# Patient Record
Sex: MALE | Race: WHITE | NOT HISPANIC OR LATINO | Employment: FULL TIME | URBAN - METROPOLITAN AREA
[De-identification: names, ages, dates, MRNs, and addresses within clinical notes are randomized per-mention and may not be internally consistent; named-entity substitution may affect disease eponyms.]

---

## 2017-01-30 ENCOUNTER — PATIENT MESSAGE (OUTPATIENT)
Dept: INTERNAL MEDICINE | Facility: CLINIC | Age: 57
End: 2017-01-30

## 2017-01-31 DIAGNOSIS — Z12.11 SPECIAL SCREENING FOR MALIGNANT NEOPLASMS, COLON: Primary | ICD-10-CM

## 2017-01-31 RX ORDER — SODIUM, POTASSIUM,MAG SULFATES 17.5-3.13G
SOLUTION, RECONSTITUTED, ORAL ORAL
Qty: 354 ML | Refills: 0 | Status: ON HOLD | OUTPATIENT
Start: 2017-01-31 | End: 2017-04-08 | Stop reason: HOSPADM

## 2017-02-10 ENCOUNTER — OFFICE VISIT (OUTPATIENT)
Dept: DERMATOLOGY | Facility: CLINIC | Age: 57
End: 2017-02-10
Payer: COMMERCIAL

## 2017-02-10 DIAGNOSIS — L25.9 CONTACT DERMATITIS, UNSPECIFIED CONTACT DERMATITIS TYPE, UNSPECIFIED TRIGGER: Primary | ICD-10-CM

## 2017-02-10 DIAGNOSIS — B07.9 VIRAL WARTS, UNSPECIFIED TYPE: ICD-10-CM

## 2017-02-10 PROCEDURE — 99999 PR PBB SHADOW E&M-EST. PATIENT-LVL II: CPT | Mod: PBBFAC,,, | Performed by: DERMATOLOGY

## 2017-02-10 PROCEDURE — 99202 OFFICE O/P NEW SF 15 MIN: CPT | Mod: 25,S$GLB,, | Performed by: DERMATOLOGY

## 2017-02-10 PROCEDURE — 17110 DESTRUCTION B9 LES UP TO 14: CPT | Mod: S$GLB,,, | Performed by: DERMATOLOGY

## 2017-02-10 RX ORDER — FLUTICASONE PROPIONATE 0.5 MG/G
CREAM TOPICAL 2 TIMES DAILY
Qty: 60 G | Refills: 0 | Status: SHIPPED | OUTPATIENT
Start: 2017-02-10 | End: 2018-05-21

## 2017-02-10 RX ORDER — KETOCONAZOLE 20 MG/G
CREAM TOPICAL 2 TIMES DAILY
Qty: 60 G | Refills: 0 | Status: SHIPPED | OUTPATIENT
Start: 2017-02-10 | End: 2018-05-21

## 2017-02-10 NOTE — PROGRESS NOTES
Subjective:       Patient ID:  Dylan Sawant is a 56 y.o. male who presents for   Chief Complaint   Patient presents with    Lesion     HPI Comments: Pt c/o itchy red rash on right buttock x 1-2 weeks. Tx with unknown otc cream. No improvement.    Lesion  - Initial  Affected locations: left elbow  Duration: 1 year  Signs / symptoms: asymptomatic  Severity: mild to moderate  Timing: constant  Aggravated by: nothing  Relieving factors/Treatments tried: nothing  Improvement on treatment: no relief    He was unknowingly applying Prep H pads to his buttock, thinking they were moist hygiene wipes  Past Medical History   Diagnosis Date    Allergy     Anxiety     Elevated BP     Hyperlipidemia     KELLY (obstructive sleep apnea)     Restless leg syndrome      Review of Systems   Constitutional: Negative for fever, chills, fatigue and malaise.   Skin: Positive for activity-related sunscreen use. Negative for daily sunscreen use and recent sunburn.   Hematologic/Lymphatic: Does not bruise/bleed easily.        Objective:    Physical Exam   Constitutional: He appears well-developed and well-nourished. No distress.   Neurological: He is alert and oriented to person, place, and time. He is not disoriented.   Psychiatric: He has a normal mood and affect.   Skin:   Areas Examined (abnormalities noted in diagram):   Head / Face Inspection Performed  Neck Inspection Performed  Chest / Axilla Inspection Performed  Back Inspection Performed  RUE Inspected  LUE Inspection Performed              Diagram Legend     Erythematous scaling macule/papule c/w actinic keratosis       Vascular papule c/w angioma      Pigmented verrucoid papule/plaque c/w seborrheic keratosis      Yellow umbilicated papule c/w sebaceous hyperplasia      Irregularly shaped tan macule c/w lentigo     1-2 mm smooth white papules consistent with Milia      Movable subcutaneous cyst with punctum c/w epidermal inclusion cyst      Subcutaneous movable cyst c/w  pilar cyst      Firm pink to brown papule c/w dermatofibroma      Pedunculated fleshy papule(s) c/w skin tag(s)      Evenly pigmented macule c/w junctional nevus     Mildly variegated pigmented, slightly irregular-bordered macule c/w mildly atypical nevus      Flesh colored to evenly pigmented papule c/w intradermal nevus       Pink pearly papule/plaque c/w basal cell carcinoma      Erythematous hyperkeratotic cursted plaque c/w SCC      Surgical scar with no sign of skin cancer recurrence      Open and closed comedones      Inflammatory papules and pustules      Verrucoid papule consistent consistent with wart     Erythematous eczematous patches and plaques     Dystrophic onycholytic nail with subungual debris c/w onychomycosis     Umbilicated papule    Erythematous-base heme-crusted tan verrucoid plaque consistent with inflamed seborrheic keratosis     Erythematous Silvery Scaling Plaque c/w Psoriasis     See annotation      Assessment / Plan:        Contact dermatitis, unspecified contact dermatitis type, unspecified trigger with likely component of intertrigo  -     fluticasone (CUTIVATE) 0.05 % cream; Apply topically 2 (two) times daily.  Dispense: 60 g; Refill: 0  -     ketoconazole (NIZORAL) 2 % cream; Apply topically 2 (two) times daily.  Dispense: 60 g; Refill: 0  I discussed the side effects of topical steroids including atrophy, telangiectasias, striae.  Avoid use on the face and contact with the eyes  Keep area as clean and dry as possible    Verruca Vulgaris  Cryosurgery procedure note:    Verbal consent from the patient is obtained. Liquid nitrogen cryosurgery is applied to 2 verruca, as detailed in the physical exam, to produce a freeze injury. 2 consecutive freeze thaw cycles are applied to each verruca. The patient is aware that blisters (possibly blood blisters) may form.           Return for Pt will call for follow up in 3 weeks if rash persists.

## 2017-03-06 ENCOUNTER — PATIENT MESSAGE (OUTPATIENT)
Dept: INTERNAL MEDICINE | Facility: CLINIC | Age: 57
End: 2017-03-06

## 2017-03-06 ENCOUNTER — PATIENT MESSAGE (OUTPATIENT)
Dept: DERMATOLOGY | Facility: CLINIC | Age: 57
End: 2017-03-06

## 2017-03-06 DIAGNOSIS — G47.33 OSA (OBSTRUCTIVE SLEEP APNEA): ICD-10-CM

## 2017-03-07 PROBLEM — G47.33 OSA (OBSTRUCTIVE SLEEP APNEA): Status: ACTIVE | Noted: 2017-03-07

## 2017-03-08 ENCOUNTER — PATIENT MESSAGE (OUTPATIENT)
Dept: INTERNAL MEDICINE | Facility: CLINIC | Age: 57
End: 2017-03-08

## 2017-03-30 DIAGNOSIS — G25.81 RESTLESS LEGS: ICD-10-CM

## 2017-03-30 RX ORDER — ROPINIROLE 2 MG/1
TABLET, FILM COATED ORAL
Qty: 180 TABLET | Refills: 1 | Status: SHIPPED | OUTPATIENT
Start: 2017-03-30 | End: 2017-09-12 | Stop reason: SDUPTHER

## 2017-04-08 ENCOUNTER — ANESTHESIA EVENT (OUTPATIENT)
Dept: ENDOSCOPY | Facility: HOSPITAL | Age: 57
End: 2017-04-08
Payer: COMMERCIAL

## 2017-04-08 ENCOUNTER — SURGERY (OUTPATIENT)
Age: 57
End: 2017-04-08

## 2017-04-08 ENCOUNTER — ANESTHESIA (OUTPATIENT)
Dept: ENDOSCOPY | Facility: HOSPITAL | Age: 57
End: 2017-04-08
Payer: COMMERCIAL

## 2017-04-08 VITALS — RESPIRATION RATE: 40 BRPM

## 2017-04-08 PROBLEM — Z12.11 SCREENING FOR COLON CANCER: Status: ACTIVE | Noted: 2017-04-08

## 2017-04-08 PROCEDURE — D9220A PRA ANESTHESIA: Mod: 33,ANES,, | Performed by: ANESTHESIOLOGY

## 2017-04-08 PROCEDURE — D9220A PRA ANESTHESIA: Mod: 33,CRNA,, | Performed by: NURSE ANESTHETIST, CERTIFIED REGISTERED

## 2017-04-08 PROCEDURE — 63600175 PHARM REV CODE 636 W HCPCS: Performed by: NURSE ANESTHETIST, CERTIFIED REGISTERED

## 2017-04-08 PROCEDURE — 25000003 PHARM REV CODE 250: Performed by: NURSE ANESTHETIST, CERTIFIED REGISTERED

## 2017-04-08 RX ORDER — PROPOFOL 10 MG/ML
VIAL (ML) INTRAVENOUS CONTINUOUS PRN
Status: DISCONTINUED | OUTPATIENT
Start: 2017-04-08 | End: 2017-04-08

## 2017-04-08 RX ORDER — PROPOFOL 10 MG/ML
VIAL (ML) INTRAVENOUS
Status: DISCONTINUED | OUTPATIENT
Start: 2017-04-08 | End: 2017-04-08

## 2017-04-08 RX ORDER — LIDOCAINE HCL/PF 100 MG/5ML
SYRINGE (ML) INTRAVENOUS
Status: DISCONTINUED | OUTPATIENT
Start: 2017-04-08 | End: 2017-04-08

## 2017-04-08 RX ADMIN — LIDOCAINE HYDROCHLORIDE 50 MG: 20 INJECTION, SOLUTION INTRAVENOUS at 09:04

## 2017-04-08 RX ADMIN — PROPOFOL 100 MG: 10 INJECTION, EMULSION INTRAVENOUS at 09:04

## 2017-04-08 RX ADMIN — PROPOFOL 150 MCG/KG/MIN: 10 INJECTION, EMULSION INTRAVENOUS at 09:04

## 2017-04-08 NOTE — ANESTHESIA PREPROCEDURE EVALUATION
04/08/2017  Dylan Sawant is a 56 y.o., male.    [facility] Anesthesia Evaluation    I have reviewed the Patient Summary Reports.    I have reviewed the Nursing Notes.   I have reviewed the Medications.     Review of Systems  Anesthesia Hx:  No problems with previous Anesthesia Denies Hx of Anesthetic complications    Social:  Non-Smoker    Cardiovascular:   Denies Hypertension.  Denies MI.  Denies CAD.    Denies CABG/stent.   Denies Angina. hyperlipidemia    Pulmonary:   Denies COPD.  Denies Asthma.  Denies Recent URI.    Renal/:   Denies Chronic Renal Disease.     Hepatic/GI:   GERD, well controlled Denies Liver Disease.    Neurological:   Denies TIA. Denies CVA. Denies Seizures.    Endocrine:   Denies Diabetes. Denies Hypothyroidism.    Psych:   Denies Psychiatric History.          Physical Exam  General:  Well nourished    Airway/Jaw/Neck:  Airway Findings: Mouth Opening: Normal Tongue: Normal  General Airway Assessment: Adult, Good  Mallampati: II  TM Distance: 4 - 6 cm       Chest/Lungs:  Chest/Lungs Findings: Clear to auscultation, Normal Respiratory Rate     Heart/Vascular:  Heart Findings: Rate: Normal  Rhythm: Regular Rhythm  Sounds: Normal        Mental Status:  Mental Status Findings:  Cooperative, Alert and Oriented         Anesthesia Plan  Type of Anesthesia, risks & benefits discussed:  Anesthesia Type:  general  Patient's Preference:   Intra-op Monitoring Plan:   Intra-op Monitoring Plan Comments:   Post Op Pain Control Plan:   Post Op Pain Control Plan Comments:   Induction:   IV  Beta Blocker:         Informed Consent: Patient understands risks and agrees with Anesthesia plan.  Questions answered. Anesthesia consent signed with patient.  ASA Score: 2     Day of Surgery Review of History & Physical: I have interviewed and examined the patient. I have reviewed the patient's H&P dated:  There  are no significant changes.          Ready For Surgery From Anesthesia Perspective.

## 2017-04-08 NOTE — ANESTHESIA POSTPROCEDURE EVALUATION
"Anesthesia Post Evaluation    Patient: Dylan Sawant    Procedure(s) Performed: Procedure(s) (LRB):  COLONOSCOPY (N/A)    Final Anesthesia Type: general  Patient location during evaluation: PACU  Patient participation: Yes- Able to Participate  Level of consciousness: awake and alert and oriented  Post-procedure vital signs: reviewed and stable  Pain management: adequate  Airway patency: patent  PONV status at discharge: No PONV  Anesthetic complications: no      Cardiovascular status: blood pressure returned to baseline  Respiratory status: unassisted, spontaneous ventilation and room air  Hydration status: euvolemic  Follow-up not needed.        Visit Vitals    /71 (BP Location: Left arm, Patient Position: Lying, BP Method: Automatic)    Pulse 78    Temp 36.7 °C (98 °F) (Axillary)    Resp 16    Ht 5' 8" (1.727 m)    Wt 89.4 kg (197 lb)    SpO2 96%    BMI 29.95 kg/m2       Pain/Gee Score: Pain Assessment Performed: Yes (4/8/2017 10:10 AM)  Presence of Pain: non-verbal indicators absent (4/8/2017 10:10 AM)  Gee Score: 9 (4/8/2017 10:10 AM)      "

## 2017-04-08 NOTE — TRANSFER OF CARE
"Anesthesia Transfer of Care Note    Patient: Dylan Sawant    Procedure(s) Performed: Procedure(s) (LRB):  COLONOSCOPY (N/A)    Patient location: PACU    Anesthesia Type: general    Transport from OR: Transported from OR on room air with adequate spontaneous ventilation    Post pain: adequate analgesia    Post assessment: no apparent anesthetic complications    Post vital signs: stable    Level of consciousness: awake    Nausea/Vomiting: no nausea/vomiting    Complications: none          Last vitals:   Visit Vitals    /71 (BP Location: Left arm, Patient Position: Lying, BP Method: Automatic)    Pulse 78    Temp 36.7 °C (98 °F) (Axillary)    Resp 16    Ht 5' 8" (1.727 m)    Wt 89.4 kg (197 lb)    SpO2 96%    BMI 29.95 kg/m2     "

## 2017-09-12 DIAGNOSIS — G25.81 RESTLESS LEGS: ICD-10-CM

## 2017-09-12 RX ORDER — ROPINIROLE 2 MG/1
TABLET, FILM COATED ORAL
Qty: 180 TABLET | Refills: 0 | Status: SHIPPED | OUTPATIENT
Start: 2017-09-12 | End: 2017-09-25 | Stop reason: SDUPTHER

## 2017-09-25 ENCOUNTER — LAB VISIT (OUTPATIENT)
Dept: LAB | Facility: HOSPITAL | Age: 57
End: 2017-09-25
Attending: INTERNAL MEDICINE
Payer: COMMERCIAL

## 2017-09-25 ENCOUNTER — OFFICE VISIT (OUTPATIENT)
Dept: INTERNAL MEDICINE | Facility: CLINIC | Age: 57
End: 2017-09-25
Payer: COMMERCIAL

## 2017-09-25 VITALS
DIASTOLIC BLOOD PRESSURE: 89 MMHG | BODY MASS INDEX: 29.61 KG/M2 | OXYGEN SATURATION: 98 % | WEIGHT: 199.94 LBS | HEART RATE: 77 BPM | SYSTOLIC BLOOD PRESSURE: 130 MMHG | HEIGHT: 69 IN

## 2017-09-25 DIAGNOSIS — S39.012D BACK STRAIN, SUBSEQUENT ENCOUNTER: ICD-10-CM

## 2017-09-25 DIAGNOSIS — Z12.5 SPECIAL SCREENING FOR MALIGNANT NEOPLASM OF PROSTATE: ICD-10-CM

## 2017-09-25 DIAGNOSIS — Z00.00 HEALTH CARE MAINTENANCE: Primary | ICD-10-CM

## 2017-09-25 DIAGNOSIS — G47.00 INSOMNIA, UNSPECIFIED TYPE: ICD-10-CM

## 2017-09-25 DIAGNOSIS — Z23 NEED FOR TDAP VACCINATION: ICD-10-CM

## 2017-09-25 DIAGNOSIS — E78.2 MIXED HYPERLIPIDEMIA: ICD-10-CM

## 2017-09-25 DIAGNOSIS — G47.33 OSA (OBSTRUCTIVE SLEEP APNEA): ICD-10-CM

## 2017-09-25 DIAGNOSIS — B35.6 TINEA CRURIS: ICD-10-CM

## 2017-09-25 DIAGNOSIS — K21.9 GASTROESOPHAGEAL REFLUX DISEASE, ESOPHAGITIS PRESENCE NOT SPECIFIED: ICD-10-CM

## 2017-09-25 DIAGNOSIS — Z00.00 HEALTH CARE MAINTENANCE: ICD-10-CM

## 2017-09-25 DIAGNOSIS — G25.81 RESTLESS LEGS: ICD-10-CM

## 2017-09-25 LAB
ALBUMIN SERPL BCP-MCNC: 4.4 G/DL
ALP SERPL-CCNC: 47 U/L
ALT SERPL W/O P-5'-P-CCNC: 35 U/L
ANION GAP SERPL CALC-SCNC: 13 MMOL/L
AST SERPL-CCNC: 25 U/L
BASOPHILS # BLD AUTO: 0.03 K/UL
BASOPHILS NFR BLD: 0.3 %
BILIRUB SERPL-MCNC: 0.7 MG/DL
BUN SERPL-MCNC: 13 MG/DL
CALCIUM SERPL-MCNC: 9.9 MG/DL
CHLORIDE SERPL-SCNC: 104 MMOL/L
CHOLEST SERPL-MCNC: 202 MG/DL
CHOLEST/HDLC SERPL: 3.8 {RATIO}
CO2 SERPL-SCNC: 25 MMOL/L
COMPLEXED PSA SERPL-MCNC: 0.47 NG/ML
CREAT SERPL-MCNC: 1 MG/DL
DIFFERENTIAL METHOD: NORMAL
EOSINOPHIL # BLD AUTO: 0.1 K/UL
EOSINOPHIL NFR BLD: 0.6 %
ERYTHROCYTE [DISTWIDTH] IN BLOOD BY AUTOMATED COUNT: 13.9 %
EST. GFR  (AFRICAN AMERICAN): >60 ML/MIN/1.73 M^2
EST. GFR  (NON AFRICAN AMERICAN): >60 ML/MIN/1.73 M^2
ESTIMATED AVG GLUCOSE: 117 MG/DL
GLUCOSE SERPL-MCNC: 95 MG/DL
HBA1C MFR BLD HPLC: 5.7 %
HCT VFR BLD AUTO: 47.1 %
HDLC SERPL-MCNC: 53 MG/DL
HDLC SERPL: 26.2 %
HGB BLD-MCNC: 15.4 G/DL
LDLC SERPL CALC-MCNC: 123 MG/DL
LYMPHOCYTES # BLD AUTO: 2.4 K/UL
LYMPHOCYTES NFR BLD: 21.7 %
MCH RBC QN AUTO: 30.7 PG
MCHC RBC AUTO-ENTMCNC: 32.7 G/DL
MCV RBC AUTO: 94 FL
MONOCYTES # BLD AUTO: 0.8 K/UL
MONOCYTES NFR BLD: 7 %
NEUTROPHILS # BLD AUTO: 7.7 K/UL
NEUTROPHILS NFR BLD: 70.2 %
NONHDLC SERPL-MCNC: 149 MG/DL
PLATELET # BLD AUTO: 281 K/UL
PMV BLD AUTO: 11.1 FL
POTASSIUM SERPL-SCNC: 4.3 MMOL/L
PROT SERPL-MCNC: 7.6 G/DL
RBC # BLD AUTO: 5.01 M/UL
SODIUM SERPL-SCNC: 142 MMOL/L
TRIGL SERPL-MCNC: 130 MG/DL
TSH SERPL DL<=0.005 MIU/L-ACNC: 0.45 UIU/ML
WBC # BLD AUTO: 10.99 K/UL

## 2017-09-25 PROCEDURE — 83036 HEMOGLOBIN GLYCOSYLATED A1C: CPT

## 2017-09-25 PROCEDURE — 90715 TDAP VACCINE 7 YRS/> IM: CPT | Mod: S$GLB,,, | Performed by: INTERNAL MEDICINE

## 2017-09-25 PROCEDURE — 90471 IMMUNIZATION ADMIN: CPT | Mod: S$GLB,,, | Performed by: INTERNAL MEDICINE

## 2017-09-25 PROCEDURE — 36415 COLL VENOUS BLD VENIPUNCTURE: CPT

## 2017-09-25 PROCEDURE — 80061 LIPID PANEL: CPT

## 2017-09-25 PROCEDURE — 84443 ASSAY THYROID STIM HORMONE: CPT

## 2017-09-25 PROCEDURE — 85025 COMPLETE CBC W/AUTO DIFF WBC: CPT

## 2017-09-25 PROCEDURE — 99396 PREV VISIT EST AGE 40-64: CPT | Mod: 25,S$GLB,, | Performed by: INTERNAL MEDICINE

## 2017-09-25 PROCEDURE — 80053 COMPREHEN METABOLIC PANEL: CPT

## 2017-09-25 PROCEDURE — 84153 ASSAY OF PSA TOTAL: CPT

## 2017-09-25 PROCEDURE — 99999 PR PBB SHADOW E&M-EST. PATIENT-LVL III: CPT | Mod: PBBFAC,,, | Performed by: INTERNAL MEDICINE

## 2017-09-25 RX ORDER — CYCLOBENZAPRINE HCL 10 MG
10 TABLET ORAL 3 TIMES DAILY PRN
Qty: 30 TABLET | Refills: 1 | Status: SHIPPED | OUTPATIENT
Start: 2017-09-25 | End: 2017-10-25

## 2017-09-25 RX ORDER — ZOLPIDEM TARTRATE 5 MG/1
5 TABLET ORAL NIGHTLY PRN
Qty: 30 TABLET | Refills: 3 | Status: SHIPPED | OUTPATIENT
Start: 2017-09-25 | End: 2018-04-25 | Stop reason: SDUPTHER

## 2017-09-25 RX ORDER — ROPINIROLE 2 MG/1
2 TABLET, FILM COATED ORAL 2 TIMES DAILY PRN
Qty: 180 TABLET | Refills: 3 | Status: SHIPPED | OUTPATIENT
Start: 2017-09-25 | End: 2018-07-24 | Stop reason: SDUPTHER

## 2017-09-25 NOTE — PROGRESS NOTES
"Subjective:       Patient ID: Dylan Sawant is a 56 y.o. male.    Chief Complaint: Follow-up (pt states he needs bloodwork/labs done.)    HPI   57 yo M here for follow up of restless leg syndrome, hyperlipidemia, CASTILLO, GERD.   ropinorole 4mg qhs, ok 2mg bid working well. Sleep medicine referred but not seen. Not interested in CPAP. Didn't go. Also with deviated septum.  Deviated septum already operated on twice.    lexapro 20mg  Fenofibrate and prava  gerd- taking omeprazole 20mg daily. Recurrent symptoms if stops medicine.    Going to gym in morning - elliptical. Now retired.   Pulled back few times over course of year. Went to chiropractor.     He had bad ear infection and was treated at [facility] about 6 weeks ago.    Still with insomnia. Tylenol pm not helping.   Review of Systems   Constitutional: Negative for fever.   HENT: Negative.    Eyes: Negative.    Respiratory: Negative for shortness of breath.    Cardiovascular: Negative for chest pain and leg swelling.   Gastrointestinal: Negative for abdominal pain, diarrhea, nausea and vomiting.   Genitourinary: Negative.    Musculoskeletal: Negative for arthralgias.   Skin: Negative for rash.   Psychiatric/Behavioral: Positive for sleep disturbance.       Objective:   /89 (BP Location: Right arm, Patient Position: Sitting, BP Method: Medium (Manual))   Pulse 77   Ht 5' 9" (1.753 m)   Wt 90.7 kg (199 lb 15.3 oz)   SpO2 98%   BMI 29.53 kg/m²      Physical Exam   Constitutional: He is oriented to person, place, and time. He appears well-developed and well-nourished.   HENT:   Head: Normocephalic and atraumatic.   Bilateral ear canals without purulence and bilateral TM with good light reflex     Eyes: Conjunctivae and EOM are normal. Pupils are equal, round, and reactive to light.   Neck: Neck supple. No thyromegaly present.   Cardiovascular: Normal rate, regular rhythm and normal heart sounds.    No murmur heard.  Pulmonary/Chest: Effort normal and " breath sounds normal. No respiratory distress. He has no wheezes.   Abdominal: Soft. Bowel sounds are normal. He exhibits no distension. There is no tenderness.   Musculoskeletal: Normal range of motion.   Neurological: He is alert and oriented to person, place, and time.   Skin: Skin is warm and dry. No rash noted.   Psychiatric: He has a normal mood and affect. Judgment and thought content normal.   Vitals reviewed.      Assessment:       1. Health care maintenance    2. KELLY (obstructive sleep apnea)    3. Mixed hyperlipidemia    4. Gastroesophageal reflux disease, esophagitis presence not specified    5. Restless legs    6. Special screening for malignant neoplasm of prostate    7. Insomnia, unspecified type    8. Tinea cruris    9. Back strain, subsequent encounter    10. Need for Tdap vaccination        Plan:       Dylan Mann was seen today for follow-up.    Diagnoses and all orders for this visit:    Health care maintenance  -     CBC auto differential; Future  -     Comprehensive metabolic panel; Future  -     Hemoglobin A1c; Future  -     Lipid panel; Future  -     TSH; Future    KELLY (obstructive sleep apnea)  Declines further eval or cpap    Mixed hyperlipidemia  Cont statin, fenofibrate  Check lipids    Gastroesophageal reflux disease, esophagitis presence not specified  Cont ppi, recurrent symptoms immediately if stops    Restless legs  -     ropinirole (REQUIP) 2 MG tablet; Take 1 tablet (2 mg total) by mouth 2 (two) times daily as needed.    Special screening for malignant neoplasm of prostate  -     PSA, Screening; Future    Insomnia, unspecified type  -     zolpidem (AMBIEN) 5 MG Tab; Take 1 tablet (5 mg total) by mouth nightly as needed.    Tinea cruris    Back strain, subsequent encounter  -     cyclobenzaprine (FLEXERIL) 10 MG tablet; Take 1 tablet (10 mg total) by mouth 3 (three) times daily as needed for Muscle spasms. Do not take within an hour of ambien or ropinorole   To take  sparingly    Need for Tdap vaccination  -     (In Office Administered) Tdap Vaccine

## 2017-11-27 DIAGNOSIS — E78.2 MIXED HYPERLIPIDEMIA: ICD-10-CM

## 2017-11-27 RX ORDER — PRAVASTATIN SODIUM 20 MG/1
TABLET ORAL
Qty: 90 TABLET | Refills: 2 | Status: SHIPPED | OUTPATIENT
Start: 2017-11-27 | End: 2018-09-04 | Stop reason: SDUPTHER

## 2017-12-22 DIAGNOSIS — K21.9 GASTROESOPHAGEAL REFLUX DISEASE, ESOPHAGITIS PRESENCE NOT SPECIFIED: ICD-10-CM

## 2017-12-22 DIAGNOSIS — F41.1 GAD (GENERALIZED ANXIETY DISORDER): ICD-10-CM

## 2017-12-22 RX ORDER — ESCITALOPRAM OXALATE 20 MG/1
TABLET ORAL
Qty: 90 TABLET | Refills: 3 | Status: SHIPPED | OUTPATIENT
Start: 2017-12-22 | End: 2018-12-02 | Stop reason: SDUPTHER

## 2017-12-22 RX ORDER — OMEPRAZOLE 20 MG/1
CAPSULE, DELAYED RELEASE ORAL
Qty: 90 CAPSULE | Refills: 2 | Status: SHIPPED | OUTPATIENT
Start: 2017-12-22 | End: 2018-12-02 | Stop reason: SDUPTHER

## 2018-02-01 DIAGNOSIS — E78.2 MIXED HYPERLIPIDEMIA: ICD-10-CM

## 2018-02-01 RX ORDER — FENOFIBRATE 134 MG/1
CAPSULE ORAL
Qty: 90 CAPSULE | Refills: 2 | Status: SHIPPED | OUTPATIENT
Start: 2018-02-01 | End: 2018-09-03 | Stop reason: SDUPTHER

## 2018-02-04 ENCOUNTER — OFFICE VISIT (OUTPATIENT)
Dept: URGENT CARE | Facility: CLINIC | Age: 58
End: 2018-02-04
Payer: COMMERCIAL

## 2018-02-04 VITALS
DIASTOLIC BLOOD PRESSURE: 80 MMHG | OXYGEN SATURATION: 99 % | HEART RATE: 98 BPM | TEMPERATURE: 98 F | WEIGHT: 198 LBS | HEIGHT: 69 IN | SYSTOLIC BLOOD PRESSURE: 120 MMHG | RESPIRATION RATE: 18 BRPM | BODY MASS INDEX: 29.33 KG/M2

## 2018-02-04 DIAGNOSIS — S91.012A: Primary | ICD-10-CM

## 2018-02-04 PROCEDURE — 99214 OFFICE O/P EST MOD 30 MIN: CPT | Mod: 25,S$GLB,, | Performed by: INTERNAL MEDICINE

## 2018-02-04 PROCEDURE — 3008F BODY MASS INDEX DOCD: CPT | Mod: S$GLB,,, | Performed by: INTERNAL MEDICINE

## 2018-02-04 PROCEDURE — 12002 RPR S/N/AX/GEN/TRNK2.6-7.5CM: CPT | Mod: S$GLB,,, | Performed by: INTERNAL MEDICINE

## 2018-02-04 NOTE — PROGRESS NOTES
Subjective:       Patient ID: Dylan Sawant is a 57 y.o. male.    Vitals:  vitals were not taken for this visit.    Chief Complaint: Laceration    Laceration    The incident occurred 1 to 3 hours ago. The laceration is located on the left foot. The laceration is 5 cm in size. The laceration mechanism was a broken glass. The patient is experiencing no pain. He reports no foreign bodies present. His tetanus status is UTD.   laceration above left medial malleolus.  Glass broke while he was cleaning bathroom.  Had tetanus injection within last year.   Review of Systems   Constitution: Negative for weakness and malaise/fatigue.   HENT: Negative for nosebleeds.    Cardiovascular: Negative for chest pain and syncope.   Respiratory: Negative for shortness of breath.    Musculoskeletal: Negative for back pain, joint pain and neck pain.   Gastrointestinal: Negative for abdominal pain.   Genitourinary: Negative for hematuria.   Neurological: Negative for dizziness and numbness.       Objective:      Physical Exam   Skin:   Clean linear 5-6 cm laceration proximal to medial malleolus left.  No tendon visible.       Assessment:       1.  laceration left ankle   Plan:         1.  Laceration repaired.  2.  Sterile dressing.

## 2018-02-04 NOTE — PATIENT INSTRUCTIONS
Extremity Laceration: Sutures, Staples, or Tape  A laceration is a cut through the skin. If it is deep, it may require stitches (sutures) or staples to close so it can heal. Minor cuts may be treated with surgical tape closures.   X-rays may be done if something may have entered the skin through the cut. You may also need a tetanus shot if you are not up to date on this vaccination.  Home care  · Follow the health care providers instructions on how to care for the cut.  · Wash your hands with soap and warm water before and after caring for your wound. This is to help prevent infection.  · Keep the wound clean and dry. If a bandage was applied and it becomes wet or dirty, replace it. Otherwise, leave it in place for the first 24 hours, then change it once a day or as directed.  · If sutures or staples were used, clean the wound daily:  · After removing the bandage, wash the area with soap and water. Use a wet cotton swab to loosen and remove any blood or crust that forms.  · After cleaning, keep the wound clean and dry. Talk with your doctor before applying any antibiotic ointment to the wound. Reapply the bandage.  · You may remove the bandage to shower as usual after the first 24 hours, but do not soak the area in water (no swimming) until the stitches or staples are removed.  · If surgical tape closures were used, keep the area clean and dry. If it becomes wet, blot it dry with a towel.  · The doctor may prescribe an antibiotic cream or ointment to prevent infection. Do not stop taking this medication until you have finished the prescribed course or the doctor tells you to stop. The doctor may also prescribe medications for pain. Follow the doctors instructions for taking these medications.  · Avoid activities that may reopen your wound.  Follow-up care  Follow up with your health care provider. Most skin wounds heal within ten days. However, an infection may sometimes occur despite proper treatment.  Therefore, check the wound daily for the signs of infection listed below. Stitches and staples should be removed within 7-14 days. If surgical tape closures were used, you may remove them after 10 days if they have not fallen off by then.   When to seek medical advice  Call your health care provider right away if any of these occur:  · Wound bleeding not controlled by direct pressure  · Signs of infection, including increasing pain in the wound, increasing wound redness or swelling, or pus or bad odor coming from the wound  · Fever of 100.4°F (38ºC) or higher or as directed by your healthcare provider  · Stitches or staples come apart or fall out or surgical tape falls off before 7 days  · Wound edges re-open  · Wound changes colors  · Numbness around the wound   · Decreased movement around the injured area  Date Last Reviewed: 6/14/2015  © 8944-8569 [employer], [employer] [facility]. [address] [place] [address], [place] [address]. All rights reserved. This information is not intended as a substitute for professional medical care. Always follow your healthcare professional's instructions.

## 2018-02-04 NOTE — PROCEDURES
"Laceration Repair  Date/Time: 2/4/2018 3:23 PM  Performed by: BARRY OCONNELL  Authorized by: BARRY OCONNELL   Consent Done: Yes  Consent: Verbal consent obtained. Written consent not obtained.  Risks and benefits: risks, benefits and alternatives were discussed  Consent given by: patient  Patient understanding: patient states understanding of the procedure being performed  Patient consent: the patient's understanding of the procedure matches consent given  Procedure consent: procedure consent matches procedure scheduled  Relevant documents: relevant documents present and verified  Test results: test results available and properly labeled  Site marked: the operative site was not marked  Imaging studies: imaging studies not available  Time out: Immediately prior to procedure a "time out" was called to verify the correct patient, procedure, equipment, support staff and site/side marked as required.  Body area: lower extremity  Location details: left ankle  Laceration length: 6 cm  Foreign bodies: no foreign bodies  Tendon involvement: none  Nerve involvement: none  Vascular damage: no  Anesthesia: local infiltration    Anesthesia:  Local Anesthetic: lidocaine 2% with epinephrine  Patient sedated: no  Preparation: Patient was prepped and draped in the usual sterile fashion.  Irrigation solution: saline  Irrigation method: syringe  Amount of cleaning: standard  Debridement: none  Degree of undermining: none  Skin closure: 5-0 nylon  Number of sutures: 4  Technique: simple  Approximation: loose  Approximation difficulty: simple  Dressing: antibiotic ointment  Patient tolerance: Patient tolerated the procedure well with no immediate complications        "

## 2018-02-14 ENCOUNTER — OFFICE VISIT (OUTPATIENT)
Dept: URGENT CARE | Facility: CLINIC | Age: 58
End: 2018-02-14

## 2018-02-14 VITALS
BODY MASS INDEX: 29.33 KG/M2 | OXYGEN SATURATION: 96 % | DIASTOLIC BLOOD PRESSURE: 99 MMHG | WEIGHT: 198 LBS | HEART RATE: 98 BPM | RESPIRATION RATE: 18 BRPM | SYSTOLIC BLOOD PRESSURE: 145 MMHG | TEMPERATURE: 99 F | HEIGHT: 69 IN

## 2018-02-14 DIAGNOSIS — Z48.02 VISIT FOR SUTURE REMOVAL: Primary | ICD-10-CM

## 2018-02-14 PROCEDURE — 99499 UNLISTED E&M SERVICE: CPT | Mod: S$GLB,,, | Performed by: NURSE PRACTITIONER

## 2018-02-14 PROCEDURE — 99024 POSTOP FOLLOW-UP VISIT: CPT | Mod: S$GLB,,, | Performed by: NURSE PRACTITIONER

## 2018-02-14 NOTE — PATIENT INSTRUCTIONS
"  Suture or Staple Removal  You were seen today for a suture or staple removal. Your wound is healing as expected. The wound has healed well enough that the sutures or staples can be removed. The wound will continue to heal for the next few months.  At this time there is no sign of infection.   Home care  · If you have pain, take pain medicine as advised by your healthcare provider.   · Keep your wound clean and protected by covering it with a bandage for the next week or so.   · Wash your hands with soap and warm water before and after caring for your wound. This helps prevent infection.  · Clean the wound gently with soap and warm water daily or as directed by your childs health care provider. Do not use iodine, alcohol, or other cleansers on the wound.  Gently pat it dry. Put on a new bandage, if needed. Do not reuse bandages.  · If the area gets wet, gently pat it dry with a clean cloth. Replace the wet bandage with a dry one.  · Check the wound daily for signs of infection. (These are listed under "When to seek medical advice" below.)  · You may shower and bathe as usual. Swimming is now permitted.  Follow-up care  Follow up with your healthcare provider as advised.  When to seek medical advice   Call your healthcare provider if any of the following occur:  · Wound reopens or bleeds  · Signs of an infection, such as:  ¨ Increasing redness or swelling around the wound  ¨ Increased warmth from the wound  ¨ Worsening pain  ¨ Red streaking lines away from the wound  ¨ Fluid draining from the wound  · Fever of 100.4°F (38°C) or higher, or as directed by your child's healthcare provider  Date Last Reviewed: 9/27/2015  © 8199-7356 [employer]. [address] [place] [address], [place] [address]. All rights reserved. This information is not intended as a substitute for professional medical care. Always follow your healthcare professional's instructions.        "

## 2018-02-14 NOTE — PROGRESS NOTES
"Subjective:       Patient ID: Dylan Sawant is a 57 y.o. male.    Vitals:  height is 5' 9" (1.753 m) and weight is 89.8 kg (198 lb). His tympanic temperature is 98.5 °F (36.9 °C). His blood pressure is 145/99 (abnormal) and his pulse is 98. His respiration is 18 and oxygen saturation is 96%.     Chief Complaint: Suture / Staple Removal (left inner ankle )    This is a 57 y.o. male with Past Medical History:  No date: Allergy  No date: Anxiety  No date: Elevated BP  No date: Hyperlipidemia  No date: KELLY (obstructive sleep apnea)  No date: Restless leg syndrome   who presents today with a chief complaint of suture removal from left  inner ankle. Sutures placed in clinic 10 days ago      Suture / Staple Removal   The sutures were placed 7 to 10 days ago. He tried regular soap and water washings since the wound repair. The treatment provided significant relief. There has been no drainage from the wound. There is no redness present. The swelling has improved. There is no pain present. He has no difficulty moving the affected extremity or digit.     Review of Systems   Constitution: Negative for weakness and malaise/fatigue.   HENT: Negative for nosebleeds.    Cardiovascular: Negative for chest pain and syncope.   Respiratory: Negative for shortness of breath.    Skin: Negative for poor wound healing.   Musculoskeletal: Negative for back pain, joint pain and neck pain.   Gastrointestinal: Negative for abdominal pain.   Genitourinary: Negative for hematuria.   Neurological: Negative for dizziness and numbness.   All other systems reviewed and are negative.      Objective:      Physical Exam   Constitutional: He is oriented to person, place, and time. He appears well-developed and well-nourished.   HENT:   Head: Normocephalic and atraumatic.   Eyes: EOM are normal.   Neck: Normal range of motion. Neck supple.   Cardiovascular: Normal rate, regular rhythm, S1 normal, S2 normal, normal heart sounds and intact distal " pulses.    Pulmonary/Chest: Effort normal and breath sounds normal.   Neurological: He is alert and oriented to person, place, and time.   Skin: Skin is warm and dry. No erythema.   4 sutures intact to L lower leg. Healing as expected. No SOI   Nursing note and vitals reviewed.      Assessment:       1. Visit for suture removal        Plan:         Visit for suture removal

## 2018-02-14 NOTE — PROCEDURES
Suture Removal  Date/Time: 2/14/2018 1:23 PM  Performed by: JD CARTER  Authorized by: JD CARTER   Body area: lower extremity  Location details: left ankle  Wound Appearance: clean, well healed, no drainage and nontender  Sutures Removed: 4  Post-removal: dressing applied  Facility: sutures placed in this facility  Complications: No  Patient tolerance: Patient tolerated the procedure well with no immediate complications

## 2018-03-05 NOTE — TELEPHONE ENCOUNTER
Called pt to let him know that he would need a follow-up to see provider before can approve refill on medication because has been over a year since last seen. Pt states when he gets some time he will make an appointment.

## 2018-04-25 DIAGNOSIS — G47.00 INSOMNIA, UNSPECIFIED TYPE: ICD-10-CM

## 2018-04-25 RX ORDER — ZOLPIDEM TARTRATE 5 MG/1
5 TABLET ORAL NIGHTLY PRN
Qty: 30 TABLET | Refills: 5 | Status: SHIPPED | OUTPATIENT
Start: 2018-04-25 | End: 2018-05-21

## 2018-05-07 ENCOUNTER — OFFICE VISIT (OUTPATIENT)
Dept: OTOLARYNGOLOGY | Facility: CLINIC | Age: 58
End: 2018-05-07
Payer: COMMERCIAL

## 2018-05-07 VITALS — HEART RATE: 75 BPM | SYSTOLIC BLOOD PRESSURE: 133 MMHG | DIASTOLIC BLOOD PRESSURE: 82 MMHG

## 2018-05-07 DIAGNOSIS — M95.0 NASAL DEFORMITY, ACQUIRED: Primary | ICD-10-CM

## 2018-05-07 DIAGNOSIS — T48.5X5A RHINITIS MEDICAMENTOSA: ICD-10-CM

## 2018-05-07 DIAGNOSIS — J31.0 RHINITIS MEDICAMENTOSA: ICD-10-CM

## 2018-05-07 DIAGNOSIS — J34.89 NASAL OBSTRUCTION: ICD-10-CM

## 2018-05-07 PROCEDURE — 99244 OFF/OP CNSLTJ NEW/EST MOD 40: CPT | Mod: S$GLB,,, | Performed by: OTOLARYNGOLOGY

## 2018-05-07 PROCEDURE — 99999 PR PBB SHADOW E&M-EST. PATIENT-LVL II: CPT | Mod: PBBFAC,,, | Performed by: OTOLARYNGOLOGY

## 2018-05-07 RX ORDER — PREDNISONE 10 MG/1
TABLET ORAL
Qty: 42 TABLET | Refills: 0 | Status: SHIPPED | OUTPATIENT
Start: 2018-05-07 | End: 2018-05-21 | Stop reason: ALTCHOICE

## 2018-05-09 NOTE — CONSULTS
"Mr. Sawant presents referred by Dr. Santiago for consultation.    VITAL SIGNS:  Per nurses' notes.    CHIEF COMPLAINT:  Nasal obstruction.    HISTORY OF PRESENT ILLNESS:  This is a 57-year-old white male who has had a   longstanding history of nasal obstruction.  He states that he had a   "septoplasty" x2 when he was a teenager.  He states this was at  though he   does not remember the name of the surgeon.  He now presents with nasal   obstruction, snoring, diagnosed obstructive sleep apnea and he is not able to   tolerate his CPAP mask.  He does have difficulty sleeping and is tired during   the day.    REVIEW OF SYSTEMS:  CONSTITUTIONAL: Weight loss or weight gain: Negative.  ALLERGY/IMMUNOLOGIC: Negative.  ENT/Mouth:  Hearing Loss/Dizziness/Tinnitus: Negative.  Ear Infections/Otalgia: Negative.  Rhinitis/Sinusitis/Epistaxis: Negative.  Headache/Facial Pain: Negative.  Nasal Obstruction/Snoring/KELLY: Negative.  Throat: Infections/Pain: Negative.  Hoarseness/Speech Disturbance: Negative.  Salivary Glands Disorder: Negative.  Trauma: Hx: Negative.    Cardiovascular:  MI/Angina: Negative.  Hypertension: Negative.  Endo: DM/Steroids: Negative.  Eyes: Negative.  GI: Dysphagia/Reflux: Negative.  : GYN Pregnancy: Negative.      Renal: Dialysis: Negative.  Lymph: Neck Mass/Lymphadenopathy: Negative.  Musculoskeletal: Negative.  Hem: Bleeding Disorders/Anemia: Negative.  Neuro: Cranial/Neuralgia: Negative.  Pulm: Asthma/SOB/Cough: Negative.  Skin/Breast: Negative.    PAST MEDICAL/FAMILY/SOCIAL HISTORY:    Additional Past Medical History   ENT Surgery: Positive for septorhinoplasty.   Occupational Exposure: Negative.    Problems: Negative.   Cancer: Negative.   Positive for anxiety, hypertension.  Past surgeries include tonsillectomy,   adenoidectomy as well as a colonoscopy.    Past Family History   Family history hearing loss: Negative.   Family history cancer: Positive for pancreatic cancer.   Positive for " diabetes, diverticulitis.    Past Social History   Tobacco: Nonsmoker.   Alcohol: Nondrinker.    MEDICATIONS:  Per MedCard.    ALLERGIES:  Per MedCard.    EXAMINATION:  General Appearance:  Well-developed, well-nourished 57-year-old white male in no   apparent distress.  Communication Ability:  Good.  EARS, NOSE, THROAT, MOUTH;  EARS: Clear.   External auditory canals: Clear.   Hearing: Grossly Intact.   Tympanic membranes: Clear.  NOSE:   External: Very narrow nasal vestibules with obvious past alar base reduction   scars.  He does have a positive Waller maneuver.  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   Intranasal: His septum is relatively straight.  His turbinates are 1-2+.  MOUTH:   Intraorally: Lips, teeth and gums: Normal.   Oropharynx: Normal.   Mucosa: Normal.  THROAT:   Tongue: Normal.   Palate: Somewhat low lying.   Tonsils: Absent.   Posterior pharynx: Normal.  HEAD/FACE INSPECTION: Normal and atraumatic.   Palpation/Percussion:  Non tender.   Facial Strength: Normal and symmetric.   Salivary glands: Normal.    NECK: Supple.  THYROID: No masses.  LYMPHATICS: No nodes.  RESPIRATORY:   Effort: Normal.  EYES:   Ocular Mobility: Normal.   Vision: Grossly intact.  NEURO/PSYCH:   Cranial nerves: 2-12 grossly intact.   Orientation: x3.   Mood/Affect: Normal.    Upon further discussion with Mr. Swaant, he does admit that he has been on   oxymetazoline type over-the-counter nasal sprays for many years.    RECOMMENDATION:  I have discussed my findings with him in detail as well as my   recommendations for treatment.  My first recommendation would be for him to   discontinue his oxymetazoline nasal sprays.  I will order a steroid taper for   him to help him do this.  I have discussed rebound nasal obstruction secondary   to the overuse of these sprays.  I have also asked him to do sinus rinses   utilizing distilled water and I have given him literature on this.  He will    begin Flonase as well.  We will have him return to clinic in two weeks for a   followup visit.  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  We have discussed possible   options including nasal reconstruction with auricular cartilage Batten grafts   versus the Latera implants.  We will discuss this again in two weeks when he   returns.      HG/HN  dd: 05/07/2018 09:40:56 (CDT)  td: 05/08/2018 00:51:26 (CDT)  Doc ID   #[id number]  Job ID #[id number]    CC:

## 2018-05-21 ENCOUNTER — OFFICE VISIT (OUTPATIENT)
Dept: OTOLARYNGOLOGY | Facility: CLINIC | Age: 58
End: 2018-05-21
Payer: COMMERCIAL

## 2018-05-21 VITALS — HEART RATE: 82 BPM | SYSTOLIC BLOOD PRESSURE: 153 MMHG | DIASTOLIC BLOOD PRESSURE: 92 MMHG

## 2018-05-21 DIAGNOSIS — J34.89 NASAL OBSTRUCTION: ICD-10-CM

## 2018-05-21 DIAGNOSIS — M95.0 NASAL DEFORMITY, ACQUIRED: Primary | ICD-10-CM

## 2018-05-21 PROCEDURE — 99999 PR PBB SHADOW E&M-EST. PATIENT-LVL II: CPT | Mod: PBBFAC,,, | Performed by: OTOLARYNGOLOGY

## 2018-05-21 PROCEDURE — 99214 OFFICE O/P EST MOD 30 MIN: CPT | Mod: S$GLB,,, | Performed by: OTOLARYNGOLOGY

## 2018-05-21 NOTE — PROGRESS NOTES
SUBJECTIVE:  Mr. Sawant presents back in followup approximately two weeks since   his last visit.  At that time, he was having nasal airway obstruction and was on   oxymetazoline sprays regularly.  He has gotten off these sprays and with the   help of steroid taper as well as Flonase and sinus rinses.  Today, he is   breathing better.  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  He recalled   the previous surgeon that had performed two previous rhinoplasties on him was   Dr. Linton.  We again discussed the options including auricular cartilage,   batten grafts versus Lutera implant.  He wishes to try the Lutera implants to   improve his valve collapse and airway obstruction.  He understands that I cannot   guarantee that this will cure his snoring issues and he understands this.  We   will set this up for him at his convenience.      HG/HN  dd: 05/21/2018 09:09:56 (CDT)  td: 05/21/2018 18:43:56 (CDT)  Doc ID   #[id number]  Job ID #[id number]    CC:

## 2018-06-07 ENCOUNTER — TELEPHONE (OUTPATIENT)
Dept: OTOLARYNGOLOGY | Facility: CLINIC | Age: 58
End: 2018-06-07

## 2018-06-07 DIAGNOSIS — Z01.818 PRE-OP TESTING: Primary | ICD-10-CM

## 2018-06-07 DIAGNOSIS — J34.89 NASAL OBSTRUCTION: ICD-10-CM

## 2018-06-07 DIAGNOSIS — M95.0 NASAL DEFORMITY, ACQUIRED: ICD-10-CM

## 2018-06-18 ENCOUNTER — ANESTHESIA EVENT (OUTPATIENT)
Dept: SURGERY | Facility: HOSPITAL | Age: 58
End: 2018-06-18
Payer: COMMERCIAL

## 2018-06-18 ENCOUNTER — HOSPITAL ENCOUNTER (OUTPATIENT)
Dept: CARDIOLOGY | Facility: CLINIC | Age: 58
Discharge: HOME | End: 2018-06-18
Payer: COMMERCIAL

## 2018-06-18 ENCOUNTER — HOSPITAL ENCOUNTER (OUTPATIENT)
Dept: PREADMISSION TESTING | Facility: HOSPITAL | Age: 58
Discharge: HOME | End: 2018-06-18
Attending: ANESTHESIOLOGY
Payer: COMMERCIAL

## 2018-06-18 VITALS
BODY MASS INDEX: 30.33 KG/M2 | HEART RATE: 78 BPM | WEIGHT: 204.81 LBS | HEIGHT: 69 IN | DIASTOLIC BLOOD PRESSURE: 95 MMHG | TEMPERATURE: 99 F | RESPIRATION RATE: 18 BRPM | SYSTOLIC BLOOD PRESSURE: 152 MMHG | OXYGEN SATURATION: 98 %

## 2018-06-18 DIAGNOSIS — Z01.818 PRE-OP TESTING: ICD-10-CM

## 2018-06-18 PROCEDURE — 93000 ELECTROCARDIOGRAM COMPLETE: CPT | Mod: S$GLB,,, | Performed by: INTERNAL MEDICINE

## 2018-06-18 NOTE — ANESTHESIA PREPROCEDURE EVALUATION
"                                                                                                             06/18/2018  Dylan Sawant is a 57 y.o., male.    Anesthesia Evaluation      I have reviewed the Medications.   Steroids Taken In Past Year: Prednisone    Review of Systems  Anesthesia Hx:  History of prior surgery of interest to airway management or planning: Previous anesthesia: General 4/2017: Colonoscopy with general anesthesia. Procedure performed at an [facility]. Denies Family Hx of Anesthesia complications.   Denies Personal Hx of Anesthesia complications.   Social:  Patient's occupation is [employer]. Denies Tobacco Use. Denies Alcohol Use.   Hematology/Oncology:         -- Denies Anemia:   EENT/Dental:   Denies Throat Symptoms Denies Jaw Problems   Cardiovascular:  Functional Capacity every morning: ellipitical; stretching: denies CP/SOB  Denies Coronary Artery Disease.  Denies Deep Venous Thrombosis (DVT)   Denies Hypertension.    Pulmonary:   Sleep Apnea  Denies Asthma.  Denies Chronic Obstructive Pulmonary Disease (COPD).  Obstructive Sleep Apnea (KELLY). No CPAP use- "felt like I was dying"   Renal/:  Denies Kidney Function/Disease    Hepatic/GI:  Esophageal / Stomach Disorders Gerd Controlled by chronic antireflux medication.  Denies Liver Disease    Musculoskeletal:  Denies Joint Disease     Neurological:  Denies Seizure Disorder  Denies CVA - Cerebrovasular Accident  Denies TIA - Transient Ischemic Attack  Movement Disorder Dx, Restless Leg Syndrome   Endocrine:  Denies Diabetes  Denies Thyroid Disease  Metabolic Disorders, Hyperlipoproteinemia, mixed hyperlipidemia  Psych:  Anxiety Disorder.          Physical Exam  General:  Well nourished    Airway/Jaw/Neck:  Airway Findings: Mouth Opening: Small, but > 3cm Tongue: Large  General Airway Assessment: Possible difficult intubation  Mallampati: III  TM Distance: Normal, at least 6 cm  Jaw/Neck Findings:  Neck ROM: Normal ROM    "   Dental:  Dental Findings: In tact   Chest/Lungs:  Chest/Lungs Findings: Clear to auscultation, Normal Respiratory Rate     Heart/Vascular:  Heart Findings: Rate: Normal  Rhythm: Regular Rhythm  Vascular Findings: Normal       Mental Status:  Mental Status Findings:  Cooperative, Alert and Oriented         Anesthesia Plan  Type of Anesthesia, risks & benefits discussed:  Anesthesia Type:  general  Patient's Preference: General  Intra-op Monitoring Plan: standard ASA monitors  Intra-op Monitoring Plan Comments:   Post Op Pain Control Plan: per primary service following discharge from PACU  Post Op Pain Control Plan Comments: Per primary service  Induction:   IV  Beta Blocker:  Patient is not currently on a Beta-Blocker (No further documentation required).       Informed Consent: Patient understands risks and agrees with Anesthesia plan.  Questions answered. Anesthesia consent signed with patient.  ASA Score: 3     Day of Surgery Review of History & Physical:    H&P update referred to the surgeon.         Ready For Surgery From Anesthesia Perspective.     Tony Whiting RN

## 2018-06-18 NOTE — DISCHARGE INSTRUCTIONS
Your surgery has been scheduled for:__________________________________________    You should report to:  ____[facility], located on the [place] side of the first floor of the           [facility] ([phone])  ____[facility], located on the [facility] side of the            Second floor of the [facility] ([phone])  ____3rd Floor [facility] located on the [facility] side of the [facility] [phone]  Please Note   - Tell your doctor if you take Aspirin, products containing Aspirin, herbal medications  or blood thinners, such as Coumadin, Ticlid, or Plavix.  (Consult your provider regarding holding or stopping before surgery).  - Arrange for someone to drive you home following surgery.  You will not be allowed to leave the surgical facility alone or drive yourself home following sedation and anesthesia.  Before Surgery  - Stop taking all herbal medications 14days prior to surgery  - No Motrin/Advil (Ibuprofen) 7 days before surgery  - No Aleve (Naproxen) 7 days before surgery  - Stop Taking Asprin, products containing Asprin _____days before surgery  - Stop taking blood thinners_______days before surgery  - Refrain from drinking alcoholic beverages for 24hours before and after surgery  - Stop or limit smoking _________days before surgery  Night before Surgery  - DO NOT EAT OR DRINK ANYTHING AFTER MIDNIGHT, INCLUDING GUM, HARD CANDY, MINTS, OR CHEWING TOBACCO.  - Take a shower or bath (shower is recommended).  Bathe with Hibiclens soap or an antibacterial soap from the neck down.  If not supplied by your surgeon, hibiclens soap will need to be purchased over the counter in pharmacy.  Rinse soap off thoroughly.  - Shampoo your hair with your regular shampoo  The Day of Surgery  - Take another bath or shower with hibiclens or any antibacterial soap, to reduce the chance of infection.  - Take heart and blood  pressure medications with a small sip of water, as advised by the perioperative team.  - Do not take fluid pills  - You may brush your teeth and rinse your mouth, but do not swall any additional water.   - Do not apply perfumes, powder, body lotions or deodorant on the day of surgery.  - Nail polish should be removed.  - Do not wear makeup or moisturizer  - Wear comfortable clothes, such as a button front shirt and loose fitting pants.  - Leave all jewelry, including body piercings, and valuables at home.    - Bring any devices you will neeed after surgery such as crutches or canes.  - If you have sleep apnea, please bring your CPAP machine  In the event that your physical condition changes including the onset of a cold or respiratory illness, or if you have to delay or cancel your surgery, please notify your surgeon.  Anesthesia: General Anesthesia     You are watched continuously during your procedure by your anesthesia provider.     Youre due to have surgery. During surgery, youll be given medicine called anesthesia or anesthetic. This will keep you comfortable and pain-free. Your anesthesia provider will use general anesthesia.  What is general anesthesia?  General anesthesia puts you into a state like deep sleep. It goes into the bloodstream (IV anesthetics), into the lungs (gas anesthetics), or both. You feel nothing during the procedure. You will not remember it. During the procedure, the anesthesia provider monitors you continuously. He or she checks your heart rate and rhythm, blood pressure, breathing, and blood oxygen.  · IV anesthetics. IV anesthetics are given through an IV line in your arm. Theyre often given first. This is so you are asleep before a gas anesthetic is started. Some kinds of IV anesthetics relieve pain. Others relax you. Your doctor will decide which kind is best in your case.  · Gas anesthetics. Gas anesthetics are breathed into the lungs. They are often used to keep you asleep.  They can be given through a facemask or a tube placed in your larynx or trachea (breathing tube).  ? If you have a facemask, your anesthesia provider will most likely place it over your nose and mouth while youre still awake. Youll breathe oxygen through the mask as your IV anesthetic is started. Gas anesthetic may be added through the mask.  ? If you have a tube in the larynx or trachea, it will be inserted into your throat after youre asleep.  Anesthesia tools and medicines  You will likely have:  · IV anesthetics. These are put into an IV line into your bloodstream.  · Gas anesthetics. You breathe these anesthetics into your lungs, where they pass into your bloodstream.  · Pulse oximeter. This is a small clip that is attached to the end of your finger. This measures your blood oxygen level.  · Electrocardiography leads (electrodes). These are small sticky pads that are placed on your chest. They record your heart rate and rhythm.  · Blood pressure cuff. This reads your blood pressure.  Risks and possible complications  General anesthesia has some risks. These include:  · Breathing problems  · Nausea and vomiting  · Sore throat or hoarseness (usually temporary)  · Allergic reaction to the anesthetic  · Irregular heartbeat (rare)  · Cardiac arrest (rare)   Anesthesia safety  · Follow all instructions you are given for how long not to eat or drink before your procedure.  · Be sure your doctor knows what medicines and drugs you take. This includes over-the-counter medicines, herbs, supplements, alcohol or other drugs. You will be asked when those were last taken.  · Have an adult family member or friend drive you home after the procedure.  · For the first 24 hours after your surgery:  ? Do not drive or use heavy equipment.  ? Do not make important decisions or sign legal documents. If important decisions or signing legal documents is necessary during the first 24 hours after surgery, have a trusted family member  or spouse act on your behalf.  ? Avoid alcohol.  ? Have a responsible adult stay with you. He or she can watch for problems and help keep you safe.  Date Last Reviewed: 12/1/2016  © 1341-6483 [employer]. [address] [place] [address], [place] [address]. All rights reserved. This information is not intended as a substitute for professional medical care. Always follow your healthcare professional's instructions

## 2018-06-21 ENCOUNTER — TELEPHONE (OUTPATIENT)
Dept: OTOLARYNGOLOGY | Facility: CLINIC | Age: 58
End: 2018-06-21

## 2018-06-22 ENCOUNTER — ANESTHESIA (OUTPATIENT)
Dept: SURGERY | Facility: HOSPITAL | Age: 58
End: 2018-06-22
Payer: COMMERCIAL

## 2018-06-22 ENCOUNTER — HOSPITAL ENCOUNTER (OUTPATIENT)
Facility: HOSPITAL | Age: 58
Discharge: HOME | End: 2018-06-22
Attending: OTOLARYNGOLOGY | Admitting: OTOLARYNGOLOGY
Payer: COMMERCIAL

## 2018-06-22 VITALS
DIASTOLIC BLOOD PRESSURE: 96 MMHG | HEIGHT: 69 IN | HEART RATE: 87 BPM | WEIGHT: 200 LBS | BODY MASS INDEX: 29.62 KG/M2 | SYSTOLIC BLOOD PRESSURE: 136 MMHG | OXYGEN SATURATION: 95 % | TEMPERATURE: 99 F | RESPIRATION RATE: 20 BRPM

## 2018-06-22 DIAGNOSIS — J34.89 NASAL OBSTRUCTION: ICD-10-CM

## 2018-06-22 PROCEDURE — 30999 UNLISTED PROCEDURE NOSE: CPT | Mod: ,,, | Performed by: OTOLARYNGOLOGY

## 2018-06-22 PROCEDURE — 63600175 PHARM REV CODE 636 W HCPCS: Performed by: ANESTHESIOLOGY

## 2018-06-22 PROCEDURE — 25000003 PHARM REV CODE 250

## 2018-06-22 PROCEDURE — D9220A PRA ANESTHESIA: Mod: CRNA,,, | Performed by: NURSE ANESTHETIST, CERTIFIED REGISTERED

## 2018-06-22 PROCEDURE — 27000221 HC OXYGEN, UP TO 24 HOURS

## 2018-06-22 PROCEDURE — 25000003 PHARM REV CODE 250: Performed by: NURSE ANESTHETIST, CERTIFIED REGISTERED

## 2018-06-22 PROCEDURE — 36000706: Performed by: OTOLARYNGOLOGY

## 2018-06-22 PROCEDURE — 71000039 HC RECOVERY, EACH ADD'L HOUR: Performed by: OTOLARYNGOLOGY

## 2018-06-22 PROCEDURE — 71000015 HC POSTOP RECOV 1ST HR: Performed by: OTOLARYNGOLOGY

## 2018-06-22 PROCEDURE — 27800903 OPTIME MED/SURG SUP & DEVICES OTHER IMPLANTS: Performed by: OTOLARYNGOLOGY

## 2018-06-22 PROCEDURE — 36000707: Performed by: OTOLARYNGOLOGY

## 2018-06-22 PROCEDURE — D9220A PRA ANESTHESIA: Mod: ANES,,, | Performed by: ANESTHESIOLOGY

## 2018-06-22 PROCEDURE — 71000033 HC RECOVERY, INTIAL HOUR: Performed by: OTOLARYNGOLOGY

## 2018-06-22 PROCEDURE — 63600175 PHARM REV CODE 636 W HCPCS: Performed by: NURSE ANESTHETIST, CERTIFIED REGISTERED

## 2018-06-22 PROCEDURE — 25000003 PHARM REV CODE 250: Performed by: ANESTHESIOLOGY

## 2018-06-22 PROCEDURE — 25000003 PHARM REV CODE 250: Performed by: STUDENT IN AN ORGANIZED HEALTH CARE EDUCATION/TRAINING PROGRAM

## 2018-06-22 PROCEDURE — 37000009 HC ANESTHESIA EA ADD 15 MINS: Performed by: OTOLARYNGOLOGY

## 2018-06-22 PROCEDURE — 25000003 PHARM REV CODE 250: Performed by: OTOLARYNGOLOGY

## 2018-06-22 PROCEDURE — 37000008 HC ANESTHESIA 1ST 15 MINUTES: Performed by: OTOLARYNGOLOGY

## 2018-06-22 PROCEDURE — 63600175 PHARM REV CODE 636 W HCPCS

## 2018-06-22 RX ORDER — CEFAZOLIN SODIUM 1 G/3ML
INJECTION, POWDER, FOR SOLUTION INTRAMUSCULAR; INTRAVENOUS
Status: DISCONTINUED | OUTPATIENT
Start: 2018-06-22 | End: 2018-06-22

## 2018-06-22 RX ORDER — EPHEDRINE SULFATE 50 MG/ML
INJECTION, SOLUTION INTRAVENOUS
Status: DISCONTINUED | OUTPATIENT
Start: 2018-06-22 | End: 2018-06-22

## 2018-06-22 RX ORDER — PROPOFOL 10 MG/ML
VIAL (ML) INTRAVENOUS
Status: DISCONTINUED | OUTPATIENT
Start: 2018-06-22 | End: 2018-06-22

## 2018-06-22 RX ORDER — LIDOCAINE HYDROCHLORIDE AND EPINEPHRINE 10; 10 MG/ML; UG/ML
INJECTION, SOLUTION INFILTRATION; PERINEURAL
Status: DISCONTINUED | OUTPATIENT
Start: 2018-06-22 | End: 2018-06-22 | Stop reason: HOSPADM

## 2018-06-22 RX ORDER — OXYCODONE AND ACETAMINOPHEN 5; 325 MG/1; MG/1
1 TABLET ORAL ONCE
Status: COMPLETED | OUTPATIENT
Start: 2018-06-22 | End: 2018-06-22

## 2018-06-22 RX ORDER — HYDROMORPHONE HYDROCHLORIDE 1 MG/ML
0.2 INJECTION, SOLUTION INTRAMUSCULAR; INTRAVENOUS; SUBCUTANEOUS EVERY 5 MIN PRN
Status: DISCONTINUED | OUTPATIENT
Start: 2018-06-22 | End: 2018-06-22 | Stop reason: HOSPADM

## 2018-06-22 RX ORDER — ROCURONIUM BROMIDE 10 MG/ML
INJECTION, SOLUTION INTRAVENOUS
Status: DISCONTINUED | OUTPATIENT
Start: 2018-06-22 | End: 2018-06-22

## 2018-06-22 RX ORDER — SODIUM CHLORIDE 9 MG/ML
INJECTION, SOLUTION INTRAVENOUS CONTINUOUS PRN
Status: DISCONTINUED | OUTPATIENT
Start: 2018-06-22 | End: 2018-06-22

## 2018-06-22 RX ORDER — ONDANSETRON 2 MG/ML
INJECTION INTRAMUSCULAR; INTRAVENOUS
Status: DISCONTINUED | OUTPATIENT
Start: 2018-06-22 | End: 2018-06-22

## 2018-06-22 RX ORDER — LABETALOL HYDROCHLORIDE 5 MG/ML
15 INJECTION, SOLUTION INTRAVENOUS ONCE
Status: COMPLETED | OUTPATIENT
Start: 2018-06-22 | End: 2018-06-22

## 2018-06-22 RX ORDER — MEPERIDINE HYDROCHLORIDE 50 MG/ML
12.5 INJECTION INTRAMUSCULAR; INTRAVENOUS; SUBCUTANEOUS ONCE AS NEEDED
Status: DISCONTINUED | OUTPATIENT
Start: 2018-06-22 | End: 2018-06-22 | Stop reason: HOSPADM

## 2018-06-22 RX ORDER — OXYMETAZOLINE HCL 0.05 %
SPRAY, NON-AEROSOL (ML) NASAL
Status: DISCONTINUED | OUTPATIENT
Start: 2018-06-22 | End: 2018-06-22 | Stop reason: HOSPADM

## 2018-06-22 RX ORDER — HYDRALAZINE HYDROCHLORIDE 20 MG/ML
10 INJECTION INTRAMUSCULAR; INTRAVENOUS ONCE
Status: COMPLETED | OUTPATIENT
Start: 2018-06-22 | End: 2018-06-22

## 2018-06-22 RX ORDER — GLYCOPYRROLATE 0.2 MG/ML
INJECTION INTRAMUSCULAR; INTRAVENOUS
Status: DISCONTINUED | OUTPATIENT
Start: 2018-06-22 | End: 2018-06-22

## 2018-06-22 RX ORDER — ONDANSETRON 4 MG/1
8 TABLET, ORALLY DISINTEGRATING ORAL EVERY 8 HOURS PRN
Qty: 15 TABLET | Refills: 0 | Status: SHIPPED | OUTPATIENT
Start: 2018-06-22 | End: 2018-07-23

## 2018-06-22 RX ORDER — BACITRACIN ZINC 500 UNIT/G
OINTMENT (GRAM) TOPICAL
Status: DISCONTINUED | OUTPATIENT
Start: 2018-06-22 | End: 2018-06-22 | Stop reason: HOSPADM

## 2018-06-22 RX ORDER — MIDAZOLAM HYDROCHLORIDE 1 MG/ML
INJECTION, SOLUTION INTRAMUSCULAR; INTRAVENOUS
Status: DISCONTINUED | OUTPATIENT
Start: 2018-06-22 | End: 2018-06-22

## 2018-06-22 RX ORDER — LABETALOL HYDROCHLORIDE 5 MG/ML
INJECTION, SOLUTION INTRAVENOUS
Status: COMPLETED
Start: 2018-06-22 | End: 2018-06-22

## 2018-06-22 RX ORDER — FENTANYL CITRATE 50 UG/ML
INJECTION, SOLUTION INTRAMUSCULAR; INTRAVENOUS
Status: DISCONTINUED | OUTPATIENT
Start: 2018-06-22 | End: 2018-06-22

## 2018-06-22 RX ORDER — SUCCINYLCHOLINE CHLORIDE 20 MG/ML
INJECTION INTRAMUSCULAR; INTRAVENOUS
Status: DISCONTINUED | OUTPATIENT
Start: 2018-06-22 | End: 2018-06-22

## 2018-06-22 RX ORDER — NEOSTIGMINE METHYLSULFATE 1 MG/ML
INJECTION, SOLUTION INTRAVENOUS
Status: DISCONTINUED | OUTPATIENT
Start: 2018-06-22 | End: 2018-06-22

## 2018-06-22 RX ORDER — BUPIVACAINE HYDROCHLORIDE AND EPINEPHRINE 5; 5 MG/ML; UG/ML
INJECTION, SOLUTION EPIDURAL; INTRACAUDAL; PERINEURAL
Status: DISCONTINUED | OUTPATIENT
Start: 2018-06-22 | End: 2018-06-22 | Stop reason: HOSPADM

## 2018-06-22 RX ORDER — OXYCODONE AND ACETAMINOPHEN 5; 325 MG/1; MG/1
1 TABLET ORAL EVERY 4 HOURS PRN
Qty: 24 TABLET | Refills: 0 | Status: SHIPPED | OUTPATIENT
Start: 2018-06-22 | End: 2018-07-23

## 2018-06-22 RX ORDER — LABETALOL HYDROCHLORIDE 5 MG/ML
15 INJECTION, SOLUTION INTRAVENOUS ONCE
Status: DISCONTINUED | OUTPATIENT
Start: 2018-06-22 | End: 2018-06-22

## 2018-06-22 RX ORDER — DEXAMETHASONE SODIUM PHOSPHATE 4 MG/ML
INJECTION, SOLUTION INTRA-ARTICULAR; INTRALESIONAL; INTRAMUSCULAR; INTRAVENOUS; SOFT TISSUE
Status: DISCONTINUED | OUTPATIENT
Start: 2018-06-22 | End: 2018-06-22

## 2018-06-22 RX ORDER — SODIUM CHLORIDE 0.9 % (FLUSH) 0.9 %
3 SYRINGE (ML) INJECTION
Status: DISCONTINUED | OUTPATIENT
Start: 2018-06-22 | End: 2018-06-22 | Stop reason: HOSPADM

## 2018-06-22 RX ORDER — LIDOCAINE HYDROCHLORIDE 10 MG/ML
1 INJECTION, SOLUTION EPIDURAL; INFILTRATION; INTRACAUDAL; PERINEURAL ONCE
Status: COMPLETED | OUTPATIENT
Start: 2018-06-22 | End: 2018-06-22

## 2018-06-22 RX ORDER — OXYCODONE AND ACETAMINOPHEN 5; 325 MG/1; MG/1
TABLET ORAL
Status: COMPLETED
Start: 2018-06-22 | End: 2018-06-22

## 2018-06-22 RX ORDER — CEPHALEXIN 500 MG/1
500 CAPSULE ORAL EVERY 8 HOURS
Qty: 30 CAPSULE | Refills: 0 | Status: SHIPPED | OUTPATIENT
Start: 2018-06-22 | End: 2018-07-02

## 2018-06-22 RX ORDER — LIDOCAINE HCL/PF 100 MG/5ML
SYRINGE (ML) INTRAVENOUS
Status: DISCONTINUED | OUTPATIENT
Start: 2018-06-22 | End: 2018-06-22

## 2018-06-22 RX ORDER — HYDRALAZINE HYDROCHLORIDE 20 MG/ML
INJECTION INTRAMUSCULAR; INTRAVENOUS
Status: COMPLETED
Start: 2018-06-22 | End: 2018-06-22

## 2018-06-22 RX ADMIN — ROCURONIUM BROMIDE 10 MG: 10 INJECTION, SOLUTION INTRAVENOUS at 07:06

## 2018-06-22 RX ADMIN — SODIUM CHLORIDE: 0.9 INJECTION, SOLUTION INTRAVENOUS at 07:06

## 2018-06-22 RX ADMIN — Medication 0.2 MG: at 09:06

## 2018-06-22 RX ADMIN — Medication 0.2 MG: at 11:06

## 2018-06-22 RX ADMIN — LABETALOL HYDROCHLORIDE 15 MG: 5 INJECTION, SOLUTION INTRAVENOUS at 09:06

## 2018-06-22 RX ADMIN — PROPOFOL 160 MG: 10 INJECTION, EMULSION INTRAVENOUS at 07:06

## 2018-06-22 RX ADMIN — SUCCINYLCHOLINE CHLORIDE 160 MG: 20 INJECTION, SOLUTION INTRAMUSCULAR; INTRAVENOUS at 07:06

## 2018-06-22 RX ADMIN — FENTANYL CITRATE 25 MCG: 50 INJECTION, SOLUTION INTRAMUSCULAR; INTRAVENOUS at 09:06

## 2018-06-22 RX ADMIN — Medication 0.2 MG: at 10:06

## 2018-06-22 RX ADMIN — EPHEDRINE SULFATE 10 MG: 50 INJECTION, SOLUTION INTRAMUSCULAR; INTRAVENOUS; SUBCUTANEOUS at 08:06

## 2018-06-22 RX ADMIN — LIDOCAINE HYDROCHLORIDE 80 MG: 20 INJECTION, SOLUTION INTRAVENOUS at 07:06

## 2018-06-22 RX ADMIN — ONDANSETRON 4 MG: 2 INJECTION INTRAMUSCULAR; INTRAVENOUS at 08:06

## 2018-06-22 RX ADMIN — ROCURONIUM BROMIDE 40 MG: 10 INJECTION, SOLUTION INTRAVENOUS at 08:06

## 2018-06-22 RX ADMIN — SODIUM CHLORIDE, SODIUM GLUCONATE, SODIUM ACETATE, POTASSIUM CHLORIDE, MAGNESIUM CHLORIDE, SODIUM PHOSPHATE, DIBASIC, AND POTASSIUM PHOSPHATE: .53; .5; .37; .037; .03; .012; .00082 INJECTION, SOLUTION INTRAVENOUS at 08:06

## 2018-06-22 RX ADMIN — HYDRALAZINE HYDROCHLORIDE 10 MG: 20 INJECTION INTRAMUSCULAR; INTRAVENOUS at 10:06

## 2018-06-22 RX ADMIN — DEXAMETHASONE SODIUM PHOSPHATE 12 MG: 4 INJECTION, SOLUTION INTRAMUSCULAR; INTRAVENOUS at 07:06

## 2018-06-22 RX ADMIN — GLYCOPYRROLATE 0.6 MG: 0.2 INJECTION, SOLUTION INTRAMUSCULAR; INTRAVENOUS at 08:06

## 2018-06-22 RX ADMIN — OXYCODONE HYDROCHLORIDE AND ACETAMINOPHEN 1 TABLET: 5; 325 TABLET ORAL at 10:06

## 2018-06-22 RX ADMIN — NEOSTIGMINE METHYLSULFATE 5 MG: 1 INJECTION INTRAVENOUS at 08:06

## 2018-06-22 RX ADMIN — FENTANYL CITRATE 100 MCG: 50 INJECTION, SOLUTION INTRAMUSCULAR; INTRAVENOUS at 07:06

## 2018-06-22 RX ADMIN — LABETALOL HYDROCHLORIDE 8 MG: 5 INJECTION, SOLUTION INTRAVENOUS at 11:06

## 2018-06-22 RX ADMIN — OXYCODONE AND ACETAMINOPHEN 1 TABLET: 5; 325 TABLET ORAL at 10:06

## 2018-06-22 RX ADMIN — LIDOCAINE HYDROCHLORIDE: 10 INJECTION, SOLUTION EPIDURAL; INFILTRATION; INTRACAUDAL; PERINEURAL at 07:06

## 2018-06-22 RX ADMIN — MIDAZOLAM HYDROCHLORIDE 2 MG: 1 INJECTION, SOLUTION INTRAMUSCULAR; INTRAVENOUS at 07:06

## 2018-06-22 RX ADMIN — CEFAZOLIN 2 G: 330 INJECTION, POWDER, FOR SOLUTION INTRAMUSCULAR; INTRAVENOUS at 08:06

## 2018-06-22 NOTE — ANESTHESIA RELEASE NOTE
"Anesthesia Release from PACU Note    Patient: Dylan Sawant    Procedure(s) Performed: Procedure(s) (LRB):  RECONSTRUCTION, NOSE (N/A)    Anesthesia type: general    Post pain: Adequate analgesia    Post assessment: no apparent anesthetic complications    Last Vitals:   Visit Vitals  BP (!) 136/96 (BP Location: Right arm, Patient Position: Lying)   Pulse 79   Temp 37.2 °C (99 °F) (Temporal)   Resp 20   Ht 5' 9" (1.753 m)   Wt 90.7 kg (200 lb)   SpO2 95%   BMI 29.53 kg/m²       Post vital signs: stable    Level of consciousness: awake    Nausea/Vomiting: no nausea/no vomiting    Complications: none    Airway Patency: patent    Respiratory: unassisted    Cardiovascular: stable    Hydration: euvolemic  "

## 2018-06-22 NOTE — BRIEF OP NOTE
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 Did well post op. Discharged home post op.      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 for 10 days     ondansetron 4 MG Tbdl  Commonly known as:  ZOFRAN-ODT  Take 2 tablets (8 mg total) by mouth every 8 (eight) hours as needed.     oxyCODONE-acetaminophen 5-325 mg per tablet  Commonly known as:  PERCOCET  Take 1 tablet by mouth every 4 (four) hours as needed for Pain.        CONTINUE taking these medications    escitalopram oxalate 20 MG tablet  Commonly known as:  LEXAPRO  TAKE ONE TABLET BY MOUTH ONCE DAILY.     fenofibrate micronized 134 MG Cap  Commonly known as:   LOFIBRA  TAKE ONE CAPSULE BY MOUTH EVERY EVENING     omeprazole 20 MG capsule  Commonly known as:  PRILOSEC  TAKE 1 CAP BY MOUTH EVERY MORNING. TRY TO STOP IF POSSIBLE. IF REFLUX RETURNS CAN TAKE A 2 WEEK COURSE.     pravastatin 20 MG tablet  Commonly known as:  PRAVACHOL  TAKE ONE TABLET BY MOUTH ONCE DAILY.     rOPINIRole 2 MG tablet  Commonly known as:  REQUIP  Take 1 tablet (2 mg total) by mouth 2 (two) times daily as needed.            Discharge Procedure Orders  Diet Adult Regular     Activity as tolerated   Order Comments: Light activity for next 2 weeks. No lifting greater than 10 pounds for 10 days. Limit nasal trauma. Please take antibiotics for next 7 days. Pain and nausea medication as needed. Ok to shower. Regular diet.  Strips on nose will fall off by self; or removed in clinic in 1 week.     Notify your health care provider if you experience any of the following:  temperature >100.4     Notify your health care provider if you experience any of the following:  persistent nausea and vomiting or diarrhea     Notify your health care provider if you experience any of the following:  severe uncontrolled pain     Notify your health care provider if you experience any of the following:  redness, tenderness, or signs of infection (pain, swelling, redness, odor or green/yellow discharge around incision site)     Notify your health care provider if you experience any of the following:  difficulty breathing or increased cough     Notify your health care provider if you experience any of the following:  severe persistent headache     Leave dressing on - Keep it clean, dry, and intact until clinic visit   Order Comments: Keep nasal dressing on. This will be removed in clinic. Ok to shower, warm running water. Do not submerge wound in sitting water.       Follow-up Information     H Bernardo Echols MD In 1 week.    Specialty:  Otolaryngology  Why:  Post op, pack removal and wound check.   Contact information:  [id number]  [facility] [address]  [phone]

## 2018-06-22 NOTE — ANESTHESIA POSTPROCEDURE EVALUATION
"Anesthesia Post Evaluation    Patient: Dylan Sawant    Procedure(s) Performed: Procedure(s) (LRB):  RECONSTRUCTION, NOSE (N/A)    Final Anesthesia Type: general  Patient location during evaluation: PACU  Patient participation: Yes- Able to Participate  Level of consciousness: awake and alert  Post-procedure vital signs: reviewed and stable  Pain management: adequate  Airway patency: patent  PONV status at discharge: No PONV  Anesthetic complications: no      Cardiovascular status: blood pressure returned to baseline and hemodynamically stable  Respiratory status: unassisted, spontaneous ventilation and room air  Hydration status: euvolemic  Follow-up not needed.        Visit Vitals  BP (!) 136/96 (BP Location: Right arm, Patient Position: Lying)   Pulse 79   Temp 37.2 °C (99 °F) (Temporal)   Resp 20   Ht 5' 9" (1.753 m)   Wt 90.7 kg (200 lb)   SpO2 95%   BMI 29.53 kg/m²       Pain/Gee Score: Pain Assessment Performed: Yes (6/22/2018 12:04 PM)  Presence of Pain: complains of pain/discomfort (6/22/2018 12:04 PM)  Pain Rating Prior to Med Admin: 6 (6/22/2018 11:26 AM)  Pain Rating Post Med Admin: 5 (6/22/2018 11:30 AM)  Gee Score: 9 (6/22/2018 10:19 AM)      "

## 2018-06-22 NOTE — OP NOTE
DATE OF PROCEDURE: 06/22/2018    PREOPERATIVE DIAGNOSIS:    1. Nasal Obstruction  2. Internal nasal valve stenosis    POSTOPERATIVE DIAGNOSIS:    1. Nasal Obstruction  2. Internal nasal valve stenosis    PROCEDURE:    1. Nasal Reconstruction with bilateral Lutera implants and mesial crural suture CPT 36947    SURGEON: WINSTON Echols III, M.D.    ASSISTANT SURGEON: Marc Dsouza MD (RES)    ANESTHESIA: General anesthesia with endotracheal intubation  .  ESTIMATED BLOOD LOSS: 5 cc     PROCEDURE IN DETAIL:     The patient was brought to the operating room and transferred to the operating room table. General anesthesia was  induced. The table was then turned 90 degrees and the nose was decongested with Afrin, and 1% Lidocaine with  epinephrine was injected to obtain infraorbital blocks. Patients nasal area is prepped and draped in the usual  fashion. A time-out was taken.    Marking in preparation for the valve repair was done as follows: A surgical pen was used to pratik the junction of the  upper lateral cartilage and nasal bone/maxilla. On the left side of the patient, a line was drawn  starting slightly medial to the medial canthus to the upper 1/3 of the alar rim. A line was marked at 6 mm cranial to  the bone/cartilage junction along the line as the most distal placement of the implant. Creation of lateral cartilage  support was performed as follows: One (1%) percent lidocaine with epinephrine was infused along the outlined track  of implantation. The polymer implant was loaded into a sterile delivery device which includes a 16-gauge cannula.  The nasal mucosa was incised immediately cranial to the alar rim and the cannula was advanced through the incision  along the line drawn. Using the cannula, a dissection plane was created over the upper lateral cartilage. At the  junction with the nasal aspect of the maxillary bone, the cannula was brought superficial to the bone. Using the 16-  gauge cannula, a dissection  plane was created above the periosteum on the superficial aspect of the maxillary bone.  The cannula tip was advanced to the target marked 6 mm cranial to the alysa/cartilaginous junction using palpation  on the surface of the skin. Once the destination was obtained and the dissection was completed, the skin on the  nose was normalized, and the cannula was inspected to determine if its tip was at a sufficient depth. Once it was  determined that the cannula tip was at a sufficient depth, the polymer implant was delivered and the cannula  withdrawn. Thus the lateral cartilage was supported. The entry wound was inspected to determine if suture closure  was necessary. There was no bleeding at the insertion site and closure of the wound was deemed unnecessary.  The same procedure was performed on the right side of the patient. Once we inspected the right implant, it was decided to remove it and reposition the implant. This was done by making a small stab marginal incision. The incision was probed with a [place] hemostat and the implant removed. The incision was closed using a chromic suture. The right implant was replaced and appreciated to be in great position.  A small mesial crural incision was made using fine Iris scissors. A chromic suture was passed around this incision to close the wound and approximate the mesial crura.  The caudal nasal airway was appreciated to be open. At this point the procedure was deemed correct. Bilateral bacitracin soaked telfa packs were placed. Steristrips were placed on the external nasal structure.  The patient was awaken from surgery without issue.       DISPOSITION: Transferred to Postanesthesia Care Unit.    COMPLICATIONS: None.    Dr. Echols was present and participated in the entirety of the case.

## 2018-06-22 NOTE — DISCHARGE INSTRUCTIONS
After Endoscopic Sinus Surgery    After surgery youll be moved to a recovery room. You may feel groggy from the anesthesia and will likely have some discomfort. There will be a dressing under your nose to absorb drainage. You may also have packing (absorbent bandage) inside your nose. You can usually go home as soon as youre no longer feeling groggy. This is usually the same day. In certain cases, you may need to stay overnight.  The first week  Your doctor will schedule an office visit a few days after surgery to check on your progress. At this visit, your doctor will remove dried blood and mucus to help you heal. He or she will also remove any nasal packing. Its normal to feel stuffiness and have pinkish or dark red drainage. Change your nasal dressing as needed, and take any prescribed medicines. Also be sure to drink plenty of water. Other guidelines from your doctor may include:  · Rinsing your nose and sinuses with saltwater  · Sneeze with your mouth open  · Not blowing your nose  · Not doing strenuous exercise, straining, or lifting  · Using a humidifier to keep nasal passages moist  · Not taking aspirin or ibuprofen  · Sleeping with your upper body raised  · Not eating hot or spicy foods  The next few weeks  As youre healing, its normal to feel some stuffiness and have nasal crusting. Keeping your nasal passages clean and moist will help speed the healing process and prevent scarring. Also be sure to:  · Take medicine as directed  · Stay away from irritating substances such as dust, chalk, and harsh chemicals  · Use saltwater rinses or a humidifier as directed.  · Drink plenty of water  · Stay away from people who have a cold  · Stay away from allergic triggers  · Talk with your doctor before swimming or air travel  When to seek medical care  Call your healthcare provider right away if you notice any of the following:  · Large amount of bright red bleeding  · Fever of 100.4°F (38°C) or higher, or as  directed by your healthcare provider  · Changes in vision, or swelling around the eye  · Signs of infection, such as yellow or greenish drainage  · Constant headache or increasing pain  · Drainage of a large amount of clear fluid  · Extreme tiredness, or a stiff neck   Date Last Reviewed: 10/1/2016  © 6671-7203 [employer]. [address] [place] [address], [place] [address]. All rights reserved. This information is not intended as a substitute for professional medical care. Always follow your healthcare professional's instructions.

## 2018-06-22 NOTE — H&P
"Mr. Sawant presents referred by Dr. Santiago for consultation.     VITAL SIGNS:  Per nurses' notes.     CHIEF COMPLAINT:  Nasal obstruction.     HISTORY OF PRESENT ILLNESS:  This is a 57-year-old white male who has had a   longstanding history of nasal obstruction.  He states that he had a   "septoplasty" x2 when he was a teenager.  He states this was at  though he   does not remember the name of the surgeon.  He now presents with nasal   obstruction, snoring, diagnosed obstructive sleep apnea and he is not able to   tolerate his CPAP mask.  He does have difficulty sleeping and is tired during   the day.     REVIEW OF SYSTEMS:  CONSTITUTIONAL: Weight loss or weight gain: Negative.  ALLERGY/IMMUNOLOGIC: Negative.  ENT/Mouth:  Hearing Loss/Dizziness/Tinnitus: Negative.  Ear Infections/Otalgia: Negative.  Rhinitis/Sinusitis/Epistaxis: Negative.  Headache/Facial Pain: Negative.  Nasal Obstruction/Snoring/KELLY: Negative.  Throat: Infections/Pain: Negative.  Hoarseness/Speech Disturbance: Negative.  Salivary Glands Disorder: Negative.  Trauma: Hx: Negative.     Cardiovascular:  MI/Angina: Negative.  Hypertension: Negative.  Endo: DM/Steroids: Negative.  Eyes: Negative.  GI: Dysphagia/Reflux: Negative.  : GYN Pregnancy: Negative.                Renal: Dialysis: Negative.  Lymph: Neck Mass/Lymphadenopathy: Negative.  Musculoskeletal: Negative.  Hem: Bleeding Disorders/Anemia: Negative.  Neuro: Cranial/Neuralgia: Negative.  Pulm: Asthma/SOB/Cough: Negative.  Skin/Breast: Negative.     PAST MEDICAL/FAMILY/SOCIAL HISTORY:     Additional Past Medical History             ENT Surgery: Positive for septorhinoplasty.             Occupational Exposure: Negative.              Problems: Negative.             Cancer: Negative.             Positive for anxiety, hypertension.  Past surgeries include tonsillectomy,   adenoidectomy as well as a colonoscopy.     Past Family History             Family history hearing loss: Negative.   "           Family history cancer: Positive for pancreatic cancer.             Positive for diabetes, diverticulitis.     Past Social History             Tobacco: Nonsmoker.             Alcohol: Nondrinker.     MEDICATIONS:  Per MedCard.     ALLERGIES:  Per MedCard.     EXAMINATION:  General Appearance:  Well-developed, well-nourished 57-year-old white male in no   apparent distress.  Communication Ability:  Good.  EARS, NOSE, THROAT, MOUTH;  EARS: Clear.             External auditory canals: Clear.             Hearing: Grossly Intact.             Tympanic membranes: Clear.  NOSE:             External: Very narrow nasal vestibules with obvious past alar base reduction   scars.  He does have a positive Lake maneuver.  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             Intranasal: His septum is relatively straight.  His turbinates are 1-2+.  MOUTH:             Intraorally: Lips, teeth and gums: Normal.             Oropharynx: Normal.             Mucosa: Normal.  THROAT:             Tongue: Normal.             Palate: Somewhat low lying.             Tonsils: Absent.             Posterior pharynx: Normal.  HEAD/FACE INSPECTION: Normal and atraumatic.             Palpation/Percussion:  Non tender.             Facial Strength: Normal and symmetric.             Salivary glands: Normal.     NECK: Supple.  THYROID: No masses.  LYMPHATICS: No nodes.  RESPIRATORY:             Effort: Normal.  EYES:             Ocular Mobility: Normal.             Vision: Grossly intact.  NEURO/PSYCH:             Cranial nerves: 2-12 grossly intact.             Orientation: x3.             Mood/Affect: Normal.     56yo with nasal obstruction     RECOMMENDATION:     To OR   Nasal Reconstruction/Lutera Implant

## 2018-06-22 NOTE — TRANSFER OF CARE
"Anesthesia Transfer of Care Note    Patient: Dylan Sawant    Procedure(s) Performed: Procedure(s) (LRB):  RECONSTRUCTION, NOSE (N/A)    Patient location: PACU    Anesthesia Type: general    Transport from OR: Transported from OR on 6-10 L/min O2 by face mask with adequate spontaneous ventilation    Post pain: adequate analgesia    Post assessment: no apparent anesthetic complications and tolerated procedure well    Post vital signs: stable    Level of consciousness: sedated and responds to stimulation    Nausea/Vomiting: no nausea/vomiting    Complications: none    Transfer of care protocol was followed      Last vitals:   Visit Vitals  BP (!) 174/92   Pulse 72   Temp 36.5 °C (97.7 °F) (Temporal)   Resp (!) 22   Ht 5' 9" (1.753 m)   Wt 90.7 kg (200 lb)   SpO2 97%   BMI 29.53 kg/m²     "

## 2018-06-27 ENCOUNTER — OFFICE VISIT (OUTPATIENT)
Dept: OTOLARYNGOLOGY | Facility: CLINIC | Age: 58
End: 2018-06-27
Payer: COMMERCIAL

## 2018-06-27 DIAGNOSIS — J34.89 NASAL OBSTRUCTION: ICD-10-CM

## 2018-06-27 DIAGNOSIS — Z98.890 POST-OPERATIVE STATE: ICD-10-CM

## 2018-06-27 DIAGNOSIS — M95.0 NASAL DEFORMITY, ACQUIRED: Primary | ICD-10-CM

## 2018-06-27 PROCEDURE — 99999 PR PBB SHADOW E&M-EST. PATIENT-LVL II: CPT | Mod: PBBFAC,,, | Performed by: OTOLARYNGOLOGY

## 2018-06-27 PROCEDURE — 99024 POSTOP FOLLOW-UP VISIT: CPT | Mod: S$GLB,,, | Performed by: OTOLARYNGOLOGY

## 2018-06-27 NOTE — PROGRESS NOTES
One week S/P placement of Latera implants and mesial crural footplate suture.  All packs removed.  Lateral nasal walls appear well supported,airway clear.  Post-op instructions reviewed.  Plan: RTC 3 weeks.

## 2018-07-23 ENCOUNTER — OFFICE VISIT (OUTPATIENT)
Dept: OTOLARYNGOLOGY | Facility: CLINIC | Age: 58
End: 2018-07-23
Payer: COMMERCIAL

## 2018-07-23 VITALS — DIASTOLIC BLOOD PRESSURE: 83 MMHG | HEART RATE: 84 BPM | SYSTOLIC BLOOD PRESSURE: 121 MMHG

## 2018-07-23 DIAGNOSIS — J34.89 NASAL OBSTRUCTION: ICD-10-CM

## 2018-07-23 DIAGNOSIS — Z98.890 POST-OPERATIVE STATE: ICD-10-CM

## 2018-07-23 DIAGNOSIS — M95.0 NASAL DEFORMITY, ACQUIRED: Primary | ICD-10-CM

## 2018-07-23 PROCEDURE — 99999 PR PBB SHADOW E&M-EST. PATIENT-LVL II: CPT | Mod: PBBFAC,,, | Performed by: OTOLARYNGOLOGY

## 2018-07-23 PROCEDURE — 99024 POSTOP FOLLOW-UP VISIT: CPT | Mod: S$GLB,,, | Performed by: OTOLARYNGOLOGY

## 2018-07-23 NOTE — PROGRESS NOTES
One month S/P placement of Latera implants and mesial crural footplate suture.  Nasal airway somewhat better subjectively.  He has significant columellar edema in area of mesial crural foot plates but no signs of infection.  Cont. nasal saline.  Plan: RTC 2 months

## 2018-07-24 DIAGNOSIS — G25.81 RESTLESS LEGS: ICD-10-CM

## 2018-07-24 RX ORDER — ROPINIROLE 2 MG/1
2 TABLET, FILM COATED ORAL 2 TIMES DAILY PRN
Qty: 180 TABLET | Refills: 3 | Status: SHIPPED | OUTPATIENT
Start: 2018-07-24 | End: 2018-10-03 | Stop reason: SDUPTHER

## 2018-07-24 NOTE — TELEPHONE ENCOUNTER
----- Message from Danny Bowman sent at 7/24/2018  1:40 PM CDT -----  Contact: self/[phone]  Type: Rx    Name of medication(s): ropinirole (REQUIP) 2 MG tablet    Is this a refill? New rx? Refill     Who prescribed medication?      Pharmacy Name, Phone, & Location:[id number] - [place], [place] - [address] [facility]    Comments: Please advise.      Thanks

## 2018-08-21 ENCOUNTER — PATIENT MESSAGE (OUTPATIENT)
Dept: INTERNAL MEDICINE | Facility: CLINIC | Age: 58
End: 2018-08-21

## 2018-08-23 ENCOUNTER — PATIENT MESSAGE (OUTPATIENT)
Dept: INTERNAL MEDICINE | Facility: CLINIC | Age: 58
End: 2018-08-23

## 2018-08-23 DIAGNOSIS — G25.81 RESTLESS LEGS: Primary | ICD-10-CM

## 2018-08-31 ENCOUNTER — TELEPHONE (OUTPATIENT)
Dept: OTOLARYNGOLOGY | Facility: CLINIC | Age: 58
End: 2018-08-31

## 2018-09-03 DIAGNOSIS — E78.2 MIXED HYPERLIPIDEMIA: ICD-10-CM

## 2018-09-04 RX ORDER — PRAVASTATIN SODIUM 20 MG/1
TABLET ORAL
Qty: 90 TABLET | Refills: 3 | Status: SHIPPED | OUTPATIENT
Start: 2018-09-04 | End: 2018-11-26 | Stop reason: SDUPTHER

## 2018-09-04 RX ORDER — FENOFIBRATE 134 MG/1
CAPSULE ORAL
Qty: 90 CAPSULE | Refills: 3 | Status: SHIPPED | OUTPATIENT
Start: 2018-09-04 | End: 2018-12-07 | Stop reason: SDUPTHER

## 2018-09-12 ENCOUNTER — OFFICE VISIT (OUTPATIENT)
Dept: NEUROLOGY | Facility: CLINIC | Age: 58
End: 2018-09-12
Payer: COMMERCIAL

## 2018-09-12 VITALS
DIASTOLIC BLOOD PRESSURE: 88 MMHG | BODY MASS INDEX: 31.71 KG/M2 | SYSTOLIC BLOOD PRESSURE: 141 MMHG | WEIGHT: 214.06 LBS | HEART RATE: 72 BPM | HEIGHT: 69 IN

## 2018-09-12 DIAGNOSIS — M79.604 RIGHT LEG PAIN: ICD-10-CM

## 2018-09-12 DIAGNOSIS — M79.604 PAIN OF RIGHT LOWER EXTREMITY: Primary | ICD-10-CM

## 2018-09-12 PROCEDURE — 99999 PR PBB SHADOW E&M-EST. PATIENT-LVL III: CPT | Mod: PBBFAC,,, | Performed by: NEUROMUSCULOSKELETAL MEDICINE & OMM

## 2018-09-12 PROCEDURE — 99204 OFFICE O/P NEW MOD 45 MIN: CPT | Mod: S$GLB,,, | Performed by: NEUROMUSCULOSKELETAL MEDICINE & OMM

## 2018-09-12 PROCEDURE — 3008F BODY MASS INDEX DOCD: CPT | Mod: CPTII,S$GLB,, | Performed by: NEUROMUSCULOSKELETAL MEDICINE & OMM

## 2018-09-12 RX ORDER — ZOLPIDEM TARTRATE 5 MG/1
TABLET ORAL
COMMUNITY
End: 2018-11-26

## 2018-09-12 NOTE — PROGRESS NOTES
Dylan Mann Jese  1960  Review of patient's allergies indicates:  No Known Allergies  [unfilled]    Past Medical History:   Diagnosis Date    Allergy     Anxiety     Elevated BP     Hyperlipidemia     KELLY (obstructive sleep apnea)     Restless leg syndrome      Social History     Socioeconomic History    Marital status:      Spouse name: Not on file    Number of children: Not on file    Years of education: Not on file    Highest education level: Not on file   Social Needs    Financial resource strain: Not on file    Food insecurity - worry: Not on file    Food insecurity - inability: Not on file    Transportation needs - medical: Not on file    Transportation needs - non-medical: Not on file   Occupational History     Employer: [employer]   Tobacco Use    Smoking status: Never Smoker    Smokeless tobacco: Never Used   Substance and Sexual Activity    Alcohol use: No    Drug use: No    Sexual activity: Not on file   Other Topics Concern    Not on file   Social History Narrative    Not on file     Family History   Problem Relation Age of Onset    Heart disease Father     Cancer Father         pancreatic    Diabetes Father     Diverticulitis Father     Restless legs syndrome Father     Diabetes Mother        Review of systems:  Constitutional-negative  Eyes-negative  ENT, mouth-negative  Cardiovascular-negative  Respiratory-negative  GI-negative  - negative  Musculoskeletal-negative  Skin-negative  Neurologic-negative  Psychiatric-negative  Endocrine-negative  Hematology/lymph nodes-negative  Allergies/immunology-negative  Dylan Mann Jese  1960  Review of patient's allergies indicates:  No Known Allergies  [unfilled]    Past Medical History:   Diagnosis Date    Allergy     Anxiety     Elevated BP     Hyperlipidemia     KELLY (obstructive sleep apnea)     Restless leg syndrome      Social History     Socioeconomic History    Marital status:       Spouse name: Not on file    Number of children: Not on file    Years of education: Not on file    Highest education level: Not on file   Social Needs    Financial resource strain: Not on file    Food insecurity - worry: Not on file    Food insecurity - inability: Not on file    Transportation needs - medical: Not on file    Transportation needs - non-medical: Not on file   Occupational History     Employer: [employer]   Tobacco Use    Smoking status: Never Smoker    Smokeless tobacco: Never Used   Substance and Sexual Activity    Alcohol use: No    Drug use: No    Sexual activity: Not on file   Other Topics Concern    Not on file   Social History Narrative    Not on file     Family History   Problem Relation Age of Onset    Heart disease Father     Cancer Father         pancreatic    Diabetes Father     Diverticulitis Father     Restless legs syndrome Father     Diabetes Mother        Review of systems:  Constitutional-negative  Eyes-negative  ENT, mouth-negative  Cardiovascular-negative  Respiratory-negative  GI-negative  - negative  Musculoskeletal-negative  Skin-negative  Neurologic-negative  Psychiatric-negative  Endocrine-negative  Hematology/lymph nodes-negative  Allergies/immunology-negative  Gen. Appearance: Well-developed with no obvious deformities  Carotid arteries symmetrical pulses  Peripheral vascular shows symmetrical pulses with no obvious edema or tenderness  Social History :  Patient is a [place]   Present history:   This is a 57-year-old white male presents with a history of restless leg syndrome.  It is predominantly his right leg that jerks and twitches at nighttime more than during the day.  He has some pain in the leg from the knee down.  He denies any weakness.  The pain can be as severe as 10/10 if he forgets to take his medication he has been taking ropinirole 2 mg 3 times a day.  He denies any recent back pain or neck pain.  He denies diabetes.  The  leg pain and twitching is been there for several years.  It does not seem to be getting better or worse but is noticeably a problem without the medication.    Neurological Exam:  Mental status-alert and oriented to person, place, and time; attention span and concentration is good. Fund of knowledge-patient is aware of current events and able to give detailed history of the current problem.recent and remote memory seems intact. Language function is normal with no evidence of aphasia  Cranial nerves:Visual acuity to hand chart -normal; visual fields to confrontation normal;pupils were equal and reactive to light ;no evidence of ptosis ;  funduscopic examination was normal with sharp disc margins. external ocular movements were full with no nystagmus. Facial sensation to pinprick : normal ; corneal reflexes intact; Facial muscles were symmetrical. Hearing is unimpaired symmetrical finger rub; Tongue movements - normal ; palate movements - normal ;Swallowing unimpaired. Shoulder shrug was intact with good strength Speech was normal  Motor examination: Upper : normal                                      Lower extremities - Normal;muscle tone was normal ;                  Right-handed  Sensory examination:   Upper; normal pinprick and soft touch ;   Lower extremities - normal and symmetrical.   Vibration sense: 15-20 seconds @ toes  Deep tendon reflexes: upper extremities :1-2+ symmetrical ;     lower extremities KJ- 1-2 +; AJ - 1-2+ Both plantar responses were flexor  Cerebellar examination upper: Normal finger to nose and rapid alternating movements  Gait: Steady with no ataxia;      heel and toe walk normal  Romberg test: negative       Tandem gait: Normal    Involuntary movements: Negative  TMJ - no tenderness  Cervical examination: Full range of motion with no pain Cervical tenderness :negative  Lumbar examination: Low back tenderness-negative                  Sciatic notchtenderness-negative            Straight leg  raising : negative    Impression:  Restless legs syndrome; right leg pain; rule out lumbar radiculopathy; rule out peripheral neuropathy    Recommendations/Plan :  Long discussion with the patient in terms of differential diagnosis of his right leg pain/twitching.  The asymmetry in the significant pain would make one wonder about radiculopathy or neuropathy as opposed to restless leg syndrome.  We will schedule EMG nerve conduction study to rule out causes other than restless leg.

## 2018-09-12 NOTE — LETTER
September 12, 2018      Abby Chaudhary MD  [address]  [facility] [address]           [facility]  [address] [place] [address]  Phone: [phone]          Patient: Dylan Sawant   MR Number: [id number]   YOB: 1960   Date of Visit: 9/12/2018       Dear Dr. Abby Chaudhary:    Thank you for referring Dylan Sawant to me for evaluation. Attached you will find relevant portions of my assessment and plan of care.    If you have questions, please do not hesitate to call me. I look forward to following Dylan Sawant along with you.    Sincerely,    Alberto Armendariz MD    Enclosure  CC:  No Recipients    If you would like to receive this communication electronically, please contact [email or web address] or [phone] to request more information on [employer] Link access.    For providers and/or their staff who would like to refer a patient to [facility], please contact us through our one-stop-shop provider referral line, [facility], at [phone].    If you feel you have received this communication in error or would no longer like to receive these types of communications, please e-mail [email or web address]

## 2018-09-24 ENCOUNTER — TELEPHONE (OUTPATIENT)
Dept: NEUROLOGY | Facility: CLINIC | Age: 58
End: 2018-09-24

## 2018-09-24 ENCOUNTER — OFFICE VISIT (OUTPATIENT)
Dept: OTOLARYNGOLOGY | Facility: CLINIC | Age: 58
End: 2018-09-24
Payer: COMMERCIAL

## 2018-09-24 VITALS — DIASTOLIC BLOOD PRESSURE: 84 MMHG | SYSTOLIC BLOOD PRESSURE: 132 MMHG | HEART RATE: 80 BPM

## 2018-09-24 DIAGNOSIS — M95.0 NASAL DEFORMITY, ACQUIRED: Primary | ICD-10-CM

## 2018-09-24 PROCEDURE — 99999 PR PBB SHADOW E&M-EST. PATIENT-LVL II: CPT | Mod: PBBFAC,,, | Performed by: OTOLARYNGOLOGY

## 2018-09-24 PROCEDURE — 99024 POSTOP FOLLOW-UP VISIT: CPT | Mod: S$GLB,,, | Performed by: OTOLARYNGOLOGY

## 2018-09-24 NOTE — TELEPHONE ENCOUNTER
----- Message from Hemalatha Mckoy sent at 8/24/2018  3:12 PM CDT -----  Contact: Self [phone]  Patient is requesting a call back to schedule from a referral for restless leg syndrome.  I attempted to schedule but was not given any times and patient was not able to make the. 9.7.18 appt at the [place] location.    Patient may be reached at [phone].    Thank you.  LC

## 2018-09-24 NOTE — PROGRESS NOTES
Three months S/P placement of Latera implants and mesial crural footplate suture.  Columellar edema in area of mesial crural foot plates resolved.  He has a URI today so is congested but he does feel that his nasal airway is somewhat improved.  Cont. nasal saline.  Mucinex D prn  Plan: RTC 2 months to examine when not sick and photos.

## 2018-09-24 NOTE — LETTER
September 24, 2018      Abby Chaudhary MD  [address]  [facility] [address]           [facility] - Otorhinolaryngology  [address]  [facility] [address]  Phone: [phone]  Fax: [phone]          Patient: Dylan Sawant   MR Number: [id number]   YOB: 1960   Date of Visit: 9/24/2018       Dear Dr. Abby Chaudhary:    Thank you for referring Dylan Sawant to me for evaluation. Attached you will find relevant portions of my assessment and plan of care.    If you have questions, please do not hesitate to call me. I look forward to following Dylan Sawant along with you.    Sincerely,    Kyle YASMANI Echols III, MD    Enclosure  CC:  No Recipients    If you would like to receive this communication electronically, please contact [email or web address] or [phone] to request more information on [employer] Link access.    For providers and/or their staff who would like to refer a patient to [facility], please contact us through our one-stop-shop provider referral line, [facility], at [phone].    If you feel you have received this communication in error or would no longer like to receive these types of communications, please e-mail [email or web address]

## 2018-10-01 ENCOUNTER — TELEPHONE (OUTPATIENT)
Dept: OTOLARYNGOLOGY | Facility: CLINIC | Age: 58
End: 2018-10-01

## 2018-10-01 NOTE — TELEPHONE ENCOUNTER
----- Message from Georgia Jackson sent at 10/1/2018 12:11 PM CDT -----  Contact: pt at [phone]  [facility] pt-Pt is requesting a rx for a c-pack.  Please call it in to [facility]/Domitila at [phone].  They still have all of his info in the computer.  Just needs ok from [employer] to charge the insurance company.  Please call pt and let know when it is taken care of.

## 2018-10-02 ENCOUNTER — TELEPHONE (OUTPATIENT)
Dept: INTERNAL MEDICINE | Facility: CLINIC | Age: 58
End: 2018-10-02

## 2018-10-02 DIAGNOSIS — G47.33 OSA (OBSTRUCTIVE SLEEP APNEA): Primary | ICD-10-CM

## 2018-10-02 NOTE — TELEPHONE ENCOUNTER
----- Message from Elinor Champion sent at 10/2/2018  4:35 PM CDT -----  Contact: patient  Says he needs a prescription for a C pap machine.    Would like a call back at [phone]      Thanks  KB

## 2018-10-03 ENCOUNTER — PROCEDURE VISIT (OUTPATIENT)
Dept: NEUROLOGY | Facility: CLINIC | Age: 58
End: 2018-10-03
Payer: COMMERCIAL

## 2018-10-03 DIAGNOSIS — M79.604 PAIN OF RIGHT LOWER EXTREMITY: ICD-10-CM

## 2018-10-03 DIAGNOSIS — G25.81 RESTLESS LEGS: ICD-10-CM

## 2018-10-03 PROCEDURE — 95886 MUSC TEST DONE W/N TEST COMP: CPT | Mod: S$GLB,,, | Performed by: NEUROMUSCULOSKELETAL MEDICINE & OMM

## 2018-10-03 PROCEDURE — 95908 NRV CNDJ TST 3-4 STUDIES: CPT | Mod: S$GLB,,, | Performed by: NEUROMUSCULOSKELETAL MEDICINE & OMM

## 2018-10-03 RX ORDER — ROPINIROLE 2 MG/1
2 TABLET, FILM COATED ORAL 3 TIMES DAILY
Qty: 180 TABLET | Refills: 3 | Status: SHIPPED | OUTPATIENT
Start: 2018-10-03 | End: 2019-04-16 | Stop reason: SDUPTHER

## 2018-10-04 NOTE — TELEPHONE ENCOUNTER
I need a copy of the sleep study with recommended cpap settings. Doesn't appear to have been done at [facility]. Thanks.

## 2018-10-04 NOTE — TELEPHONE ENCOUNTER
Pt called stating that he spoke with [facility], the department that provides the machine and they said a new study is not needed. All that's needed is an RX from PCP.   Provided with number. Domitila at access  [phone]

## 2018-11-02 ENCOUNTER — PATIENT MESSAGE (OUTPATIENT)
Dept: INTERNAL MEDICINE | Facility: CLINIC | Age: 58
End: 2018-11-02

## 2018-11-13 ENCOUNTER — PATIENT MESSAGE (OUTPATIENT)
Dept: INTERNAL MEDICINE | Facility: CLINIC | Age: 58
End: 2018-11-13

## 2018-11-13 DIAGNOSIS — G47.33 OSA (OBSTRUCTIVE SLEEP APNEA): Primary | ICD-10-CM

## 2018-11-20 ENCOUNTER — PATIENT MESSAGE (OUTPATIENT)
Dept: INTERNAL MEDICINE | Facility: CLINIC | Age: 58
End: 2018-11-20

## 2018-11-20 DIAGNOSIS — Z00.00 HEALTH CARE MAINTENANCE: Primary | ICD-10-CM

## 2018-11-20 DIAGNOSIS — Z12.5 SCREENING FOR MALIGNANT NEOPLASM OF PROSTATE: ICD-10-CM

## 2018-11-23 ENCOUNTER — LAB VISIT (OUTPATIENT)
Dept: LAB | Facility: HOSPITAL | Age: 58
End: 2018-11-23
Attending: INTERNAL MEDICINE
Payer: COMMERCIAL

## 2018-11-23 DIAGNOSIS — Z12.5 SCREENING FOR MALIGNANT NEOPLASM OF PROSTATE: ICD-10-CM

## 2018-11-23 DIAGNOSIS — Z00.00 HEALTH CARE MAINTENANCE: ICD-10-CM

## 2018-11-23 LAB
25(OH)D3+25(OH)D2 SERPL-MCNC: 16 NG/ML
ALBUMIN SERPL BCP-MCNC: 4.1 G/DL
ALP SERPL-CCNC: 59 U/L
ALT SERPL W/O P-5'-P-CCNC: 30 U/L
ANION GAP SERPL CALC-SCNC: 10 MMOL/L
AST SERPL-CCNC: 27 U/L
BASOPHILS # BLD AUTO: 0.07 K/UL
BASOPHILS NFR BLD: 1 %
BILIRUB SERPL-MCNC: 0.6 MG/DL
BUN SERPL-MCNC: 16 MG/DL
CALCIUM SERPL-MCNC: 9.5 MG/DL
CHLORIDE SERPL-SCNC: 106 MMOL/L
CHOLEST SERPL-MCNC: 197 MG/DL
CHOLEST/HDLC SERPL: 4.5 {RATIO}
CO2 SERPL-SCNC: 23 MMOL/L
COMPLEXED PSA SERPL-MCNC: 0.5 NG/ML
CREAT SERPL-MCNC: 0.9 MG/DL
DIFFERENTIAL METHOD: NORMAL
EOSINOPHIL # BLD AUTO: 0.1 K/UL
EOSINOPHIL NFR BLD: 1.6 %
ERYTHROCYTE [DISTWIDTH] IN BLOOD BY AUTOMATED COUNT: 14.2 %
EST. GFR  (AFRICAN AMERICAN): >60 ML/MIN/1.73 M^2
EST. GFR  (NON AFRICAN AMERICAN): >60 ML/MIN/1.73 M^2
ESTIMATED AVG GLUCOSE: 123 MG/DL
GLUCOSE SERPL-MCNC: 101 MG/DL
HBA1C MFR BLD HPLC: 5.9 %
HCT VFR BLD AUTO: 47.6 %
HDLC SERPL-MCNC: 44 MG/DL
HDLC SERPL: 22.3 %
HGB BLD-MCNC: 15.3 G/DL
IMM GRANULOCYTES # BLD AUTO: 0.03 K/UL
IMM GRANULOCYTES NFR BLD AUTO: 0.4 %
LDLC SERPL CALC-MCNC: 119.4 MG/DL
LYMPHOCYTES # BLD AUTO: 2.3 K/UL
LYMPHOCYTES NFR BLD: 31.6 %
MCH RBC QN AUTO: 28.9 PG
MCHC RBC AUTO-ENTMCNC: 32.1 G/DL
MCV RBC AUTO: 90 FL
MONOCYTES # BLD AUTO: 0.6 K/UL
MONOCYTES NFR BLD: 7.8 %
NEUTROPHILS # BLD AUTO: 4.2 K/UL
NEUTROPHILS NFR BLD: 57.6 %
NONHDLC SERPL-MCNC: 153 MG/DL
NRBC BLD-RTO: 0 /100 WBC
PLATELET # BLD AUTO: 254 K/UL
PMV BLD AUTO: 10.5 FL
POTASSIUM SERPL-SCNC: 3.8 MMOL/L
PROT SERPL-MCNC: 7.1 G/DL
RBC # BLD AUTO: 5.3 M/UL
SODIUM SERPL-SCNC: 139 MMOL/L
TRIGL SERPL-MCNC: 168 MG/DL
TSH SERPL DL<=0.005 MIU/L-ACNC: 0.77 UIU/ML
WBC # BLD AUTO: 7.31 K/UL

## 2018-11-23 PROCEDURE — 84443 ASSAY THYROID STIM HORMONE: CPT

## 2018-11-23 PROCEDURE — 84153 ASSAY OF PSA TOTAL: CPT

## 2018-11-23 PROCEDURE — 36415 COLL VENOUS BLD VENIPUNCTURE: CPT

## 2018-11-23 PROCEDURE — 83036 HEMOGLOBIN GLYCOSYLATED A1C: CPT

## 2018-11-23 PROCEDURE — 80061 LIPID PANEL: CPT

## 2018-11-23 PROCEDURE — 85025 COMPLETE CBC W/AUTO DIFF WBC: CPT

## 2018-11-23 PROCEDURE — 82306 VITAMIN D 25 HYDROXY: CPT

## 2018-11-23 PROCEDURE — 80053 COMPREHEN METABOLIC PANEL: CPT

## 2018-11-26 ENCOUNTER — TELEPHONE (OUTPATIENT)
Dept: INTERNAL MEDICINE | Facility: CLINIC | Age: 58
End: 2018-11-26

## 2018-11-26 ENCOUNTER — OFFICE VISIT (OUTPATIENT)
Dept: INTERNAL MEDICINE | Facility: CLINIC | Age: 58
End: 2018-11-26
Payer: COMMERCIAL

## 2018-11-26 ENCOUNTER — OFFICE VISIT (OUTPATIENT)
Dept: OTOLARYNGOLOGY | Facility: CLINIC | Age: 58
End: 2018-11-26
Payer: COMMERCIAL

## 2018-11-26 VITALS
HEART RATE: 94 BPM | DIASTOLIC BLOOD PRESSURE: 80 MMHG | BODY MASS INDEX: 30.92 KG/M2 | HEIGHT: 70 IN | OXYGEN SATURATION: 94 % | SYSTOLIC BLOOD PRESSURE: 136 MMHG | WEIGHT: 216 LBS

## 2018-11-26 VITALS — DIASTOLIC BLOOD PRESSURE: 94 MMHG | HEART RATE: 78 BPM | SYSTOLIC BLOOD PRESSURE: 141 MMHG

## 2018-11-26 DIAGNOSIS — Z98.890 POST-OPERATIVE STATE: ICD-10-CM

## 2018-11-26 DIAGNOSIS — G47.33 OSA (OBSTRUCTIVE SLEEP APNEA): ICD-10-CM

## 2018-11-26 DIAGNOSIS — J34.89 NASAL OBSTRUCTION: ICD-10-CM

## 2018-11-26 DIAGNOSIS — Z00.00 HEALTH CARE MAINTENANCE: Primary | ICD-10-CM

## 2018-11-26 DIAGNOSIS — F51.01 PRIMARY INSOMNIA: ICD-10-CM

## 2018-11-26 DIAGNOSIS — G25.81 RESTLESS LEGS: ICD-10-CM

## 2018-11-26 DIAGNOSIS — E78.2 MIXED HYPERLIPIDEMIA: ICD-10-CM

## 2018-11-26 DIAGNOSIS — M95.0 NASAL DEFORMITY, ACQUIRED: Primary | ICD-10-CM

## 2018-11-26 DIAGNOSIS — F41.9 ANXIETY: ICD-10-CM

## 2018-11-26 DIAGNOSIS — G47.33 OSA (OBSTRUCTIVE SLEEP APNEA): Primary | ICD-10-CM

## 2018-11-26 PROCEDURE — 99024 POSTOP FOLLOW-UP VISIT: CPT | Mod: S$GLB,,, | Performed by: OTOLARYNGOLOGY

## 2018-11-26 PROCEDURE — 99396 PREV VISIT EST AGE 40-64: CPT | Mod: S$GLB,,, | Performed by: INTERNAL MEDICINE

## 2018-11-26 PROCEDURE — 99999 PR PBB SHADOW E&M-EST. PATIENT-LVL II: CPT | Mod: PBBFAC,,, | Performed by: OTOLARYNGOLOGY

## 2018-11-26 PROCEDURE — 99999 PR PBB SHADOW E&M-EST. PATIENT-LVL IV: CPT | Mod: PBBFAC,,, | Performed by: INTERNAL MEDICINE

## 2018-11-26 RX ORDER — TRAZODONE HYDROCHLORIDE 50 MG/1
50 TABLET ORAL NIGHTLY PRN
Qty: 30 TABLET | Refills: 11 | Status: SHIPPED | OUTPATIENT
Start: 2018-11-26 | End: 2019-11-20 | Stop reason: SDUPTHER

## 2018-11-26 RX ORDER — PRAVASTATIN SODIUM 40 MG/1
40 TABLET ORAL DAILY
Qty: 90 TABLET | Refills: 3 | Status: SHIPPED | OUTPATIENT
Start: 2018-11-26 | End: 2019-12-23 | Stop reason: SDUPTHER

## 2018-11-26 NOTE — PROGRESS NOTES
Five months S/P placement of Latera implants and mesial crural footplate suture.  Notes improvement in nasal breathing since surgery  Does note continued nasal congestion, using nasal saline and flonase intermittently  Recommend daily use of flonase and saline  Plan: RTC

## 2018-11-26 NOTE — PROGRESS NOTES
"Subjective:       Patient ID: Dylan Sawant is a 57 y.o. male.    Chief Complaint: Annual Exam (yearly check up. patient is requesting to go back on Cpap machine. ); Procedure (patient did have nasal deformity surgery with Dr Echols.); and Results (patient is requesting lab results that he had done on last  week.)    HPI   Dylan Sawant is a 57 y.o. male here for a yearly preventative healthcare visit.     Vit d 16  a1c 5.9%  Lipid ok on prava  reaminder labs wnl    Sleep study done outside of [facility].  Previously had trouble tolerating mask. Has been working with [facility] to find a mask that fits. Medical necessity evidence by snoring, excessive fatigue, previous dx of KELLY.   Domitila  [facility]  Ph [phone]  Fax [phone]    Saw Dr. Armendariz for RLE pain. EMG    Saw Dr. Echols for deviated septum.    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    RLS   2mg tid    Review of Systems   Constitutional: Positive for fatigue. Negative for activity change and unexpected weight change.   HENT: Negative for hearing loss, rhinorrhea and trouble swallowing.    Eyes: Negative for discharge and visual disturbance.   Respiratory: Negative for chest tightness and wheezing.    Cardiovascular: Negative for chest pain and palpitations.   Gastrointestinal: Negative for blood in stool, constipation, diarrhea and vomiting.   Endocrine: Negative for polydipsia and polyuria.   Genitourinary: Negative for difficulty urinating, hematuria and urgency.   Musculoskeletal: Negative for arthralgias, joint swelling and neck pain.   Neurological: Negative for weakness and headaches.   Psychiatric/Behavioral: Positive for sleep disturbance. Negative for confusion and dysphoric mood.       Objective:   /80 (BP Location: Left arm, Patient Position: Sitting, BP Method: Large (Manual))   Pulse 94   Ht 5' 10" (1.778 m)   Wt 98 kg (216 lb)   SpO2 (!) 94%   BMI 30.99 kg/m²      Physical Exam "   Constitutional: He is oriented to person, place, and time. He appears well-developed and well-nourished. No distress.   HENT:   Head: Normocephalic and atraumatic.   Cardiovascular: Normal rate and regular rhythm.   No murmur heard.  Pulmonary/Chest: Effort normal. No respiratory distress. He has no wheezes. He has no rales.   Neurological: He is alert and oriented to person, place, and time.   Skin: Skin is warm and dry. He is not diaphoretic.   Psychiatric: He has a normal mood and affect. His behavior is normal.       Assessment:       1. Health care maintenance    2. KELLY (obstructive sleep apnea)    3. Primary insomnia    4. Mixed hyperlipidemia    5. Restless legs    6. Anxiety        Plan:       Dylan Mann was seen today for annual exam, procedure and results.    Diagnoses and all orders for this visit:    Health care maintenance  Labs reviewed    KELLY (obstructive sleep apnea)  Recommend CPAP  To [facility]    Primary insomnia  -     traZODone (DESYREL) 50 MG tablet; Take 1 tablet (50 mg total) by mouth nightly as needed for Insomnia.    Mixed hyperlipidemia  -    INCREASE pravastatin (PRAVACHOL) 40 MG tablet; Take 1 tablet (40 mg total) by mouth once daily.    Restless legs  ropinirole per neuro    Anxiety  lexapro helping, continue          prava increased to 40mg

## 2018-11-26 NOTE — TELEPHONE ENCOUNTER
Please call:  [facility]  Ph [phone]  Fax [phone]    We never received sleep study from outside hospital or other records from them. Please see if they can fax what they have. Does he need a new sleep study? I can order home study if so.

## 2018-11-26 NOTE — TELEPHONE ENCOUNTER
Last sleep study from 2010 will be scanned. Supply company states new one may not be required. Will send with recent note and order for cpap.

## 2018-11-29 ENCOUNTER — TELEPHONE (OUTPATIENT)
Dept: INTERNAL MEDICINE | Facility: CLINIC | Age: 58
End: 2018-11-29

## 2018-11-29 NOTE — TELEPHONE ENCOUNTER
Spoke with Domitila at [facility] she states they received order and was able to use study. She says pt has an appt edwar with them. KJ

## 2018-11-29 NOTE — TELEPHONE ENCOUNTER
Insurance did not approve in lab sleep study but should approve home study. Were you able to find out if the old sleep study was still able to be used from [facility]? If not will order home study. Thanks!

## 2018-12-02 DIAGNOSIS — F41.1 GAD (GENERALIZED ANXIETY DISORDER): ICD-10-CM

## 2018-12-02 DIAGNOSIS — K21.9 GASTROESOPHAGEAL REFLUX DISEASE, ESOPHAGITIS PRESENCE NOT SPECIFIED: ICD-10-CM

## 2018-12-03 RX ORDER — ESCITALOPRAM OXALATE 20 MG/1
TABLET ORAL
Qty: 90 TABLET | Refills: 1 | Status: SHIPPED | OUTPATIENT
Start: 2018-12-03 | End: 2019-03-02 | Stop reason: SDUPTHER

## 2018-12-03 RX ORDER — OMEPRAZOLE 20 MG/1
CAPSULE, DELAYED RELEASE ORAL
Qty: 90 CAPSULE | Refills: 1 | Status: SHIPPED | OUTPATIENT
Start: 2018-12-03 | End: 2019-07-20 | Stop reason: SDUPTHER

## 2018-12-07 DIAGNOSIS — E78.2 MIXED HYPERLIPIDEMIA: ICD-10-CM

## 2018-12-07 RX ORDER — FENOFIBRATE 134 MG/1
134 CAPSULE ORAL NIGHTLY
Qty: 90 CAPSULE | Refills: 3 | Status: SHIPPED | OUTPATIENT
Start: 2018-12-07 | End: 2019-12-23 | Stop reason: SDUPTHER

## 2018-12-09 ENCOUNTER — OFFICE VISIT (OUTPATIENT)
Dept: URGENT CARE | Facility: CLINIC | Age: 58
End: 2018-12-09
Payer: COMMERCIAL

## 2018-12-09 VITALS
HEART RATE: 78 BPM | WEIGHT: 216 LBS | RESPIRATION RATE: 18 BRPM | BODY MASS INDEX: 30.92 KG/M2 | OXYGEN SATURATION: 99 % | TEMPERATURE: 99 F | SYSTOLIC BLOOD PRESSURE: 140 MMHG | HEIGHT: 70 IN | DIASTOLIC BLOOD PRESSURE: 90 MMHG

## 2018-12-09 DIAGNOSIS — J06.9 UPPER RESPIRATORY TRACT INFECTION, UNSPECIFIED TYPE: ICD-10-CM

## 2018-12-09 DIAGNOSIS — H10.023 OTHER MUCOPURULENT CONJUNCTIVITIS OF BOTH EYES: Primary | ICD-10-CM

## 2018-12-09 PROCEDURE — 3008F BODY MASS INDEX DOCD: CPT | Mod: CPTII,S$GLB,, | Performed by: FAMILY MEDICINE

## 2018-12-09 PROCEDURE — 99214 OFFICE O/P EST MOD 30 MIN: CPT | Mod: S$GLB,,, | Performed by: FAMILY MEDICINE

## 2018-12-09 RX ORDER — GENTAMICIN SULFATE 3 MG/ML
1 SOLUTION/ DROPS OPHTHALMIC EVERY 4 HOURS
Qty: 5 ML | Refills: 0 | Status: SHIPPED | OUTPATIENT
Start: 2018-12-09 | End: 2020-12-01

## 2018-12-09 NOTE — PROGRESS NOTES
"Subjective:       Patient ID: Dylan Sawant is a 57 y.o. male.    Vitals:  height is 5' 10" (1.778 m) and weight is 98 kg (216 lb). His oral temperature is 98.5 °F (36.9 °C). His blood pressure is 140/90 (abnormal) and his pulse is 78. His respiration is 18 and oxygen saturation is 99%.     Chief Complaint: Eye Problem    This is a 57 y.o. male who presents today with a chief complaint of eye discharge and congestion. Patient reports awakening with mucus in both his eyes since this morning. Patient reports he has had a cold for the past three days with chest congestion. sinus congestion and pressure, cough for 3 days taking daquil and nyquil      Sinus Problem   This is a new problem. The current episode started in the past 7 days. The problem has been gradually worsening since onset. There has been no fever. His pain is at a severity of 0/10. He is experiencing no pain. Associated symptoms include congestion, coughing and sinus pressure. Pertinent negatives include no chills, diaphoresis, ear pain, shortness of breath or sore throat. Treatments tried: nyquil, daquil,  The treatment provided mild relief.       Constitution: Negative for chills, sweating, fatigue and fever.   HENT: Positive for congestion and sinus pressure. Negative for ear pain, sinus pain, sore throat and voice change.    Neck: Negative for painful lymph nodes.   Eyes: Positive for eye discharge, eye itching and eye pain. Negative for eye trauma, foreign body in eye and eye redness.   Respiratory: Positive for cough. Negative for chest tightness, sputum production, bloody sputum, COPD, shortness of breath, stridor, wheezing and asthma.    Gastrointestinal: Negative for nausea and vomiting.   Musculoskeletal: Negative for muscle ache.   Skin: Negative for rash.   Allergic/Immunologic: Negative for seasonal allergies and asthma.   Hematologic/Lymphatic: Negative for swollen lymph nodes.       Objective:      Physical Exam   Constitutional: He " appears well-developed and well-nourished.   HENT:   Head: Normocephalic and atraumatic.   Eyes: Right eye exhibits discharge. Left eye exhibits discharge. Right conjunctiva is injected. Left conjunctiva is injected.   Neck: Normal range of motion. Neck supple.   Cardiovascular: Normal rate, regular rhythm and normal heart sounds.   Pulmonary/Chest: Effort normal and breath sounds normal.   Lymphadenopathy:     He has no cervical adenopathy.   Nursing note and vitals reviewed.      Assessment:       1. Other mucopurulent conjunctivitis of both eyes    2. Upper respiratory tract infection, unspecified type        Plan:         Other mucopurulent conjunctivitis of both eyes  -     gentamicin (GARAMYCIN) 0.3 % ophthalmic solution; Place 1 drop into both eyes every 4 (four) hours.  Dispense: 5 mL; Refill: 0    Upper respiratory tract infection, unspecified type    recommend Muccinex OTC

## 2018-12-09 NOTE — PATIENT INSTRUCTIONS
What Is Conjunctivitis?    Conjunctivitis is an irritation or infection. It affects the membrane that covers the white of your eye and the inside of your eyelid (conjunctiva). It can happen to one or both eyes. The membrane swells and the blood vessels enlarge (dilate). This makes your eye red. That's why conjunctivitis is sometimes called red eye or pink eye.  What are the symptoms?  If you have one or more of these symptoms, see an eye doctor:  · Redness in and around your eye  · Eyes that are puffy and sore  · Itching, burning, or stinging eyes  · Watery eyes or discharge from your eye  · Eyelids that are crusty or stuck together when you wake up in the morning  · Pink color in the whites of one or both eyes  Getting treatment quickly can help prevent damage to your eyes.  How is it diagnosed?  Conjunctivitis is usually a minor eye infection. But it can sometimes become a more serious problem. Some more serious eye diseases have symptoms that look like conjunctivitis. So it's important for an eye doctor to diagnose you. Your eye doctor will ask about your symptoms and any medicines you take. He or she will ask about any illnesses or medical conditions you may have. The doctor will also check your eyes with a hand-held light and a special microscope called a slit lamp.  Date Last Reviewed: 6/11/2015 © 2000-2017 [employer]. [address] [place] [address], [place] [address]. All rights reserved. This information is not intended as a substitute for professional medical care. Always follow your healthcare professional's instructions.        Viral Upper Respiratory Illness (Adult)  You have a viral upper respiratory illness (URI), which is another term for the common cold. This illness is contagious during the first few days. It is spread through the air by coughing and sneezing. It may also be spread by direct contact (touching the sick person and then touching your own eyes, nose, or mouth). Frequent  handwashing will decrease risk of spread. Most viral illnesses go away within 7 to 10 days with rest and simple home remedies. Sometimes the illness may last for several weeks. Antibiotics will not kill a virus, and they are generally not prescribed for this condition.    Home care  · If symptoms are severe, rest at home for the first 2 to 3 days. When you resume activity, don't let yourself get too tired.  · Avoid being exposed to cigarette smoke (yours or others).  · You may use acetaminophen or ibuprofen to control pain and fever, unless another medicine was prescribed. (Note: If you have chronic liver or kidney disease, have ever had a stomach ulcer or gastrointestinal bleeding, or are taking blood-thinning medicines, talk with your healthcare provider before using these medicines.) Aspirin should never be given to anyone under 18 years of age who is ill with a viral infection or fever. It may cause severe liver or brain damage.  · Your appetite may be poor, so a light diet is fine. Avoid dehydration by drinking 6 to 8 glasses of fluids per day (water, soft drinks, juices, tea, or soup). Extra fluids will help loosen secretions in the nose and lungs.  · Over-the-counter cold medicines will not shorten the length of time youre sick, but they may be helpful for the following symptoms: cough, sore throat, and nasal and sinus congestion. (Note: Do not use decongestants if you have high blood pressure.)  Follow-up care  Follow up with your healthcare provider, or as advised.  When to seek medical advice  Call your healthcare provider right away if any of these occur:  · Cough with lots of colored sputum (mucus)  · Severe headache; face, neck, or ear pain  · Difficulty swallowing due to throat pain  · Fever of 100.4°F (38°C)  Call 911, or get immediate medical care  Call emergency services right away if any of these occur:  · Chest pain, shortness of breath, wheezing, or difficulty breathing  · Coughing up  blood  · Inability to swallow due to throat pain  Date Last Reviewed: 9/13/2015  © 0301-6775 [employer]. [address] [place] [address], [place] [address]. All rights reserved. This information is not intended as a substitute for professional medical care. Always follow your healthcare professional's instructions.

## 2019-01-23 ENCOUNTER — OFFICE VISIT (OUTPATIENT)
Dept: URGENT CARE | Facility: CLINIC | Age: 59
End: 2019-01-23
Payer: COMMERCIAL

## 2019-01-23 ENCOUNTER — PATIENT MESSAGE (OUTPATIENT)
Dept: OTOLARYNGOLOGY | Facility: CLINIC | Age: 59
End: 2019-01-23

## 2019-01-23 VITALS
DIASTOLIC BLOOD PRESSURE: 88 MMHG | SYSTOLIC BLOOD PRESSURE: 137 MMHG | HEIGHT: 70 IN | HEART RATE: 87 BPM | OXYGEN SATURATION: 99 % | RESPIRATION RATE: 20 BRPM | WEIGHT: 216 LBS | TEMPERATURE: 98 F | BODY MASS INDEX: 30.92 KG/M2

## 2019-01-23 DIAGNOSIS — A08.4 VIRAL GASTROENTERITIS: Primary | ICD-10-CM

## 2019-01-23 PROCEDURE — 99214 PR OFFICE/OUTPT VISIT, EST, LEVL IV, 30-39 MIN: ICD-10-PCS | Mod: S$GLB,,, | Performed by: FAMILY MEDICINE

## 2019-01-23 PROCEDURE — 3008F BODY MASS INDEX DOCD: CPT | Mod: CPTII,S$GLB,, | Performed by: FAMILY MEDICINE

## 2019-01-23 PROCEDURE — 99214 OFFICE O/P EST MOD 30 MIN: CPT | Mod: S$GLB,,, | Performed by: FAMILY MEDICINE

## 2019-01-23 PROCEDURE — 3008F PR BODY MASS INDEX (BMI) DOCUMENTED: ICD-10-PCS | Mod: CPTII,S$GLB,, | Performed by: FAMILY MEDICINE

## 2019-01-23 NOTE — PATIENT INSTRUCTIONS

## 2019-01-23 NOTE — PROGRESS NOTES
"Subjective:       Patient ID: Dylan Sawant is a 58 y.o. male.    Vitals:  height is 5' 10" (1.778 m) and weight is 98 kg (216 lb). His oral temperature is 98.2 °F (36.8 °C). His blood pressure is 137/88 and his pulse is 87. His respiration is 20 and oxygen saturation is 99%.     Chief Complaint: Diarrhea    This is a 58 y.o. male who presents today with a chief complaint of diarrhea since Monday.  Pt is also having some ABD cramping.  He has been taking Imodium without relief. No unusual ingestions or exposures. Social contacts are well.       Diarrhea    This is a new problem. The current episode started in the past 7 days (Monday). The problem occurs 5 to 10 times per day. The problem has been unchanged. The patient states that diarrhea awakens him from sleep. Associated symptoms include abdominal pain. Pertinent negatives include no chills, fever or vomiting. The treatment provided no relief.       Constitution: Negative for appetite change, chills, sweating and fever.   Cardiovascular: Negative for chest pain.   Respiratory: Negative for shortness of breath.    Gastrointestinal: Positive for abdominal pain and diarrhea. Negative for abdominal trauma, abdominal bloating, history of abdominal surgery, nausea, vomiting, constipation, dark colored stools and heartburn.   Genitourinary: Negative for dysuria and pelvic pain.   Musculoskeletal: Negative for back pain.       Objective:      Physical Exam   Constitutional: He appears well-developed and well-nourished.   HENT:   Head: Normocephalic and atraumatic.   Eyes: Pupils are equal, round, and reactive to light.   Neck: Normal range of motion. Neck supple.   Cardiovascular: Normal rate, regular rhythm and normal heart sounds.   Pulmonary/Chest: Effort normal and breath sounds normal.   Abdominal: Soft. He exhibits no distension. There is no tenderness. There is no guarding.   Nursing note and vitals reviewed.      Assessment:       1. Viral gastroenteritis     "    Plan:         Viral gastroenteritis    BRAT diet, fluids. RTC as needed

## 2019-01-31 ENCOUNTER — TELEPHONE (OUTPATIENT)
Dept: OTOLARYNGOLOGY | Facility: CLINIC | Age: 59
End: 2019-01-31

## 2019-02-11 ENCOUNTER — PATIENT MESSAGE (OUTPATIENT)
Dept: OTOLARYNGOLOGY | Facility: CLINIC | Age: 59
End: 2019-02-11

## 2019-02-28 ENCOUNTER — TELEPHONE (OUTPATIENT)
Dept: INTERNAL MEDICINE | Facility: CLINIC | Age: 59
End: 2019-02-28

## 2019-02-28 NOTE — TELEPHONE ENCOUNTER
----- Message from Sandra Hagen sent at 2/27/2019  4:36 PM CST -----  Contact: self [phone]  Type: Returning a call    Who left a message? Esther    When did the practice call? today    Comments:Pt states that he is calling to speak with someone in the office. He states that he received a letter in the mail stating that he has to meet with  in regards to a consult for his C-Pap machine for insurance. He states that he was advised that this consult has to be done by 02/28/19. Please call  Back and advise.

## 2019-03-02 DIAGNOSIS — F41.1 GAD (GENERALIZED ANXIETY DISORDER): ICD-10-CM

## 2019-03-04 ENCOUNTER — TELEPHONE (OUTPATIENT)
Dept: INTERNAL MEDICINE | Facility: CLINIC | Age: 59
End: 2019-03-04

## 2019-03-04 ENCOUNTER — OFFICE VISIT (OUTPATIENT)
Dept: INTERNAL MEDICINE | Facility: CLINIC | Age: 59
End: 2019-03-04
Payer: COMMERCIAL

## 2019-03-04 VITALS
WEIGHT: 218.94 LBS | BODY MASS INDEX: 32.43 KG/M2 | HEART RATE: 69 BPM | HEIGHT: 69 IN | DIASTOLIC BLOOD PRESSURE: 80 MMHG | SYSTOLIC BLOOD PRESSURE: 132 MMHG

## 2019-03-04 DIAGNOSIS — H10.10 ALLERGIC CONJUNCTIVITIS, UNSPECIFIED LATERALITY: Primary | ICD-10-CM

## 2019-03-04 DIAGNOSIS — G47.33 OSA (OBSTRUCTIVE SLEEP APNEA): ICD-10-CM

## 2019-03-04 PROCEDURE — 99213 OFFICE O/P EST LOW 20 MIN: CPT | Mod: S$GLB,,, | Performed by: PHYSICIAN ASSISTANT

## 2019-03-04 PROCEDURE — 3008F PR BODY MASS INDEX (BMI) DOCUMENTED: ICD-10-PCS | Mod: CPTII,S$GLB,, | Performed by: PHYSICIAN ASSISTANT

## 2019-03-04 PROCEDURE — 99213 PR OFFICE/OUTPT VISIT, EST, LEVL III, 20-29 MIN: ICD-10-PCS | Mod: S$GLB,,, | Performed by: PHYSICIAN ASSISTANT

## 2019-03-04 PROCEDURE — 3008F BODY MASS INDEX DOCD: CPT | Mod: CPTII,S$GLB,, | Performed by: PHYSICIAN ASSISTANT

## 2019-03-04 PROCEDURE — 99999 PR PBB SHADOW E&M-EST. PATIENT-LVL IV: CPT | Mod: PBBFAC,,, | Performed by: PHYSICIAN ASSISTANT

## 2019-03-04 PROCEDURE — 99999 PR PBB SHADOW E&M-EST. PATIENT-LVL IV: ICD-10-PCS | Mod: PBBFAC,,, | Performed by: PHYSICIAN ASSISTANT

## 2019-03-04 RX ORDER — OLOPATADINE HYDROCHLORIDE 2 MG/ML
1 SOLUTION/ DROPS OPHTHALMIC DAILY
Qty: 2.5 ML | Refills: 0 | Status: SHIPPED | OUTPATIENT
Start: 2019-03-04 | End: 2020-12-01

## 2019-03-04 RX ORDER — ESCITALOPRAM OXALATE 20 MG/1
TABLET ORAL
Qty: 90 TABLET | Refills: 0 | Status: SHIPPED | OUTPATIENT
Start: 2019-03-04 | End: 2019-05-23 | Stop reason: SDUPTHER

## 2019-03-04 NOTE — TELEPHONE ENCOUNTER
Please fax copy of office visit note from today to [facility] at , alternate fax is .  Please let patient know when complete and then send paperwork to be scanned.

## 2019-03-04 NOTE — PROGRESS NOTES
Subjective:       Patient ID: Dylan Sawant is a 58 y.o. male.        Chief Complaint: Follow-up (c-pap)    Dylan Sawant is an established patient of Abby Chaudhary MD here today for follow up visit.    KELLY:  Using CPAP on average 6 hours/night.  No longer snoring.  Resting better.  Feeling more rested.    Daytime somnolence improved.  Sleep study completed 11/26/18 and 12 cm recommended.    Eye irritation/itching:  Bilateral eyes.  Cutting grass for a living, wearing safety glasses.  Notes eyes are somewhat red and itchy after cutting grass.  No pain or change in vision.  No eye discharge.  Using OTC visine allergy drops.  Lots of grass and pollen exposure.           Review of Systems   Constitutional: Negative for activity change, appetite change, chills, fatigue, fever and unexpected weight change.   HENT: Negative for congestion, hearing loss, rhinorrhea, sore throat and trouble swallowing.    Eyes: Positive for itching. Negative for discharge, redness and visual disturbance.   Respiratory: Negative for cough, chest tightness, shortness of breath and wheezing.    Cardiovascular: Negative for chest pain, palpitations and leg swelling.   Gastrointestinal: Negative for abdominal pain, blood in stool, constipation, diarrhea, nausea and vomiting.   Endocrine: Negative for polydipsia and polyuria.   Genitourinary: Negative for difficulty urinating, dysuria, frequency, hematuria and urgency.   Musculoskeletal: Negative for arthralgias, back pain, joint swelling and neck pain.   Skin: Negative for rash.   Neurological: Negative for dizziness, syncope, weakness and headaches.   Psychiatric/Behavioral: Negative for confusion, dysphoric mood and sleep disturbance. The patient is not nervous/anxious.        Objective:      Physical Exam   Constitutional: He appears well-developed and well-nourished.   HENT:   Head: Normocephalic.   Right Ear: External ear normal.   Left Ear: External ear normal.  "  Mouth/Throat: Oropharynx is clear and moist.   Eyes: EOM are normal. Pupils are equal, round, and reactive to light. Right conjunctiva is injected. Left conjunctiva is injected.   Cardiovascular: Normal rate, regular rhythm and normal heart sounds. Exam reveals no gallop and no friction rub.   No murmur heard.  Pulmonary/Chest: Effort normal and breath sounds normal. No respiratory distress.   Abdominal: Soft. There is no tenderness.   Musculoskeletal: He exhibits no edema.   Neurological: He is alert.   Skin: Skin is warm and dry.   Psychiatric: He has a normal mood and affect.   Nursing note and vitals reviewed.      Assessment:       1. Allergic conjunctivitis, unspecified laterality    2. KELLY (obstructive sleep apnea)        Plan:       Dylan Mann was seen today for follow-up.    Diagnoses and all orders for this visit:    Allergic conjunctivitis, unspecified laterality: to see optometry if not improving  -     olopatadine (PATADAY) 0.2 % Drop; Place 1 drop into both eyes once daily.    KELLY (obstructive sleep apnea): [facility], fax number is [phone], will fax copy of office note from today with required documentation (benefiting from CPAP therapy and compliance with CPAP device evidenced by use > 4 hours per day 21 of each 30 day period), feeling better rested, no further snoring, and sleeping better s/p CPAP and using approximately 6 hours every night    Pt has been given instructions populated from [employer] database and has verbalized understanding of the after visit summary and information contained wherein.    Follow up with a primary care provider. May go to ER for acute shortness of breath, lightheadedness, fever, or any other emergent complaints or changes in condition.    "This note will be shared with the patient"    No future appointments.            "

## 2019-03-04 NOTE — PATIENT INSTRUCTIONS
Conjunctivitis, Allergic    Conjunctivitis is an irritation of a thin membrane in the eye. This membrane is called the conjunctiva. It covers the white of the eye and the inside of the eyelid. The condition is often known as pink eye or red eye because the eye looks pink or red. The eye can also be swollen. A thick fluid may leak from the eyelid. The eye may itch and burn, and feel gritty or scratchy.  Allergic conjunctivitis is caused by an allergen. Allergens are substances that cause the body to react with certain symptoms. Allergens that cause eye irritation include things such as house dust or pollen in the air. This can occur seasonally, most often in the spring.  Home care  · Eye drops may be prescribed to reduce itching and redness. Use these as directed. Otherwise, over-the-counter decongestant eye drops may be used.  · Apply a cool compress (towel soaked in cool water) to the affected eye 3 to 4 times a day to reduce swelling and itching.  · It is common to have mucus drainage during the night, causing the eyelids to become crusted by morning. Use a warm, wet cloth to wipe this away. You may also use saline irrigating solution or artificial tears to rinse away mucus in the eye. Do not patch the eye.  · You may use acetaminophen or ibuprofen to control pain, unless another medicine was prescribed. (Note: If you have chronic liver or kidney disease, or if you have ever had a stomach ulcer or gastrointestinal bleeding, talk with your healthcare provider before using these medicines.)  · Do not wear contact lenses until your eyes have healed and all symptoms are gone.  Follow-up care  Follow up with your healthcare provider, or as advised.  When to seek medical advice  Call your healthcare provider right away if any of these occur:  · Increased eyelid swelling  · New or worsening drainage from the eye  · Increasing redness around the eye  · Facial swelling  Date Last Reviewed: 6/14/2015  © 9134-2713 [employer]. [address] [place] [address], [place] [address]. All rights reserved. This information is not intended as a substitute for professional medical care. Always follow your healthcare professional's instructions.

## 2019-03-07 ENCOUNTER — TELEPHONE (OUTPATIENT)
Dept: INTERNAL MEDICINE | Facility: CLINIC | Age: 59
End: 2019-03-07

## 2019-04-16 DIAGNOSIS — G25.81 RESTLESS LEGS: ICD-10-CM

## 2019-04-17 RX ORDER — ROPINIROLE 2 MG/1
TABLET, FILM COATED ORAL
Qty: 180 TABLET | Refills: 2 | Status: SHIPPED | OUTPATIENT
Start: 2019-04-17 | End: 2019-07-20 | Stop reason: SDUPTHER

## 2019-05-23 DIAGNOSIS — F41.1 GAD (GENERALIZED ANXIETY DISORDER): ICD-10-CM

## 2019-05-23 RX ORDER — ESCITALOPRAM OXALATE 20 MG/1
20 TABLET ORAL DAILY
Qty: 90 TABLET | Refills: 3 | Status: SHIPPED | OUTPATIENT
Start: 2019-05-23 | End: 2019-12-23 | Stop reason: SDUPTHER

## 2019-05-31 RX ORDER — OMEPRAZOLE 20 MG/1
CAPSULE, DELAYED RELEASE ORAL
Qty: 90 CAPSULE | Refills: 0 | Status: SHIPPED | OUTPATIENT
Start: 2019-05-31 | End: 2019-12-23 | Stop reason: SDUPTHER

## 2019-07-20 DIAGNOSIS — K21.9 GASTROESOPHAGEAL REFLUX DISEASE, ESOPHAGITIS PRESENCE NOT SPECIFIED: ICD-10-CM

## 2019-07-20 DIAGNOSIS — G25.81 RESTLESS LEGS: ICD-10-CM

## 2019-07-20 RX ORDER — OMEPRAZOLE 20 MG/1
CAPSULE, DELAYED RELEASE ORAL
Qty: 90 CAPSULE | Refills: 3 | Status: SHIPPED | OUTPATIENT
Start: 2019-07-20 | End: 2019-12-23

## 2019-07-22 ENCOUNTER — PATIENT MESSAGE (OUTPATIENT)
Dept: NEUROLOGY | Facility: CLINIC | Age: 59
End: 2019-07-22

## 2019-07-22 DIAGNOSIS — G25.81 RESTLESS LEGS: ICD-10-CM

## 2019-07-22 RX ORDER — ROPINIROLE 2 MG/1
2 TABLET, FILM COATED ORAL 3 TIMES DAILY
Qty: 90 TABLET | Refills: 0 | Status: CANCELLED | OUTPATIENT
Start: 2019-07-22

## 2019-07-23 RX ORDER — ROPINIROLE 2 MG/1
TABLET, FILM COATED ORAL
Qty: 90 TABLET | Refills: 0 | Status: SHIPPED | OUTPATIENT
Start: 2019-07-23 | End: 2019-09-30 | Stop reason: SDUPTHER

## 2019-09-28 DIAGNOSIS — G25.81 RESTLESS LEGS: ICD-10-CM

## 2019-09-30 ENCOUNTER — PATIENT MESSAGE (OUTPATIENT)
Dept: INTERNAL MEDICINE | Facility: CLINIC | Age: 59
End: 2019-09-30

## 2019-09-30 DIAGNOSIS — Z12.5 SPECIAL SCREENING FOR MALIGNANT NEOPLASM OF PROSTATE: ICD-10-CM

## 2019-09-30 DIAGNOSIS — E78.2 MIXED HYPERLIPIDEMIA: Primary | ICD-10-CM

## 2019-09-30 DIAGNOSIS — G25.81 RESTLESS LEGS: ICD-10-CM

## 2019-09-30 DIAGNOSIS — Z00.00 HEALTH CARE MAINTENANCE: ICD-10-CM

## 2019-09-30 RX ORDER — ROPINIROLE 2 MG/1
TABLET, FILM COATED ORAL
Qty: 90 TABLET | Refills: 0 | OUTPATIENT
Start: 2019-09-30

## 2019-09-30 RX ORDER — ROPINIROLE 2 MG/1
2 TABLET, FILM COATED ORAL 3 TIMES DAILY
Qty: 90 TABLET | Refills: 0 | Status: SHIPPED | OUTPATIENT
Start: 2019-09-30 | End: 2019-10-25 | Stop reason: SDUPTHER

## 2019-10-25 DIAGNOSIS — G25.81 RESTLESS LEGS: ICD-10-CM

## 2019-10-25 RX ORDER — ROPINIROLE 2 MG/1
TABLET, FILM COATED ORAL
Qty: 90 TABLET | Refills: 3 | Status: SHIPPED | OUTPATIENT
Start: 2019-10-25 | End: 2019-10-28 | Stop reason: SDUPTHER

## 2019-10-28 DIAGNOSIS — G25.81 RESTLESS LEGS: ICD-10-CM

## 2019-10-28 RX ORDER — ROPINIROLE 2 MG/1
TABLET, FILM COATED ORAL
Qty: 90 TABLET | Refills: 0 | Status: SHIPPED | OUTPATIENT
Start: 2019-10-28 | End: 2019-12-23

## 2019-11-20 DIAGNOSIS — F51.01 PRIMARY INSOMNIA: ICD-10-CM

## 2019-11-20 RX ORDER — TRAZODONE HYDROCHLORIDE 50 MG/1
50 TABLET ORAL NIGHTLY PRN
Qty: 30 TABLET | Refills: 10 | Status: SHIPPED | OUTPATIENT
Start: 2019-11-20 | End: 2020-12-01 | Stop reason: SDUPTHER

## 2019-11-29 RX ORDER — PRAVASTATIN SODIUM 20 MG/1
TABLET ORAL
Qty: 90 TABLET | Refills: 2 | OUTPATIENT
Start: 2019-11-29

## 2019-11-29 NOTE — PROGRESS NOTES
Refill Authorization Note     is requesting a refill authorization.    Brief assessment and rationale for refill: REVIEW: dose adjustment/need labs  Name and strength of medication: PRAVASTATIN 20MG TAB  Medication-related problems identified:   Dose adjustment  Requires labs    Medication Therapy Plan: Refill requested is for pravastatin 20mg; INCREASE pravastatin 40MG tablet; Take 1 tablet (40 mg total) by mouth once daily(11/18-Jet); FLOS(12/19)    Medication reconciliation completed: No   Pharmacist Review Requested: Yes          How patient will take medication: t1 po qd          Comments:   Requested Prescriptions   Pending Prescriptions Disp Refills    pravastatin (PRAVACHOL) 20 MG tablet [Pharmacy Med Name: PRAVASTATIN 20 MG        TAB] 90 tablet 2     Sig: TAKE ONE TABLET BY MOUTH ONCE DAILY.       Cardiovascular:  Antilipid - Statins Failed - 11/29/2019 11:21 AM        Failed - Lipid Panel completed in last 360 days     Lab Results   Component Value Date    CHOL 197 11/23/2018    HDL 44 11/23/2018    LDLCALC 119.4 11/23/2018    TRIG 168 (H) 11/23/2018             Failed - ALT is 94 or below and within 360 days     ALT   Date Value Ref Range Status   11/23/2018 30 10 - 44 U/L Final   09/25/2017 35 10 - 44 U/L Final   11/08/2016 32 10 - 44 U/L Final              Failed - AST is 54 or below and within 360 days     AST   Date Value Ref Range Status   11/23/2018 27 10 - 40 U/L Final   09/25/2017 25 10 - 40 U/L Final   11/08/2016 24 10 - 40 U/L Final              Passed - Patient is at least 18 years old        Passed - Office visit in past 12 months or future 90 days     Recent Outpatient Visits            9 months ago Allergic conjunctivitis, unspecified laterality    [facility] Joann Morales PA-C    10 months ago Viral gastroenteritis    [facility] Erasmo Mulligan MD    11 months ago Other mucopurulent conjunctivitis of both eyes    [facility] [address] Erasmo Mulligan MD    1 year ago Health care maintenance    [facility] Abby Chaudhary MD    1 year ago Nasal deformity, acquired    [facility] [place] YASMANI Echols III, MD          Future Appointments              In 2 weeks LAB, [facility] [place]    In 3 weeks MD [facility] - Internal Medicine, [facility]

## 2019-11-29 NOTE — TELEPHONE ENCOUNTER
I have reviewed and agree with the assessment below with changes. Last commented as patient no longer taking pravastatin 20 mg. Dose was increased to pravastatin 40 mg 11/18. Quick DC.

## 2019-12-09 ENCOUNTER — PATIENT OUTREACH (OUTPATIENT)
Dept: ADMINISTRATIVE | Facility: HOSPITAL | Age: 59
End: 2019-12-09

## 2019-12-13 ENCOUNTER — LAB VISIT (OUTPATIENT)
Dept: LAB | Facility: HOSPITAL | Age: 59
End: 2019-12-13
Attending: INTERNAL MEDICINE
Payer: COMMERCIAL

## 2019-12-13 DIAGNOSIS — Z00.00 HEALTH CARE MAINTENANCE: ICD-10-CM

## 2019-12-13 DIAGNOSIS — Z12.5 SPECIAL SCREENING FOR MALIGNANT NEOPLASM OF PROSTATE: ICD-10-CM

## 2019-12-13 DIAGNOSIS — E78.2 MIXED HYPERLIPIDEMIA: ICD-10-CM

## 2019-12-13 LAB
25(OH)D3+25(OH)D2 SERPL-MCNC: 17 NG/ML (ref 30–96)
ALBUMIN SERPL BCP-MCNC: 4.2 G/DL (ref 3.5–5.2)
ALP SERPL-CCNC: 59 U/L (ref 55–135)
ALT SERPL W/O P-5'-P-CCNC: 33 U/L (ref 10–44)
ANION GAP SERPL CALC-SCNC: 7 MMOL/L (ref 8–16)
AST SERPL-CCNC: 26 U/L (ref 10–40)
BASOPHILS # BLD AUTO: 0.05 K/UL (ref 0–0.2)
BASOPHILS NFR BLD: 0.6 % (ref 0–1.9)
BILIRUB SERPL-MCNC: 0.4 MG/DL (ref 0.1–1)
BUN SERPL-MCNC: 13 MG/DL (ref 6–20)
CALCIUM SERPL-MCNC: 9.7 MG/DL (ref 8.7–10.5)
CHLORIDE SERPL-SCNC: 105 MMOL/L (ref 95–110)
CHOLEST SERPL-MCNC: 157 MG/DL (ref 120–199)
CHOLEST/HDLC SERPL: 3.6 {RATIO} (ref 2–5)
CO2 SERPL-SCNC: 28 MMOL/L (ref 23–29)
COMPLEXED PSA SERPL-MCNC: 0.88 NG/ML (ref 0–4)
CREAT SERPL-MCNC: 1 MG/DL (ref 0.5–1.4)
DIFFERENTIAL METHOD: ABNORMAL
EOSINOPHIL # BLD AUTO: 0.1 K/UL (ref 0–0.5)
EOSINOPHIL NFR BLD: 1.7 % (ref 0–8)
ERYTHROCYTE [DISTWIDTH] IN BLOOD BY AUTOMATED COUNT: 15.7 % (ref 11.5–14.5)
EST. GFR  (AFRICAN AMERICAN): >60 ML/MIN/1.73 M^2
EST. GFR  (NON AFRICAN AMERICAN): >60 ML/MIN/1.73 M^2
ESTIMATED AVG GLUCOSE: 134 MG/DL (ref 68–131)
GLUCOSE SERPL-MCNC: 109 MG/DL (ref 70–110)
HBA1C MFR BLD HPLC: 6.3 % (ref 4–5.6)
HCT VFR BLD AUTO: 46 % (ref 40–54)
HDLC SERPL-MCNC: 44 MG/DL (ref 40–75)
HDLC SERPL: 28 % (ref 20–50)
HGB BLD-MCNC: 14.3 G/DL (ref 14–18)
IMM GRANULOCYTES # BLD AUTO: 0.03 K/UL (ref 0–0.04)
IMM GRANULOCYTES NFR BLD AUTO: 0.4 % (ref 0–0.5)
LDLC SERPL CALC-MCNC: 94.2 MG/DL (ref 63–159)
LYMPHOCYTES # BLD AUTO: 2.3 K/UL (ref 1–4.8)
LYMPHOCYTES NFR BLD: 27.6 % (ref 18–48)
MCH RBC QN AUTO: 27.7 PG (ref 27–31)
MCHC RBC AUTO-ENTMCNC: 31.1 G/DL (ref 32–36)
MCV RBC AUTO: 89 FL (ref 82–98)
MONOCYTES # BLD AUTO: 0.7 K/UL (ref 0.3–1)
MONOCYTES NFR BLD: 8.6 % (ref 4–15)
NEUTROPHILS # BLD AUTO: 5.1 K/UL (ref 1.8–7.7)
NEUTROPHILS NFR BLD: 61.1 % (ref 38–73)
NONHDLC SERPL-MCNC: 113 MG/DL
NRBC BLD-RTO: 0 /100 WBC
PLATELET # BLD AUTO: 274 K/UL (ref 150–350)
PMV BLD AUTO: 10.9 FL (ref 9.2–12.9)
POTASSIUM SERPL-SCNC: 4.3 MMOL/L (ref 3.5–5.1)
PROT SERPL-MCNC: 7.4 G/DL (ref 6–8.4)
RBC # BLD AUTO: 5.16 M/UL (ref 4.6–6.2)
SODIUM SERPL-SCNC: 140 MMOL/L (ref 136–145)
TRIGL SERPL-MCNC: 94 MG/DL (ref 30–150)
TSH SERPL DL<=0.005 MIU/L-ACNC: 0.89 UIU/ML (ref 0.4–4)
WBC # BLD AUTO: 8.36 K/UL (ref 3.9–12.7)

## 2019-12-13 PROCEDURE — 80053 COMPREHEN METABOLIC PANEL: CPT

## 2019-12-13 PROCEDURE — 85025 COMPLETE CBC W/AUTO DIFF WBC: CPT

## 2019-12-13 PROCEDURE — 80061 LIPID PANEL: CPT

## 2019-12-13 PROCEDURE — 84153 ASSAY OF PSA TOTAL: CPT

## 2019-12-13 PROCEDURE — 36415 COLL VENOUS BLD VENIPUNCTURE: CPT | Mod: PO

## 2019-12-13 PROCEDURE — 82306 VITAMIN D 25 HYDROXY: CPT

## 2019-12-13 PROCEDURE — 84443 ASSAY THYROID STIM HORMONE: CPT

## 2019-12-13 PROCEDURE — 83036 HEMOGLOBIN GLYCOSYLATED A1C: CPT

## 2019-12-23 ENCOUNTER — OFFICE VISIT (OUTPATIENT)
Dept: INTERNAL MEDICINE | Facility: CLINIC | Age: 59
End: 2019-12-23
Payer: COMMERCIAL

## 2019-12-23 VITALS
SYSTOLIC BLOOD PRESSURE: 124 MMHG | BODY MASS INDEX: 31.78 KG/M2 | DIASTOLIC BLOOD PRESSURE: 76 MMHG | WEIGHT: 222 LBS | HEIGHT: 70 IN

## 2019-12-23 DIAGNOSIS — F41.1 GAD (GENERALIZED ANXIETY DISORDER): ICD-10-CM

## 2019-12-23 DIAGNOSIS — E78.2 MIXED HYPERLIPIDEMIA: ICD-10-CM

## 2019-12-23 DIAGNOSIS — R73.03 PREDIABETES: ICD-10-CM

## 2019-12-23 DIAGNOSIS — M25.562 CHRONIC PAIN OF BOTH KNEES: ICD-10-CM

## 2019-12-23 DIAGNOSIS — G89.29 CHRONIC PAIN OF BOTH KNEES: ICD-10-CM

## 2019-12-23 DIAGNOSIS — E55.9 VITAMIN D INSUFFICIENCY: ICD-10-CM

## 2019-12-23 DIAGNOSIS — M25.561 CHRONIC PAIN OF BOTH KNEES: ICD-10-CM

## 2019-12-23 DIAGNOSIS — Z00.00 HEALTH CARE MAINTENANCE: Primary | ICD-10-CM

## 2019-12-23 DIAGNOSIS — G25.81 RESTLESS LEGS: ICD-10-CM

## 2019-12-23 DIAGNOSIS — F41.9 ANXIETY: ICD-10-CM

## 2019-12-23 DIAGNOSIS — K21.9 GASTROESOPHAGEAL REFLUX DISEASE, ESOPHAGITIS PRESENCE NOT SPECIFIED: ICD-10-CM

## 2019-12-23 PROCEDURE — 99999 PR PBB SHADOW E&M-EST. PATIENT-LVL III: CPT | Mod: PBBFAC,,, | Performed by: INTERNAL MEDICINE

## 2019-12-23 PROCEDURE — 99999 PR PBB SHADOW E&M-EST. PATIENT-LVL III: ICD-10-PCS | Mod: PBBFAC,,, | Performed by: INTERNAL MEDICINE

## 2019-12-23 PROCEDURE — 99396 PREV VISIT EST AGE 40-64: CPT | Mod: S$GLB,,, | Performed by: INTERNAL MEDICINE

## 2019-12-23 PROCEDURE — 99396 PR PREVENTIVE VISIT,EST,40-64: ICD-10-PCS | Mod: S$GLB,,, | Performed by: INTERNAL MEDICINE

## 2019-12-23 RX ORDER — METFORMIN HYDROCHLORIDE 500 MG/1
500 TABLET ORAL 2 TIMES DAILY WITH MEALS
Qty: 180 TABLET | Refills: 3 | Status: SHIPPED | OUTPATIENT
Start: 2019-12-23 | End: 2021-02-12 | Stop reason: SDUPTHER

## 2019-12-23 RX ORDER — FENOFIBRATE 134 MG/1
134 CAPSULE ORAL NIGHTLY
Qty: 90 CAPSULE | Refills: 3 | Status: SHIPPED | OUTPATIENT
Start: 2019-12-23 | End: 2021-01-11 | Stop reason: SDUPTHER

## 2019-12-23 RX ORDER — PRAVASTATIN SODIUM 40 MG/1
40 TABLET ORAL DAILY
Qty: 90 TABLET | Refills: 3 | Status: SHIPPED | OUTPATIENT
Start: 2019-12-23 | End: 2021-05-03 | Stop reason: SDUPTHER

## 2019-12-23 RX ORDER — VIT C/E/ZN/COPPR/LUTEIN/ZEAXAN 250MG-90MG
1000 CAPSULE ORAL DAILY
Qty: 90 CAPSULE | Refills: 3 | Status: SHIPPED | OUTPATIENT
Start: 2019-12-23 | End: 2020-12-01

## 2019-12-23 RX ORDER — DICLOFENAC SODIUM 10 MG/G
2 GEL TOPICAL 4 TIMES DAILY
Qty: 100 G | Refills: 3 | Status: SHIPPED | OUTPATIENT
Start: 2019-12-23 | End: 2021-05-03

## 2019-12-23 RX ORDER — ROPINIROLE HYDROCHLORIDE 2 MG/1
6 TABLET, FILM COATED, EXTENDED RELEASE ORAL NIGHTLY
Qty: 90 TABLET | Refills: 11 | Status: SHIPPED | OUTPATIENT
Start: 2019-12-23 | End: 2020-03-23

## 2019-12-23 RX ORDER — ESCITALOPRAM OXALATE 10 MG/1
10 TABLET ORAL DAILY
Qty: 90 TABLET | Refills: 3 | Status: SHIPPED | OUTPATIENT
Start: 2019-12-23 | End: 2021-02-28 | Stop reason: SDUPTHER

## 2019-12-23 RX ORDER — OMEPRAZOLE 20 MG/1
CAPSULE, DELAYED RELEASE ORAL
Qty: 90 CAPSULE | Refills: 3 | Status: SHIPPED | OUTPATIENT
Start: 2019-12-23 | End: 2020-11-22

## 2019-12-23 NOTE — PROGRESS NOTES
"Subjective:       Patient ID: Dylan Sawant is a 59 y.o. male.    Chief Complaint: Annual Exam (general check up.) and Labs Only (had labs done on last Fri, would like to discuss results.)    HPI   Dylan Sawant is a 59 y.o. male here for a yearly preventative healthcare visit.     Vit d 17  Cholesterol levels good - improved from last year  a1c 6.3% (up from 5.9%)  Remainder wnl    RLS bothering him somewhat and knee down pain. Lawn care.   requip 2mg tid, occasionally 4/day.   apap doesn't work. nsaids didn't help either.   mike on cpap.     Review of Systems   Constitutional: Negative for activity change and unexpected weight change.   HENT: Negative for hearing loss, rhinorrhea and trouble swallowing.    Eyes: Negative for discharge and visual disturbance.   Respiratory: Negative for chest tightness and wheezing.    Cardiovascular: Negative for chest pain and palpitations.   Gastrointestinal: Negative for blood in stool, constipation, diarrhea and vomiting.   Endocrine: Negative for polydipsia and polyuria.   Genitourinary: Negative for difficulty urinating, hematuria and urgency.   Musculoskeletal: Positive for arthralgias. Negative for joint swelling and neck pain.   Neurological: Negative for weakness and headaches.   Psychiatric/Behavioral: Negative for confusion and dysphoric mood.       Objective:   /76   Ht 5' 10" (1.778 m)   Wt 100.7 kg (222 lb)   BMI 31.85 kg/m²      Physical Exam   Constitutional: He is oriented to person, place, and time. He appears well-developed and well-nourished. No distress.   HENT:   Head: Normocephalic and atraumatic.   Cardiovascular: Normal rate and regular rhythm.   No murmur heard.  Pulmonary/Chest: Effort normal. No respiratory distress. He has no wheezes. He has no rales.   Neurological: He is alert and oriented to person, place, and time.   Skin: Skin is warm and dry. He is not diaphoretic.   Psychiatric: He has a normal mood and affect. His behavior is " normal.       Assessment:       1. Health care maintenance    2. Mixed hyperlipidemia    3. Anxiety    4. Chronic pain of both knees    5. Vitamin D insufficiency    6. CASTILLO (generalized anxiety disorder)    7. Prediabetes    8. Restless legs    9. Gastroesophageal reflux disease, esophagitis presence not specified        Plan:       Dylan Mann was seen today for annual exam and labs only.    Diagnoses and all orders for this visit:    Health care maintenance  shingrx    Mixed hyperlipidemia  -     fenofibrate micronized (LOFIBRA) 134 MG Cap; Take 1 capsule (134 mg total) by mouth every evening.  -     pravastatin (PRAVACHOL) 40 MG tablet; Take 1 tablet (40 mg total) by mouth once daily.    Anxiety  CASTILLO (generalized anxiety disorder)  -     escitalopram oxalate (LEXAPRO) 10 MG tablet; Take 1 tablet (10 mg total) by mouth once daily.  Feels that lexapro may not be working, contributing to weight gain  Decrease to 10mg and monitor  Consider switch to sertraline    Chronic pain of both knees  -     diclofenac sodium (VOLTAREN) 1 % Gel; Apply 2 g topically 4 (four) times daily.   Consider meloxicam if ineffective with close BP monitoring if ineffective    Vitamin D insufficiency  -     cholecalciferol, vitamin D3, (VITAMIN D3) 25 mcg (1,000 unit) capsule; Take 1 capsule (1,000 Units total) by mouth once daily.    Prediabetes  -     Start metFORMIN (GLUCOPHAGE) 500 MG tablet; Take 1 tablet (500 mg total) by mouth 2 (two) times daily with meals.  -     CBC auto differential; Future  -     Comprehensive metabolic panel; Future  -     Hemoglobin A1c; Future    Restless legs  -     rOPINIRole (REQUIP XL) 2 mg 24 hr tablet; Take 3 tablets (6 mg total) by mouth every evening.   Switch to XR version. At max dose. If not effective consider specialty referral    Gastroesophageal reflux disease, esophagitis presence not specified  -     omeprazole (PRILOSEC) 20 MG capsule; TAKE 1 CAP BY MOUTH EVERY MORNING. TRY TO STOP IF  POSSIBLE. IF REFLUX RETURNS TAKE A 2 WEEK COURSE

## 2020-01-06 ENCOUNTER — PATIENT MESSAGE (OUTPATIENT)
Dept: INTERNAL MEDICINE | Facility: CLINIC | Age: 60
End: 2020-01-06

## 2020-01-06 DIAGNOSIS — Z20.828 EXPOSURE TO THE FLU: Primary | ICD-10-CM

## 2020-01-07 RX ORDER — OSELTAMIVIR PHOSPHATE 75 MG/1
75 CAPSULE ORAL DAILY
Qty: 7 CAPSULE | Refills: 0 | Status: SHIPPED | OUTPATIENT
Start: 2020-01-07 | End: 2020-01-14

## 2020-03-22 ENCOUNTER — PATIENT MESSAGE (OUTPATIENT)
Dept: INTERNAL MEDICINE | Facility: CLINIC | Age: 60
End: 2020-03-22

## 2020-03-23 ENCOUNTER — PATIENT MESSAGE (OUTPATIENT)
Dept: INTERNAL MEDICINE | Facility: CLINIC | Age: 60
End: 2020-03-23

## 2020-03-23 ENCOUNTER — OFFICE VISIT (OUTPATIENT)
Dept: INTERNAL MEDICINE | Facility: CLINIC | Age: 60
End: 2020-03-23
Payer: COMMERCIAL

## 2020-03-23 DIAGNOSIS — B02.9 HERPES ZOSTER WITHOUT COMPLICATION: Primary | ICD-10-CM

## 2020-03-23 DIAGNOSIS — M79.604 RIGHT LEG PAIN: ICD-10-CM

## 2020-03-23 PROCEDURE — 99213 PR OFFICE/OUTPT VISIT, EST, LEVL III, 20-29 MIN: ICD-10-PCS | Mod: 95,,, | Performed by: INTERNAL MEDICINE

## 2020-03-23 PROCEDURE — 99213 OFFICE O/P EST LOW 20 MIN: CPT | Mod: 95,,, | Performed by: INTERNAL MEDICINE

## 2020-03-23 RX ORDER — GABAPENTIN 100 MG/1
100 CAPSULE ORAL NIGHTLY
Qty: 30 CAPSULE | Refills: 1 | Status: SHIPPED | OUTPATIENT
Start: 2020-03-23 | End: 2020-03-30 | Stop reason: SDUPTHER

## 2020-03-23 RX ORDER — VALACYCLOVIR HYDROCHLORIDE 1 G/1
1000 TABLET, FILM COATED ORAL 3 TIMES DAILY
Qty: 21 TABLET | Refills: 0 | Status: SHIPPED | OUTPATIENT
Start: 2020-03-23 | End: 2021-05-03

## 2020-03-23 RX ORDER — ROPINIROLE 2 MG/1
2 TABLET, FILM COATED ORAL 3 TIMES DAILY
Qty: 90 TABLET | Refills: 11 | Status: SHIPPED | OUTPATIENT
Start: 2020-03-23 | End: 2021-03-26 | Stop reason: SDUPTHER

## 2020-03-23 NOTE — PROGRESS NOTES
Subjective:       Patient ID: Dylan Sawant is a 59 y.o. male.    Chief Complaintrash    HPI   Dylan Sawant is a 59 y.o. male presenting via video visit for evaluation/follow up of the following issues. This visit occurs during the COVID 19 outbreak.     The patient location is: home  The chief complaint leading to consultation is: rash  Visit type: Virtual visit with synchronous audio and video  Total time spent with patient: 15 minutes  Each patient to whom he or she provides medical services by telemedicine is:  (1) informed of the relationship between the physician and patient and the respective role of any other health care provider with respect to management of the patient; and (2) notified that he or she may decline to receive medical services by telemedicine and may withdraw from such care at any time.    Tingling rash on chest left side, does not cross midline.   Started about 4 days ago.       Review of Systems   Constitutional: Negative for fever.   Respiratory: Negative for shortness of breath.    Cardiovascular: Negative for chest pain.   Musculoskeletal: Negative.    Skin: Negative.        Objective:   There were no vitals taken for this visit.     Physical Exam   Constitutional: He is oriented to person, place, and time. He appears well-developed and well-nourished. No distress.   HENT:   Head: Atraumatic.   Pulmonary/Chest: Effort normal. No respiratory distress.   Neurological: He is alert and oriented to person, place, and time.   Skin: He is not diaphoretic.   Left chest with blistering rash on erythematous base in dermatomal distribution wrapping around front and back and not crossing midline   Psychiatric: He has a normal mood and affect. His behavior is normal.       Assessment:       1. Herpes zoster without complication    2. Right leg pain        Plan:       Diagnoses and all orders for this visit:    Herpes zoster without complication  -     valACYclovir (VALTREX) 1000 MG tablet;  Take 1 tablet (1,000 mg total) by mouth 3 (three) times daily. for 7 days  -     gabapentin (NEURONTIN) 100 MG capsule; Take 1 capsule (100 mg total) by mouth every evening.  Discussed skin care and contagiousness.  Due to covid 19 outbreak he is remaining home with his wife.    Right leg pain  -     rOPINIRole (REQUIP) 2 MG tablet; Take 1 tablet (2 mg total) by mouth 3 (three) times daily.  xl too expensive, will switch to equivalent IR version - was taking 2mg xr tid

## 2020-03-30 ENCOUNTER — PATIENT MESSAGE (OUTPATIENT)
Dept: INTERNAL MEDICINE | Facility: CLINIC | Age: 60
End: 2020-03-30

## 2020-03-30 DIAGNOSIS — B02.9 HERPES ZOSTER WITHOUT COMPLICATION: ICD-10-CM

## 2020-03-30 RX ORDER — GABAPENTIN 300 MG/1
300 CAPSULE ORAL 3 TIMES DAILY PRN
Qty: 90 CAPSULE | Refills: 1 | Status: SHIPPED | OUTPATIENT
Start: 2020-03-30 | End: 2020-06-23 | Stop reason: SDUPTHER

## 2020-03-30 RX ORDER — GABAPENTIN 300 MG/1
300 CAPSULE ORAL 3 TIMES DAILY PRN
Qty: 90 CAPSULE | Refills: 1 | Status: SHIPPED | OUTPATIENT
Start: 2020-03-30 | End: 2020-03-30 | Stop reason: SDUPTHER

## 2020-06-09 ENCOUNTER — PATIENT OUTREACH (OUTPATIENT)
Dept: ADMINISTRATIVE | Facility: HOSPITAL | Age: 60
End: 2020-06-09

## 2020-06-17 ENCOUNTER — LAB VISIT (OUTPATIENT)
Dept: LAB | Facility: HOSPITAL | Age: 60
End: 2020-06-17
Attending: INTERNAL MEDICINE
Payer: COMMERCIAL

## 2020-06-17 DIAGNOSIS — R73.03 PREDIABETES: ICD-10-CM

## 2020-06-17 LAB
ALBUMIN SERPL BCP-MCNC: 4.2 G/DL (ref 3.5–5.2)
ALP SERPL-CCNC: 51 U/L (ref 55–135)
ALT SERPL W/O P-5'-P-CCNC: 28 U/L (ref 10–44)
ANION GAP SERPL CALC-SCNC: 8 MMOL/L (ref 8–16)
AST SERPL-CCNC: 25 U/L (ref 10–40)
BASOPHILS # BLD AUTO: 0.06 K/UL (ref 0–0.2)
BASOPHILS NFR BLD: 0.9 % (ref 0–1.9)
BILIRUB SERPL-MCNC: 0.5 MG/DL (ref 0.1–1)
BUN SERPL-MCNC: 15 MG/DL (ref 6–20)
CALCIUM SERPL-MCNC: 9.4 MG/DL (ref 8.7–10.5)
CHLORIDE SERPL-SCNC: 108 MMOL/L (ref 95–110)
CO2 SERPL-SCNC: 25 MMOL/L (ref 23–29)
CREAT SERPL-MCNC: 1 MG/DL (ref 0.5–1.4)
DIFFERENTIAL METHOD: ABNORMAL
EOSINOPHIL # BLD AUTO: 0.2 K/UL (ref 0–0.5)
EOSINOPHIL NFR BLD: 3.1 % (ref 0–8)
ERYTHROCYTE [DISTWIDTH] IN BLOOD BY AUTOMATED COUNT: 14.7 % (ref 11.5–14.5)
EST. GFR  (AFRICAN AMERICAN): >60 ML/MIN/1.73 M^2
EST. GFR  (NON AFRICAN AMERICAN): >60 ML/MIN/1.73 M^2
ESTIMATED AVG GLUCOSE: 123 MG/DL (ref 68–131)
GLUCOSE SERPL-MCNC: 106 MG/DL (ref 70–110)
HBA1C MFR BLD HPLC: 5.9 % (ref 4–5.6)
HCT VFR BLD AUTO: 46.8 % (ref 40–54)
HGB BLD-MCNC: 14.5 G/DL (ref 14–18)
IMM GRANULOCYTES # BLD AUTO: 0.02 K/UL (ref 0–0.04)
IMM GRANULOCYTES NFR BLD AUTO: 0.3 % (ref 0–0.5)
LYMPHOCYTES # BLD AUTO: 2.2 K/UL (ref 1–4.8)
LYMPHOCYTES NFR BLD: 33.2 % (ref 18–48)
MCH RBC QN AUTO: 28.4 PG (ref 27–31)
MCHC RBC AUTO-ENTMCNC: 31 G/DL (ref 32–36)
MCV RBC AUTO: 92 FL (ref 82–98)
MONOCYTES # BLD AUTO: 0.6 K/UL (ref 0.3–1)
MONOCYTES NFR BLD: 9.4 % (ref 4–15)
NEUTROPHILS # BLD AUTO: 3.4 K/UL (ref 1.8–7.7)
NEUTROPHILS NFR BLD: 53.1 % (ref 38–73)
NRBC BLD-RTO: 0 /100 WBC
PLATELET # BLD AUTO: 271 K/UL (ref 150–350)
PMV BLD AUTO: 11.3 FL (ref 9.2–12.9)
POTASSIUM SERPL-SCNC: 3.7 MMOL/L (ref 3.5–5.1)
PROT SERPL-MCNC: 7.1 G/DL (ref 6–8.4)
RBC # BLD AUTO: 5.1 M/UL (ref 4.6–6.2)
SODIUM SERPL-SCNC: 141 MMOL/L (ref 136–145)
WBC # BLD AUTO: 6.47 K/UL (ref 3.9–12.7)

## 2020-06-17 PROCEDURE — 83036 HEMOGLOBIN GLYCOSYLATED A1C: CPT

## 2020-06-17 PROCEDURE — 80053 COMPREHEN METABOLIC PANEL: CPT

## 2020-06-17 PROCEDURE — 85025 COMPLETE CBC W/AUTO DIFF WBC: CPT

## 2020-06-17 PROCEDURE — 36415 COLL VENOUS BLD VENIPUNCTURE: CPT | Mod: PO

## 2020-06-19 ENCOUNTER — PATIENT MESSAGE (OUTPATIENT)
Dept: INTERNAL MEDICINE | Facility: CLINIC | Age: 60
End: 2020-06-19

## 2020-06-23 ENCOUNTER — OFFICE VISIT (OUTPATIENT)
Dept: INTERNAL MEDICINE | Facility: CLINIC | Age: 60
End: 2020-06-23
Payer: COMMERCIAL

## 2020-06-23 VITALS
OXYGEN SATURATION: 95 % | DIASTOLIC BLOOD PRESSURE: 82 MMHG | BODY MASS INDEX: 31.28 KG/M2 | HEART RATE: 67 BPM | HEIGHT: 70 IN | SYSTOLIC BLOOD PRESSURE: 116 MMHG | WEIGHT: 218.5 LBS

## 2020-06-23 DIAGNOSIS — F41.9 ANXIETY: ICD-10-CM

## 2020-06-23 DIAGNOSIS — R73.03 PREDIABETES: ICD-10-CM

## 2020-06-23 DIAGNOSIS — G25.81 RESTLESS LEGS: ICD-10-CM

## 2020-06-23 DIAGNOSIS — E66.09 CLASS 1 OBESITY DUE TO EXCESS CALORIES WITH SERIOUS COMORBIDITY AND BODY MASS INDEX (BMI) OF 33.0 TO 33.9 IN ADULT: ICD-10-CM

## 2020-06-23 DIAGNOSIS — F51.01 PRIMARY INSOMNIA: ICD-10-CM

## 2020-06-23 DIAGNOSIS — E78.2 MIXED HYPERLIPIDEMIA: Primary | ICD-10-CM

## 2020-06-23 DIAGNOSIS — E55.9 VITAMIN D INSUFFICIENCY: ICD-10-CM

## 2020-06-23 DIAGNOSIS — G47.33 OSA (OBSTRUCTIVE SLEEP APNEA): ICD-10-CM

## 2020-06-23 DIAGNOSIS — B02.9 HERPES ZOSTER WITHOUT COMPLICATION: ICD-10-CM

## 2020-06-23 PROCEDURE — 3008F PR BODY MASS INDEX (BMI) DOCUMENTED: ICD-10-PCS | Mod: CPTII,S$GLB,, | Performed by: NURSE PRACTITIONER

## 2020-06-23 PROCEDURE — 3008F BODY MASS INDEX DOCD: CPT | Mod: CPTII,S$GLB,, | Performed by: NURSE PRACTITIONER

## 2020-06-23 PROCEDURE — 99214 PR OFFICE/OUTPT VISIT, EST, LEVL IV, 30-39 MIN: ICD-10-PCS | Mod: S$GLB,,, | Performed by: NURSE PRACTITIONER

## 2020-06-23 PROCEDURE — 99214 OFFICE O/P EST MOD 30 MIN: CPT | Mod: S$GLB,,, | Performed by: NURSE PRACTITIONER

## 2020-06-23 PROCEDURE — 99999 PR PBB SHADOW E&M-EST. PATIENT-LVL IV: ICD-10-PCS | Mod: PBBFAC,,, | Performed by: NURSE PRACTITIONER

## 2020-06-23 PROCEDURE — 99999 PR PBB SHADOW E&M-EST. PATIENT-LVL IV: CPT | Mod: PBBFAC,,, | Performed by: NURSE PRACTITIONER

## 2020-06-23 RX ORDER — GABAPENTIN 300 MG/1
300 CAPSULE ORAL 3 TIMES DAILY PRN
Qty: 90 CAPSULE | Refills: 1 | Status: SHIPPED | OUTPATIENT
Start: 2020-06-23 | End: 2021-10-26

## 2020-06-23 NOTE — PROGRESS NOTES
INTERNAL MEDICINE PROGRESS NOTE    CHIEF COMPLAINT     Chief Complaint   Patient presents with    Follow-up     6 month        HPI     Dylan Sawant is a 59 y.o. male with RLS, anxiety, GERD, KELLY and h/o Herpes Zoster who presents for a follow up visit today.  He is an established pt of Dr Chaudhary - last seen 12/2019    Annual labs 12/2019-   Vit d 17  Cholesterol levels good - improved from last year  a1c 6.3% (up from 5.9%)  Remainder wnl    Pre-DM- last A1c 6.3 12/19 - started metformin.   A1c improved to 5.9    Vit d def - 17 12/2019    KELLY- using CPAP     Depression/anxiety- taking lexapro   Less energy and increased sadness.     RLS- taking requip 2mg TID - helped by gabapentin from shingles taking gabapentin 300mg daily     Insomnia- uses CPAP. Taking trazodone     HLD- taking pravastatin     HM-   shingarix - will order today   PSA- 12/2019  Lipids- 12/2019  Tdap- UTD  CRCS- 2017    Past Medical History:  Past Medical History:   Diagnosis Date    Allergy     Anxiety     Elevated BP     Hyperlipidemia     KELLY (obstructive sleep apnea)     Restless leg syndrome        Home Medications:  Prior to Admission medications    Medication Sig Start Date End Date Taking? Authorizing Provider   cholecalciferol, vitamin D3, (VITAMIN D3) 25 mcg (1,000 unit) capsule Take 1 capsule (1,000 Units total) by mouth once daily. 12/23/19  Yes Abby Chaudhary MD   diclofenac sodium (VOLTAREN) 1 % Gel Apply 2 g topically 4 (four) times daily. 12/23/19  Yes Abby Chaudhary MD   escitalopram oxalate (LEXAPRO) 10 MG tablet Take 1 tablet (10 mg total) by mouth once daily. 12/23/19  Yes Abby Chaudhary MD   fenofibrate micronized (LOFIBRA) 134 MG Cap Take 1 capsule (134 mg total) by mouth every evening. 12/23/19  Yes Abby Chaudhary MD   gabapentin (NEURONTIN) 300 MG capsule Take 1 capsule (300 mg total) by mouth 3 (three) times daily as needed (shingles pain). 3/30/20  Yes Abby Thomas MD    metFORMIN (GLUCOPHAGE) 500 MG tablet Take 1 tablet (500 mg total) by mouth 2 (two) times daily with meals. 12/23/19 12/22/20 Yes Abby Chaudhary MD   omeprazole (PRILOSEC) 20 MG capsule TAKE 1 CAP BY MOUTH EVERY MORNING. TRY TO STOP IF POSSIBLE. IF REFLUX RETURNS TAKE A 2 WEEK COURSE 12/23/19  Yes Abby Chaudhary MD   pravastatin (PRAVACHOL) 40 MG tablet Take 1 tablet (40 mg total) by mouth once daily. 12/23/19  Yes Abby Chaudhary MD   rOPINIRole (REQUIP) 2 MG tablet Take 1 tablet (2 mg total) by mouth 3 (three) times daily. 3/23/20 3/23/21 Yes Abby Chaudhary MD   traZODone (DESYREL) 50 MG tablet TAKE 1 TABLET (50 MG TOTAL) BY MOUTH NIGHTLY AS NEEDED FOR INSOMNIA. 11/20/19  Yes Abby Chaudhary MD   gentamicin (GARAMYCIN) 0.3 % ophthalmic solution Place 1 drop into both eyes every 4 (four) hours. 12/9/18   Erasmo Mulligan MD   olopatadine (PATADAY) 0.2 % Drop Place 1 drop into both eyes once daily.  Patient not taking: Reported on 12/23/2019 3/4/19 3/3/20  Joann Morales PA-C   valACYclovir (VALTREX) 1000 MG tablet Take 1 tablet (1,000 mg total) by mouth 3 (three) times daily. for 7 days 3/23/20 3/30/20  Abby Chaudhary MD       Review of Systems:  Review of Systems   Constitutional: Negative for activity change and unexpected weight change.   HENT: Negative for hearing loss, rhinorrhea and trouble swallowing.    Eyes: Negative for discharge and visual disturbance.   Respiratory: Negative for chest tightness and wheezing.    Cardiovascular: Negative for chest pain and palpitations.   Gastrointestinal: Negative for blood in stool, constipation, diarrhea and vomiting.   Endocrine: Negative for polydipsia and polyuria.   Genitourinary: Negative for difficulty urinating, hematuria and urgency.   Musculoskeletal: Positive for arthralgias. Negative for joint swelling and neck pain.   Skin: Negative for rash.   Neurological: Negative for weakness and headaches.  "  Psychiatric/Behavioral: Positive for dysphoric mood. Negative for confusion.       Health Maintainence:   Immunizations:  Health Maintenance       Date Due Completion Date    HIV Screening 12/18/1977 ---    Shingles Vaccine (1 of 2) 12/18/2012 ---    PROSTATE-SPECIFIC ANTIGEN 12/13/2020 12/13/2019    Lipid Panel 12/13/2024 12/13/2019    Colorectal Cancer Screening 04/08/2027 4/8/2017 (Done)    Override on 4/8/2017: Done    TETANUS VACCINE 09/25/2027 9/25/2017           PHYSICAL EXAM     /82 (BP Location: Right arm, Patient Position: Sitting, BP Method: Large (Manual))   Pulse 67   Ht 5' 10" (1.778 m)   Wt 99.1 kg (218 lb 7.6 oz)   SpO2 95%   BMI 31.35 kg/m²     Physical Exam  Vitals signs reviewed.   Constitutional:       Appearance: He is well-developed.   HENT:      Head: Normocephalic.      Right Ear: External ear normal.      Left Ear: External ear normal.      Nose: Nose normal.      Mouth/Throat:      Pharynx: No oropharyngeal exudate.   Eyes:      Pupils: Pupils are equal, round, and reactive to light.   Neck:      Thyroid: No thyromegaly.      Vascular: No JVD.      Trachea: No tracheal deviation.   Cardiovascular:      Rate and Rhythm: Normal rate and regular rhythm.      Heart sounds: No murmur. No friction rub. No gallop.    Pulmonary:      Effort: No respiratory distress.      Breath sounds: Normal breath sounds. No wheezing or rales.   Chest:      Chest wall: No tenderness.   Abdominal:      General: Bowel sounds are normal. There is no distension.      Palpations: Abdomen is soft.      Tenderness: There is no abdominal tenderness.   Musculoskeletal: Normal range of motion.         General: No tenderness.   Lymphadenopathy:      Cervical: No cervical adenopathy.   Skin:     General: Skin is warm and dry.      Findings: No rash.   Neurological:      Mental Status: He is alert and oriented to person, place, and time.   Psychiatric:         Behavior: Behavior normal.         LABS     Lab " Results   Component Value Date    HGBA1C 5.9 (H) 06/17/2020     CMP  Sodium   Date Value Ref Range Status   06/17/2020 141 136 - 145 mmol/L Final     Potassium   Date Value Ref Range Status   06/17/2020 3.7 3.5 - 5.1 mmol/L Final     Chloride   Date Value Ref Range Status   06/17/2020 108 95 - 110 mmol/L Final     CO2   Date Value Ref Range Status   06/17/2020 25 23 - 29 mmol/L Final     Glucose   Date Value Ref Range Status   06/17/2020 106 70 - 110 mg/dL Final     BUN, Bld   Date Value Ref Range Status   06/17/2020 15 6 - 20 mg/dL Final     Creatinine   Date Value Ref Range Status   06/17/2020 1.0 0.5 - 1.4 mg/dL Final     Calcium   Date Value Ref Range Status   06/17/2020 9.4 8.7 - 10.5 mg/dL Final     Total Protein   Date Value Ref Range Status   06/17/2020 7.1 6.0 - 8.4 g/dL Final     Albumin   Date Value Ref Range Status   06/17/2020 4.2 3.5 - 5.2 g/dL Final     Total Bilirubin   Date Value Ref Range Status   06/17/2020 0.5 0.1 - 1.0 mg/dL Final     Comment:     For infants and newborns, interpretation of results should be based  on gestational age, weight and in agreement with clinical  observations.  Premature Infant recommended reference ranges:  Up to 24 hours.............<8.0 mg/dL  Up to 48 hours............<12.0 mg/dL  3-5 days..................<15.0 mg/dL  6-29 days.................<15.0 mg/dL       Alkaline Phosphatase   Date Value Ref Range Status   06/17/2020 51 (L) 55 - 135 U/L Final     AST   Date Value Ref Range Status   06/17/2020 25 10 - 40 U/L Final     ALT   Date Value Ref Range Status   06/17/2020 28 10 - 44 U/L Final     Anion Gap   Date Value Ref Range Status   06/17/2020 8 8 - 16 mmol/L Final     eGFR if    Date Value Ref Range Status   06/17/2020 >60.0 >60 mL/min/1.73 m^2 Final     eGFR if non    Date Value Ref Range Status   06/17/2020 >60.0 >60 mL/min/1.73 m^2 Final     Comment:     Calculation used to obtain the estimated glomerular filtration  rate  (eGFR) is the CKD-EPI equation.        Lab Results   Component Value Date    WBC 6.47 06/17/2020    HGB 14.5 06/17/2020    HCT 46.8 06/17/2020    MCV 92 06/17/2020     06/17/2020     Lab Results   Component Value Date    CHOL 157 12/13/2019    CHOL 197 11/23/2018    CHOL 202 (H) 09/25/2017     Lab Results   Component Value Date    HDL 44 12/13/2019    HDL 44 11/23/2018    HDL 53 09/25/2017     Lab Results   Component Value Date    LDLCALC 94.2 12/13/2019    LDLCALC 119.4 11/23/2018    LDLCALC 123.0 09/25/2017     Lab Results   Component Value Date    TRIG 94 12/13/2019    TRIG 168 (H) 11/23/2018    TRIG 130 09/25/2017     Lab Results   Component Value Date    CHOLHDL 28.0 12/13/2019    CHOLHDL 22.3 11/23/2018    CHOLHDL 26.2 09/25/2017     Lab Results   Component Value Date    TSH 0.892 12/13/2019       ASSESSMENT/PLAN     Dylan Sawant is a 59 y.o. male     Mixed hyperlipidemia- at goal. Cont current meds.     Prediabetes- stable. Cont metformin     Vitamin D insufficiency- stable. Cont vit d3 1000units daily     Restless legs- stable. Will cont current meds    Anxiety- stable.     KELLY (obstructive sleep apnea)- stable. Will cont cpap     Primary insomnia- stable. Will cont current meds.     Herpes zoster without complication-  -     gabapentin (NEURONTIN) 300 MG capsule; Take 1 capsule (300 mg total) by mouth 3 (three) times daily as needed (shingles pain).  Dispense: 90 capsule; Refill: 1    Class 1 obesity due to excess calories with serious comorbidity and body mass index (BMI) of 33.0 to 33.9 in adult    Follow up with PCP in December for annual     Patient education provided from [facility]. Patient was counseled on when and how to seek emergent care.       Bonnie SYKES, APRN, FNP-c   Department of Internal Medicine - [facility]  7:59 AM

## 2020-11-19 ENCOUNTER — TELEPHONE (OUTPATIENT)
Dept: INTERNAL MEDICINE | Facility: CLINIC | Age: 60
End: 2020-11-19

## 2020-11-19 ENCOUNTER — PATIENT MESSAGE (OUTPATIENT)
Dept: INTERNAL MEDICINE | Facility: CLINIC | Age: 60
End: 2020-11-19

## 2020-11-19 DIAGNOSIS — Z00.00 ENCOUNTER FOR PREVENTIVE HEALTH EXAMINATION: Primary | ICD-10-CM

## 2020-11-21 DIAGNOSIS — E78.2 MIXED HYPERLIPIDEMIA: Primary | ICD-10-CM

## 2020-11-21 DIAGNOSIS — K21.9 GASTROESOPHAGEAL REFLUX DISEASE: ICD-10-CM

## 2020-11-21 NOTE — TELEPHONE ENCOUNTER
Care Due:                  Date            Visit Type   Department     Provider  --------------------------------------------------------------------------------                                SHABBIR ROJAS      [facility] INTERNAL  Last Visit: 03-      VISIT        MEDICINE       KYLEE BRYANT  Next Visit: None Scheduled  None         None Found                                                            Last  Test          Frequency    Reason                     Performed    Due Date  --------------------------------------------------------------------------------    HBA1C.......  6 months...  metFORMIN................  06- 12-    Lipid Panel.  12 months..  fenofibrate, pravastatin.  12- 12-    Powered by [employer]. Reference number: [id number]. 11/21/2020 10:44:43 AM   CST

## 2020-11-22 RX ORDER — OMEPRAZOLE 20 MG/1
CAPSULE, DELAYED RELEASE ORAL
Qty: 90 CAPSULE | Refills: 1 | Status: SHIPPED | OUTPATIENT
Start: 2020-11-22 | End: 2021-01-29 | Stop reason: SDUPTHER

## 2020-11-22 NOTE — PROGRESS NOTES
Refill Authorization Note   Dylan Sawant is requesting a refill authorization.  Brief assessment and rationale for refill: Approve     Medication Therapy Plan: CDMR. FLOS    Medication reconciliation completed: No   Comments:   Orders Placed This Encounter    Lipid Panel    omeprazole (PRILOSEC) 20 MG capsule      Requested Prescriptions   Signed Prescriptions Disp Refills    omeprazole (PRILOSEC) 20 MG capsule 90 capsule 1     Sig: TAKE 1 CAP BY MOUTH EVERY MORNING. TRY TO STOP IF POSSIBLE. IF REFLUX RETURNS TAKE A 2 WEEK COURSE       Gastroenterology: Proton Pump Inhibitors Passed - 11/21/2020 10:44 AM        Passed - Patient is at least 18 years old        Passed - Osteoporosis is not on problem list        Passed - Plavix is not on active medication list        Passed - Office visit in past 12 months or future 90 days     Recent Outpatient Visits            5 months ago Mixed hyperlipidemia    [facility] Bonnie Gong NP    8 months ago Herpes zoster without complication    [facility] Abby Chaudhary MD    11 months ago Health care maintenance    [facility] Abby Chaudhary MD    1 year ago Allergic conjunctivitis, unspecified laterality    [facility] Joann Morales PA-C    1 year ago Viral gastroenteritis    [facility] Erasmo Mulligan MD          Future Appointments              In 3 days LAB, [facility] [place]    In 1 week Joann Morales PA-C [facility], Ceas[place] PCW                Passed - GERD is on problem list            Appointments  past 12m or future 3m with PCP    Date Provider   Last Visit   3/23/2020 Abby Chaudhary MD   Next Visit   Visit date not found Abby Chaudhary MD   ED visits in past 90 days: 0     Note composed:11:10 AM 11/22/2020

## 2020-11-24 ENCOUNTER — LAB VISIT (OUTPATIENT)
Dept: LAB | Facility: HOSPITAL | Age: 60
End: 2020-11-24
Attending: INTERNAL MEDICINE
Payer: COMMERCIAL

## 2020-11-24 DIAGNOSIS — Z00.00 ENCOUNTER FOR PREVENTIVE HEALTH EXAMINATION: ICD-10-CM

## 2020-11-24 LAB
25(OH)D3+25(OH)D2 SERPL-MCNC: 18 NG/ML (ref 30–96)
ALBUMIN SERPL BCP-MCNC: 4.4 G/DL (ref 3.5–5.2)
ALP SERPL-CCNC: 45 U/L (ref 55–135)
ALT SERPL W/O P-5'-P-CCNC: 28 U/L (ref 10–44)
ANION GAP SERPL CALC-SCNC: 6 MMOL/L (ref 8–16)
AST SERPL-CCNC: 26 U/L (ref 10–40)
BASOPHILS # BLD AUTO: 0.05 K/UL (ref 0–0.2)
BASOPHILS NFR BLD: 0.7 % (ref 0–1.9)
BILIRUB SERPL-MCNC: 0.5 MG/DL (ref 0.1–1)
BUN SERPL-MCNC: 18 MG/DL (ref 6–20)
CALCIUM SERPL-MCNC: 9.7 MG/DL (ref 8.7–10.5)
CHLORIDE SERPL-SCNC: 103 MMOL/L (ref 95–110)
CHOLEST SERPL-MCNC: 190 MG/DL (ref 120–199)
CHOLEST/HDLC SERPL: 4.6 {RATIO} (ref 2–5)
CO2 SERPL-SCNC: 31 MMOL/L (ref 23–29)
COMPLEXED PSA SERPL-MCNC: 0.47 NG/ML (ref 0–4)
CREAT SERPL-MCNC: 1.2 MG/DL (ref 0.5–1.4)
DIFFERENTIAL METHOD: NORMAL
EOSINOPHIL # BLD AUTO: 0.2 K/UL (ref 0–0.5)
EOSINOPHIL NFR BLD: 2.4 % (ref 0–8)
ERYTHROCYTE [DISTWIDTH] IN BLOOD BY AUTOMATED COUNT: 13.1 % (ref 11.5–14.5)
EST. GFR  (AFRICAN AMERICAN): >60 ML/MIN/1.73 M^2
EST. GFR  (NON AFRICAN AMERICAN): >60 ML/MIN/1.73 M^2
ESTIMATED AVG GLUCOSE: 117 MG/DL (ref 68–131)
GLUCOSE SERPL-MCNC: 104 MG/DL (ref 70–110)
HBA1C MFR BLD HPLC: 5.7 % (ref 4–5.6)
HCT VFR BLD AUTO: 46.7 % (ref 40–54)
HDLC SERPL-MCNC: 41 MG/DL (ref 40–75)
HDLC SERPL: 21.6 % (ref 20–50)
HGB BLD-MCNC: 15 G/DL (ref 14–18)
IMM GRANULOCYTES # BLD AUTO: 0.02 K/UL (ref 0–0.04)
IMM GRANULOCYTES NFR BLD AUTO: 0.3 % (ref 0–0.5)
LDLC SERPL CALC-MCNC: 111 MG/DL (ref 63–159)
LYMPHOCYTES # BLD AUTO: 2.5 K/UL (ref 1–4.8)
LYMPHOCYTES NFR BLD: 33.2 % (ref 18–48)
MCH RBC QN AUTO: 30.4 PG (ref 27–31)
MCHC RBC AUTO-ENTMCNC: 32.1 G/DL (ref 32–36)
MCV RBC AUTO: 95 FL (ref 82–98)
MONOCYTES # BLD AUTO: 0.7 K/UL (ref 0.3–1)
MONOCYTES NFR BLD: 8.6 % (ref 4–15)
NEUTROPHILS # BLD AUTO: 4.2 K/UL (ref 1.8–7.7)
NEUTROPHILS NFR BLD: 54.8 % (ref 38–73)
NONHDLC SERPL-MCNC: 149 MG/DL
NRBC BLD-RTO: 0 /100 WBC
PLATELET # BLD AUTO: 267 K/UL (ref 150–350)
PMV BLD AUTO: 11.1 FL (ref 9.2–12.9)
POTASSIUM SERPL-SCNC: 4 MMOL/L (ref 3.5–5.1)
PROT SERPL-MCNC: 7.6 G/DL (ref 6–8.4)
RBC # BLD AUTO: 4.93 M/UL (ref 4.6–6.2)
SODIUM SERPL-SCNC: 140 MMOL/L (ref 136–145)
TRIGL SERPL-MCNC: 190 MG/DL (ref 30–150)
TSH SERPL DL<=0.005 MIU/L-ACNC: 1.52 UIU/ML (ref 0.4–4)
WBC # BLD AUTO: 7.64 K/UL (ref 3.9–12.7)

## 2020-11-24 PROCEDURE — 80061 LIPID PANEL: CPT

## 2020-11-24 PROCEDURE — 82306 VITAMIN D 25 HYDROXY: CPT

## 2020-11-24 PROCEDURE — 84443 ASSAY THYROID STIM HORMONE: CPT

## 2020-11-24 PROCEDURE — 83036 HEMOGLOBIN GLYCOSYLATED A1C: CPT

## 2020-11-24 PROCEDURE — 36415 COLL VENOUS BLD VENIPUNCTURE: CPT | Mod: PO

## 2020-11-24 PROCEDURE — 85025 COMPLETE CBC W/AUTO DIFF WBC: CPT

## 2020-11-24 PROCEDURE — 80053 COMPREHEN METABOLIC PANEL: CPT

## 2020-11-24 PROCEDURE — 84153 ASSAY OF PSA TOTAL: CPT

## 2020-12-01 ENCOUNTER — OFFICE VISIT (OUTPATIENT)
Dept: INTERNAL MEDICINE | Facility: CLINIC | Age: 60
End: 2020-12-01
Payer: COMMERCIAL

## 2020-12-01 ENCOUNTER — TELEPHONE (OUTPATIENT)
Dept: INTERNAL MEDICINE | Facility: CLINIC | Age: 60
End: 2020-12-01

## 2020-12-01 ENCOUNTER — PATIENT MESSAGE (OUTPATIENT)
Dept: INTERNAL MEDICINE | Facility: CLINIC | Age: 60
End: 2020-12-01

## 2020-12-01 VITALS
HEIGHT: 69 IN | BODY MASS INDEX: 32.9 KG/M2 | DIASTOLIC BLOOD PRESSURE: 78 MMHG | WEIGHT: 222.13 LBS | HEART RATE: 79 BPM | SYSTOLIC BLOOD PRESSURE: 128 MMHG | OXYGEN SATURATION: 98 %

## 2020-12-01 DIAGNOSIS — E55.9 VITAMIN D INSUFFICIENCY: ICD-10-CM

## 2020-12-01 DIAGNOSIS — F51.01 PRIMARY INSOMNIA: ICD-10-CM

## 2020-12-01 DIAGNOSIS — E78.2 MIXED HYPERLIPIDEMIA: Primary | ICD-10-CM

## 2020-12-01 DIAGNOSIS — R73.03 PREDIABETES: ICD-10-CM

## 2020-12-01 PROCEDURE — 3008F PR BODY MASS INDEX (BMI) DOCUMENTED: ICD-10-PCS | Mod: CPTII,S$GLB,, | Performed by: PHYSICIAN ASSISTANT

## 2020-12-01 PROCEDURE — 99214 PR OFFICE/OUTPT VISIT, EST, LEVL IV, 30-39 MIN: ICD-10-PCS | Mod: S$GLB,,, | Performed by: PHYSICIAN ASSISTANT

## 2020-12-01 PROCEDURE — 99999 PR PBB SHADOW E&M-EST. PATIENT-LVL IV: ICD-10-PCS | Mod: PBBFAC,,, | Performed by: PHYSICIAN ASSISTANT

## 2020-12-01 PROCEDURE — 99214 OFFICE O/P EST MOD 30 MIN: CPT | Mod: S$GLB,,, | Performed by: PHYSICIAN ASSISTANT

## 2020-12-01 PROCEDURE — 3008F BODY MASS INDEX DOCD: CPT | Mod: CPTII,S$GLB,, | Performed by: PHYSICIAN ASSISTANT

## 2020-12-01 PROCEDURE — 1126F PR PAIN SEVERITY QUANTIFIED, NO PAIN PRESENT: ICD-10-PCS | Mod: S$GLB,,, | Performed by: PHYSICIAN ASSISTANT

## 2020-12-01 PROCEDURE — 99999 PR PBB SHADOW E&M-EST. PATIENT-LVL IV: CPT | Mod: PBBFAC,,, | Performed by: PHYSICIAN ASSISTANT

## 2020-12-01 PROCEDURE — 1126F AMNT PAIN NOTED NONE PRSNT: CPT | Mod: S$GLB,,, | Performed by: PHYSICIAN ASSISTANT

## 2020-12-01 RX ORDER — ACETAMINOPHEN 500 MG
1 TABLET ORAL DAILY
Qty: 30 CAPSULE | Refills: 6 | Status: SHIPPED | OUTPATIENT
Start: 2020-12-01 | End: 2021-06-06 | Stop reason: SDUPTHER

## 2020-12-01 RX ORDER — TRAZODONE HYDROCHLORIDE 50 MG/1
50-100 TABLET ORAL NIGHTLY PRN
Qty: 60 TABLET | Refills: 10 | Status: SHIPPED | OUTPATIENT
Start: 2020-12-01 | End: 2021-10-26 | Stop reason: SDUPTHER

## 2020-12-01 NOTE — PROGRESS NOTES
Subjective:       Patient ID: Dylan Sawant is a 59 y.o. male.        Chief Complaint: Follow-up    Dylan Sawant is an established patient of Abby Chaudhary MD here today for annual exam.    KELLY - using CPAP    Lipids - taking pravastatin 40 mg and fenofibrate 134 mg, cholesterol is up compared to prior, has gained 20# over the past 2 years, diet not as great lately during pandemic, very active cutting lawns  Lab Results       Component                Value               Date                       CHOL                         190                 11/24/2020                 TRIG                         190 (H)             11/24/2020                 HDL                             41                  11/24/2020                 LDLCALC                  111.0               11/24/2020              Vitamin d def - vit d 18 11/2020, taking vitamin d 400 U 2-3 tablets daily, vitamin d still low    Prediabetes - 12/2019 started on metformin  Lab Results       Component                Value               Date                       HGBA1C                   5.7 (H)             11/24/2020                 HGBA1C                   5.9 (H)             06/17/2020                 HGBA1C                   6.3 (H)             12/13/2019              Depression and anxiety - taking lexparo 10 mg daily, lots of stress, mother is having delusions of seeing bugs, very stressful, sister having issues with her step children     RLS- taking requip 2mg TID - helped by gabapentin from shingles taking gabapentin 300 mg daily      Insomnia - taking trazodone 50 mg nightly, often waking up at 2 AM and can't sleep, see stress as above     Health maintenance -   PSA 11/2020  Colonoscopy 4/2017 f/u 10 years  S/p flu shot  Tdap 9/2017       Review of Systems   Constitutional: Negative for activity change and unexpected weight change.   HENT: Negative for hearing loss, rhinorrhea and trouble swallowing.    Eyes: Negative for discharge and  visual disturbance.   Respiratory: Negative for chest tightness and wheezing.    Cardiovascular: Negative for chest pain and palpitations.   Gastrointestinal: Negative for blood in stool, constipation, diarrhea and vomiting.   Endocrine: Negative for polydipsia and polyuria.   Genitourinary: Negative for difficulty urinating, hematuria and urgency.   Musculoskeletal: Positive for arthralgias. Negative for joint swelling and neck pain.   Neurological: Negative for weakness and headaches.   Psychiatric/Behavioral: Positive for dysphoric mood. Negative for confusion.       Objective:      Physical Exam  Vitals signs and nursing note reviewed.   Constitutional:       Appearance: He is well-developed.   HENT:      Head: Normocephalic.      Right Ear: External ear normal.      Left Ear: External ear normal.   Eyes:      Pupils: Pupils are equal, round, and reactive to light.   Cardiovascular:      Rate and Rhythm: Normal rate and regular rhythm.      Heart sounds: Normal heart sounds. No murmur. No friction rub. No gallop.    Pulmonary:      Effort: Pulmonary effort is normal. No respiratory distress.      Breath sounds: Normal breath sounds.   Abdominal:      Palpations: Abdomen is soft.      Tenderness: There is no abdominal tenderness.   Skin:     General: Skin is warm and dry.   Neurological:      Mental Status: He is alert.         Assessment:       1. Mixed hyperlipidemia    2. Vitamin D insufficiency    3. Prediabetes    4. Primary insomnia        Plan:       Dylan Mann was seen today for follow-up.    Diagnoses and all orders for this visit:    Mixed hyperlipidemia  -     Lipid Panel; Future    Vitamin D insufficiency  -     cholecalciferol, vitamin D3, (VITAMIN D3) 50 mcg (2,000 unit) Cap; Take 1 capsule (2,000 Units total) by mouth once daily.  -     Vitamin D; Future    Prediabetes  -     Comprehensive Metabolic Panel; Future  -     Hemoglobin A1C; Future    Primary insomnia  -     traZODone (DESYREL) 50 MG  "tablet; Take 1-2 tablets ( mg total) by mouth nightly as needed for Insomnia.    Inc vit d to 2000 IU daily  F/u with Dr. Chaudhary in 6 months with labs prior  Inc trazodone to 50 mg 1-2 tabs at night to help with sleep  Work on healthy diet and weight loss    Pt has been given instructions populated from [employer] database and has verbalized understanding of the after visit summary and information contained wherein.    Follow up with a primary care provider. May go to ER for acute shortness of breath, lightheadedness, fever, or any other emergent complaints or changes in condition.    "This note will be shared with the patient"    Future Appointments   Date Time Provider Department [place]   3/1/2021  9:10 AM LAB, [facility] [place]   5/3/2021  8:00 AM Abby Chaudhary MD [facility] Ceasar [facility]                 "

## 2020-12-01 NOTE — PATIENT INSTRUCTIONS

## 2021-01-11 DIAGNOSIS — E78.2 MIXED HYPERLIPIDEMIA: ICD-10-CM

## 2021-01-11 RX ORDER — FENOFIBRATE 134 MG/1
134 CAPSULE ORAL NIGHTLY
Qty: 90 CAPSULE | Refills: 3 | Status: SHIPPED | OUTPATIENT
Start: 2021-01-11 | End: 2022-01-16 | Stop reason: SDUPTHER

## 2021-01-25 ENCOUNTER — PATIENT MESSAGE (OUTPATIENT)
Dept: INTERNAL MEDICINE | Facility: CLINIC | Age: 61
End: 2021-01-25

## 2021-01-25 DIAGNOSIS — Z20.822 CLOSE EXPOSURE TO COVID-19 VIRUS: Primary | ICD-10-CM

## 2021-01-26 ENCOUNTER — LAB VISIT (OUTPATIENT)
Dept: INTERNAL MEDICINE | Facility: CLINIC | Age: 61
End: 2021-01-26
Payer: COMMERCIAL

## 2021-01-26 DIAGNOSIS — Z20.822 CLOSE EXPOSURE TO COVID-19 VIRUS: ICD-10-CM

## 2021-01-26 LAB — SARS-COV-2 RNA RESP QL NAA+PROBE: NOT DETECTED

## 2021-01-26 PROCEDURE — U0003 INFECTIOUS AGENT DETECTION BY NUCLEIC ACID (DNA OR RNA); SEVERE ACUTE RESPIRATORY SYNDROME CORONAVIRUS 2 (SARS-COV-2) (CORONAVIRUS DISEASE [COVID-19]), AMPLIFIED PROBE TECHNIQUE, MAKING USE OF HIGH THROUGHPUT TECHNOLOGIES AS DESCRIBED BY CMS-2020-01-R: HCPCS

## 2021-02-28 DIAGNOSIS — F41.1 GAD (GENERALIZED ANXIETY DISORDER): ICD-10-CM

## 2021-03-01 RX ORDER — ESCITALOPRAM OXALATE 10 MG/1
10 TABLET ORAL DAILY
Qty: 90 TABLET | Refills: 3 | Status: SHIPPED | OUTPATIENT
Start: 2021-03-01 | End: 2021-10-26 | Stop reason: SDUPTHER

## 2021-03-26 DIAGNOSIS — M79.604 RIGHT LEG PAIN: ICD-10-CM

## 2021-03-26 RX ORDER — ROPINIROLE 2 MG/1
TABLET, FILM COATED ORAL
Qty: 90 TABLET | Refills: 11 | OUTPATIENT
Start: 2021-03-26

## 2021-03-26 RX ORDER — ROPINIROLE 2 MG/1
2 TABLET, FILM COATED ORAL 3 TIMES DAILY
Qty: 90 TABLET | Refills: 11 | Status: SHIPPED | OUTPATIENT
Start: 2021-03-26 | End: 2021-10-26 | Stop reason: SDUPTHER

## 2021-04-27 ENCOUNTER — PATIENT MESSAGE (OUTPATIENT)
Dept: INTERNAL MEDICINE | Facility: CLINIC | Age: 61
End: 2021-04-27

## 2021-04-27 DIAGNOSIS — R73.03 PREDIABETES: ICD-10-CM

## 2021-04-27 DIAGNOSIS — E78.2 MIXED HYPERLIPIDEMIA: ICD-10-CM

## 2021-04-27 DIAGNOSIS — E55.9 VITAMIN D INSUFFICIENCY: Primary | ICD-10-CM

## 2021-04-27 DIAGNOSIS — Z00.00 HEALTH CARE MAINTENANCE: ICD-10-CM

## 2021-04-30 ENCOUNTER — LAB VISIT (OUTPATIENT)
Dept: LAB | Facility: HOSPITAL | Age: 61
End: 2021-04-30
Attending: INTERNAL MEDICINE
Payer: COMMERCIAL

## 2021-04-30 DIAGNOSIS — E55.9 VITAMIN D INSUFFICIENCY: ICD-10-CM

## 2021-04-30 DIAGNOSIS — R73.03 PREDIABETES: ICD-10-CM

## 2021-04-30 DIAGNOSIS — E78.2 MIXED HYPERLIPIDEMIA: ICD-10-CM

## 2021-04-30 DIAGNOSIS — Z00.00 HEALTH CARE MAINTENANCE: ICD-10-CM

## 2021-04-30 LAB
25(OH)D3+25(OH)D2 SERPL-MCNC: 31 NG/ML (ref 30–96)
ALBUMIN SERPL BCP-MCNC: 4 G/DL (ref 3.5–5.2)
ALP SERPL-CCNC: 49 U/L (ref 55–135)
ALT SERPL W/O P-5'-P-CCNC: 27 U/L (ref 10–44)
ANION GAP SERPL CALC-SCNC: 9 MMOL/L (ref 8–16)
AST SERPL-CCNC: 25 U/L (ref 10–40)
BILIRUB SERPL-MCNC: 0.4 MG/DL (ref 0.1–1)
BUN SERPL-MCNC: 14 MG/DL (ref 6–20)
CALCIUM SERPL-MCNC: 8.9 MG/DL (ref 8.7–10.5)
CHLORIDE SERPL-SCNC: 106 MMOL/L (ref 95–110)
CHOLEST SERPL-MCNC: 171 MG/DL (ref 120–199)
CHOLEST/HDLC SERPL: 5.2 {RATIO} (ref 2–5)
CO2 SERPL-SCNC: 25 MMOL/L (ref 23–29)
CREAT SERPL-MCNC: 1.1 MG/DL (ref 0.5–1.4)
EST. GFR  (AFRICAN AMERICAN): >60 ML/MIN/1.73 M^2
EST. GFR  (NON AFRICAN AMERICAN): >60 ML/MIN/1.73 M^2
ESTIMATED AVG GLUCOSE: 123 MG/DL (ref 68–131)
GLUCOSE SERPL-MCNC: 103 MG/DL (ref 70–110)
HBA1C MFR BLD: 5.9 % (ref 4–5.6)
HDLC SERPL-MCNC: 33 MG/DL (ref 40–75)
HDLC SERPL: 19.3 % (ref 20–50)
HIV 1+2 AB+HIV1 P24 AG SERPL QL IA: NEGATIVE
LDLC SERPL CALC-MCNC: 102 MG/DL (ref 63–159)
NONHDLC SERPL-MCNC: 138 MG/DL
POTASSIUM SERPL-SCNC: 3.7 MMOL/L (ref 3.5–5.1)
PROT SERPL-MCNC: 7.1 G/DL (ref 6–8.4)
SODIUM SERPL-SCNC: 140 MMOL/L (ref 136–145)
TRIGL SERPL-MCNC: 180 MG/DL (ref 30–150)

## 2021-04-30 PROCEDURE — 83036 HEMOGLOBIN GLYCOSYLATED A1C: CPT | Performed by: INTERNAL MEDICINE

## 2021-04-30 PROCEDURE — 80053 COMPREHEN METABOLIC PANEL: CPT | Performed by: INTERNAL MEDICINE

## 2021-04-30 PROCEDURE — 86703 HIV-1/HIV-2 1 RESULT ANTBDY: CPT | Performed by: INTERNAL MEDICINE

## 2021-04-30 PROCEDURE — 36415 COLL VENOUS BLD VENIPUNCTURE: CPT | Mod: PO | Performed by: INTERNAL MEDICINE

## 2021-04-30 PROCEDURE — 82306 VITAMIN D 25 HYDROXY: CPT | Performed by: INTERNAL MEDICINE

## 2021-04-30 PROCEDURE — 80061 LIPID PANEL: CPT | Performed by: INTERNAL MEDICINE

## 2021-05-03 ENCOUNTER — OFFICE VISIT (OUTPATIENT)
Dept: INTERNAL MEDICINE | Facility: CLINIC | Age: 61
End: 2021-05-03
Payer: COMMERCIAL

## 2021-05-03 VITALS
BODY MASS INDEX: 32.74 KG/M2 | HEART RATE: 71 BPM | DIASTOLIC BLOOD PRESSURE: 78 MMHG | SYSTOLIC BLOOD PRESSURE: 124 MMHG | WEIGHT: 216 LBS | HEIGHT: 68 IN | OXYGEN SATURATION: 97 %

## 2021-05-03 DIAGNOSIS — G47.33 OSA (OBSTRUCTIVE SLEEP APNEA): ICD-10-CM

## 2021-05-03 DIAGNOSIS — R73.03 PREDIABETES: ICD-10-CM

## 2021-05-03 DIAGNOSIS — E78.2 MIXED HYPERLIPIDEMIA: Primary | ICD-10-CM

## 2021-05-03 PROCEDURE — 3008F BODY MASS INDEX DOCD: CPT | Mod: CPTII,S$GLB,, | Performed by: INTERNAL MEDICINE

## 2021-05-03 PROCEDURE — 3008F PR BODY MASS INDEX (BMI) DOCUMENTED: ICD-10-PCS | Mod: CPTII,S$GLB,, | Performed by: INTERNAL MEDICINE

## 2021-05-03 PROCEDURE — 99214 PR OFFICE/OUTPT VISIT, EST, LEVL IV, 30-39 MIN: ICD-10-PCS | Mod: S$GLB,,, | Performed by: INTERNAL MEDICINE

## 2021-05-03 PROCEDURE — 99999 PR PBB SHADOW E&M-EST. PATIENT-LVL III: CPT | Mod: PBBFAC,,, | Performed by: INTERNAL MEDICINE

## 2021-05-03 PROCEDURE — 99999 PR PBB SHADOW E&M-EST. PATIENT-LVL III: ICD-10-PCS | Mod: PBBFAC,,, | Performed by: INTERNAL MEDICINE

## 2021-05-03 PROCEDURE — 1126F PR PAIN SEVERITY QUANTIFIED, NO PAIN PRESENT: ICD-10-PCS | Mod: S$GLB,,, | Performed by: INTERNAL MEDICINE

## 2021-05-03 PROCEDURE — 99214 OFFICE O/P EST MOD 30 MIN: CPT | Mod: S$GLB,,, | Performed by: INTERNAL MEDICINE

## 2021-05-03 PROCEDURE — 1126F AMNT PAIN NOTED NONE PRSNT: CPT | Mod: S$GLB,,, | Performed by: INTERNAL MEDICINE

## 2021-05-03 RX ORDER — PRAVASTATIN SODIUM 40 MG/1
40 TABLET ORAL DAILY
Qty: 90 TABLET | Refills: 3 | Status: SHIPPED | OUTPATIENT
Start: 2021-05-03 | End: 2022-06-26

## 2021-05-07 ENCOUNTER — PATIENT MESSAGE (OUTPATIENT)
Dept: INTERNAL MEDICINE | Facility: CLINIC | Age: 61
End: 2021-05-07

## 2021-05-27 ENCOUNTER — PATIENT MESSAGE (OUTPATIENT)
Dept: RESEARCH | Facility: HOSPITAL | Age: 61
End: 2021-05-27

## 2021-07-27 DIAGNOSIS — K21.9 GASTROESOPHAGEAL REFLUX DISEASE: ICD-10-CM

## 2021-07-29 RX ORDER — OMEPRAZOLE 20 MG/1
CAPSULE, DELAYED RELEASE ORAL
Qty: 90 CAPSULE | Refills: 3 | Status: SHIPPED | OUTPATIENT
Start: 2021-07-29 | End: 2022-07-20

## 2021-10-26 ENCOUNTER — OFFICE VISIT (OUTPATIENT)
Dept: FAMILY MEDICINE | Facility: CLINIC | Age: 61
End: 2021-10-26
Payer: COMMERCIAL

## 2021-10-26 ENCOUNTER — LAB VISIT (OUTPATIENT)
Dept: LAB | Facility: HOSPITAL | Age: 61
End: 2021-10-26
Attending: FAMILY MEDICINE
Payer: COMMERCIAL

## 2021-10-26 VITALS
DIASTOLIC BLOOD PRESSURE: 80 MMHG | OXYGEN SATURATION: 99 % | HEIGHT: 68 IN | SYSTOLIC BLOOD PRESSURE: 138 MMHG | WEIGHT: 210.13 LBS | HEART RATE: 76 BPM | BODY MASS INDEX: 31.85 KG/M2

## 2021-10-26 DIAGNOSIS — K21.9 GASTROESOPHAGEAL REFLUX DISEASE WITHOUT ESOPHAGITIS: ICD-10-CM

## 2021-10-26 DIAGNOSIS — Z12.5 SCREENING PSA (PROSTATE SPECIFIC ANTIGEN): ICD-10-CM

## 2021-10-26 DIAGNOSIS — G25.81 RESTLESS LEGS: ICD-10-CM

## 2021-10-26 DIAGNOSIS — F41.1 GAD (GENERALIZED ANXIETY DISORDER): ICD-10-CM

## 2021-10-26 DIAGNOSIS — E78.2 MIXED HYPERLIPIDEMIA: ICD-10-CM

## 2021-10-26 DIAGNOSIS — Z00.00 VISIT FOR WELL MAN HEALTH CHECK: ICD-10-CM

## 2021-10-26 DIAGNOSIS — Z76.89 ENCOUNTER TO ESTABLISH CARE WITH NEW DOCTOR: ICD-10-CM

## 2021-10-26 DIAGNOSIS — Z23 NEED FOR INFLUENZA VACCINATION: ICD-10-CM

## 2021-10-26 DIAGNOSIS — Z00.00 VISIT FOR WELL MAN HEALTH CHECK: Primary | ICD-10-CM

## 2021-10-26 DIAGNOSIS — F51.01 PRIMARY INSOMNIA: ICD-10-CM

## 2021-10-26 DIAGNOSIS — M79.604 RIGHT LEG PAIN: ICD-10-CM

## 2021-10-26 DIAGNOSIS — R73.01 ELEVATED FASTING GLUCOSE: ICD-10-CM

## 2021-10-26 DIAGNOSIS — E55.9 VITAMIN D INSUFFICIENCY: ICD-10-CM

## 2021-10-26 DIAGNOSIS — R73.03 PREDIABETES: ICD-10-CM

## 2021-10-26 PROBLEM — Z12.11 SCREENING FOR COLON CANCER: Status: RESOLVED | Noted: 2017-04-08 | Resolved: 2021-10-26

## 2021-10-26 LAB
25(OH)D3+25(OH)D2 SERPL-MCNC: 33 NG/ML (ref 30–96)
ALBUMIN SERPL BCP-MCNC: 4.4 G/DL (ref 3.5–5.2)
ALP SERPL-CCNC: 43 U/L (ref 55–135)
ALT SERPL W/O P-5'-P-CCNC: 28 U/L (ref 10–44)
ANION GAP SERPL CALC-SCNC: 12 MMOL/L (ref 8–16)
AST SERPL-CCNC: 23 U/L (ref 10–40)
BASOPHILS # BLD AUTO: 0.06 K/UL (ref 0–0.2)
BASOPHILS NFR BLD: 0.8 % (ref 0–1.9)
BILIRUB SERPL-MCNC: 0.6 MG/DL (ref 0.1–1)
BUN SERPL-MCNC: 15 MG/DL (ref 6–20)
CALCIUM SERPL-MCNC: 10.3 MG/DL (ref 8.7–10.5)
CHLORIDE SERPL-SCNC: 104 MMOL/L (ref 95–110)
CHOLEST SERPL-MCNC: 157 MG/DL (ref 120–199)
CHOLEST/HDLC SERPL: 3.7 {RATIO} (ref 2–5)
CO2 SERPL-SCNC: 23 MMOL/L (ref 23–29)
COMPLEXED PSA SERPL-MCNC: 0.76 NG/ML (ref 0–4)
CREAT SERPL-MCNC: 0.9 MG/DL (ref 0.5–1.4)
DIFFERENTIAL METHOD: ABNORMAL
EOSINOPHIL # BLD AUTO: 0.2 K/UL (ref 0–0.5)
EOSINOPHIL NFR BLD: 2.8 % (ref 0–8)
ERYTHROCYTE [DISTWIDTH] IN BLOOD BY AUTOMATED COUNT: 14.8 % (ref 11.5–14.5)
EST. GFR  (AFRICAN AMERICAN): >60 ML/MIN/1.73 M^2
EST. GFR  (NON AFRICAN AMERICAN): >60 ML/MIN/1.73 M^2
ESTIMATED AVG GLUCOSE: 120 MG/DL (ref 68–131)
FERRITIN SERPL-MCNC: 45 NG/ML (ref 20–300)
GLUCOSE SERPL-MCNC: 98 MG/DL (ref 70–110)
HBA1C MFR BLD: 5.8 % (ref 4–5.6)
HCT VFR BLD AUTO: 47.3 % (ref 40–54)
HDLC SERPL-MCNC: 42 MG/DL (ref 40–75)
HDLC SERPL: 26.8 % (ref 20–50)
HGB BLD-MCNC: 15.6 G/DL (ref 14–18)
IMM GRANULOCYTES # BLD AUTO: 0.03 K/UL (ref 0–0.04)
IMM GRANULOCYTES NFR BLD AUTO: 0.4 % (ref 0–0.5)
IRON SERPL-MCNC: 75 UG/DL (ref 45–160)
LDLC SERPL CALC-MCNC: 91.6 MG/DL (ref 63–159)
LYMPHOCYTES # BLD AUTO: 2.3 K/UL (ref 1–4.8)
LYMPHOCYTES NFR BLD: 32.4 % (ref 18–48)
MCH RBC QN AUTO: 30.2 PG (ref 27–31)
MCHC RBC AUTO-ENTMCNC: 33 G/DL (ref 32–36)
MCV RBC AUTO: 92 FL (ref 82–98)
MONOCYTES # BLD AUTO: 0.6 K/UL (ref 0.3–1)
MONOCYTES NFR BLD: 8.8 % (ref 4–15)
NEUTROPHILS # BLD AUTO: 3.9 K/UL (ref 1.8–7.7)
NEUTROPHILS NFR BLD: 54.8 % (ref 38–73)
NONHDLC SERPL-MCNC: 115 MG/DL
NRBC BLD-RTO: 0 /100 WBC
PLATELET # BLD AUTO: 271 K/UL (ref 150–450)
PMV BLD AUTO: 10.7 FL (ref 9.2–12.9)
POTASSIUM SERPL-SCNC: 3.7 MMOL/L (ref 3.5–5.1)
PROT SERPL-MCNC: 7.3 G/DL (ref 6–8.4)
RBC # BLD AUTO: 5.17 M/UL (ref 4.6–6.2)
SATURATED IRON: 13 % (ref 20–50)
SODIUM SERPL-SCNC: 139 MMOL/L (ref 136–145)
TOTAL IRON BINDING CAPACITY: 556 UG/DL (ref 250–450)
TRANSFERRIN SERPL-MCNC: 376 MG/DL (ref 200–375)
TRIGL SERPL-MCNC: 117 MG/DL (ref 30–150)
TSH SERPL DL<=0.005 MIU/L-ACNC: 0.79 UIU/ML (ref 0.4–4)
VIT B12 SERPL-MCNC: 452 PG/ML (ref 210–950)
WBC # BLD AUTO: 7.07 K/UL (ref 3.9–12.7)

## 2021-10-26 PROCEDURE — 80061 LIPID PANEL: CPT | Performed by: FAMILY MEDICINE

## 2021-10-26 PROCEDURE — 36415 COLL VENOUS BLD VENIPUNCTURE: CPT | Mod: PO | Performed by: FAMILY MEDICINE

## 2021-10-26 PROCEDURE — 3079F DIAST BP 80-89 MM HG: CPT | Mod: CPTII,S$GLB,, | Performed by: FAMILY MEDICINE

## 2021-10-26 PROCEDURE — 82607 VITAMIN B-12: CPT | Performed by: FAMILY MEDICINE

## 2021-10-26 PROCEDURE — 3075F PR MOST RECENT SYSTOLIC BLOOD PRESS GE 130-139MM HG: ICD-10-PCS | Mod: CPTII,S$GLB,, | Performed by: FAMILY MEDICINE

## 2021-10-26 PROCEDURE — 99396 PR PREVENTIVE VISIT,EST,40-64: ICD-10-PCS | Mod: 25,S$GLB,, | Performed by: FAMILY MEDICINE

## 2021-10-26 PROCEDURE — 84153 ASSAY OF PSA TOTAL: CPT | Performed by: FAMILY MEDICINE

## 2021-10-26 PROCEDURE — 90686 FLU VACCINE (QUAD) GREATER THAN OR EQUAL TO 3YO PRESERVATIVE FREE IM: ICD-10-PCS | Mod: S$GLB,,, | Performed by: FAMILY MEDICINE

## 2021-10-26 PROCEDURE — 90471 FLU VACCINE (QUAD) GREATER THAN OR EQUAL TO 3YO PRESERVATIVE FREE IM: ICD-10-PCS | Mod: S$GLB,,, | Performed by: FAMILY MEDICINE

## 2021-10-26 PROCEDURE — 80053 COMPREHEN METABOLIC PANEL: CPT | Performed by: FAMILY MEDICINE

## 2021-10-26 PROCEDURE — 90471 IMMUNIZATION ADMIN: CPT | Mod: S$GLB,,, | Performed by: FAMILY MEDICINE

## 2021-10-26 PROCEDURE — 99999 PR PBB SHADOW E&M-EST. PATIENT-LVL IV: CPT | Mod: PBBFAC,,, | Performed by: FAMILY MEDICINE

## 2021-10-26 PROCEDURE — 83036 HEMOGLOBIN GLYCOSYLATED A1C: CPT | Performed by: FAMILY MEDICINE

## 2021-10-26 PROCEDURE — 3044F PR MOST RECENT HEMOGLOBIN A1C LEVEL <7.0%: ICD-10-PCS | Mod: CPTII,S$GLB,, | Performed by: FAMILY MEDICINE

## 2021-10-26 PROCEDURE — 1160F PR REVIEW ALL MEDS BY PRESCRIBER/CLIN PHARMACIST DOCUMENTED: ICD-10-PCS | Mod: CPTII,S$GLB,, | Performed by: FAMILY MEDICINE

## 2021-10-26 PROCEDURE — 3008F PR BODY MASS INDEX (BMI) DOCUMENTED: ICD-10-PCS | Mod: CPTII,S$GLB,, | Performed by: FAMILY MEDICINE

## 2021-10-26 PROCEDURE — 1159F PR MEDICATION LIST DOCUMENTED IN MEDICAL RECORD: ICD-10-PCS | Mod: CPTII,S$GLB,, | Performed by: FAMILY MEDICINE

## 2021-10-26 PROCEDURE — 90686 IIV4 VACC NO PRSV 0.5 ML IM: CPT | Mod: S$GLB,,, | Performed by: FAMILY MEDICINE

## 2021-10-26 PROCEDURE — 85025 COMPLETE CBC W/AUTO DIFF WBC: CPT | Performed by: FAMILY MEDICINE

## 2021-10-26 PROCEDURE — 84466 ASSAY OF TRANSFERRIN: CPT | Performed by: FAMILY MEDICINE

## 2021-10-26 PROCEDURE — 84443 ASSAY THYROID STIM HORMONE: CPT | Performed by: FAMILY MEDICINE

## 2021-10-26 PROCEDURE — 99999 PR PBB SHADOW E&M-EST. PATIENT-LVL IV: ICD-10-PCS | Mod: PBBFAC,,, | Performed by: FAMILY MEDICINE

## 2021-10-26 PROCEDURE — 82728 ASSAY OF FERRITIN: CPT | Performed by: FAMILY MEDICINE

## 2021-10-26 PROCEDURE — 3075F SYST BP GE 130 - 139MM HG: CPT | Mod: CPTII,S$GLB,, | Performed by: FAMILY MEDICINE

## 2021-10-26 PROCEDURE — 1159F MED LIST DOCD IN RCRD: CPT | Mod: CPTII,S$GLB,, | Performed by: FAMILY MEDICINE

## 2021-10-26 PROCEDURE — 99396 PREV VISIT EST AGE 40-64: CPT | Mod: 25,S$GLB,, | Performed by: FAMILY MEDICINE

## 2021-10-26 PROCEDURE — 82306 VITAMIN D 25 HYDROXY: CPT | Performed by: FAMILY MEDICINE

## 2021-10-26 PROCEDURE — 3079F PR MOST RECENT DIASTOLIC BLOOD PRESSURE 80-89 MM HG: ICD-10-PCS | Mod: CPTII,S$GLB,, | Performed by: FAMILY MEDICINE

## 2021-10-26 PROCEDURE — 1160F RVW MEDS BY RX/DR IN RCRD: CPT | Mod: CPTII,S$GLB,, | Performed by: FAMILY MEDICINE

## 2021-10-26 PROCEDURE — 3044F HG A1C LEVEL LT 7.0%: CPT | Mod: CPTII,S$GLB,, | Performed by: FAMILY MEDICINE

## 2021-10-26 PROCEDURE — 3008F BODY MASS INDEX DOCD: CPT | Mod: CPTII,S$GLB,, | Performed by: FAMILY MEDICINE

## 2021-10-26 RX ORDER — METFORMIN HYDROCHLORIDE 500 MG/1
500 TABLET ORAL
Qty: 90 TABLET | Refills: 3 | Status: SHIPPED | OUTPATIENT
Start: 2021-10-26 | End: 2021-12-21

## 2021-10-26 RX ORDER — TRAZODONE HYDROCHLORIDE 100 MG/1
100 TABLET ORAL NIGHTLY PRN
Qty: 90 TABLET | Refills: 1 | Status: SHIPPED | OUTPATIENT
Start: 2021-10-26 | End: 2022-05-06

## 2021-10-26 RX ORDER — ROPINIROLE 4 MG/1
4 TABLET, FILM COATED ORAL NIGHTLY
Qty: 90 TABLET | Refills: 0 | Status: SHIPPED | OUTPATIENT
Start: 2021-10-26 | End: 2022-01-12 | Stop reason: SDUPTHER

## 2021-10-26 RX ORDER — ESCITALOPRAM OXALATE 20 MG/1
20 TABLET ORAL NIGHTLY
Qty: 90 TABLET | Refills: 1 | Status: SHIPPED | OUTPATIENT
Start: 2021-10-26 | End: 2022-07-20 | Stop reason: SDUPTHER

## 2021-10-26 RX ORDER — ESCITALOPRAM OXALATE 20 MG/1
20 TABLET ORAL NIGHTLY
Qty: 90 TABLET | Refills: 1
Start: 2021-10-26 | End: 2021-10-26 | Stop reason: SDUPTHER

## 2021-10-27 ENCOUNTER — TELEPHONE (OUTPATIENT)
Dept: FAMILY MEDICINE | Facility: CLINIC | Age: 61
End: 2021-10-27
Payer: COMMERCIAL

## 2021-10-27 DIAGNOSIS — G25.81 RESTLESS LEGS: ICD-10-CM

## 2021-10-27 DIAGNOSIS — R79.0 DECREASED IRON STORES: Primary | ICD-10-CM

## 2021-10-27 RX ORDER — LANOLIN ALCOHOL/MO/W.PET/CERES
1 CREAM (GRAM) TOPICAL 2 TIMES DAILY
Qty: 180 TABLET | Refills: 3 | Status: SHIPPED | OUTPATIENT
Start: 2021-10-27 | End: 2022-01-25

## 2021-10-29 ENCOUNTER — HOSPITAL ENCOUNTER (OUTPATIENT)
Dept: RADIOLOGY | Facility: HOSPITAL | Age: 61
Discharge: HOME | End: 2021-10-29
Attending: NURSE PRACTITIONER
Payer: COMMERCIAL

## 2021-10-29 ENCOUNTER — OFFICE VISIT (OUTPATIENT)
Dept: ORTHOPEDICS | Facility: CLINIC | Age: 61
End: 2021-10-29
Payer: COMMERCIAL

## 2021-10-29 DIAGNOSIS — M25.562 LEFT KNEE PAIN, UNSPECIFIED CHRONICITY: Primary | ICD-10-CM

## 2021-10-29 DIAGNOSIS — M17.0 PRIMARY OSTEOARTHRITIS OF BOTH KNEES: ICD-10-CM

## 2021-10-29 DIAGNOSIS — M25.562 LEFT KNEE PAIN, UNSPECIFIED CHRONICITY: ICD-10-CM

## 2021-10-29 PROCEDURE — 1159F PR MEDICATION LIST DOCUMENTED IN MEDICAL RECORD: ICD-10-PCS | Mod: CPTII,S$GLB,, | Performed by: NURSE PRACTITIONER

## 2021-10-29 PROCEDURE — 73564 XR KNEE ORTHO LEFT WITH FLEXION: ICD-10-PCS | Mod: 26,LT,, | Performed by: RADIOLOGY

## 2021-10-29 PROCEDURE — 1160F PR REVIEW ALL MEDS BY PRESCRIBER/CLIN PHARMACIST DOCUMENTED: ICD-10-PCS | Mod: CPTII,S$GLB,, | Performed by: NURSE PRACTITIONER

## 2021-10-29 PROCEDURE — 3044F HG A1C LEVEL LT 7.0%: CPT | Mod: CPTII,S$GLB,, | Performed by: NURSE PRACTITIONER

## 2021-10-29 PROCEDURE — 99203 OFFICE O/P NEW LOW 30 MIN: CPT | Mod: 25,S$GLB,, | Performed by: NURSE PRACTITIONER

## 2021-10-29 PROCEDURE — 73564 X-RAY EXAM KNEE 4 OR MORE: CPT | Mod: TC,LT

## 2021-10-29 PROCEDURE — 20610 DRAIN/INJ JOINT/BURSA W/O US: CPT | Mod: 50,S$GLB,, | Performed by: NURSE PRACTITIONER

## 2021-10-29 PROCEDURE — 3044F PR MOST RECENT HEMOGLOBIN A1C LEVEL <7.0%: ICD-10-PCS | Mod: CPTII,S$GLB,, | Performed by: NURSE PRACTITIONER

## 2021-10-29 PROCEDURE — 1159F MED LIST DOCD IN RCRD: CPT | Mod: CPTII,S$GLB,, | Performed by: NURSE PRACTITIONER

## 2021-10-29 PROCEDURE — 99203 PR OFFICE/OUTPT VISIT, NEW, LEVL III, 30-44 MIN: ICD-10-PCS | Mod: 25,S$GLB,, | Performed by: NURSE PRACTITIONER

## 2021-10-29 PROCEDURE — 73562 X-RAY EXAM OF KNEE 3: CPT | Mod: 26,RT,, | Performed by: RADIOLOGY

## 2021-10-29 PROCEDURE — 20610 PR DRAIN/INJECT LARGE JOINT/BURSA: ICD-10-PCS | Mod: 50,S$GLB,, | Performed by: NURSE PRACTITIONER

## 2021-10-29 PROCEDURE — 73564 X-RAY EXAM KNEE 4 OR MORE: CPT | Mod: 26,LT,, | Performed by: RADIOLOGY

## 2021-10-29 PROCEDURE — 99999 PR PBB SHADOW E&M-EST. PATIENT-LVL II: ICD-10-PCS | Mod: PBBFAC,,, | Performed by: NURSE PRACTITIONER

## 2021-10-29 PROCEDURE — 99999 PR PBB SHADOW E&M-EST. PATIENT-LVL II: CPT | Mod: PBBFAC,,, | Performed by: NURSE PRACTITIONER

## 2021-10-29 PROCEDURE — 73562 XR KNEE ORTHO LEFT WITH FLEXION: ICD-10-PCS | Mod: 26,RT,, | Performed by: RADIOLOGY

## 2021-10-29 PROCEDURE — 1160F RVW MEDS BY RX/DR IN RCRD: CPT | Mod: CPTII,S$GLB,, | Performed by: NURSE PRACTITIONER

## 2021-10-29 RX ADMIN — TRIAMCINOLONE ACETONIDE 80 MG: 40 INJECTION, SUSPENSION INTRA-ARTICULAR; INTRAMUSCULAR at 03:10

## 2021-11-05 RX ORDER — TRIAMCINOLONE ACETONIDE 40 MG/ML
80 INJECTION, SUSPENSION INTRA-ARTICULAR; INTRAMUSCULAR
Status: COMPLETED | OUTPATIENT
Start: 2021-10-29 | End: 2021-10-29

## 2021-12-21 ENCOUNTER — OFFICE VISIT (OUTPATIENT)
Dept: FAMILY MEDICINE | Facility: CLINIC | Age: 61
End: 2021-12-21
Payer: COMMERCIAL

## 2021-12-21 VITALS
OXYGEN SATURATION: 98 % | DIASTOLIC BLOOD PRESSURE: 82 MMHG | HEART RATE: 76 BPM | WEIGHT: 216.5 LBS | BODY MASS INDEX: 32.81 KG/M2 | HEIGHT: 68 IN | SYSTOLIC BLOOD PRESSURE: 122 MMHG

## 2021-12-21 DIAGNOSIS — E61.1 LOW IRON: ICD-10-CM

## 2021-12-21 DIAGNOSIS — F41.9 ANXIETY: Primary | ICD-10-CM

## 2021-12-21 DIAGNOSIS — R73.01 ELEVATED FASTING GLUCOSE: ICD-10-CM

## 2021-12-21 DIAGNOSIS — G25.81 RESTLESS LEGS: ICD-10-CM

## 2021-12-21 PROCEDURE — 99999 PR PBB SHADOW E&M-EST. PATIENT-LVL IV: CPT | Mod: PBBFAC,,, | Performed by: FAMILY MEDICINE

## 2021-12-21 PROCEDURE — 99214 PR OFFICE/OUTPT VISIT, EST, LEVL IV, 30-39 MIN: ICD-10-PCS | Mod: S$GLB,,, | Performed by: FAMILY MEDICINE

## 2021-12-21 PROCEDURE — 99999 PR PBB SHADOW E&M-EST. PATIENT-LVL IV: ICD-10-PCS | Mod: PBBFAC,,, | Performed by: FAMILY MEDICINE

## 2021-12-21 PROCEDURE — 99214 OFFICE O/P EST MOD 30 MIN: CPT | Mod: S$GLB,,, | Performed by: FAMILY MEDICINE

## 2021-12-21 RX ORDER — GABAPENTIN 300 MG/1
300 CAPSULE ORAL NIGHTLY
Qty: 90 CAPSULE | Refills: 1 | Status: SHIPPED | OUTPATIENT
Start: 2021-12-21 | End: 2022-06-14

## 2021-12-21 RX ORDER — ESCITALOPRAM OXALATE 10 MG/1
10 TABLET ORAL DAILY
COMMUNITY
Start: 2021-12-16 | End: 2021-12-21

## 2022-01-11 ENCOUNTER — PATIENT MESSAGE (OUTPATIENT)
Dept: FAMILY MEDICINE | Facility: CLINIC | Age: 62
End: 2022-01-11
Payer: COMMERCIAL

## 2022-01-12 DIAGNOSIS — M79.604 RIGHT LEG PAIN: ICD-10-CM

## 2022-01-12 RX ORDER — ROPINIROLE 4 MG/1
4 TABLET, FILM COATED ORAL NIGHTLY
Qty: 90 TABLET | Refills: 0 | Status: SHIPPED | OUTPATIENT
Start: 2022-01-12 | End: 2022-02-25 | Stop reason: SDUPTHER

## 2022-02-25 ENCOUNTER — PATIENT MESSAGE (OUTPATIENT)
Dept: ORTHOPEDICS | Facility: CLINIC | Age: 62
End: 2022-02-25
Payer: COMMERCIAL

## 2022-02-25 DIAGNOSIS — M79.604 RIGHT LEG PAIN: ICD-10-CM

## 2022-02-25 RX ORDER — ROPINIROLE 4 MG/1
4 TABLET, FILM COATED ORAL NIGHTLY
Qty: 90 TABLET | Refills: 0 | Status: SHIPPED | OUTPATIENT
Start: 2022-02-25 | End: 2022-04-18

## 2022-02-26 ENCOUNTER — PATIENT MESSAGE (OUTPATIENT)
Dept: FAMILY MEDICINE | Facility: CLINIC | Age: 62
End: 2022-02-26
Payer: COMMERCIAL

## 2022-02-26 DIAGNOSIS — G25.81 RESTLESS LEGS: Primary | ICD-10-CM

## 2022-02-28 ENCOUNTER — OFFICE VISIT (OUTPATIENT)
Dept: ORTHOPEDICS | Facility: CLINIC | Age: 62
End: 2022-02-28
Payer: COMMERCIAL

## 2022-02-28 VITALS — HEIGHT: 68 IN | WEIGHT: 216.5 LBS | BODY MASS INDEX: 32.81 KG/M2

## 2022-02-28 DIAGNOSIS — M17.12 PRIMARY OSTEOARTHRITIS OF LEFT KNEE: Primary | ICD-10-CM

## 2022-02-28 PROCEDURE — 1160F RVW MEDS BY RX/DR IN RCRD: CPT | Mod: CPTII,S$GLB,, | Performed by: NURSE PRACTITIONER

## 2022-02-28 PROCEDURE — 99213 PR OFFICE/OUTPT VISIT, EST, LEVL III, 20-29 MIN: ICD-10-PCS | Mod: 25,S$GLB,, | Performed by: NURSE PRACTITIONER

## 2022-02-28 PROCEDURE — 99213 OFFICE O/P EST LOW 20 MIN: CPT | Mod: 25,S$GLB,, | Performed by: NURSE PRACTITIONER

## 2022-02-28 PROCEDURE — 99999 PR PBB SHADOW E&M-EST. PATIENT-LVL III: ICD-10-PCS | Mod: PBBFAC,,, | Performed by: NURSE PRACTITIONER

## 2022-02-28 PROCEDURE — 1160F PR REVIEW ALL MEDS BY PRESCRIBER/CLIN PHARMACIST DOCUMENTED: ICD-10-PCS | Mod: CPTII,S$GLB,, | Performed by: NURSE PRACTITIONER

## 2022-02-28 PROCEDURE — 20610 DRAIN/INJ JOINT/BURSA W/O US: CPT | Mod: LT,S$GLB,, | Performed by: NURSE PRACTITIONER

## 2022-02-28 PROCEDURE — 1159F PR MEDICATION LIST DOCUMENTED IN MEDICAL RECORD: ICD-10-PCS | Mod: CPTII,S$GLB,, | Performed by: NURSE PRACTITIONER

## 2022-02-28 PROCEDURE — 3008F BODY MASS INDEX DOCD: CPT | Mod: CPTII,S$GLB,, | Performed by: NURSE PRACTITIONER

## 2022-02-28 PROCEDURE — 20610 PR DRAIN/INJECT LARGE JOINT/BURSA: ICD-10-PCS | Mod: LT,S$GLB,, | Performed by: NURSE PRACTITIONER

## 2022-02-28 PROCEDURE — 3008F PR BODY MASS INDEX (BMI) DOCUMENTED: ICD-10-PCS | Mod: CPTII,S$GLB,, | Performed by: NURSE PRACTITIONER

## 2022-02-28 PROCEDURE — 99999 PR PBB SHADOW E&M-EST. PATIENT-LVL III: CPT | Mod: PBBFAC,,, | Performed by: NURSE PRACTITIONER

## 2022-02-28 PROCEDURE — 1159F MED LIST DOCD IN RCRD: CPT | Mod: CPTII,S$GLB,, | Performed by: NURSE PRACTITIONER

## 2022-02-28 RX ORDER — TRIAMCINOLONE ACETONIDE 40 MG/ML
40 INJECTION, SUSPENSION INTRA-ARTICULAR; INTRAMUSCULAR
Status: COMPLETED | OUTPATIENT
Start: 2022-02-28 | End: 2022-02-28

## 2022-02-28 RX ADMIN — TRIAMCINOLONE ACETONIDE 40 MG: 40 INJECTION, SUSPENSION INTRA-ARTICULAR; INTRAMUSCULAR at 08:02

## 2022-02-28 NOTE — PROGRESS NOTES
CC: Pain of the Left Knee      HPI: Pt with c/o left knee pain for the past few weeks. The pain is lateral and behind the knee cap. It is aching and burning. It is worse with increased activity and better with rest. He has been seen in the past and has known djd of the knees. He has taken nsaids with some relief, but he has had better relief with cortisone injections. He returns today to repeat that. He is very active and is getting ready to go on another cruise. He is ambulating without assistive device. There is not a limp.    ROS  General: denies fever and chills  Resp: no c/o sob  CVS: no c/o cp  MSK: c/o left knee pain    PE  General: AAOx3, pleasant and cooperative  Resp: respirations even and unlabored  MSK: left knee exam  0 degrees extension  120 degrees flexion  No warmth or erythema   - effusion  + tenderness over the lateral joint line  Mild crepitus  5/5 strength    Assessment:  Left knee djd    Plan:  Cortisone injection left knee today  RICE  nsaids prn  F/u as needed    Knee Injection Procedure Note  Diagnosis: left knee degenerative arthritis  Indications: left knee pain  Procedure Details: Verbal consent was obtained for the procedure. The injection site was identified and the skin was prepared with alcohol. The left knee was injected from an anterolateral approach with 1 ml of Kenalog and 4 ml Lidocaine under sterile technique using a 22 gauge needle. The needle was removed and the area cleansed and dressed.  Complications:  Patient tolerated the procedure well.    he was advised to rest the knee today, using ice and elevation as needed for comfort and swelling.Immediate relief of the knee pain may be short lived and secondary to the lidocaine. he may have an increase in discomfort tonight followed by steady improvement over the next several days. It may take 1-2 weeks following the injection to get the full benefit of the medication.

## 2022-03-02 ENCOUNTER — TELEPHONE (OUTPATIENT)
Dept: ADMINISTRATIVE | Facility: OTHER | Age: 62
End: 2022-03-02
Payer: COMMERCIAL

## 2022-03-16 DIAGNOSIS — F41.1 GAD (GENERALIZED ANXIETY DISORDER): ICD-10-CM

## 2022-03-16 NOTE — TELEPHONE ENCOUNTER
No new care gaps identified.  Powered by [employer] [facility]. Reference number: [id number].   3/16/2022 4:02:46 AM CDT

## 2022-03-17 ENCOUNTER — PATIENT MESSAGE (OUTPATIENT)
Dept: FAMILY MEDICINE | Facility: CLINIC | Age: 62
End: 2022-03-17
Payer: COMMERCIAL

## 2022-03-18 ENCOUNTER — LAB VISIT (OUTPATIENT)
Dept: LAB | Facility: HOSPITAL | Age: 62
End: 2022-03-18
Attending: FAMILY MEDICINE
Payer: COMMERCIAL

## 2022-03-18 DIAGNOSIS — E61.1 LOW IRON: ICD-10-CM

## 2022-03-18 DIAGNOSIS — R73.01 ELEVATED FASTING GLUCOSE: ICD-10-CM

## 2022-03-18 LAB
BASOPHILS # BLD AUTO: 0.05 K/UL (ref 0–0.2)
BASOPHILS NFR BLD: 0.8 % (ref 0–1.9)
DIFFERENTIAL METHOD: ABNORMAL
EOSINOPHIL # BLD AUTO: 0.2 K/UL (ref 0–0.5)
EOSINOPHIL NFR BLD: 2.4 % (ref 0–8)
ERYTHROCYTE [DISTWIDTH] IN BLOOD BY AUTOMATED COUNT: 13.6 % (ref 11.5–14.5)
ESTIMATED AVG GLUCOSE: 131 MG/DL (ref 68–131)
FERRITIN SERPL-MCNC: 186 NG/ML (ref 20–300)
HBA1C MFR BLD: 6.2 % (ref 4–5.6)
HCT VFR BLD AUTO: 50.7 % (ref 40–54)
HGB BLD-MCNC: 16.3 G/DL (ref 14–18)
IMM GRANULOCYTES # BLD AUTO: 0.04 K/UL (ref 0–0.04)
IMM GRANULOCYTES NFR BLD AUTO: 0.6 % (ref 0–0.5)
IRON SERPL-MCNC: 98 UG/DL (ref 45–160)
LYMPHOCYTES # BLD AUTO: 1.8 K/UL (ref 1–4.8)
LYMPHOCYTES NFR BLD: 27.2 % (ref 18–48)
MCH RBC QN AUTO: 30.5 PG (ref 27–31)
MCHC RBC AUTO-ENTMCNC: 32.1 G/DL (ref 32–36)
MCV RBC AUTO: 95 FL (ref 82–98)
MONOCYTES # BLD AUTO: 0.7 K/UL (ref 0.3–1)
MONOCYTES NFR BLD: 10.2 % (ref 4–15)
NEUTROPHILS # BLD AUTO: 3.9 K/UL (ref 1.8–7.7)
NEUTROPHILS NFR BLD: 58.8 % (ref 38–73)
NRBC BLD-RTO: 0 /100 WBC
PLATELET # BLD AUTO: 221 K/UL (ref 150–450)
PMV BLD AUTO: 10.7 FL (ref 9.2–12.9)
RBC # BLD AUTO: 5.34 M/UL (ref 4.6–6.2)
SATURATED IRON: 18 % (ref 20–50)
TOTAL IRON BINDING CAPACITY: 548 UG/DL (ref 250–450)
TRANSFERRIN SERPL-MCNC: 370 MG/DL (ref 200–375)
WBC # BLD AUTO: 6.65 K/UL (ref 3.9–12.7)

## 2022-03-18 PROCEDURE — 85025 COMPLETE CBC W/AUTO DIFF WBC: CPT | Performed by: FAMILY MEDICINE

## 2022-03-18 PROCEDURE — 36415 COLL VENOUS BLD VENIPUNCTURE: CPT | Mod: PO | Performed by: FAMILY MEDICINE

## 2022-03-18 PROCEDURE — 82728 ASSAY OF FERRITIN: CPT | Performed by: FAMILY MEDICINE

## 2022-03-18 PROCEDURE — 84466 ASSAY OF TRANSFERRIN: CPT | Performed by: FAMILY MEDICINE

## 2022-03-18 PROCEDURE — 83036 HEMOGLOBIN GLYCOSYLATED A1C: CPT | Performed by: FAMILY MEDICINE

## 2022-03-22 RX ORDER — ESCITALOPRAM OXALATE 10 MG/1
TABLET ORAL
Qty: 90 TABLET | Refills: 3 | OUTPATIENT
Start: 2022-03-22

## 2022-03-23 NOTE — TELEPHONE ENCOUNTER
Quick DC. Inappropriate Request    Refill Authorization Note   Dylan Sawant  is requesting a refill authorization.  Brief Assessment and Rationale for Refill:  Quick Discontinue  Medication Therapy Plan:  Escitalopram increased to 20mg    Medication Reconciliation Completed:  No      Comments:   Pended Medication(s)       Requested Prescriptions     Pending Prescriptions Disp Refills    EScitalopram oxalate (LEXAPRO) 10 MG tablet [Pharmacy Med Name: ESCITALOPRAM 10MG TABLETS] 90 tablet 3     Sig: TAKE 1 TABLET(10 MG) BY MOUTH EVERY DAY        Duplicate Pended Encounter(s)/ Last Prescribed Details: (includes pharmacy & prescriber details)   Ordering Encounter Report    Associated Reports   View Encounter                Note composed:7:25 PM 03/22/2022

## 2022-03-30 ENCOUNTER — TELEPHONE (OUTPATIENT)
Dept: OPHTHALMOLOGY | Facility: CLINIC | Age: 62
End: 2022-03-30
Payer: COMMERCIAL

## 2022-03-30 ENCOUNTER — OFFICE VISIT (OUTPATIENT)
Dept: OPTOMETRY | Facility: CLINIC | Age: 62
End: 2022-03-30
Payer: COMMERCIAL

## 2022-03-30 DIAGNOSIS — S05.01XA CORNEA ABRASION, RIGHT, INITIAL ENCOUNTER: ICD-10-CM

## 2022-03-30 DIAGNOSIS — T15.11XA ACUTE FOREIGN BODY OF CONJUNCTIVA, RIGHT, INITIAL ENCOUNTER: Primary | ICD-10-CM

## 2022-03-30 PROCEDURE — 1159F PR MEDICATION LIST DOCUMENTED IN MEDICAL RECORD: ICD-10-PCS | Mod: CPTII,S$GLB,, | Performed by: OPTOMETRIST

## 2022-03-30 PROCEDURE — 3044F HG A1C LEVEL LT 7.0%: CPT | Mod: CPTII,S$GLB,, | Performed by: OPTOMETRIST

## 2022-03-30 PROCEDURE — 99999 PR PBB SHADOW E&M-EST. PATIENT-LVL III: CPT | Mod: PBBFAC,,, | Performed by: OPTOMETRIST

## 2022-03-30 PROCEDURE — 99999 PR PBB SHADOW E&M-EST. PATIENT-LVL III: ICD-10-PCS | Mod: PBBFAC,,, | Performed by: OPTOMETRIST

## 2022-03-30 PROCEDURE — 1159F MED LIST DOCD IN RCRD: CPT | Mod: CPTII,S$GLB,, | Performed by: OPTOMETRIST

## 2022-03-30 PROCEDURE — 65205 PR REMV F.B.,EYE,SUPERF CONJUNC: ICD-10-PCS | Mod: RT,S$GLB,, | Performed by: OPTOMETRIST

## 2022-03-30 PROCEDURE — 92004 PR EYE EXAM, NEW PATIENT,COMPREHESV: ICD-10-PCS | Mod: 25,S$GLB,, | Performed by: OPTOMETRIST

## 2022-03-30 PROCEDURE — 92004 COMPRE OPH EXAM NEW PT 1/>: CPT | Mod: 25,S$GLB,, | Performed by: OPTOMETRIST

## 2022-03-30 PROCEDURE — 65205 REMOVE FOREIGN BODY FROM EYE: CPT | Mod: RT,S$GLB,, | Performed by: OPTOMETRIST

## 2022-03-30 PROCEDURE — 3044F PR MOST RECENT HEMOGLOBIN A1C LEVEL <7.0%: ICD-10-PCS | Mod: CPTII,S$GLB,, | Performed by: OPTOMETRIST

## 2022-03-30 RX ORDER — OFLOXACIN 3 MG/ML
1 SOLUTION/ DROPS OPHTHALMIC EVERY 4 HOURS
Qty: 10 ML | Refills: 0 | Status: SHIPPED | OUTPATIENT
Start: 2022-03-30 | End: 2022-04-06

## 2022-03-30 NOTE — PROGRESS NOTES
HPI     Patient is here for FB sensation OD patient states something flew into   his eyes yesterday.  Eyewash and otc eye drops used     Last edited by GALLO Hernandez on 3/30/2022  1:26 PM. (History)            Assessment /Plan     For exam results, see Encounter Report.    Acute foreign body of conjunctiva, right, initial encounter  -     ofloxacin (OCUFLOX) 0.3 % ophthalmic solution; Place 1 drop into the right eye every 4 (four) hours. for 7 days  Dispense: 10 mL; Refill: 0  -inert FB removed RUL with sterile cotton swab    Cornea abrasion, right, initial encounter  -     ofloxacin (OCUFLOX) 0.3 % ophthalmic solution; Place 1 drop into the right eye every 4 (four) hours. for 7 days  Dispense: 10 mL; Refill: 0  -Abrasion 2/2 FB tracking with blink  -Ocuflox QID x 1 wk  -Gel Gtts PRN      RTC annual

## 2022-03-30 NOTE — TELEPHONE ENCOUNTER
----- Message from Génesis Andrade sent at 3/30/2022  8:03 AM CDT -----  Patient's wife is calling stating that the patient feels like there is something in the OD. The eye is irritated, watery, and itchy. She stated that even that sinuses are acting up. Patient got a haircut and feels like it can be a hair inside the eye. Patient feels the need to be seen today. Please contact patient to schedule at [phone].

## 2022-05-05 DIAGNOSIS — F51.01 PRIMARY INSOMNIA: ICD-10-CM

## 2022-05-05 NOTE — TELEPHONE ENCOUNTER
No new care gaps identified.  [facility] Embedded Care Gaps. Reference number: [id number]. 5/05/2022   6:26:45 PM CDT

## 2022-05-06 RX ORDER — TRAZODONE HYDROCHLORIDE 100 MG/1
TABLET ORAL
Qty: 90 TABLET | Refills: 0 | Status: SHIPPED | OUTPATIENT
Start: 2022-05-06 | End: 2022-07-20 | Stop reason: SDUPTHER

## 2022-05-06 NOTE — TELEPHONE ENCOUNTER
Refill Routing Note   Medication(s) are not appropriate for processing by [facility] for the following reason(s):      - Indication is outside of scope for [facility]    [facility] action(s):  Route          Medication reconciliation completed: No     Appointments  past 12m or future 3m with PCP    Date Provider   Last Visit   12/21/2021 Roland Swenson DO   Next Visit   7/20/2022 Roland Swenson DO   ED visits in past 90 days: 0        Note composed:11:36 PM 05/05/2022

## 2022-05-23 ENCOUNTER — TELEPHONE (OUTPATIENT)
Dept: ORTHOPEDICS | Facility: CLINIC | Age: 62
End: 2022-05-23
Payer: COMMERCIAL

## 2022-05-23 NOTE — TELEPHONE ENCOUNTER
----- Message from Magda Melissa sent at 5/23/2022  8:59 AM CDT -----  Patient Call Back    Who Called: PT     What is the request in detail:Pt calling to speak with someone regarding his LT knee. The pt stated that he is starting to have trouble with his knee again. Pls advise.     Can the clinic reply by [facility]?    Best Call Back Number: [phone]

## 2022-05-23 NOTE — TELEPHONE ENCOUNTER
Spoke to patient, he would like to try another injection. Scheduled after 5/28 for 3 months after last injection. Stephan agreeable.       ----- Message from Abby Taylor sent at 5/23/2022 12:43 PM CDT -----  Regarding: pt  Pt is calling to speak with the nurse pt said he will further discuss when the nurse calls him back can you please call pt at [phone].    MELITON

## 2022-05-30 ENCOUNTER — OFFICE VISIT (OUTPATIENT)
Dept: ORTHOPEDICS | Facility: CLINIC | Age: 62
End: 2022-05-30
Payer: COMMERCIAL

## 2022-05-30 VITALS — HEIGHT: 69 IN | WEIGHT: 227.13 LBS | BODY MASS INDEX: 33.64 KG/M2

## 2022-05-30 DIAGNOSIS — M17.12 PRIMARY OSTEOARTHRITIS OF LEFT KNEE: Primary | ICD-10-CM

## 2022-05-30 PROCEDURE — 3008F BODY MASS INDEX DOCD: CPT | Mod: CPTII,S$GLB,, | Performed by: PHYSICIAN ASSISTANT

## 2022-05-30 PROCEDURE — 99999 PR PBB SHADOW E&M-EST. PATIENT-LVL III: ICD-10-PCS | Mod: PBBFAC,,, | Performed by: PHYSICIAN ASSISTANT

## 2022-05-30 PROCEDURE — 99999 PR PBB SHADOW E&M-EST. PATIENT-LVL III: CPT | Mod: PBBFAC,,, | Performed by: PHYSICIAN ASSISTANT

## 2022-05-30 PROCEDURE — 3044F PR MOST RECENT HEMOGLOBIN A1C LEVEL <7.0%: ICD-10-PCS | Mod: CPTII,S$GLB,, | Performed by: PHYSICIAN ASSISTANT

## 2022-05-30 PROCEDURE — 1159F MED LIST DOCD IN RCRD: CPT | Mod: CPTII,S$GLB,, | Performed by: PHYSICIAN ASSISTANT

## 2022-05-30 PROCEDURE — 99499 NO LOS: ICD-10-PCS | Mod: S$GLB,,, | Performed by: PHYSICIAN ASSISTANT

## 2022-05-30 PROCEDURE — 3008F PR BODY MASS INDEX (BMI) DOCUMENTED: ICD-10-PCS | Mod: CPTII,S$GLB,, | Performed by: PHYSICIAN ASSISTANT

## 2022-05-30 PROCEDURE — 20610 PR DRAIN/INJECT LARGE JOINT/BURSA: ICD-10-PCS | Mod: LT,S$GLB,, | Performed by: PHYSICIAN ASSISTANT

## 2022-05-30 PROCEDURE — 3044F HG A1C LEVEL LT 7.0%: CPT | Mod: CPTII,S$GLB,, | Performed by: PHYSICIAN ASSISTANT

## 2022-05-30 PROCEDURE — 20610 DRAIN/INJ JOINT/BURSA W/O US: CPT | Mod: LT,S$GLB,, | Performed by: PHYSICIAN ASSISTANT

## 2022-05-30 PROCEDURE — 1159F PR MEDICATION LIST DOCUMENTED IN MEDICAL RECORD: ICD-10-PCS | Mod: CPTII,S$GLB,, | Performed by: PHYSICIAN ASSISTANT

## 2022-05-30 PROCEDURE — 99499 UNLISTED E&M SERVICE: CPT | Mod: S$GLB,,, | Performed by: PHYSICIAN ASSISTANT

## 2022-05-30 RX ORDER — TRIAMCINOLONE ACETONIDE 40 MG/ML
40 INJECTION, SUSPENSION INTRA-ARTICULAR; INTRAMUSCULAR
Status: COMPLETED | OUTPATIENT
Start: 2022-05-30 | End: 2022-05-30

## 2022-05-30 RX ADMIN — TRIAMCINOLONE ACETONIDE 40 MG: 40 INJECTION, SUSPENSION INTRA-ARTICULAR; INTRAMUSCULAR at 09:05

## 2022-05-30 NOTE — PROGRESS NOTES
CC: Pain of the Left Knee      HPI: Pt with c/o left knee pain for the past few weeks. The pain is lateral and behind the knee cap. It is aching and burning. It is worse with increased activity and better with rest. He has been seen in the past and has known djd of the knees. He has taken nsaids with some relief, but he has had better relief with cortisone injections. He returns today to repeat that. 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 He is ambulating without assistive device. There is not a limp. He denies back pain or groin pain. His RLS is worse, even on Requip. Following up with pcp next month.     ROS  General: denies fever and chills  Resp: no c/o sob  CVS: no c/o cp  MSK: c/o left knee pain    PE  General: AAOx3, pleasant and cooperative  Resp: respirations even and unlabored  MSK: left knee exam  0 degrees extension  120 degrees flexion  No warmth or erythema   - effusion  + tenderness over the lateral joint line  Mild crepitus  5/5 strength  No pain with PROM of hip    Assessment:  Primary osteoarthritis left knee    Plan:  Cortisone injection left knee today  RICE  nsaids prn  F/u with pcp about rls and medication options  F/u as needed    Knee Injection Procedure Note  Diagnosis: left knee degenerative arthritis  Indications: left knee pain  Procedure Details: Verbal consent was obtained for the procedure. The injection site was identified and the skin was prepared with alcohol. The left knee was injected from an anterolateral approach with 1 ml of Kenalog and 3 ml Lidocaine under sterile technique using a 22 gauge needle. The needle was removed and the area cleansed and dressed.  Complications:  Patient tolerated the procedure well.    he was advised to rest the knee today, using ice and elevation as needed for comfort and swelling.Immediate relief of the knee pain may be short lived and secondary to the lidocaine. he may have an increase in  discomfort tonight followed by steady improvement over the next several days. It may take 1-2 weeks following the injection to get the full benefit of the medication.

## 2022-06-14 DIAGNOSIS — G25.81 RESTLESS LEGS: ICD-10-CM

## 2022-06-14 RX ORDER — GABAPENTIN 300 MG/1
CAPSULE ORAL
Qty: 90 CAPSULE | Refills: 0 | Status: SHIPPED | OUTPATIENT
Start: 2022-06-14 | End: 2022-07-20 | Stop reason: SDUPTHER

## 2022-06-14 NOTE — TELEPHONE ENCOUNTER
No new care gaps identified.  [facility] Embedded Care Gaps. Reference number: [id number]. 6/14/2022   8:52:35 AM CDT

## 2022-06-25 DIAGNOSIS — E78.2 MIXED HYPERLIPIDEMIA: ICD-10-CM

## 2022-06-25 NOTE — TELEPHONE ENCOUNTER
No new care gaps identified.  [facility] Embedded Care Gaps. Reference number: [id number]. 6/25/2022   9:36:50 AM CDT

## 2022-06-25 NOTE — TELEPHONE ENCOUNTER
Refill Routing Note   Medication(s) are not appropriate for processing by [facility] for the following reason(s):      - Medication not previously prescribed by PCP    [facility] action(s):  Defer          Medication reconciliation completed: No     Appointments  past 12m or future 3m with PCP    Date Provider   Last Visit   12/21/2021 Roland Swenson, DO   Next Visit   7/20/2022 Roland Swenson,    ED visits in past 90 days: 0        Note composed:1:27 PM 06/25/2022

## 2022-06-26 RX ORDER — PRAVASTATIN SODIUM 40 MG/1
TABLET ORAL
Qty: 90 TABLET | Refills: 0 | Status: SHIPPED | OUTPATIENT
Start: 2022-06-26 | End: 2022-07-20 | Stop reason: SDUPTHER

## 2022-07-20 ENCOUNTER — OFFICE VISIT (OUTPATIENT)
Dept: FAMILY MEDICINE | Facility: CLINIC | Age: 62
End: 2022-07-20
Payer: COMMERCIAL

## 2022-07-20 VITALS
OXYGEN SATURATION: 98 % | DIASTOLIC BLOOD PRESSURE: 70 MMHG | SYSTOLIC BLOOD PRESSURE: 120 MMHG | HEIGHT: 69 IN | HEART RATE: 72 BPM | WEIGHT: 227.94 LBS | BODY MASS INDEX: 33.76 KG/M2

## 2022-07-20 DIAGNOSIS — F51.01 PRIMARY INSOMNIA: ICD-10-CM

## 2022-07-20 DIAGNOSIS — Z00.00 VISIT FOR WELL MAN HEALTH CHECK: ICD-10-CM

## 2022-07-20 DIAGNOSIS — Z12.5 SCREENING PSA (PROSTATE SPECIFIC ANTIGEN): ICD-10-CM

## 2022-07-20 DIAGNOSIS — Z91.09 ENVIRONMENTAL ALLERGIES: ICD-10-CM

## 2022-07-20 DIAGNOSIS — G25.81 RESTLESS LEGS: ICD-10-CM

## 2022-07-20 DIAGNOSIS — R73.01 ELEVATED FASTING GLUCOSE: ICD-10-CM

## 2022-07-20 DIAGNOSIS — E78.2 MIXED HYPERLIPIDEMIA: ICD-10-CM

## 2022-07-20 DIAGNOSIS — M79.604 RIGHT LEG PAIN: ICD-10-CM

## 2022-07-20 DIAGNOSIS — M25.511 ACUTE PAIN OF RIGHT SHOULDER: Primary | ICD-10-CM

## 2022-07-20 DIAGNOSIS — F41.1 GAD (GENERALIZED ANXIETY DISORDER): ICD-10-CM

## 2022-07-20 PROCEDURE — 99999 PR PBB SHADOW E&M-EST. PATIENT-LVL III: CPT | Mod: PBBFAC,,, | Performed by: FAMILY MEDICINE

## 2022-07-20 PROCEDURE — 99214 PR OFFICE/OUTPT VISIT, EST, LEVL IV, 30-39 MIN: ICD-10-PCS | Mod: S$GLB,,, | Performed by: FAMILY MEDICINE

## 2022-07-20 PROCEDURE — 99999 PR PBB SHADOW E&M-EST. PATIENT-LVL III: ICD-10-PCS | Mod: PBBFAC,,, | Performed by: FAMILY MEDICINE

## 2022-07-20 PROCEDURE — 3008F BODY MASS INDEX DOCD: CPT | Mod: CPTII,S$GLB,, | Performed by: FAMILY MEDICINE

## 2022-07-20 PROCEDURE — 1159F PR MEDICATION LIST DOCUMENTED IN MEDICAL RECORD: ICD-10-PCS | Mod: CPTII,S$GLB,, | Performed by: FAMILY MEDICINE

## 2022-07-20 PROCEDURE — 1160F RVW MEDS BY RX/DR IN RCRD: CPT | Mod: CPTII,S$GLB,, | Performed by: FAMILY MEDICINE

## 2022-07-20 PROCEDURE — 3008F PR BODY MASS INDEX (BMI) DOCUMENTED: ICD-10-PCS | Mod: CPTII,S$GLB,, | Performed by: FAMILY MEDICINE

## 2022-07-20 PROCEDURE — 3044F HG A1C LEVEL LT 7.0%: CPT | Mod: CPTII,S$GLB,, | Performed by: FAMILY MEDICINE

## 2022-07-20 PROCEDURE — 3074F PR MOST RECENT SYSTOLIC BLOOD PRESSURE < 130 MM HG: ICD-10-PCS | Mod: CPTII,S$GLB,, | Performed by: FAMILY MEDICINE

## 2022-07-20 PROCEDURE — 3044F PR MOST RECENT HEMOGLOBIN A1C LEVEL <7.0%: ICD-10-PCS | Mod: CPTII,S$GLB,, | Performed by: FAMILY MEDICINE

## 2022-07-20 PROCEDURE — 1159F MED LIST DOCD IN RCRD: CPT | Mod: CPTII,S$GLB,, | Performed by: FAMILY MEDICINE

## 2022-07-20 PROCEDURE — 99214 OFFICE O/P EST MOD 30 MIN: CPT | Mod: S$GLB,,, | Performed by: FAMILY MEDICINE

## 2022-07-20 PROCEDURE — 3074F SYST BP LT 130 MM HG: CPT | Mod: CPTII,S$GLB,, | Performed by: FAMILY MEDICINE

## 2022-07-20 PROCEDURE — 3078F DIAST BP <80 MM HG: CPT | Mod: CPTII,S$GLB,, | Performed by: FAMILY MEDICINE

## 2022-07-20 PROCEDURE — 3078F PR MOST RECENT DIASTOLIC BLOOD PRESSURE < 80 MM HG: ICD-10-PCS | Mod: CPTII,S$GLB,, | Performed by: FAMILY MEDICINE

## 2022-07-20 PROCEDURE — 1160F PR REVIEW ALL MEDS BY PRESCRIBER/CLIN PHARMACIST DOCUMENTED: ICD-10-PCS | Mod: CPTII,S$GLB,, | Performed by: FAMILY MEDICINE

## 2022-07-20 RX ORDER — FENOFIBRATE 134 MG/1
134 CAPSULE ORAL NIGHTLY
Qty: 90 CAPSULE | Refills: 3 | Status: SHIPPED | OUTPATIENT
Start: 2022-07-20 | End: 2022-12-13 | Stop reason: SDUPTHER

## 2022-07-20 RX ORDER — TRAZODONE HYDROCHLORIDE 100 MG/1
100 TABLET ORAL NIGHTLY PRN
Qty: 90 TABLET | Refills: 0 | Status: SHIPPED | OUTPATIENT
Start: 2022-07-20 | End: 2022-11-11

## 2022-07-20 RX ORDER — ROPINIROLE 4 MG/1
TABLET, FILM COATED ORAL
Qty: 90 TABLET | Refills: 0 | Status: SHIPPED | OUTPATIENT
Start: 2022-07-20 | End: 2022-08-05 | Stop reason: SDUPTHER

## 2022-07-20 RX ORDER — PRAVASTATIN SODIUM 40 MG/1
40 TABLET ORAL DAILY
Qty: 90 TABLET | Refills: 0 | Status: SHIPPED | OUTPATIENT
Start: 2022-07-20 | End: 2022-12-13

## 2022-07-20 RX ORDER — FERROUS SULFATE TAB 325 MG (65 MG ELEMENTAL FE) 325 (65 FE) MG
TAB ORAL 2 TIMES DAILY
COMMUNITY
Start: 2022-05-14 | End: 2022-07-20 | Stop reason: SDUPTHER

## 2022-07-20 RX ORDER — GABAPENTIN 300 MG/1
300 CAPSULE ORAL 2 TIMES DAILY
Qty: 180 CAPSULE | Refills: 0 | Status: SHIPPED | OUTPATIENT
Start: 2022-07-20 | End: 2022-08-05 | Stop reason: SDUPTHER

## 2022-07-20 RX ORDER — FERROUS SULFATE TAB 325 MG (65 MG ELEMENTAL FE) 325 (65 FE) MG
325 TAB ORAL DAILY
Qty: 90 TABLET | Refills: 0 | Status: SHIPPED | OUTPATIENT
Start: 2022-07-20 | End: 2022-12-13

## 2022-07-20 RX ORDER — IPRATROPIUM BROMIDE 42 UG/1
2 SPRAY, METERED NASAL 4 TIMES DAILY
Qty: 15 ML | Refills: 0 | Status: SHIPPED | OUTPATIENT
Start: 2022-07-20 | End: 2022-12-13 | Stop reason: SDUPTHER

## 2022-07-20 RX ORDER — MELOXICAM 15 MG/1
15 TABLET ORAL DAILY
Qty: 30 TABLET | Refills: 0 | Status: ON HOLD | OUTPATIENT
Start: 2022-07-20 | End: 2023-01-27 | Stop reason: HOSPADM

## 2022-07-20 RX ORDER — DICLOFENAC SODIUM 10 MG/G
2 GEL TOPICAL DAILY
Qty: 100 G | Refills: 0 | Status: ON HOLD | OUTPATIENT
Start: 2022-07-20 | End: 2023-01-27 | Stop reason: HOSPADM

## 2022-07-20 RX ORDER — ESCITALOPRAM OXALATE 20 MG/1
20 TABLET ORAL NIGHTLY
Qty: 90 TABLET | Refills: 1 | Status: SHIPPED | OUTPATIENT
Start: 2022-07-20 | End: 2022-12-13 | Stop reason: SDUPTHER

## 2022-07-20 RX ORDER — METFORMIN HYDROCHLORIDE 500 MG/1
500 TABLET, EXTENDED RELEASE ORAL
Qty: 90 TABLET | Refills: 3 | Status: SHIPPED | OUTPATIENT
Start: 2022-07-20 | End: 2022-12-13 | Stop reason: SDUPTHER

## 2022-07-20 NOTE — PROGRESS NOTES
"Subjective:       Patient ID: Dylan Sawant is a 61 y.o. male.    Chief Complaint: No chief complaint on file.    Dylan is a 61 y.o. male who presents today for f/u    Last seen about 7 months ago    Anxiety: still taking lexapro. Also taking trazodone nightly. Symptoms controlled. "I think its working."  RLS/Iron def: iron is improved. Seeing neurology. RLS is not improved. Taking requip 4 mg. Max dose is 4 mg/day per dosing review. Want to try 2 mg twice daily or increase gabapentin. On gabapentin, thinks that this may be helping. Will keep requip at current dose. Gabapentin increased to 300 mg BID 7/20/2022.   Prediabetes: worse. Stopped metformin. Restart 7/20/2022.   DLD: on pravastatin, "for a while" no change in RLS with this. No leg cramps or myalgias.   GERD: not using PPI, no symptoms currently.     Seeing ortho for knee pain.     Obesity: up about 10 pounds. Does eat ward's for lunch.     Having some left shoulder pain. Was doing yard work and this seemed to cause his symptoms.  No trauma.     Review of Systems   Constitutional: Negative for chills and fever.   Gastrointestinal: Negative for diarrhea, nausea and vomiting.   Genitourinary: Negative for difficulty urinating and dysuria.   Neurological:        Restless legs             Results for orders placed or performed in visit on 03/18/22   Hemoglobin A1C   Result Value Ref Range    Hemoglobin A1C 6.2 (H) 4.0 - 5.6 %    Estimated Avg Glucose 131 68 - 131 mg/dL   CBC Auto Differential   Result Value Ref Range    WBC 6.65 3.90 - 12.70 K/uL    RBC 5.34 4.60 - 6.20 M/uL    Hemoglobin 16.3 14.0 - 18.0 g/dL    Hematocrit 50.7 40.0 - 54.0 %    MCV 95 82 - 98 fL    MCH 30.5 27.0 - 31.0 pg    MCHC 32.1 32.0 - 36.0 g/dL    RDW 13.6 11.5 - 14.5 %    Platelets 221 150 - 450 K/uL    MPV 10.7 9.2 - 12.9 fL    Immature Granulocytes 0.6 (H) 0.0 - 0.5 %    Gran # (ANC) 3.9 1.8 - 7.7 K/uL    Immature Grans (Abs) 0.04 0.00 - 0.04 K/uL    Lymph # 1.8 1.0 - 4.8 " K/uL    Mono # 0.7 0.3 - 1.0 K/uL    Eos # 0.2 0.0 - 0.5 K/uL    Baso # 0.05 0.00 - 0.20 K/uL    nRBC 0 0 /100 WBC    Gran % 58.8 38.0 - 73.0 %    Lymph % 27.2 18.0 - 48.0 %    Mono % 10.2 4.0 - 15.0 %    Eosinophil % 2.4 0.0 - 8.0 %    Basophil % 0.8 0.0 - 1.9 %    Differential Method Automated    Iron and TIBC   Result Value Ref Range    Iron 98 45 - 160 ug/dL    Transferrin 370 200 - 375 mg/dL    TIBC 548 (H) 250 - 450 ug/dL    Saturated Iron 18 (L) 20 - 50 %   Ferritin   Result Value Ref Range    Ferritin 186 20.0 - 300.0 ng/mL       Objective:     Vitals:    07/20/22 0923   BP: 120/70   Pulse: 72        Physical Exam  Vitals and nursing note reviewed.   Constitutional:       General: He is not in acute distress.     Appearance: He is obese. He is not ill-appearing, toxic-appearing or diaphoretic.   HENT:      Right Ear: There is no impacted cerumen.      Left Ear: There is no impacted cerumen.      Nose: Congestion present.      Mouth/Throat:      Pharynx: No oropharyngeal exudate or posterior oropharyngeal erythema.   Cardiovascular:      Rate and Rhythm: Normal rate and regular rhythm.   Pulmonary:      Effort: Pulmonary effort is normal.      Breath sounds: Normal breath sounds.   Musculoskeletal:         General: No swelling.      Comments: Shoulder exam grossly normal  No focal weakness noted   Neurological:      General: No focal deficit present.      Mental Status: He is alert.   Psychiatric:         Mood and Affect: Mood normal.         Behavior: Behavior normal.         Thought Content: Thought content normal.         Judgment: Judgment normal.         Assessment:       1. Acute pain of right shoulder    2. Restless legs    3. Right leg pain    4. Mixed hyperlipidemia    5. Primary insomnia    6. CASTILLO (generalized anxiety disorder)    7. Elevated fasting glucose    8. Screening PSA (prostate specific antigen)    9. Environmental allergies    10. Visit for well man health check        Plan:            Continue lexapro 20 mg  Continue trazodone 1/2 pill to 1 pill nightly PRN     Requip 4 mg nightly. Defer further increase to neurology.   gabapentin 300 mg increase to twice daily. Can try 300 am, 600 PM if desired.   Continue iron once daily.   Keep neurology f/u     Continue pravastatin  Continue fenofibrate  Continue omeprazole     Trial of weight loss  Restart metformin for now  You are prediabetic. Being prediabetic puts you at high risk of developing diabetes in the future. Please decrease your intake of white bread, white rice, corn, pasta, potatoes and sugar to prevent diabetes. Regular daily exercise will also decrease your risk of developing diabetes.    Topical and oral nsaids for shoulder    Nasal spray for congestion.      F/u 4 months, labs prior. annual.    Acute pain of right shoulder  -     meloxicam (MOBIC) 15 MG tablet; Take 1 tablet (15 mg total) by mouth once daily.  Dispense: 30 tablet; Refill: 0  -     diclofenac sodium (VOLTAREN) 1 % Gel; Apply 2 g topically once daily.  Dispense: 100 g; Refill: 0    Restless legs  -     gabapentin (NEURONTIN) 300 MG capsule; Take 1 capsule (300 mg total) by mouth 2 (two) times daily.  Dispense: 180 capsule; Refill: 0  -     CBC Auto Differential; Future; Expected date: 07/20/2022  -     Comprehensive Metabolic Panel; Future; Expected date: 07/20/2022  -     Iron and TIBC; Future; Expected date: 07/20/2022  -     Ferritin; Future; Expected date: 07/20/2022    Right leg pain  -     rOPINIRole (REQUIP) 4 MG tablet; TAKE 1 TABLET(4 MG) BY MOUTH EVERY NIGHT  Dispense: 90 tablet; Refill: 0  -     CBC Auto Differential; Future; Expected date: 07/20/2022  -     Comprehensive Metabolic Panel; Future; Expected date: 07/20/2022  -     Iron and TIBC; Future; Expected date: 07/20/2022  -     Ferritin; Future; Expected date: 07/20/2022    Mixed hyperlipidemia  -     fenofibrate micronized (LOFIBRA) 134 MG Cap; Take 1 capsule (134 mg total) by mouth every evening.   Dispense: 90 capsule; Refill: 3  -     pravastatin (PRAVACHOL) 40 MG tablet; Take 1 tablet (40 mg total) by mouth once daily.  Dispense: 90 tablet; Refill: 0  -     Lipid Panel; Future; Expected date: 07/20/2022    Primary insomnia  -     traZODone (DESYREL) 100 MG tablet; Take 1 tablet (100 mg total) by mouth nightly as needed for Insomnia.  Dispense: 90 tablet; Refill: 0    CATSILLO (generalized anxiety disorder)  -     EScitalopram oxalate (LEXAPRO) 20 MG tablet; Take 1 tablet (20 mg total) by mouth every evening.  Dispense: 90 tablet; Refill: 1    Elevated fasting glucose  -     metFORMIN (GLUCOPHAGE-XR) 500 MG ER 24hr tablet; Take 1 tablet (500 mg total) by mouth daily with breakfast.  Dispense: 90 tablet; Refill: 3  -     Hemoglobin A1C; Future; Expected date: 07/20/2022    Screening PSA (prostate specific antigen)  -     PSA, Screening; Future; Expected date: 07/20/2022    Environmental allergies  -     ipratropium (ATROVENT) 42 mcg (0.06 %) nasal spray; 2 sprays by Nasal route 4 (four) times daily.  Dispense: 15 mL; Refill: 0    Visit for well Bracey health check  -     CBC Auto Differential; Future; Expected date: 07/20/2022  -     Comprehensive Metabolic Panel; Future; Expected date: 07/20/2022  -     Hemoglobin A1C; Future; Expected date: 07/20/2022  -     Lipid Panel; Future; Expected date: 07/20/2022  -     TSH; Future; Expected date: 07/20/2022  -     Vitamin D; Future; Expected date: 07/20/2022  -     PSA, Screening; Future; Expected date: 07/20/2022  -     Iron and TIBC; Future; Expected date: 07/20/2022  -     Ferritin; Future; Expected date: 07/20/2022    Other orders  -     FEROSUL 325 mg (65 mg iron) Tab tablet; Take 1 tablet (325 mg total) by mouth once daily.  Dispense: 90 tablet; Refill: 0            Warning signs discussed, patient to call with any further issues or worsening of symptoms.

## 2022-07-20 NOTE — PATIENT INSTRUCTIONS
Continue lexapro 20 mg  Continue trazodone 1/2 pill to 1 pill nightly PRN     Requip 4 mg nightly  gabapentin 300 mg increase to twice daily  Continue iron  Keep neurology f/u     Continue pravastatin  Continue fenofibrate  Continue omeprazole     Trial of weight loss  Restart metformin for now  You are prediabetic. Being prediabetic puts you at high risk of developing diabetes in the future. Please decrease your intake of white bread, white rice, corn, pasta, potatoes and sugar to prevent diabetes. Regular daily exercise will also decrease your risk of developing diabetes.    Topical and oral nsaids for shoulder    Nasal spray for congestion.      F/u 4 months, labs prior. annual.

## 2022-07-25 ENCOUNTER — TELEPHONE (OUTPATIENT)
Dept: ORTHOPEDICS | Facility: CLINIC | Age: 62
End: 2022-07-25
Payer: COMMERCIAL

## 2022-07-25 ENCOUNTER — PATIENT MESSAGE (OUTPATIENT)
Dept: ORTHOPEDICS | Facility: CLINIC | Age: 62
End: 2022-07-25
Payer: COMMERCIAL

## 2022-07-25 DIAGNOSIS — R52 PAIN: Primary | ICD-10-CM

## 2022-07-26 ENCOUNTER — OFFICE VISIT (OUTPATIENT)
Dept: ORTHOPEDICS | Facility: CLINIC | Age: 62
End: 2022-07-26
Payer: COMMERCIAL

## 2022-07-26 ENCOUNTER — HOSPITAL ENCOUNTER (OUTPATIENT)
Dept: RADIOLOGY | Facility: HOSPITAL | Age: 62
Discharge: HOME | End: 2022-07-26
Attending: PHYSICIAN ASSISTANT
Payer: COMMERCIAL

## 2022-07-26 VITALS
SYSTOLIC BLOOD PRESSURE: 129 MMHG | BODY MASS INDEX: 34.23 KG/M2 | DIASTOLIC BLOOD PRESSURE: 85 MMHG | HEIGHT: 68 IN | WEIGHT: 225.88 LBS | HEART RATE: 80 BPM

## 2022-07-26 DIAGNOSIS — M79.89 PALPABLE MASS OF SOFT TISSUE OF SHOULDER: ICD-10-CM

## 2022-07-26 DIAGNOSIS — R52 PAIN: ICD-10-CM

## 2022-07-26 DIAGNOSIS — M75.81 ROTATOR CUFF TENDINITIS, RIGHT: Primary | ICD-10-CM

## 2022-07-26 PROCEDURE — 3079F PR MOST RECENT DIASTOLIC BLOOD PRESSURE 80-89 MM HG: ICD-10-PCS | Mod: CPTII,S$GLB,, | Performed by: PHYSICIAN ASSISTANT

## 2022-07-26 PROCEDURE — 3044F HG A1C LEVEL LT 7.0%: CPT | Mod: CPTII,S$GLB,, | Performed by: PHYSICIAN ASSISTANT

## 2022-07-26 PROCEDURE — 3044F PR MOST RECENT HEMOGLOBIN A1C LEVEL <7.0%: ICD-10-PCS | Mod: CPTII,S$GLB,, | Performed by: PHYSICIAN ASSISTANT

## 2022-07-26 PROCEDURE — 1160F RVW MEDS BY RX/DR IN RCRD: CPT | Mod: CPTII,S$GLB,, | Performed by: PHYSICIAN ASSISTANT

## 2022-07-26 PROCEDURE — 3079F DIAST BP 80-89 MM HG: CPT | Mod: CPTII,S$GLB,, | Performed by: PHYSICIAN ASSISTANT

## 2022-07-26 PROCEDURE — 99213 OFFICE O/P EST LOW 20 MIN: CPT | Mod: 25,S$GLB,, | Performed by: PHYSICIAN ASSISTANT

## 2022-07-26 PROCEDURE — 99213 PR OFFICE/OUTPT VISIT, EST, LEVL III, 20-29 MIN: ICD-10-PCS | Mod: 25,S$GLB,, | Performed by: PHYSICIAN ASSISTANT

## 2022-07-26 PROCEDURE — 1160F PR REVIEW ALL MEDS BY PRESCRIBER/CLIN PHARMACIST DOCUMENTED: ICD-10-PCS | Mod: CPTII,S$GLB,, | Performed by: PHYSICIAN ASSISTANT

## 2022-07-26 PROCEDURE — 3008F PR BODY MASS INDEX (BMI) DOCUMENTED: ICD-10-PCS | Mod: CPTII,S$GLB,, | Performed by: PHYSICIAN ASSISTANT

## 2022-07-26 PROCEDURE — 1159F PR MEDICATION LIST DOCUMENTED IN MEDICAL RECORD: ICD-10-PCS | Mod: CPTII,S$GLB,, | Performed by: PHYSICIAN ASSISTANT

## 2022-07-26 PROCEDURE — 73030 X-RAY EXAM OF SHOULDER: CPT | Mod: TC,FY,RT

## 2022-07-26 PROCEDURE — 73030 X-RAY EXAM OF SHOULDER: CPT | Mod: 26,RT,, | Performed by: INTERNAL MEDICINE

## 2022-07-26 PROCEDURE — 99999 PR PBB SHADOW E&M-EST. PATIENT-LVL III: CPT | Mod: PBBFAC,,, | Performed by: PHYSICIAN ASSISTANT

## 2022-07-26 PROCEDURE — 99999 PR PBB SHADOW E&M-EST. PATIENT-LVL III: ICD-10-PCS | Mod: PBBFAC,,, | Performed by: PHYSICIAN ASSISTANT

## 2022-07-26 PROCEDURE — 20610 LARGE JOINT ASPIRATION/INJECTION: R SUBACROMIAL BURSA: ICD-10-PCS | Mod: RT,S$GLB,, | Performed by: PHYSICIAN ASSISTANT

## 2022-07-26 PROCEDURE — 20610 DRAIN/INJ JOINT/BURSA W/O US: CPT | Mod: RT,S$GLB,, | Performed by: PHYSICIAN ASSISTANT

## 2022-07-26 PROCEDURE — 3008F BODY MASS INDEX DOCD: CPT | Mod: CPTII,S$GLB,, | Performed by: PHYSICIAN ASSISTANT

## 2022-07-26 PROCEDURE — 3074F PR MOST RECENT SYSTOLIC BLOOD PRESSURE < 130 MM HG: ICD-10-PCS | Mod: CPTII,S$GLB,, | Performed by: PHYSICIAN ASSISTANT

## 2022-07-26 PROCEDURE — 3074F SYST BP LT 130 MM HG: CPT | Mod: CPTII,S$GLB,, | Performed by: PHYSICIAN ASSISTANT

## 2022-07-26 PROCEDURE — 1159F MED LIST DOCD IN RCRD: CPT | Mod: CPTII,S$GLB,, | Performed by: PHYSICIAN ASSISTANT

## 2022-07-26 PROCEDURE — 73030 XR SHOULDER COMPLETE 2 OR MORE VIEWS RIGHT: ICD-10-PCS | Mod: 26,RT,, | Performed by: INTERNAL MEDICINE

## 2022-07-26 RX ORDER — TRIAMCINOLONE ACETONIDE 40 MG/ML
40 INJECTION, SUSPENSION INTRA-ARTICULAR; INTRAMUSCULAR
Status: DISCONTINUED | OUTPATIENT
Start: 2022-07-26 | End: 2022-07-26 | Stop reason: HOSPADM

## 2022-07-26 RX ADMIN — TRIAMCINOLONE ACETONIDE 40 MG: 40 INJECTION, SUSPENSION INTRA-ARTICULAR; INTRAMUSCULAR at 02:07

## 2022-07-26 NOTE — PROGRESS NOTES
Subjective:      Patient ID: yDlan Sawant is a 61 y.o. male.    Chief Complaint: Pain of the Right Shoulder      60yo RHD male presents with 1 week history of right shoulder pain and a mass along his scapular region.  Denies injury.  Does work with landscaping for living.  His symptoms are worse with overhead motions and when he is resting at home.  He states the pain starts around the mass in the posterior aspect of the shoulder and radiates down to the upper arm.  He notes tingling in his upper arm also.  Denies weakness or stiffness.  He is currently taking meloxicam 50 mg and Voltaren gel which she states is not offer any relief.      Review of Systems   Constitutional: Negative for chills and fever.   Cardiovascular: Negative for chest pain.   Respiratory: Negative for cough.    Hematologic/Lymphatic: Does not bruise/bleed easily.   Skin: Negative for poor wound healing and rash.   Musculoskeletal: Positive for arthritis, joint pain, joint swelling, myalgias and stiffness. Negative for neck pain.   Gastrointestinal: Negative for abdominal pain.   Genitourinary: Negative for bladder incontinence.   Neurological: Positive for paresthesias. Negative for dizziness, loss of balance and weakness.   Psychiatric/Behavioral: Negative for altered mental status.       Review of patient's allergies indicates:  No Known Allergies     Current Outpatient Medications   Medication Sig Dispense Refill    cholecalciferol, vitamin D3, (VITAMIN D3) 50 mcg (2,000 unit) Cap Take 1 capsule (2,000 Units total) by mouth once daily. 30 capsule 6    diclofenac sodium (VOLTAREN) 1 % Gel Apply 2 g topically once daily. 100 g 0    EScitalopram oxalate (LEXAPRO) 20 MG tablet Take 1 tablet (20 mg total) by mouth every evening. 90 tablet 1    fenofibrate micronized (LOFIBRA) 134 MG Cap Take 1 capsule (134 mg total) by mouth every evening. 90 capsule 3    FEROSUL 325 mg (65 mg iron) Tab tablet Take 1 tablet (325 mg total) by mouth once  "daily. 90 tablet 0    gabapentin (NEURONTIN) 300 MG capsule Take 1 capsule (300 mg total) by mouth 2 (two) times daily. 180 capsule 0    ipratropium (ATROVENT) 42 mcg (0.06 %) nasal spray 2 sprays by Nasal route 4 (four) times daily. 15 mL 0    meloxicam (MOBIC) 15 MG tablet Take 1 tablet (15 mg total) by mouth once daily. 30 tablet 0    metFORMIN (GLUCOPHAGE-XR) 500 MG ER 24hr tablet Take 1 tablet (500 mg total) by mouth daily with breakfast. 90 tablet 3    pravastatin (PRAVACHOL) 40 MG tablet Take 1 tablet (40 mg total) by mouth once daily. 90 tablet 0    rOPINIRole (REQUIP) 4 MG tablet TAKE 1 TABLET(4 MG) BY MOUTH EVERY NIGHT 90 tablet 0    traZODone (DESYREL) 100 MG tablet Take 1 tablet (100 mg total) by mouth nightly as needed for Insomnia. 90 tablet 0     No current facility-administered medications for this visit.        The patient's relevant past medical, surgical, and social history was reviewed in Epic.       Objective:      VITAL SIGNS: /85 (BP Location: Left arm, Patient Position: Sitting, BP Method: Large (Automatic))   Pulse 80   Ht 5' 8" (1.727 m)   Wt 102.4 kg (225 lb 13.8 oz)   BMI 34.34 kg/m²     General    Nursing note and vitals reviewed.  Constitutional: He is oriented to person, place, and time. He appears well-developed and well-nourished.   Neurological: He is alert and oriented to person, place, and time.         Right Shoulder Exam     Inspection/Observation   Swelling: present  Deformity: present    Tenderness   The patient is tender to palpation of the greater tuberosity and supraspinatus.    Range of Motion   Active abduction: 170   Passive abduction: 170   Forward Flexion: 180   External Rotation 0 degrees: 90   Internal rotation 0 degrees: T10     Tests & Signs   Bridges test: positive  Impingement: negative  Rotator Cuff Painful Arc/Range: mild  Speed's Test: negative    Other   Sensation: normal    Comments:  Palpable soft mobile mass along the supraspinatus muscle " belly. Mildly tender to touch.     Muscle Strength   Right Upper Extremity   Shoulder Abduction: 4/5   Shoulder Internal Rotation: 5/5   Shoulder External Rotation: 5/5   Supraspinatus: 4/5   Subscapularis: 5/5   Biceps: 5/5      X-ray Shoulder 2 or More Views Right  Narrative: EXAMINATION:  XR SHOULDER COMPLETE 2 OR MORE VIEWS RIGHT    CLINICAL HISTORY:  Pain, unspecified    TECHNIQUE:  Two or three views of the right shoulder were performed.    COMPARISON:  None    FINDINGS:  There is mild degenerative change at the acromioclavicular joint.  There is no evidence of fracture or osseous destruction.  There is no dislocation.  Minimal high density within the soft tissue superior to the humeral head may relate to calcification and can be seen in the setting tendonitis.  Impression: As above    Electronically signed by: Leatha Waggoner MD  Date:    07/26/2022  Time:    14:39      I have reviewed the above radiograph and agree with the findings stated by the radiologist.         Assessment:       1. Rotator cuff tendinitis, right    2. Palpable mass of soft tissue of shoulder          Plan:         Dylan Mann was seen today for pain.    Diagnoses and all orders for this visit:    Rotator cuff tendinitis, right  -     Large Joint Aspiration/Injection: R subacromial bursa    Palpable mass of soft tissue of shoulder      Possible lipoma causing some irration of the rotator cuff. Will try a right shoulder subacromial injection today to see if this helps his symptoms. If no relief, consider a MRI (shoulder).        Charmaine Pavon PA-C   07/26/2022

## 2022-07-26 NOTE — PROCEDURES
Large Joint Aspiration/Injection: R subacromial bursa    Date/Time: 7/26/2022 2:15 PM  Performed by: Charmaine Pavon PA-C  Authorized by: Charmaine Pavon PA-C     Consent Done?:  Yes (Verbal)  Indications:  Pain  Site marked: the procedure site was marked    Timeout: prior to procedure the correct patient, procedure, and site was verified    Prep: patient was prepped and draped in usual sterile fashion      Local anesthesia used?: Yes    Local anesthetic:  Lidocaine 1% without epinephrine and topical anesthetic    Details:  Needle Size:  21 G  Ultrasonic Guidance for needle placement?: No    Approach:  Posterior  Location:  Shoulder  Site:  R subacromial bursa  Medications:  40 mg triamcinolone acetonide 40 mg/mL  Patient tolerance:  Patient tolerated the procedure well with no immediate complications

## 2022-09-06 ENCOUNTER — TELEPHONE (OUTPATIENT)
Dept: NEUROLOGY | Facility: CLINIC | Age: 62
End: 2022-09-06

## 2022-09-06 NOTE — TELEPHONE ENCOUNTER
Left message on Mr. Sawant's voicemail requesting a call back in regards to rescheduling appt 10/13/2022 with Dr. Armendariz.

## 2022-09-10 NOTE — TELEPHONE ENCOUNTER
Informed pt that Dr. Echols does not write rx for c-pack. He would have to talk with someone at the sleep center or his pcp   Likely due to critical illness   Most recent H/H 7/23  - repeat labs Monday and fridays 9/2  - H/H 6/18  - anemia panel pending   - will transfuse 1u PRBCs with HD   9/2  - 1u PRBCs with H/H 6/21  - transfuse another unit of PRBCs   - recheck in AM   - SHIVAM with iron supplementation in place   9/4  - recheck H/H in AM  - no obvious s/s of bleeding   9/5  - H/H 7.3/23.2  9/9- Hgb is 6.4. Can transfuse 1 unit with next HD

## 2022-10-20 ENCOUNTER — PATIENT MESSAGE (OUTPATIENT)
Dept: ORTHOPEDICS | Facility: CLINIC | Age: 62
End: 2022-10-20
Payer: COMMERCIAL

## 2022-10-21 ENCOUNTER — HOSPITAL ENCOUNTER (OUTPATIENT)
Dept: RADIOLOGY | Facility: HOSPITAL | Age: 62
Discharge: HOME | End: 2022-10-21
Attending: PHYSICIAN ASSISTANT
Payer: COMMERCIAL

## 2022-10-21 ENCOUNTER — OFFICE VISIT (OUTPATIENT)
Dept: ORTHOPEDICS | Facility: CLINIC | Age: 62
End: 2022-10-21
Payer: COMMERCIAL

## 2022-10-21 VITALS — HEIGHT: 69 IN | WEIGHT: 224.88 LBS | BODY MASS INDEX: 33.31 KG/M2

## 2022-10-21 DIAGNOSIS — M25.552 LEFT HIP PAIN: ICD-10-CM

## 2022-10-21 DIAGNOSIS — M16.0 PRIMARY OSTEOARTHRITIS OF BOTH HIPS: Primary | ICD-10-CM

## 2022-10-21 DIAGNOSIS — G89.29 CHRONIC PAIN OF LEFT KNEE: ICD-10-CM

## 2022-10-21 DIAGNOSIS — M17.0 PRIMARY OSTEOARTHRITIS OF BOTH KNEES: ICD-10-CM

## 2022-10-21 DIAGNOSIS — M25.562 CHRONIC PAIN OF LEFT KNEE: ICD-10-CM

## 2022-10-21 PROCEDURE — 73502 X-RAY EXAM HIP UNI 2-3 VIEWS: CPT | Mod: TC,LT

## 2022-10-21 PROCEDURE — 73502 XR HIP WITH PELVIS WHEN PERFORMED, 2 OR 3 VIEWS LEFT: ICD-10-PCS | Mod: 26,LT,, | Performed by: RADIOLOGY

## 2022-10-21 PROCEDURE — 73502 X-RAY EXAM HIP UNI 2-3 VIEWS: CPT | Mod: 26,LT,, | Performed by: RADIOLOGY

## 2022-10-21 PROCEDURE — 20610 PR DRAIN/INJECT LARGE JOINT/BURSA: ICD-10-PCS | Mod: LT,S$GLB,, | Performed by: PHYSICIAN ASSISTANT

## 2022-10-21 PROCEDURE — 3044F HG A1C LEVEL LT 7.0%: CPT | Mod: CPTII,S$GLB,, | Performed by: PHYSICIAN ASSISTANT

## 2022-10-21 PROCEDURE — 1159F MED LIST DOCD IN RCRD: CPT | Mod: CPTII,S$GLB,, | Performed by: PHYSICIAN ASSISTANT

## 2022-10-21 PROCEDURE — 99999 PR PBB SHADOW E&M-EST. PATIENT-LVL III: ICD-10-PCS | Mod: PBBFAC,,, | Performed by: PHYSICIAN ASSISTANT

## 2022-10-21 PROCEDURE — 99213 OFFICE O/P EST LOW 20 MIN: CPT | Mod: 25,S$GLB,, | Performed by: PHYSICIAN ASSISTANT

## 2022-10-21 PROCEDURE — 3044F PR MOST RECENT HEMOGLOBIN A1C LEVEL <7.0%: ICD-10-PCS | Mod: CPTII,S$GLB,, | Performed by: PHYSICIAN ASSISTANT

## 2022-10-21 PROCEDURE — 1160F PR REVIEW ALL MEDS BY PRESCRIBER/CLIN PHARMACIST DOCUMENTED: ICD-10-PCS | Mod: CPTII,S$GLB,, | Performed by: PHYSICIAN ASSISTANT

## 2022-10-21 PROCEDURE — 1159F PR MEDICATION LIST DOCUMENTED IN MEDICAL RECORD: ICD-10-PCS | Mod: CPTII,S$GLB,, | Performed by: PHYSICIAN ASSISTANT

## 2022-10-21 PROCEDURE — 20610 DRAIN/INJ JOINT/BURSA W/O US: CPT | Mod: LT,S$GLB,, | Performed by: PHYSICIAN ASSISTANT

## 2022-10-21 PROCEDURE — 1160F RVW MEDS BY RX/DR IN RCRD: CPT | Mod: CPTII,S$GLB,, | Performed by: PHYSICIAN ASSISTANT

## 2022-10-21 PROCEDURE — 99213 PR OFFICE/OUTPT VISIT, EST, LEVL III, 20-29 MIN: ICD-10-PCS | Mod: 25,S$GLB,, | Performed by: PHYSICIAN ASSISTANT

## 2022-10-21 PROCEDURE — 99999 PR PBB SHADOW E&M-EST. PATIENT-LVL III: CPT | Mod: PBBFAC,,, | Performed by: PHYSICIAN ASSISTANT

## 2022-10-21 RX ADMIN — TRIAMCINOLONE ACETONIDE 40 MG: 40 INJECTION, SUSPENSION INTRA-ARTICULAR; INTRAMUSCULAR at 07:10

## 2022-10-24 RX ORDER — TRIAMCINOLONE ACETONIDE 40 MG/ML
40 INJECTION, SUSPENSION INTRA-ARTICULAR; INTRAMUSCULAR
Status: COMPLETED | OUTPATIENT
Start: 2022-10-24 | End: 2022-10-21

## 2022-10-24 NOTE — PROGRESS NOTES
"  SUBJECTIVE:     Chief Complaint: bilateral knee pain    History of Present Illness:  Dylan Sawant is a 61 y.o. male seen in clinic today with a chief complaint of chronic bilateral knee pain L>R. Pain in knees as well as in thighs and groin. Difficulty when first getting up in the morning. Patient has received bilateral knee steroid injections by ortho clinic APPs with varying degree of benefit. Last injections were 5/20222. He has also tried NSAIDs (Mobic and topical voltaren). Patient is on Requip for RLS. He helps care for his wife who is disabled.     Past Medical History:   Diagnosis Date    Allergy     Anxiety     Elevated BP     GERD (gastroesophageal reflux disease)     Hyperlipidemia     KELLY (obstructive sleep apnea)     Restless leg syndrome      Review of Systems:  Constitutional: no fever or chills  ENT: no nasal congestion or sore throat  Respiratory: no cough or shortness of breath  Cardiovascular: no chest pain or palpitations  Gastrointestinal: no nausea or vomiting, tolerating diet    OBJECTIVE:     PHYSICAL EXAM:  Height 5' 9" (1.753 m), weight 102 kg (224 lb 14.4 oz).   General Appearance: WDWN, NAD  Gait: Normal, no assistive device   Neuro/Psych: Mood & affect appropriate  Lungs: Respirations equal and unlabored.   CV: 2+ bilateral upper and lower extremity pulses.   Skin: Intact throughout LE  Extremities: No LE edema    Right Knee Exam  Range of Motion:0-130 active   Effusion:none  Condition of skin:intact  Location of tenderness:Medial joint line   Strength:5 of 5 quadriceps strength and 5 of 5 hamstring strength  Stability:stable to testing    Left Knee Exam  Range of Motion:0-130 active   Effusion:none  Condition of skin:intact  Location of tenderness:Medial joint line   Strength:5 of 5 quadriceps strength and 5 of 5 hamstring strength  Stability:stable to testing    Right Hip Examination:  Skin: intact with no abnormality  Tenderness:None  ROM: mild pain with PROM "    Flexion:100   Internal Rotation:10    External Rotation:30      Muscle Strength:    Abduction:5/5    Adduction:5/5    Flexion: 5/5    Left Hip Examination:  Skin: intact with no abnormality  Tenderness:None  ROM: pain with PROM    Flexion:90   Internal Rotation:10    External Rotation:20     Muscle Strength:    Abduction:5/5    Adduction:5/5    Flexion: 5/5    Back Exam:   Tenderness:None    RADIOGRAPHS: AP, lateral and merchant knee x-ray images reviewed today by me reveal moderate degenerative changes with loss of medial joint space R>L. Kellgren-Pavel grade 3. Hip xrays obtained and reviewed today by me reveal advanced degenerative changes with loss of joint space and impingement changes     ASSESSMENT/PLAN:   Primary osteoarthritis both hips  Primary osteoarthritis both knees  - Primary pain generator at this point is his hips. Discussed pre, intra and post op expectations of rafal. Patient would like to meet surgeon to discuss. Appt scheduled.   - Pt requests repeat L knee CSI  as he did get some relief from injection in May. He understands that a majority of knee pain is likely coming from the hip.     Knee Injection Procedure Note  Diagnosis: left knee degenerative arthritis  Indications: left knee pain  Procedure Details: Verbal consent was obtained for the procedure. The injection site was identified and the skin was prepared with alcohol. The left knee was injected from an anterolateral approach with 1 ml of Kenalog and 3 ml Lidocaine under sterile technique using a 22 gauge needle. The needle was removed and the area cleansed and dressed.  Complications:  Patient tolerated the procedure well.    he was advised to rest the knee today, using ice and elevation as needed for comfort and swelling.Immediate relief of the knee pain may be short lived and secondary to the lidocaine. he may have an increase in discomfort tonight followed by steady improvement over the next several days. It may take 1-2 weeks  following the injection to get the full benefit of the medication.

## 2022-11-08 ENCOUNTER — OFFICE VISIT (OUTPATIENT)
Dept: NEUROLOGY | Facility: CLINIC | Age: 62
End: 2022-11-08
Payer: COMMERCIAL

## 2022-11-08 VITALS
HEIGHT: 69 IN | WEIGHT: 224.63 LBS | SYSTOLIC BLOOD PRESSURE: 140 MMHG | DIASTOLIC BLOOD PRESSURE: 77 MMHG | BODY MASS INDEX: 33.27 KG/M2 | HEART RATE: 66 BPM

## 2022-11-08 DIAGNOSIS — M16.12 OSTEOARTHRITIS OF LEFT HIP, UNSPECIFIED OSTEOARTHRITIS TYPE: ICD-10-CM

## 2022-11-08 DIAGNOSIS — G25.81 RESTLESS LEGS: Primary | ICD-10-CM

## 2022-11-08 PROCEDURE — 3044F PR MOST RECENT HEMOGLOBIN A1C LEVEL <7.0%: ICD-10-PCS | Mod: CPTII,S$GLB,, | Performed by: PSYCHIATRY & NEUROLOGY

## 2022-11-08 PROCEDURE — 3008F PR BODY MASS INDEX (BMI) DOCUMENTED: ICD-10-PCS | Mod: CPTII,S$GLB,, | Performed by: PSYCHIATRY & NEUROLOGY

## 2022-11-08 PROCEDURE — 3078F DIAST BP <80 MM HG: CPT | Mod: CPTII,S$GLB,, | Performed by: PSYCHIATRY & NEUROLOGY

## 2022-11-08 PROCEDURE — 3077F SYST BP >= 140 MM HG: CPT | Mod: CPTII,S$GLB,, | Performed by: PSYCHIATRY & NEUROLOGY

## 2022-11-08 PROCEDURE — 99999 PR PBB SHADOW E&M-EST. PATIENT-LVL V: CPT | Mod: PBBFAC,,, | Performed by: STUDENT IN AN ORGANIZED HEALTH CARE EDUCATION/TRAINING PROGRAM

## 2022-11-08 PROCEDURE — 3077F PR MOST RECENT SYSTOLIC BLOOD PRESSURE >= 140 MM HG: ICD-10-PCS | Mod: CPTII,S$GLB,, | Performed by: PSYCHIATRY & NEUROLOGY

## 2022-11-08 PROCEDURE — 3078F PR MOST RECENT DIASTOLIC BLOOD PRESSURE < 80 MM HG: ICD-10-PCS | Mod: CPTII,S$GLB,, | Performed by: PSYCHIATRY & NEUROLOGY

## 2022-11-08 PROCEDURE — 99204 OFFICE O/P NEW MOD 45 MIN: CPT | Mod: S$GLB,,, | Performed by: PSYCHIATRY & NEUROLOGY

## 2022-11-08 PROCEDURE — 99999 PR PBB SHADOW E&M-EST. PATIENT-LVL V: ICD-10-PCS | Mod: PBBFAC,,, | Performed by: STUDENT IN AN ORGANIZED HEALTH CARE EDUCATION/TRAINING PROGRAM

## 2022-11-08 PROCEDURE — 3044F HG A1C LEVEL LT 7.0%: CPT | Mod: CPTII,S$GLB,, | Performed by: PSYCHIATRY & NEUROLOGY

## 2022-11-08 PROCEDURE — 3008F BODY MASS INDEX DOCD: CPT | Mod: CPTII,S$GLB,, | Performed by: PSYCHIATRY & NEUROLOGY

## 2022-11-08 PROCEDURE — 99204 PR OFFICE/OUTPT VISIT, NEW, LEVL IV, 45-59 MIN: ICD-10-PCS | Mod: S$GLB,,, | Performed by: PSYCHIATRY & NEUROLOGY

## 2022-11-08 RX ORDER — GABAPENTIN 300 MG/1
900 CAPSULE ORAL
Qty: 90 CAPSULE | Refills: 3 | Status: SHIPPED | OUTPATIENT
Start: 2022-11-08 | End: 2023-06-27 | Stop reason: SDUPTHER

## 2022-11-08 NOTE — PROGRESS NOTES
[facility] - NEUROLOGY 7TH FL [facility], [place]    Date: 11/8/22  Patient Name: Dylan Sawant   MRN: [id number]   PCP: Roland Swenson  Referring Provider: Roland Swenson DO    Assessment:   Dylan Sawant is a 61 y.o. male w/ a pmh of KELLY, anxiety, HLD, and vitamin D insufficiency who presents with a chief complaint of restless leg isolated to his L leg, for which he takes Requip 4 mg qd and Neurontin 300-600 mg PRN with significant relief after medication. He had hip x-ray performed which showed degenerative changes, worse on the L. He is scheduled for follow up with ortho to discuss potential surgery. At this time, due to the unilateral nature, the primary cause of his symptoms is most likely mechanical in nature, occurring at the hip, possibly with contribution from the lumbar spine as well. We will continue medical treatment, with hopes that a mechanical intervention may provide more definitive relief.    Plan:     -Continue Requip 4 mg qd (taking at around 4 PM each day and lasts until about 2 PM the next day)  -Begin taking Neurontin 900 mg qd w/ lunch  -F/u w/ ortho as scheduled  -X-ray lumbar spine flexion and extension  -Amb ref to PT/OT  -RTC in 3 months    Problem List Items Addressed This Visit          Neuro    Restless legs - Primary    Relevant Orders    Ambulatory referral/consult to Physical/Occupational Therapy       Orthopedic    Osteoarthritis of left hip    Relevant Orders    Ambulatory referral/consult to Physical/Occupational Therapy    X-Ray Lumbar Complete Including Flex And Ext       G. Edson Leung MD    Patient note was created using MModal Dictation.  Any errors in syntax or even information may not have been identified and edited on initial review prior to signing this note.  Subjective:   Patient seen in consultation at the request of Roland Swenson DO for the evaluation of restless leg. A copy of this note will be sent to the  "referring physician.        HPI:   Mr. Dylan Sawant is a 61 y.o. male w/ a pmh of KELLY, anxiety, HLD, and vitamin D insufficiency who presents with a chief complaint of restless legs. He has had the symptoms for about 10 years. He states the symptoms are only present primarily in the left leg. He feels as though when the medicine wears off at 3 PM, and he goes home to sit down, his leg starts twitching, he experiences shooting pain, and a jumping sensation. The symptoms are most significant from the knee down. He states it is improved earlier in the day, when he is up walking around and when he is working. He has trouble putting his shoe and sock on in his left leg in the morning.  He had an X-ray of the hip performed in October which showed some degenerative changes in the left hip worse than the right. He has had EMG performed. He states he is currently taking Requip 4 mg at 4 PM each day and gabapentin either 300 or 600 mg at night before bed. He states he feels significant relief with his medicines, and does not generally have trouble sleeping through the night. He was previously taking ropinirole 2 mg BID, which he states was not controlling his symptoms. He has had several steroid injections in the knee, which he states had minimal effect. The patient previously had decreased iron levels on labs, and was on iron supplementation, which he stopped taking "a while ago" due to it causing stomach issues. 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 He currently denies any CP, SOB, acute vision changes, or speech changes.    PAST MEDICAL HISTORY:  Past Medical History:   Diagnosis Date    Allergy     Anxiety     Elevated BP     GERD (gastroesophageal reflux disease)     Hyperlipidemia     KELLY (obstructive sleep apnea)     Restless leg syndrome        PAST SURGICAL HISTORY:  Past Surgical History:   Procedure Laterality Date    ADENOIDECTOMY      COLONOSCOPY N/A 4/8/2017    " Procedure: COLONOSCOPY;  Surgeon: Kyle Moreno MD;  Location: [facility] (4TH FLR);  Service: Endoscopy;  Laterality: N/A;    NASAL SEPTUM SURGERY      RECONSTRUCTION OF NOSE N/A 6/22/2018    Procedure: RECONSTRUCTION, NOSE;  Surgeon: Tulio Echols III, MD;  Location: [facility] OR 2ND FLR;  Service: ENT;  Laterality: N/A;  1 HOUR TOTAL / LATERA  NASAL VALVE IMPLANTS    TONSILLECTOMY         CURRENT MEDS:  Current Outpatient Medications   Medication Sig Dispense Refill    cholecalciferol, vitamin D3, (VITAMIN D3) 50 mcg (2,000 unit) Cap Take 1 capsule (2,000 Units total) by mouth once daily. 30 capsule 6    EScitalopram oxalate (LEXAPRO) 20 MG tablet Take 1 tablet (20 mg total) by mouth every evening. 90 tablet 1    fenofibrate micronized (LOFIBRA) 134 MG Cap Take 1 capsule (134 mg total) by mouth every evening. 90 capsule 3    metFORMIN (GLUCOPHAGE-XR) 500 MG ER 24hr tablet Take 1 tablet (500 mg total) by mouth daily with breakfast. 90 tablet 3    pravastatin (PRAVACHOL) 40 MG tablet Take 1 tablet (40 mg total) by mouth once daily. 90 tablet 0    rOPINIRole (REQUIP) 4 MG tablet TAKE 1 TABLET(4 MG) BY MOUTH EVERY NIGHT 90 tablet 0    traZODone (DESYREL) 100 MG tablet Take 1 tablet (100 mg total) by mouth nightly as needed for Insomnia. 90 tablet 0    diclofenac sodium (VOLTAREN) 1 % Gel Apply 2 g topically once daily. (Patient not taking: Reported on 11/8/2022) 100 g 0    FEROSUL 325 mg (65 mg iron) Tab tablet Take 1 tablet (325 mg total) by mouth once daily. (Patient not taking: Reported on 11/8/2022) 90 tablet 0    gabapentin (NEURONTIN) 300 MG capsule Take 3 capsules (900 mg total) by mouth daily with lunch. 90 capsule 3    ipratropium (ATROVENT) 42 mcg (0.06 %) nasal spray 2 sprays by Nasal route 4 (four) times daily. (Patient not taking: Reported on 11/8/2022) 15 mL 0    meloxicam (MOBIC) 15 MG tablet Take 1 tablet (15 mg total) by mouth once daily. 30 tablet 0     No current facility-administered medications  "for this visit.       ALLERGIES:  Review of patient's allergies indicates:  No Known Allergies    FAMILY HISTORY:  Family History   Problem Relation Age of Onset    Heart disease Father     Cancer Father         pancreatic    Diabetes Father     Diverticulitis Father     Restless legs syndrome Father     Diabetes Mother     Depression Mother     Arthritis Mother     COPD Mother     Heart disease Paternal Uncle        SOCIAL HISTORY:  Social History     Tobacco Use    Smoking status: Never    Smokeless tobacco: Never   Substance Use Topics    Alcohol use: Not Currently    Drug use: No       Review of Systems:  12 system review of systems is negative except for the symptoms mentioned in HPI.      Objective:     Vitals:    11/08/22 1328   BP: (!) 140/77   BP Location: Left arm   Patient Position: Sitting   BP Method: Large (Automatic)   Pulse: 66   Weight: 101.9 kg (224 lb 10.4 oz)   Height: 5' 9" (1.753 m)     General: NAD, well nourished   Eyes: no tearing, discharge, no erythema   ENT: moist mucous membranes of the oral cavity, nares patent    Neck: Supple, full range of motion  Cardiovascular: Warm and well perfused, pulses equal and symmetrical  Lungs: Normal work of breathing, normal chest wall excursions  Skin: No rash, lesions, or breakdown on exposed skin  Psychiatry: Mood and affect are appropriate   Abdomen: soft, non tender, non distended  Extremeties: No cyanosis, clubbing or edema.    Neurological   MENTAL STATUS: Alert and oriented to person, place, and time. Attention and concentration within normal limits. Speech without dysarthria, able to name and repeat without difficulty. Recent and remote memory within normal limits   CRANIAL NERVES: Visual fields intact. PERRL. EOMI. Facial sensation intact. Face symmetrical. Hearing grossly intact. Full shoulder shrug bilaterally. Tongue protrudes midline   SENSORY: Sensation is slightly decreased to light touch in the LLE.  Joint position perception intact. " Negative Romberg.   MOTOR: Normal bulk and tone. No pronator drift.  5/5 deltoid, biceps, triceps, interosseous, hand  bilaterally. 5/5 iliopsoas, knee extension/flexion, foot dorsi/plantarflexion bilaterally.    REFLEXES: Symmetric and 2+ throughout.   CEREBELLAR/COORDINATION/GAIT: Gait steady with normal arm swing and stride length. Finger to nose intact.     X-ray Hips 10/21/22    FINDINGS:  Degenerative change about the hips left greater than right.  Hypertrophic new bone noted.  Well-defined lucency with sclerotic border right femoral neck appears nonaggressive.  No acute fracture or bone destruction.   Impression:   Degenerative change at the hips.

## 2022-11-08 NOTE — PATIENT INSTRUCTIONS
Please schedule to be seen by PT/OT as discussed. Please attend follow-up appointment with orthopedic surgery. Please begin taking gabapentin 900 mg once a day with lunch. Continue taking Requip as prescribed. Please schedule X-ray lumbar spine to be performed as discussed, and call or message with any additional questions or concerns.

## 2022-11-09 PROBLEM — M16.12 OSTEOARTHRITIS OF LEFT HIP: Status: ACTIVE | Noted: 2022-11-09

## 2022-11-15 ENCOUNTER — OFFICE VISIT (OUTPATIENT)
Dept: ORTHOPEDICS | Facility: CLINIC | Age: 62
End: 2022-11-15
Payer: COMMERCIAL

## 2022-11-15 VITALS — HEIGHT: 69 IN | WEIGHT: 215.25 LBS | BODY MASS INDEX: 31.88 KG/M2

## 2022-11-15 DIAGNOSIS — M17.0 PRIMARY OSTEOARTHRITIS OF BOTH KNEES: ICD-10-CM

## 2022-11-15 DIAGNOSIS — M16.0 PRIMARY OSTEOARTHRITIS OF BOTH HIPS: ICD-10-CM

## 2022-11-15 DIAGNOSIS — M16.12 PRIMARY OSTEOARTHRITIS OF LEFT HIP: Primary | ICD-10-CM

## 2022-11-15 PROCEDURE — 1159F MED LIST DOCD IN RCRD: CPT | Mod: CPTII,S$GLB,, | Performed by: ORTHOPAEDIC SURGERY

## 2022-11-15 PROCEDURE — 99999 PR PBB SHADOW E&M-EST. PATIENT-LVL III: ICD-10-PCS | Mod: PBBFAC,,, | Performed by: ORTHOPAEDIC SURGERY

## 2022-11-15 PROCEDURE — 3044F PR MOST RECENT HEMOGLOBIN A1C LEVEL <7.0%: ICD-10-PCS | Mod: CPTII,S$GLB,, | Performed by: ORTHOPAEDIC SURGERY

## 2022-11-15 PROCEDURE — 99999 PR PBB SHADOW E&M-EST. PATIENT-LVL III: CPT | Mod: PBBFAC,,, | Performed by: ORTHOPAEDIC SURGERY

## 2022-11-15 PROCEDURE — 3008F BODY MASS INDEX DOCD: CPT | Mod: CPTII,S$GLB,, | Performed by: ORTHOPAEDIC SURGERY

## 2022-11-15 PROCEDURE — 3044F HG A1C LEVEL LT 7.0%: CPT | Mod: CPTII,S$GLB,, | Performed by: ORTHOPAEDIC SURGERY

## 2022-11-15 PROCEDURE — 99215 OFFICE O/P EST HI 40 MIN: CPT | Mod: S$GLB,,, | Performed by: ORTHOPAEDIC SURGERY

## 2022-11-15 PROCEDURE — 99215 PR OFFICE/OUTPT VISIT, EST, LEVL V, 40-54 MIN: ICD-10-PCS | Mod: S$GLB,,, | Performed by: ORTHOPAEDIC SURGERY

## 2022-11-15 PROCEDURE — 1159F PR MEDICATION LIST DOCUMENTED IN MEDICAL RECORD: ICD-10-PCS | Mod: CPTII,S$GLB,, | Performed by: ORTHOPAEDIC SURGERY

## 2022-11-15 PROCEDURE — 3008F PR BODY MASS INDEX (BMI) DOCUMENTED: ICD-10-PCS | Mod: CPTII,S$GLB,, | Performed by: ORTHOPAEDIC SURGERY

## 2022-11-15 NOTE — PROGRESS NOTES
Subjective:     HPI:   Dylan Sawant is a 61 y.o. male who presents for eval L hip pain    He is had about a year to a year and a half of left-sided initial knee pain.  He was seen and treated for knee arthritis he does have some grade 3 varus bilateral knee arthritis on x-rays a year ago.  He had 3 rounds of steroid injections with minimal relief.  Hip x-rays were obtained and showed significant left hip arthritis and diagnosis was made of referred pain from his knee and he was sent here for arthroplasty evaluation.      He complains of groin pain anterior and medial thigh pain radiating down to the knee occasional radiation to the anterior shin.  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     Social support: Wife has neurofibromatosis, paralyzed from knees down, still working but doesn't drive much, he drives her every day    Has friends that could help with rides  No kids  Mother in town but not in good shape      ROS:  The updated medical history is in the chart and has been reviewed. A review of systems is updated and there is no reported vision changes, ear/nose/mouth/throat complaints,  chest pain, shortness of breath, abdominal pain, urological complaints, fevers or chills, psychiatric complaints. Musculoskeletal and neurologcial symptoms are as documented. All other systems are negative.      Objective:   Exam:  There were no vitals filed for this  visit.  Body mass index is 31.79 kg/m².    Physical examination included assessment of the patient's general appearance with particular attention to development, nutrition, body habitus, attention to grooming, and any evidence of distress.  Constitutional: The patient is a well-developed, well-nourished patient in no acute distress.   Cardiovascular: Vascular examination included warmth and capillary refill as well inspection for edema and assessment of pedal pulses. Pulses are palpable and regular.  Musculoskeletal: Gait was assessed as to whether it was steady, non-antalgic, and/or required the use of an assist device. The patient was also asked to walk independently and get onto the examination table.  Skin: The skin was examined for any obvious rashes or lesions in the extremity.  Neurologic: Sensation is intact to light touch in the extremity. The patient has good coordination without hyperreflexia and is alert and oriented to person, place and time and has normal mood and affect.     All of the above were examined and found to be within normal limits except for the following pertinent clinical findings:    Antalgic gait with a limp slight on the left hip negative Trendelenburg.  Nontender over the greater trochanter.  Groin pain with active straight leg raise 0-100 hip flexion 30 abduction 20 abduction 35 external 0 internal rotation which recreates his symptoms.  Skin is intact to the anterior hip.  No significant limb length discrepancy he does not notice a difference      Imaging:    HIP R ARTHRITIS        Indication:  Right hip pain  Exam Ordered: Radiographs include an anteroposterior pelvis, an anteroposterior and lateral view of the proximal femur including the hip joint.  Details of Examination: Exam shows evidence of joint space narrowing, osteophyte formation, and subchondral sclerosis, all consistent with degenerative arthritis of the hip.  No other significant findings are noted.  Impression:   Degenerative Arthritis, Right Hip and HIP L ARTHRITIS         Indication:  Left hip pain  Exam Ordered: Radiographs include an anteroposterior pelvis, an anteroposterior and lateral view of the proximal femur including the hip joint.  Details of Examination: Exam shows evidence of joint space narrowing, osteophyte formation, and subchondral sclerosis, all consistent with degenerative arthritis of the hip.  No other significant findings are noted.  Impression:  Degenerative Arthritis, Left Hip    B hip L>R Tg3 hip arthritis, left side severe bone on bone    XR B knees 2021: KL g3 varus       Assessment:       ICD-10-CM ICD-9-CM   1. Primary osteoarthritis of left hip  M16.12 715.15   2. Primary osteoarthritis of both hips  M16.0 715.15   3. Primary osteoarthritis of both knees  M17.0 715.16      preDM, was on metformin but stopped, now diet controlled, 6.2  KELLY + CPAP  RLS     Plan:       The above findings were discussed with patient length. We discussed the risks of conservative versus surgical management of the patients hip arthritis. Conservative management consisting of anti-inflammatory medications, weight loss, physical therapy, and activity modification was discussed at length. At this point considering the patient's level of activity, pain, and radiographic findings I recommend CASSANDRA    This patient has significant symptoms in their hip that are affecting their quality of life and daily activities.  They have tried non-operative treatment including analgesics, an exercise program, and activity modification, but the symptoms have persisted. I believe they make a good candidate for hip arthroplasty.     The risks and benefits of hip arthroplasty have been discussed with the patient which include, but are not limited to infections (including severe sequelae), potential component failure, fracture, DVT, pulmonary embolus, nerve palsy, dislocation, leg length inequality, persistent pain, wound healing  complications, transfusions, medical complications and death.     We discussed surgical options including implant type and why I believe the implant selected is a good match for the patient's needs. The patient expressed understanding and agrees to proceed with the planned surgery.     Pre-operative planning will include the following:  A pre-surgical evaluation will be arranged.  Pre-op orders will be placed.  We will make arrangements with the operating room for proper time and staffing.  We will make Social Service arrangements for the patient.    Approach: Anterior    Implants:   Company: [employer]  Cup: [employer]  Stem: [employer]    DVT prophylaxis: ASA 81mg BID x1 month  Dispo: home health PT    Admission status: Outpatient/23hr OBS                   Location: [place]                                   Tentative plan for L ant CASSANDRA next available date 2/15/22  Would like date before end of the year as has meet his deductible but I don't have any available offered the option to go elsewhere    Fu for pre-op visit  Rx cefadroxil  CASSANDRA info        No orders of the defined types were placed in this encounter.            Past Medical History:   Diagnosis Date    Allergy     Anxiety     Elevated BP     GERD (gastroesophageal reflux disease)     Hyperlipidemia     KELLY (obstructive sleep apnea)     Restless leg syndrome        Past Surgical History:   Procedure Laterality Date    ADENOIDECTOMY      COLONOSCOPY N/A 4/8/2017    Procedure: COLONOSCOPY;  Surgeon: Kyle Moreno MD;  Location: [facility] (4TH FLR);  Service: Endoscopy;  Laterality: N/A;    NASAL SEPTUM SURGERY      RECONSTRUCTION OF NOSE N/A 6/22/2018    Procedure: RECONSTRUCTION, NOSE;  Surgeon: Tulio Echols III, MD;  Location: [facility] OR 2ND FLR;  Service: ENT;  Laterality: N/A;  1 HOUR TOTAL / LATERA  NASAL VALVE IMPLANTS    TONSILLECTOMY         Family History   Problem Relation Age of Onset    Heart disease Father     Cancer Father         pancreatic     Diabetes Father     Diverticulitis Father     Restless legs syndrome Father     Diabetes Mother     Depression Mother     Arthritis Mother     COPD Mother     Heart disease Paternal Uncle        Social History     Socioeconomic History    Marital status:    Occupational History     Employer: [employer]   Tobacco Use    Smoking status: Never    Smokeless tobacco: Never   Substance and Sexual Activity    Alcohol use: Not Currently    Drug use: No    Sexual activity: Not Currently     Partners: Female     Birth control/protection: Abstinence   Social History Narrative    10/26/2021: lives with my patient, Yoel Has a 4 pound yorkie at home. Sister lives nearby as does mom. She retired from [employer] after 30 years, now in the lawn business. No smokers at home.      Social Determinants of Health     Financial Resource Strain: Low Risk     Difficulty of Paying Living Expenses: Not hard at all   Food Insecurity: No Food Insecurity    Worried About Running Out of Food in the Last Year: Never true    Ran Out of Food in the Last Year: Never true   Transportation Needs: No Transportation Needs    Lack of Transportation (Medical): No    Lack of Transportation (Non-Medical): No   Physical Activity: Sufficiently Active    Days of Exercise per Week: 5 days    Minutes of Exercise per Session: 120 min   Stress: Stress Concern Present    Feeling of Stress : To some extent   Social Connections: Unknown    Frequency of Communication with Friends and Family: Once a week    Frequency of Social Gatherings with Friends and Family: Once a week    Active Member of Clubs or Organizations: Yes    Attends Club or Organization Meetings: 1 to 4 times per year    Marital Status:    Housing Stability: Low Risk     Unable to Pay for Housing in the Last Year: No    Number of Places Lived in the Last Year: 1    Unstable Housing in the Last Year: No

## 2022-11-19 ENCOUNTER — LAB VISIT (OUTPATIENT)
Dept: LAB | Facility: HOSPITAL | Age: 62
End: 2022-11-19
Attending: FAMILY MEDICINE
Payer: COMMERCIAL

## 2022-11-19 DIAGNOSIS — R73.01 ELEVATED FASTING GLUCOSE: ICD-10-CM

## 2022-11-19 DIAGNOSIS — G25.81 RESTLESS LEGS: ICD-10-CM

## 2022-11-19 DIAGNOSIS — Z12.5 SCREENING PSA (PROSTATE SPECIFIC ANTIGEN): ICD-10-CM

## 2022-11-19 DIAGNOSIS — M79.604 RIGHT LEG PAIN: ICD-10-CM

## 2022-11-19 DIAGNOSIS — E78.2 MIXED HYPERLIPIDEMIA: ICD-10-CM

## 2022-11-19 DIAGNOSIS — Z00.00 VISIT FOR WELL MAN HEALTH CHECK: ICD-10-CM

## 2022-11-19 LAB
25(OH)D3+25(OH)D2 SERPL-MCNC: 18 NG/ML (ref 30–96)
ALBUMIN SERPL BCP-MCNC: 4 G/DL (ref 3.5–5.2)
ALP SERPL-CCNC: 48 U/L (ref 55–135)
ALT SERPL W/O P-5'-P-CCNC: 30 U/L (ref 10–44)
ANION GAP SERPL CALC-SCNC: 12 MMOL/L (ref 8–16)
AST SERPL-CCNC: 22 U/L (ref 10–40)
BASOPHILS # BLD AUTO: 0.04 K/UL (ref 0–0.2)
BASOPHILS NFR BLD: 0.7 % (ref 0–1.9)
BILIRUB SERPL-MCNC: 0.6 MG/DL (ref 0.1–1)
BUN SERPL-MCNC: 13 MG/DL (ref 6–20)
CALCIUM SERPL-MCNC: 10 MG/DL (ref 8.7–10.5)
CHLORIDE SERPL-SCNC: 103 MMOL/L (ref 95–110)
CHOLEST SERPL-MCNC: 173 MG/DL (ref 120–199)
CHOLEST/HDLC SERPL: 4.3 {RATIO} (ref 2–5)
CO2 SERPL-SCNC: 24 MMOL/L (ref 23–29)
COMPLEXED PSA SERPL-MCNC: 0.37 NG/ML (ref 0–4)
CREAT SERPL-MCNC: 1.1 MG/DL (ref 0.5–1.4)
DIFFERENTIAL METHOD: ABNORMAL
EOSINOPHIL # BLD AUTO: 0.2 K/UL (ref 0–0.5)
EOSINOPHIL NFR BLD: 3 % (ref 0–8)
ERYTHROCYTE [DISTWIDTH] IN BLOOD BY AUTOMATED COUNT: 13.2 % (ref 11.5–14.5)
EST. GFR  (NO RACE VARIABLE): >60 ML/MIN/1.73 M^2
ESTIMATED AVG GLUCOSE: 126 MG/DL (ref 68–131)
FERRITIN SERPL-MCNC: 118 NG/ML (ref 20–300)
GLUCOSE SERPL-MCNC: 161 MG/DL (ref 70–110)
HBA1C MFR BLD: 6 % (ref 4–5.6)
HCT VFR BLD AUTO: 46.8 % (ref 40–54)
HDLC SERPL-MCNC: 40 MG/DL (ref 40–75)
HDLC SERPL: 23.1 % (ref 20–50)
HGB BLD-MCNC: 15.1 G/DL (ref 14–18)
IMM GRANULOCYTES # BLD AUTO: 0.04 K/UL (ref 0–0.04)
IMM GRANULOCYTES NFR BLD AUTO: 0.7 % (ref 0–0.5)
IRON SERPL-MCNC: 47 UG/DL (ref 45–160)
LDLC SERPL CALC-MCNC: 98.2 MG/DL (ref 63–159)
LYMPHOCYTES # BLD AUTO: 1.6 K/UL (ref 1–4.8)
LYMPHOCYTES NFR BLD: 28 % (ref 18–48)
MCH RBC QN AUTO: 31.2 PG (ref 27–31)
MCHC RBC AUTO-ENTMCNC: 32.3 G/DL (ref 32–36)
MCV RBC AUTO: 97 FL (ref 82–98)
MONOCYTES # BLD AUTO: 0.7 K/UL (ref 0.3–1)
MONOCYTES NFR BLD: 11.5 % (ref 4–15)
NEUTROPHILS # BLD AUTO: 3.2 K/UL (ref 1.8–7.7)
NEUTROPHILS NFR BLD: 56.1 % (ref 38–73)
NONHDLC SERPL-MCNC: 133 MG/DL
NRBC BLD-RTO: 0 /100 WBC
PLATELET # BLD AUTO: 226 K/UL (ref 150–450)
PMV BLD AUTO: 10.6 FL (ref 9.2–12.9)
POTASSIUM SERPL-SCNC: 3.3 MMOL/L (ref 3.5–5.1)
PROT SERPL-MCNC: 7.1 G/DL (ref 6–8.4)
RBC # BLD AUTO: 4.84 M/UL (ref 4.6–6.2)
SATURATED IRON: 9 % (ref 20–50)
SODIUM SERPL-SCNC: 139 MMOL/L (ref 136–145)
TOTAL IRON BINDING CAPACITY: 497 UG/DL (ref 250–450)
TRANSFERRIN SERPL-MCNC: 336 MG/DL (ref 200–375)
TRIGL SERPL-MCNC: 174 MG/DL (ref 30–150)
TSH SERPL DL<=0.005 MIU/L-ACNC: 0.95 UIU/ML (ref 0.4–4)
WBC # BLD AUTO: 5.74 K/UL (ref 3.9–12.7)

## 2022-11-19 PROCEDURE — 84153 ASSAY OF PSA TOTAL: CPT | Performed by: FAMILY MEDICINE

## 2022-11-19 PROCEDURE — 82728 ASSAY OF FERRITIN: CPT | Performed by: FAMILY MEDICINE

## 2022-11-19 PROCEDURE — 36415 COLL VENOUS BLD VENIPUNCTURE: CPT | Mod: PO | Performed by: FAMILY MEDICINE

## 2022-11-19 PROCEDURE — 84443 ASSAY THYROID STIM HORMONE: CPT | Performed by: FAMILY MEDICINE

## 2022-11-19 PROCEDURE — 84466 ASSAY OF TRANSFERRIN: CPT | Performed by: FAMILY MEDICINE

## 2022-11-19 PROCEDURE — 82306 VITAMIN D 25 HYDROXY: CPT | Performed by: FAMILY MEDICINE

## 2022-11-19 PROCEDURE — 80061 LIPID PANEL: CPT | Performed by: FAMILY MEDICINE

## 2022-11-19 PROCEDURE — 85025 COMPLETE CBC W/AUTO DIFF WBC: CPT | Performed by: FAMILY MEDICINE

## 2022-11-19 PROCEDURE — 80053 COMPREHEN METABOLIC PANEL: CPT | Performed by: FAMILY MEDICINE

## 2022-11-19 PROCEDURE — 83036 HEMOGLOBIN GLYCOSYLATED A1C: CPT | Performed by: FAMILY MEDICINE

## 2022-12-13 ENCOUNTER — OFFICE VISIT (OUTPATIENT)
Dept: FAMILY MEDICINE | Facility: CLINIC | Age: 62
End: 2022-12-13
Payer: COMMERCIAL

## 2022-12-13 VITALS
HEART RATE: 71 BPM | WEIGHT: 219.56 LBS | BODY MASS INDEX: 32.52 KG/M2 | TEMPERATURE: 98 F | SYSTOLIC BLOOD PRESSURE: 126 MMHG | HEIGHT: 69 IN | DIASTOLIC BLOOD PRESSURE: 86 MMHG | OXYGEN SATURATION: 98 %

## 2022-12-13 DIAGNOSIS — R73.01 ELEVATED FASTING GLUCOSE: ICD-10-CM

## 2022-12-13 DIAGNOSIS — Z00.00 VISIT FOR WELL MAN HEALTH CHECK: Primary | ICD-10-CM

## 2022-12-13 DIAGNOSIS — F51.01 PRIMARY INSOMNIA: ICD-10-CM

## 2022-12-13 DIAGNOSIS — Z91.09 ENVIRONMENTAL ALLERGIES: ICD-10-CM

## 2022-12-13 DIAGNOSIS — F41.1 GAD (GENERALIZED ANXIETY DISORDER): ICD-10-CM

## 2022-12-13 DIAGNOSIS — E87.6 HYPOKALEMIA: ICD-10-CM

## 2022-12-13 DIAGNOSIS — R79.0 ABNORMAL IRON SATURATION: ICD-10-CM

## 2022-12-13 DIAGNOSIS — E78.2 MIXED HYPERLIPIDEMIA: ICD-10-CM

## 2022-12-13 DIAGNOSIS — E55.9 VITAMIN D DEFICIENCY: ICD-10-CM

## 2022-12-13 PROCEDURE — 3079F PR MOST RECENT DIASTOLIC BLOOD PRESSURE 80-89 MM HG: ICD-10-PCS | Mod: CPTII,S$GLB,, | Performed by: FAMILY MEDICINE

## 2022-12-13 PROCEDURE — 3074F PR MOST RECENT SYSTOLIC BLOOD PRESSURE < 130 MM HG: ICD-10-PCS | Mod: CPTII,S$GLB,, | Performed by: FAMILY MEDICINE

## 2022-12-13 PROCEDURE — 1160F PR REVIEW ALL MEDS BY PRESCRIBER/CLIN PHARMACIST DOCUMENTED: ICD-10-PCS | Mod: CPTII,S$GLB,, | Performed by: FAMILY MEDICINE

## 2022-12-13 PROCEDURE — 1160F RVW MEDS BY RX/DR IN RCRD: CPT | Mod: CPTII,S$GLB,, | Performed by: FAMILY MEDICINE

## 2022-12-13 PROCEDURE — 1159F MED LIST DOCD IN RCRD: CPT | Mod: CPTII,S$GLB,, | Performed by: FAMILY MEDICINE

## 2022-12-13 PROCEDURE — 3008F PR BODY MASS INDEX (BMI) DOCUMENTED: ICD-10-PCS | Mod: CPTII,S$GLB,, | Performed by: FAMILY MEDICINE

## 2022-12-13 PROCEDURE — 99396 PREV VISIT EST AGE 40-64: CPT | Mod: S$GLB,,, | Performed by: FAMILY MEDICINE

## 2022-12-13 PROCEDURE — 99999 PR PBB SHADOW E&M-EST. PATIENT-LVL IV: CPT | Mod: PBBFAC,,, | Performed by: FAMILY MEDICINE

## 2022-12-13 PROCEDURE — 99396 PR PREVENTIVE VISIT,EST,40-64: ICD-10-PCS | Mod: S$GLB,,, | Performed by: FAMILY MEDICINE

## 2022-12-13 PROCEDURE — 3008F BODY MASS INDEX DOCD: CPT | Mod: CPTII,S$GLB,, | Performed by: FAMILY MEDICINE

## 2022-12-13 PROCEDURE — 3079F DIAST BP 80-89 MM HG: CPT | Mod: CPTII,S$GLB,, | Performed by: FAMILY MEDICINE

## 2022-12-13 PROCEDURE — 3044F HG A1C LEVEL LT 7.0%: CPT | Mod: CPTII,S$GLB,, | Performed by: FAMILY MEDICINE

## 2022-12-13 PROCEDURE — 1159F PR MEDICATION LIST DOCUMENTED IN MEDICAL RECORD: ICD-10-PCS | Mod: CPTII,S$GLB,, | Performed by: FAMILY MEDICINE

## 2022-12-13 PROCEDURE — 99999 PR PBB SHADOW E&M-EST. PATIENT-LVL IV: ICD-10-PCS | Mod: PBBFAC,,, | Performed by: FAMILY MEDICINE

## 2022-12-13 PROCEDURE — 3044F PR MOST RECENT HEMOGLOBIN A1C LEVEL <7.0%: ICD-10-PCS | Mod: CPTII,S$GLB,, | Performed by: FAMILY MEDICINE

## 2022-12-13 PROCEDURE — 3074F SYST BP LT 130 MM HG: CPT | Mod: CPTII,S$GLB,, | Performed by: FAMILY MEDICINE

## 2022-12-13 RX ORDER — METFORMIN HYDROCHLORIDE 500 MG/1
500 TABLET, EXTENDED RELEASE ORAL
Qty: 90 TABLET | Refills: 3 | Status: SHIPPED | OUTPATIENT
Start: 2022-12-13 | End: 2023-06-15 | Stop reason: SDUPTHER

## 2022-12-13 RX ORDER — ERGOCALCIFEROL 1.25 MG/1
50000 CAPSULE ORAL
Qty: 12 CAPSULE | Refills: 3 | Status: SHIPPED | OUTPATIENT
Start: 2022-12-13 | End: 2023-12-11 | Stop reason: SDUPTHER

## 2022-12-13 RX ORDER — TRAZODONE HYDROCHLORIDE 150 MG/1
150 TABLET ORAL NIGHTLY PRN
Qty: 90 TABLET | Refills: 1 | Status: SHIPPED | OUTPATIENT
Start: 2022-12-13 | End: 2023-06-15 | Stop reason: SDUPTHER

## 2022-12-13 RX ORDER — FENOFIBRATE 134 MG/1
134 CAPSULE ORAL NIGHTLY
Qty: 90 CAPSULE | Refills: 3 | Status: SHIPPED | OUTPATIENT
Start: 2022-12-13 | End: 2023-06-15 | Stop reason: SDUPTHER

## 2022-12-13 RX ORDER — FERROUS SULFATE 325(65) MG
325 TABLET, DELAYED RELEASE (ENTERIC COATED) ORAL DAILY
Qty: 90 TABLET | Refills: 3 | Status: SHIPPED | OUTPATIENT
Start: 2022-12-13 | End: 2023-01-18 | Stop reason: CLARIF

## 2022-12-13 RX ORDER — IPRATROPIUM BROMIDE 42 UG/1
2 SPRAY, METERED NASAL 4 TIMES DAILY
Qty: 15 ML | Refills: 2 | Status: SHIPPED | OUTPATIENT
Start: 2022-12-13 | End: 2023-01-18 | Stop reason: CLARIF

## 2022-12-13 RX ORDER — ESCITALOPRAM OXALATE 20 MG/1
20 TABLET ORAL NIGHTLY
Qty: 90 TABLET | Refills: 1 | Status: SHIPPED | OUTPATIENT
Start: 2022-12-13 | End: 2023-06-15 | Stop reason: SDUPTHER

## 2022-12-13 RX ORDER — ATORVASTATIN CALCIUM 40 MG/1
40 TABLET, FILM COATED ORAL DAILY
Qty: 90 TABLET | Refills: 3 | Status: SHIPPED | OUTPATIENT
Start: 2022-12-13 | End: 2023-06-15 | Stop reason: SDUPTHER

## 2022-12-13 NOTE — PROGRESS NOTES
Subjective:       Patient ID: Dylan Sawant is a 61 y.o. male.    Chief Complaint: Annual Exam      Dylan Sawant is a 61 y.o. male who presents today for an annual exam.      Diet: tries to eat less fast food.   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 No other exercise. Has hip pain. 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      Labs: ordered and reviewed     C-scope: repeat in 2027     Anxiety: on lexapro 20 mg. Taking trazodone 100 mg nightly. Even with gabapentin, not staying asleep.   DLD: on pravastatin and fenofibrate. Has been on this for at least 5 years. Triglycerides high again. Have been intermittently high since 2018. Will change to atorvastatin 12/13/2022 from pravastatin.   RLS: has been using sleep apnea machine. Has Restless legs, starts at night. Mostly the left leg. On requip. Neurology increased gabapentin. This has helped slightly.   Hip: maybe getting surgery in February?  Prediabetes: on metformin.       PMHx: reviewed in EMR and updated  Meds: reviewed in EMR and updated  Shx: reviewed in EMR and updated  FMHx: no family history of colon cancer, breast cancer, ovarian cancer  Social: lives with my patient, Robyn. Dog passed away a year ago. Sister lives nearby as does mom. Retiring this week. No smokers at home.     Review of Systems   Constitutional:  Negative for chills and fever.   Respiratory:  Negative for shortness of breath.    Cardiovascular:  Negative for chest pain.   Gastrointestinal:  Negative for diarrhea, nausea and vomiting.   Neurological:  Negative for dizziness and headaches.   Psychiatric/Behavioral:  Positive for sleep disturbance. Negative for suicidal ideas. The patient is not nervous/anxious.        There are no preventive care reminders to display for this patient.  Immunization History   Administered Date(s) Administered    COVID-19 Vaccine 04/08/2021, 04/22/2021    COVID-19, MRNA, LN-S, PF (Pfizer) (Gray Cap) 05/14/2022    COVID-19, MRNA, LN-S, PF (Pfizer)  (Purple Cap) 03/18/2021, 04/08/2021, 10/30/2021    COVID-19, mRNA, LNP-S, bivalent booster, PF (PFIZER OMICRON) 11/17/2022    Hepatitis A, Adult 09/09/2005    Influenza 11/13/2008, 01/14/2010, 09/23/2010, 09/08/2011, 11/11/2018    Influenza (FLUAD) - Trivalent - Adjuvanted - PF (65+) 09/12/2016    Influenza - Quadrivalent 10/13/2019    Influenza - Quadrivalent - MDCK - PF 12/09/2017, 09/01/2020    Influenza - Quadrivalent - PF *Preferred* (6 months and older) 09/27/2018, 10/26/2021, 10/07/2022    Influenza A (H1N1) 2009 Monovalent - IM 01/14/2010    Td (ADULT) 09/09/2005    Tdap 09/25/2017    Zoster Recombinant 09/01/2020, 11/04/2020           Results for orders placed or performed in visit on 11/19/22   CBC Auto Differential   Result Value Ref Range    WBC 5.74 3.90 - 12.70 K/uL    RBC 4.84 4.60 - 6.20 M/uL    Hemoglobin 15.1 14.0 - 18.0 g/dL    Hematocrit 46.8 40.0 - 54.0 %    MCV 97 82 - 98 fL    MCH 31.2 (H) 27.0 - 31.0 pg    MCHC 32.3 32.0 - 36.0 g/dL    RDW 13.2 11.5 - 14.5 %    Platelets 226 150 - 450 K/uL    MPV 10.6 9.2 - 12.9 fL    Immature Granulocytes 0.7 (H) 0.0 - 0.5 %    Gran # (ANC) 3.2 1.8 - 7.7 K/uL    Immature Grans (Abs) 0.04 0.00 - 0.04 K/uL    Lymph # 1.6 1.0 - 4.8 K/uL    Mono # 0.7 0.3 - 1.0 K/uL    Eos # 0.2 0.0 - 0.5 K/uL    Baso # 0.04 0.00 - 0.20 K/uL    nRBC 0 0 /100 WBC    Gran % 56.1 38.0 - 73.0 %    Lymph % 28.0 18.0 - 48.0 %    Mono % 11.5 4.0 - 15.0 %    Eosinophil % 3.0 0.0 - 8.0 %    Basophil % 0.7 0.0 - 1.9 %    Differential Method Automated    Comprehensive Metabolic Panel   Result Value Ref Range    Sodium 139 136 - 145 mmol/L    Potassium 3.3 (L) 3.5 - 5.1 mmol/L    Chloride 103 95 - 110 mmol/L    CO2 24 23 - 29 mmol/L    Glucose 161 (H) 70 - 110 mg/dL    BUN 13 6 - 20 mg/dL    Creatinine 1.1 0.5 - 1.4 mg/dL    Calcium 10.0 8.7 - 10.5 mg/dL    Total Protein 7.1 6.0 - 8.4 g/dL    Albumin 4.0 3.5 - 5.2 g/dL    Total Bilirubin 0.6 0.1 - 1.0 mg/dL    Alkaline Phosphatase 48 (L) 55  - 135 U/L    AST 22 10 - 40 U/L    ALT 30 10 - 44 U/L    Anion Gap 12 8 - 16 mmol/L    eGFR >60.0 >60 mL/min/1.73 m^2   Hemoglobin A1C   Result Value Ref Range    Hemoglobin A1C 6.0 (H) 4.0 - 5.6 %    Estimated Avg Glucose 126 68 - 131 mg/dL   Lipid Panel   Result Value Ref Range    Cholesterol 173 120 - 199 mg/dL    Triglycerides 174 (H) 30 - 150 mg/dL    HDL 40 40 - 75 mg/dL    LDL Cholesterol 98.2 63.0 - 159.0 mg/dL    HDL/Cholesterol Ratio 23.1 20.0 - 50.0 %    Total Cholesterol/HDL Ratio 4.3 2.0 - 5.0    Non-HDL Cholesterol 133 mg/dL   TSH   Result Value Ref Range    TSH 0.946 0.400 - 4.000 uIU/mL   Vitamin D   Result Value Ref Range    Vit D, 25-Hydroxy 18 (L) 30 - 96 ng/mL   PSA, Screening   Result Value Ref Range    PSA, Screen 0.37 0.00 - 4.00 ng/mL   Iron and TIBC   Result Value Ref Range    Iron 47 45 - 160 ug/dL    Transferrin 336 200 - 375 mg/dL    TIBC 497 (H) 250 - 450 ug/dL    Saturated Iron 9 (L) 20 - 50 %   Ferritin   Result Value Ref Range    Ferritin 118 20.0 - 300.0 ng/mL       Objective:     Vitals:    12/13/22 0758   BP: 126/86   Pulse: 71   Temp: 97.9 °F (36.6 °C)        Physical Exam  Vitals reviewed.   Constitutional:       General: He is not in acute distress.     Appearance: He is obese. He is not ill-appearing, toxic-appearing or diaphoretic.   Cardiovascular:      Rate and Rhythm: Normal rate and regular rhythm.   Pulmonary:      Effort: Pulmonary effort is normal.      Breath sounds: Normal breath sounds.   Abdominal:      Palpations: Abdomen is soft.      Tenderness: There is no abdominal tenderness.   Musculoskeletal:         General: No swelling.   Skin:     Findings: No rash.   Neurological:      General: No focal deficit present.      Mental Status: He is alert.   Psychiatric:         Mood and Affect: Mood normal.         Behavior: Behavior normal.         Thought Content: Thought content normal.         Judgment: Judgment normal.       Assessment:       1. Visit for well man  health check    2. Mixed hyperlipidemia    3. Elevated fasting glucose    4. CASTILLO (generalized anxiety disorder)    5. Vitamin D deficiency    6. Abnormal iron saturation    7. Primary insomnia    8. BMI 32.0-32.9,adult    9. Environmental allergies    10. Hypokalemia        Plan:         ?Avoid tobacco  ?Be physically active  ?Maintain a healthy weight  ?Eat a diet rich in fruits, vegetables, and whole grains, and low in saturated/trans fat  ?Limit alcohol consumption  ?Protect against sexually transmitted infections  ?Avoid excess sun  RTC as directed during the visit, as needed or in 1 year for a physical with labs prior. Call one month prior to the physical to schedule apt and labs.       Stop pravastatin  Start atorvastatin  Continue fenofibrate    Continue gabapentin per neurology  Continue requip    Continue lexapro 20 mg  Continue trazodone but at 150 mg instead of 100 mg    Restart iron with food  You have low vitamin D and a Rx has been sent to your pharmacy. Take this pill once a week and when the prescription and refills are done, start taking an OTC vitamin D supplementation at 1000 IU daily.     Repeat potassium labs ordered.     Visit for well [place] health check    Mixed hyperlipidemia  -     atorvastatin (LIPITOR) 40 MG tablet; Take 1 tablet (40 mg total) by mouth once daily.  Dispense: 90 tablet; Refill: 3  -     fenofibrate micronized (LOFIBRA) 134 MG Cap; Take 1 capsule (134 mg total) by mouth every evening.  Dispense: 90 capsule; Refill: 3    Elevated fasting glucose  -     metFORMIN (GLUCOPHAGE-XR) 500 MG ER 24hr tablet; Take 1 tablet (500 mg total) by mouth daily with breakfast.  Dispense: 90 tablet; Refill: 3    CASTILLO (generalized anxiety disorder)  -     EScitalopram oxalate (LEXAPRO) 20 MG tablet; Take 1 tablet (20 mg total) by mouth every evening.  Dispense: 90 tablet; Refill: 1    Vitamin D deficiency  -     ergocalciferol (ERGOCALCIFEROL) 50,000 unit Cap; Take 1 capsule (50,000 Units total) by  mouth every 7 days. Then start daily OTC replacement after this Rx is complete  Dispense: 12 capsule; Refill: 3    Abnormal iron saturation  -     ferrous sulfate 325 (65 FE) MG EC tablet; Take 1 tablet (325 mg total) by mouth once daily.  Dispense: 90 tablet; Refill: 3    Primary insomnia  -     traZODone (DESYREL) 150 MG tablet; Take 1 tablet (150 mg total) by mouth nightly as needed for Insomnia.  Dispense: 90 tablet; Refill: 1    BMI 32.0-32.9,adult    Environmental allergies  -     ipratropium (ATROVENT) 42 mcg (0.06 %) nasal spray; 2 sprays by Each Nostril route 4 (four) times daily.  Dispense: 15 mL; Refill: 2    Hypokalemia  -     BASIC METABOLIC PANEL; Future; Expected date: 12/13/2022  -     Magnesium; Future; Expected date: 12/13/2022

## 2022-12-13 NOTE — PATIENT INSTRUCTIONS
Stop pravastatin  Start atorvastatin  Continue fenofibrate    Continue gabapentin per neurology  Continue requip    Continue lexapro 20 mg  Continue trazodone but at 150 mg instead of 100 mg    Restart iron with food  You have low vitamin D and a Rx has been sent to your pharmacy. Take this pill once a week and when the prescription and refills are done, start taking an OTC vitamin D supplementation at 1000 IU daily.     Lab Results   Component Value Date    HGBA1C 6.0 (H) 11/19/2022    HGBA1C 6.2 (H) 03/18/2022    HGBA1C 5.8 (H) 10/26/2021     Results for orders placed or performed in visit on 11/19/22   CBC Auto Differential   Result Value Ref Range    WBC 5.74 3.90 - 12.70 K/uL    RBC 4.84 4.60 - 6.20 M/uL    Hemoglobin 15.1 14.0 - 18.0 g/dL    Hematocrit 46.8 40.0 - 54.0 %    MCV 97 82 - 98 fL    MCH 31.2 (H) 27.0 - 31.0 pg    MCHC 32.3 32.0 - 36.0 g/dL    RDW 13.2 11.5 - 14.5 %    Platelets 226 150 - 450 K/uL    MPV 10.6 9.2 - 12.9 fL    Immature Granulocytes 0.7 (H) 0.0 - 0.5 %    Gran # (ANC) 3.2 1.8 - 7.7 K/uL    Immature Grans (Abs) 0.04 0.00 - 0.04 K/uL    Lymph # 1.6 1.0 - 4.8 K/uL    Mono # 0.7 0.3 - 1.0 K/uL    Eos # 0.2 0.0 - 0.5 K/uL    Baso # 0.04 0.00 - 0.20 K/uL    nRBC 0 0 /100 WBC    Gran % 56.1 38.0 - 73.0 %    Lymph % 28.0 18.0 - 48.0 %    Mono % 11.5 4.0 - 15.0 %    Eosinophil % 3.0 0.0 - 8.0 %    Basophil % 0.7 0.0 - 1.9 %    Differential Method Automated    Comprehensive Metabolic Panel   Result Value Ref Range    Sodium 139 136 - 145 mmol/L    Potassium 3.3 (L) 3.5 - 5.1 mmol/L    Chloride 103 95 - 110 mmol/L    CO2 24 23 - 29 mmol/L    Glucose 161 (H) 70 - 110 mg/dL    BUN 13 6 - 20 mg/dL    Creatinine 1.1 0.5 - 1.4 mg/dL    Calcium 10.0 8.7 - 10.5 mg/dL    Total Protein 7.1 6.0 - 8.4 g/dL    Albumin 4.0 3.5 - 5.2 g/dL    Total Bilirubin 0.6 0.1 - 1.0 mg/dL    Alkaline Phosphatase 48 (L) 55 - 135 U/L    AST 22 10 - 40 U/L    ALT 30 10 - 44 U/L    Anion Gap 12 8 - 16 mmol/L    eGFR >60.0  >60 mL/min/1.73 m^2   Hemoglobin A1C   Result Value Ref Range    Hemoglobin A1C 6.0 (H) 4.0 - 5.6 %    Estimated Avg Glucose 126 68 - 131 mg/dL   Lipid Panel   Result Value Ref Range    Cholesterol 173 120 - 199 mg/dL    Triglycerides 174 (H) 30 - 150 mg/dL    HDL 40 40 - 75 mg/dL    LDL Cholesterol 98.2 63.0 - 159.0 mg/dL    HDL/Cholesterol Ratio 23.1 20.0 - 50.0 %    Total Cholesterol/HDL Ratio 4.3 2.0 - 5.0    Non-HDL Cholesterol 133 mg/dL   TSH   Result Value Ref Range    TSH 0.946 0.400 - 4.000 uIU/mL   Vitamin D   Result Value Ref Range    Vit D, 25-Hydroxy 18 (L) 30 - 96 ng/mL   PSA, Screening   Result Value Ref Range    PSA, Screen 0.37 0.00 - 4.00 ng/mL   Iron and TIBC   Result Value Ref Range    Iron 47 45 - 160 ug/dL    Transferrin 336 200 - 375 mg/dL    TIBC 497 (H) 250 - 450 ug/dL    Saturated Iron 9 (L) 20 - 50 %   Ferritin   Result Value Ref Range    Ferritin 118 20.0 - 300.0 ng/mL

## 2023-01-04 ENCOUNTER — TELEPHONE (OUTPATIENT)
Dept: ORTHOPEDICS | Facility: CLINIC | Age: 63
End: 2023-01-04
Payer: COMMERCIAL

## 2023-01-04 ENCOUNTER — PATIENT MESSAGE (OUTPATIENT)
Dept: ORTHOPEDICS | Facility: CLINIC | Age: 63
End: 2023-01-04
Payer: COMMERCIAL

## 2023-01-04 NOTE — TELEPHONE ENCOUNTER
I called the patient today regarding his surgery. The patient confirmed a new surgery date of 1/27/2023 at [facility]. The patient verbalized understanding and has no further questions.

## 2023-01-06 ENCOUNTER — ANESTHESIA EVENT (OUTPATIENT)
Dept: SURGERY | Facility: OTHER | Age: 63
End: 2023-01-06
Payer: COMMERCIAL

## 2023-01-09 ENCOUNTER — EDUCATION (OUTPATIENT)
Dept: ORTHOPEDICS | Facility: CLINIC | Age: 63
End: 2023-01-09

## 2023-01-09 DIAGNOSIS — M16.12 PRIMARY OSTEOARTHRITIS OF LEFT HIP: Primary | ICD-10-CM

## 2023-01-10 ENCOUNTER — HOSPITAL ENCOUNTER (OUTPATIENT)
Dept: RADIOLOGY | Facility: HOSPITAL | Age: 63
Discharge: HOME | End: 2023-01-10
Attending: ORTHOPAEDIC SURGERY
Payer: COMMERCIAL

## 2023-01-10 ENCOUNTER — OFFICE VISIT (OUTPATIENT)
Dept: INTERNAL MEDICINE | Facility: CLINIC | Age: 63
End: 2023-01-10
Payer: COMMERCIAL

## 2023-01-10 ENCOUNTER — OFFICE VISIT (OUTPATIENT)
Dept: ORTHOPEDICS | Facility: CLINIC | Age: 63
End: 2023-01-10
Payer: COMMERCIAL

## 2023-01-10 VITALS — HEIGHT: 69 IN | BODY MASS INDEX: 32.65 KG/M2 | WEIGHT: 220.44 LBS

## 2023-01-10 VITALS — BODY MASS INDEX: 32.58 KG/M2 | WEIGHT: 220 LBS | HEIGHT: 69 IN

## 2023-01-10 VITALS
BODY MASS INDEX: 33.77 KG/M2 | OXYGEN SATURATION: 97 % | WEIGHT: 228 LBS | HEIGHT: 69 IN | SYSTOLIC BLOOD PRESSURE: 151 MMHG | DIASTOLIC BLOOD PRESSURE: 79 MMHG | TEMPERATURE: 98 F | HEART RATE: 86 BPM

## 2023-01-10 DIAGNOSIS — R73.03 PREDIABETES: ICD-10-CM

## 2023-01-10 DIAGNOSIS — E87.6 LOW SERUM POTASSIUM: ICD-10-CM

## 2023-01-10 DIAGNOSIS — R79.0 ABNORMAL IRON SATURATION: ICD-10-CM

## 2023-01-10 DIAGNOSIS — G25.81 RESTLESS LEGS: ICD-10-CM

## 2023-01-10 DIAGNOSIS — E78.2 MIXED HYPERLIPIDEMIA: ICD-10-CM

## 2023-01-10 DIAGNOSIS — Z96.642 S/P HIP REPLACEMENT, LEFT: ICD-10-CM

## 2023-01-10 DIAGNOSIS — M16.12 PRIMARY OSTEOARTHRITIS OF LEFT HIP: Primary | ICD-10-CM

## 2023-01-10 DIAGNOSIS — F41.9 ANXIETY: ICD-10-CM

## 2023-01-10 DIAGNOSIS — M16.12 OSTEOARTHRITIS OF LEFT HIP, UNSPECIFIED OSTEOARTHRITIS TYPE: ICD-10-CM

## 2023-01-10 DIAGNOSIS — K21.9 GASTROESOPHAGEAL REFLUX DISEASE, UNSPECIFIED WHETHER ESOPHAGITIS PRESENT: ICD-10-CM

## 2023-01-10 DIAGNOSIS — G47.33 OSA (OBSTRUCTIVE SLEEP APNEA): ICD-10-CM

## 2023-01-10 DIAGNOSIS — M16.10 HIP ARTHRITIS: ICD-10-CM

## 2023-01-10 DIAGNOSIS — M16.12 PRIMARY OSTEOARTHRITIS OF LEFT HIP: ICD-10-CM

## 2023-01-10 DIAGNOSIS — E55.9 VITAMIN D DEFICIENCY: ICD-10-CM

## 2023-01-10 DIAGNOSIS — R03.0 ELEVATED BP WITHOUT DIAGNOSIS OF HYPERTENSION: ICD-10-CM

## 2023-01-10 DIAGNOSIS — M79.605 PAIN OF LEFT LOWER EXTREMITY: ICD-10-CM

## 2023-01-10 DIAGNOSIS — Z01.818 PREOPERATIVE EXAMINATION: Primary | ICD-10-CM

## 2023-01-10 PROCEDURE — 99999 PR PBB SHADOW E&M-EST. PATIENT-LVL IV: CPT | Mod: PBBFAC,,, | Performed by: NURSE PRACTITIONER

## 2023-01-10 PROCEDURE — 99215 OFFICE O/P EST HI 40 MIN: CPT | Mod: S$GLB,,, | Performed by: NURSE PRACTITIONER

## 2023-01-10 PROCEDURE — 3008F BODY MASS INDEX DOCD: CPT | Mod: CPTII,S$GLB,, | Performed by: NURSE PRACTITIONER

## 2023-01-10 PROCEDURE — 99215 PR OFFICE/OUTPT VISIT, EST, LEVL V, 40-54 MIN: ICD-10-PCS | Mod: S$GLB,,, | Performed by: NURSE PRACTITIONER

## 2023-01-10 PROCEDURE — 99999 PR PBB SHADOW E&M-EST. PATIENT-LVL III: ICD-10-PCS | Mod: PBBFAC,,, | Performed by: ORTHOPAEDIC SURGERY

## 2023-01-10 PROCEDURE — 99999 PR PBB SHADOW E&M-EST. PATIENT-LVL IV: ICD-10-PCS | Mod: PBBFAC,,, | Performed by: NURSE PRACTITIONER

## 2023-01-10 PROCEDURE — 1160F PR REVIEW ALL MEDS BY PRESCRIBER/CLIN PHARMACIST DOCUMENTED: ICD-10-PCS | Mod: CPTII,S$GLB,, | Performed by: NURSE PRACTITIONER

## 2023-01-10 PROCEDURE — 3008F PR BODY MASS INDEX (BMI) DOCUMENTED: ICD-10-PCS | Mod: CPTII,S$GLB,, | Performed by: NURSE PRACTITIONER

## 2023-01-10 PROCEDURE — 1159F MED LIST DOCD IN RCRD: CPT | Mod: CPTII,S$GLB,, | Performed by: ORTHOPAEDIC SURGERY

## 2023-01-10 PROCEDURE — 3078F PR MOST RECENT DIASTOLIC BLOOD PRESSURE < 80 MM HG: ICD-10-PCS | Mod: CPTII,S$GLB,, | Performed by: NURSE PRACTITIONER

## 2023-01-10 PROCEDURE — 3077F SYST BP >= 140 MM HG: CPT | Mod: CPTII,S$GLB,, | Performed by: NURSE PRACTITIONER

## 2023-01-10 PROCEDURE — 1160F RVW MEDS BY RX/DR IN RCRD: CPT | Mod: CPTII,S$GLB,, | Performed by: NURSE PRACTITIONER

## 2023-01-10 PROCEDURE — 99214 PR OFFICE/OUTPT VISIT, EST, LEVL IV, 30-39 MIN: ICD-10-PCS | Mod: S$GLB,,, | Performed by: NURSE PRACTITIONER

## 2023-01-10 PROCEDURE — 3078F DIAST BP <80 MM HG: CPT | Mod: CPTII,S$GLB,, | Performed by: NURSE PRACTITIONER

## 2023-01-10 PROCEDURE — 1159F PR MEDICATION LIST DOCUMENTED IN MEDICAL RECORD: ICD-10-PCS | Mod: CPTII,S$GLB,, | Performed by: ORTHOPAEDIC SURGERY

## 2023-01-10 PROCEDURE — 3008F PR BODY MASS INDEX (BMI) DOCUMENTED: ICD-10-PCS | Mod: CPTII,S$GLB,, | Performed by: ORTHOPAEDIC SURGERY

## 2023-01-10 PROCEDURE — 72170 X-RAY EXAM OF PELVIS: CPT | Mod: TC

## 2023-01-10 PROCEDURE — 72170 X-RAY EXAM OF PELVIS: CPT | Mod: 26,,, | Performed by: RADIOLOGY

## 2023-01-10 PROCEDURE — 3008F BODY MASS INDEX DOCD: CPT | Mod: CPTII,S$GLB,, | Performed by: ORTHOPAEDIC SURGERY

## 2023-01-10 PROCEDURE — 3077F PR MOST RECENT SYSTOLIC BLOOD PRESSURE >= 140 MM HG: ICD-10-PCS | Mod: CPTII,S$GLB,, | Performed by: NURSE PRACTITIONER

## 2023-01-10 PROCEDURE — 1159F MED LIST DOCD IN RCRD: CPT | Mod: CPTII,S$GLB,, | Performed by: NURSE PRACTITIONER

## 2023-01-10 PROCEDURE — 72170 XR PELVIS ROUTINE AP: ICD-10-PCS | Mod: 26,,, | Performed by: RADIOLOGY

## 2023-01-10 PROCEDURE — 1159F PR MEDICATION LIST DOCUMENTED IN MEDICAL RECORD: ICD-10-PCS | Mod: CPTII,S$GLB,, | Performed by: NURSE PRACTITIONER

## 2023-01-10 PROCEDURE — 99499 UNLISTED E&M SERVICE: CPT | Mod: S$GLB,,, | Performed by: ORTHOPAEDIC SURGERY

## 2023-01-10 PROCEDURE — 99499 NO LOS: ICD-10-PCS | Mod: S$GLB,,, | Performed by: ORTHOPAEDIC SURGERY

## 2023-01-10 PROCEDURE — 99999 PR PBB SHADOW E&M-EST. PATIENT-LVL III: CPT | Mod: PBBFAC,,, | Performed by: ORTHOPAEDIC SURGERY

## 2023-01-10 PROCEDURE — 99214 OFFICE O/P EST MOD 30 MIN: CPT | Mod: S$GLB,,, | Performed by: NURSE PRACTITIONER

## 2023-01-10 RX ORDER — MULTIVITAMIN
1 TABLET ORAL DAILY
COMMUNITY

## 2023-01-10 NOTE — OUTPATIENT SUBJECTIVE & OBJECTIVE
Outpatient Subjective & Objective      Chief Complaint: Preoperative evaulation, perioperative medical management, and complication reduction plan.     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 Parked in garage and walked to POC without CP/SOB.       Anesthesia issues: None    Difficulty mouth opening: No    Steroid use in the last 12 months:  No    Dental Issues: No    Family anesthesia difficulty: None       Family Hx of Thrombosis: None    Past Medical History:   Diagnosis Date    Allergy     Anxiety     Elevated BP     GERD (gastroesophageal reflux disease)     Hyperlipidemia     KELLY (obstructive sleep apnea)     Restless leg syndrome          Past Medical History Pertinent Negatives:   Diagnosis Date Noted    Asthma 01/10/2023    CHF (congestive heart failure) 01/10/2023    COPD (chronic obstructive pulmonary disease) 01/10/2023    Coronary artery disease 01/10/2023    Deep vein thrombosis 01/10/2023    Diabetes mellitus type I 01/23/2019    Diabetes mellitus, type 2 01/23/2019    Disorder of kidney and ureter 01/10/2023    Hypertension 01/10/2023    Myocardial infarction 01/10/2023    Pulmonary embolism 01/10/2023    Seizures 01/10/2023    Stroke 01/10/2023    Thyroid disease 01/10/2023         Past Surgical History:   Procedure Laterality Date    ADENOIDECTOMY      COLONOSCOPY N/A 4/8/2017    Procedure: COLONOSCOPY;  Surgeon: Kyle Moreno MD;  Location: [facility] ([address] [place] [address]);  Service: Endoscopy;  Laterality: N/A;    NASAL SEPTUM SURGERY      RECONSTRUCTION OF NOSE N/A 6/22/2018    Procedure: RECONSTRUCTION, NOSE;  Surgeon: Tulio Echols III, MD;  Location: [address];  Service: ENT;  Laterality: N/A;  1 HOUR TOTAL / LATERA  NASAL VALVE IMPLANTS    TONSILLECTOMY         Review of Systems   Constitutional:  Negative for chills, fatigue, fever and unexpected weight change.   HENT:  Negative for dental problem, hearing loss, postnasal drip,  "rhinorrhea, sore throat, tinnitus and trouble swallowing.    Eyes:  Negative for photophobia, pain, discharge and visual disturbance.   Respiratory:  Negative for apnea, cough, chest tightness, shortness of breath and wheezing.    Cardiovascular:  Negative for chest pain, palpitations and leg swelling.   Gastrointestinal:  Negative for abdominal pain, blood in stool, constipation, nausea and vomiting.        Denies Fatty liver, Hepatitis   Endocrine: Negative for cold intolerance and heat intolerance.   Genitourinary:  Positive for frequency. Negative for decreased urine volume, difficulty urinating, dysuria, hematuria and urgency.   Musculoskeletal:  Positive for arthralgias (left hip/groin/knee). Negative for back pain, neck pain and neck stiffness.   Skin:  Negative for rash and wound.   Neurological:  Positive for numbness (LLE). Negative for dizziness, tremors, seizures, syncope, weakness and headaches.   Hematological:  Negative for adenopathy. Does not bruise/bleed easily.   Psychiatric/Behavioral:  Negative for confusion, sleep disturbance and suicidal ideas.             VITALS  Visit Vitals  BP (!) 151/79 (BP Location: Left arm, Patient Position: Sitting)   Pulse 86   Temp 97.7 °F (36.5 °C) (Oral)   Ht 5' 9" (1.753 m)   Wt 103.4 kg (228 lb)   SpO2 97%   BMI 33.67 kg/m²          Physical Exam  Vitals reviewed.   Constitutional:       General: He is not in acute distress.     Appearance: He is well-developed. He is obese.   HENT:      Head: Normocephalic.      Nose: Nose normal.      Mouth/Throat:      Pharynx: No oropharyngeal exudate.   Eyes:      General:         Right eye: No discharge.         Left eye: No discharge.      Conjunctiva/sclera: Conjunctivae normal.      Pupils: Pupils are equal, round, and reactive to light.   Neck:      Thyroid: No thyromegaly.      Vascular: No carotid bruit or JVD.      Trachea: No tracheal deviation.   Cardiovascular:      Rate and Rhythm: Normal rate and regular " rhythm.      Pulses:           Carotid pulses are 2+ on the right side and 2+ on the left side.       Dorsalis pedis pulses are 2+ on the right side and 2+ on the left side.        Posterior tibial pulses are 2+ on the right side and 2+ on the left side.      Heart sounds: Normal heart sounds. No murmur heard.  Pulmonary:      Effort: Pulmonary effort is normal. No respiratory distress.      Breath sounds: Normal breath sounds. No stridor. No wheezing, rhonchi or rales.   Abdominal:      General: Bowel sounds are normal. There is no distension.      Palpations: Abdomen is soft.      Tenderness: There is no abdominal tenderness. There is no guarding.      Comments: central obesity   Musculoskeletal:      Cervical back: Decreased range of motion (with extension).      Right lower leg: No edema.      Left lower leg: No edema.   Lymphadenopathy:      Cervical: No cervical adenopathy.   Skin:     General: Skin is warm and dry.      Capillary Refill: Capillary refill takes less than 2 seconds.      Findings: No erythema or rash.   Neurological:      Mental Status: He is alert and oriented to person, place, and time.      Coordination: Coordination normal.        Significant Labs:  Lab Results   Component Value Date    WBC 5.74 11/19/2022    HGB 15.1 11/19/2022    HCT 46.8 11/19/2022     11/19/2022    CHOL 173 11/19/2022    TRIG 174 (H) 11/19/2022    HDL 40 11/19/2022    ALT 30 11/19/2022    AST 22 11/19/2022     11/19/2022    K 3.9 01/10/2023     11/19/2022    CREATININE 1.1 11/19/2022    BUN 13 11/19/2022    CO2 24 11/19/2022    TSH 0.946 11/19/2022    PSA 0.37 11/19/2022    INR 1.1 01/10/2023    HGBA1C 6.0 (H) 11/19/2022       Diagnostic Studies: No relevant studies.    EKG:   Results for orders placed or performed during the hospital encounter of 06/18/18   SCHEDULED EKG 12-LEAD (to Muse)    Collection Time: 06/18/18  9:33 AM    Narrative    Test Reason : Z01.818,Z01.818,    Vent. Rate : 086 BPM      Atrial Rate : 086 BPM     P-R Int : 134 ms          QRS Dur : 102 ms      QT Int : 394 ms       P-R-T Axes : 044 -52 032 degrees     QTc Int : 471 ms    Normal sinus rhythm  Left axis deviation  Abnormal ECG  No previous ECGs available  Confirmed by MAXIMILIANO CLINTON MD ([id number]) on 6/18/2018 10:57:12 AM    Referred By: COOKIE GRANDE           Confirmed By:MAXIMILIANO CLINTON MD         Active Cardiac Conditions: None      Revised Cardiac Risk Index   High -Risk Surgery  Intraperitoneal; Intrathoracic; suprainguinal vascular Yes- + 1 No- 0   History of Ischemic Heart Disease   (Hx of MI/positive exercise test/current chest pain due to ischemia/use of nitrate therapy/EKG with pathological Q waves) Yes- + 1 No- 0   History of CHF  (Pulmonary edema/bilateral rales or S3 gallop/PND/CXR showing pulmonary vascular redistribution) Yes- + 1 No- 0   History of CVA   (Prior stroke or TIA) Yes- + 1 No- 0   Pre-operative treatment with insulin Yes- + 1 No- 0   Pre-operative creatinine > 2mg/dl Yes- + 1 No- 0   Total: 0      Risk Status:  Estimated risk of cardiac complications after non-cardiac surgery using the Revised Cardiac Risk Index for Preoperative risk is 3.9 %      ARISCAT (Canet) risk index: Low: 1.6% risk of post-op pulmonary complications; Intermediate: 13.3% risk of post-op pulmonary complications if surgery is > 3 hours.     American Society of Anesthesiologists Physical Status classification (ASA): 2           No further cardiac workup needed prior to surgery.    Outpatient Subjective & Objective

## 2023-01-10 NOTE — ASSESSMENT & PLAN NOTE
Treated with: Lexapro 20 mg- some improvement noted. Recent change to this medication approximately one month ago; followed per PCP.   Denies suicidal/ homicidal ideations.   Recommend regular exercise and healthy diet; appropriate sleep. Limit caffeine intake.

## 2023-01-10 NOTE — ASSESSMENT & PLAN NOTE
Taking ferrous sulfate daily; stomach upset    Component      Latest Ref Rng & Units 11/19/2022   Iron      45 - 160 ug/dL 47   Transferrin      200 - 375 mg/dL 336   TIBC      250 - 450 ug/dL 497 (H)   Saturated Iron      20 - 50 % 9 (L)

## 2023-01-10 NOTE — ASSESSMENT & PLAN NOTE
Not currently treated; controlled.   Weight loss encouraged as well as dietary changes such as reduction or avoidance of fatty foods, caffeine, spicy foods, and chocolate.

## 2023-01-10 NOTE — ASSESSMENT & PLAN NOTE
CPAP compliance: Yes.   Encouraged weight loss, increased exercise.  Recommend caution with sedating medication that can cause respiratory depression.

## 2023-01-10 NOTE — PROGRESS NOTES
Pre-op visit L ant CASSANDRA 1/27/23  Exam unchanged      This patient has significant symptoms in their hip that are affecting their quality of life and daily activities.  They have tried non-operative treatment including analgesics, an exercise program, and activity modification, but the symptoms have persisted. I believe they make a good candidate for hip arthroplasty.     The risks and benefits of hip arthroplasty have been discussed with the patient which include, but are not limited to infections (including severe sequelae), potential component failure, fracture, DVT, pulmonary embolus, nerve palsy, dislocation, leg length inequality, persistent pain, wound healing complications, transfusions, medical complications and death.     We discussed surgical options including implant type and why I believe the implant selected is a good match for the patient's needs. The patient expressed understanding and agrees to proceed with the planned surgery.     Pre-operative planning will include the following:  A pre-surgical evaluation will be arranged.  Pre-op orders will be placed.  We will make arrangements with the operating room for proper time and staffing.  We will make Social Service arrangements for the patient.  Approach: Anterior     Implants:   Company: [employer]  Cup: [place]  Stem: Actis     DVT prophylaxis: ASA 81mg BID x1 month  Dispo: home health PT     Admission status: Outpatient/23hr OBS                   Location: [place]                                                   Tentative plan for L ant CASSANDRA next available date 2/15/22  Would like date before end of the year as has meet his deductible but I don't have any available offered the option to go elsewhere     Fu for pre-op visit  Rx cefadroxil  Overnight: wife doesn't drive, limited social support  R hip OA - ok to lengthen  Actis reamers open

## 2023-01-10 NOTE — ASSESSMENT & PLAN NOTE
I recommend monitoring patient's glucose level during the perioperative period. Glucose may be elevated from stress hyperglycemia and may require insulin.  A1c is 6.0 on 11/19/22 labs

## 2023-01-10 NOTE — PROGRESS NOTES
[facility]  Progress Note    Patient Name: Dylan Sawant  MRN: [id number]  Date of Evaluation- 01/11/2023  PCP- Roland Swenson, DO    Future cases for Dylan Sawant [[id number]]       Case ID Status Date Time Jerod Procedure Provider Location    [id number] [facility] 1/27/2023  7:00  ARTHROPLASTY, HIP, TOTAL, ANTERIOR APPROACH: LEFT: DEPUY - ACTIS + PINNACLE Karlo Springer III, MD [[id number]] [facility] OR            HPI:  This is a 62 y.o. male  who presents today for a preoperative evaluation in preparation for an Orthopedic  procedure.  Scheduled for  left hip arthroplasty.   Surgery is indicated for osteoarthritis of left hip.  Patient is new to me.  Details of current problem: The duration of problem is  two years. Patient reports history of left knee pain and was treated with steroid injections but reported left hip and  groin pain to the ortho team.   He had a hip xray done in October 2022  revealing left hip arthritis. Pain is becoming progressively worse.   Reports symptoms of intermittent aching pain to left hip/groin radiating down to left knee.   Aggravating factors include: getting in/out car and decreased ADLs.     Relieving factors are gabapentin/movement.   Reports pain: 4/10 to left hip today; no use of assistive devices.    The history has been obtained by speaking with the patient and reviewing the electronic medical record and/or outside health information. Significant health conditions for the perioperative period are discussed below in assessment and plan.     Patient reports current health status to be Good.  Denies any new symptoms before surgery.        Subjective/ Objective:     Chief Complaint: Preoperative evaulation, perioperative medical management, and complication reduction plan.     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 Parked in garage and walked to [facility] without CP/SOB.       Anesthesia issues: None    Difficulty mouth  opening: No    Steroid use in the last 12 months:  No    Dental Issues: No    Family anesthesia difficulty: None       Family Hx of Thrombosis: None    Past Medical History:   Diagnosis Date    Allergy     Anxiety     Elevated BP     GERD (gastroesophageal reflux disease)     Hyperlipidemia     KELLY (obstructive sleep apnea)     Restless leg syndrome          Past Medical History Pertinent Negatives:   Diagnosis Date Noted    Asthma 01/10/2023    CHF (congestive heart failure) 01/10/2023    COPD (chronic obstructive pulmonary disease) 01/10/2023    Coronary artery disease 01/10/2023    Deep vein thrombosis 01/10/2023    Diabetes mellitus type I 01/23/2019    Diabetes mellitus, type 2 01/23/2019    Disorder of kidney and ureter 01/10/2023    Hypertension 01/10/2023    Myocardial infarction 01/10/2023    Pulmonary embolism 01/10/2023    Seizures 01/10/2023    Stroke 01/10/2023    Thyroid disease 01/10/2023         Past Surgical History:   Procedure Laterality Date    ADENOIDECTOMY      COLONOSCOPY N/A 4/8/2017    Procedure: COLONOSCOPY;  Surgeon: Kyle Moreno MD;  Location: [facility] (4TH FLR);  Service: Endoscopy;  Laterality: N/A;    NASAL SEPTUM SURGERY      RECONSTRUCTION OF NOSE N/A 6/22/2018    Procedure: RECONSTRUCTION, NOSE;  Surgeon: Tulio Echols III, MD;  Location: [facility] OR [address] [place] [address];  Service: ENT;  Laterality: N/A;  1 HOUR TOTAL / LATERA  NASAL VALVE IMPLANTS    TONSILLECTOMY         Review of Systems   Constitutional:  Negative for chills, fatigue, fever and unexpected weight change.   HENT:  Negative for dental problem, hearing loss, postnasal drip, rhinorrhea, sore throat, tinnitus and trouble swallowing.    Eyes:  Negative for photophobia, pain, discharge and visual disturbance.   Respiratory:  Negative for apnea, cough, chest tightness, shortness of breath and wheezing.    Cardiovascular:  Negative for chest pain, palpitations and leg swelling.   Gastrointestinal:  Negative for abdominal pain,  "blood in stool, constipation, nausea and vomiting.        Denies Fatty liver, Hepatitis   Endocrine: Negative for cold intolerance and heat intolerance.   Genitourinary:  Positive for frequency. Negative for decreased urine volume, difficulty urinating, dysuria, hematuria and urgency.   Musculoskeletal:  Positive for arthralgias (left hip/groin/knee). Negative for back pain, neck pain and neck stiffness.   Skin:  Negative for rash and wound.   Neurological:  Positive for numbness (LLE). Negative for dizziness, tremors, seizures, syncope, weakness and headaches.   Hematological:  Negative for adenopathy. Does not bruise/bleed easily.   Psychiatric/Behavioral:  Negative for confusion, sleep disturbance and suicidal ideas.             VITALS  Visit Vitals  BP (!) 151/79 (BP Location: Left arm, Patient Position: Sitting)   Pulse 86   Temp 97.7 °F (36.5 °C) (Oral)   Ht 5' 9" (1.753 m)   Wt 103.4 kg (228 lb)   SpO2 97%   BMI 33.67 kg/m²          Physical Exam  Vitals reviewed.   Constitutional:       General: He is not in acute distress.     Appearance: He is well-developed. He is obese.   HENT:      Head: Normocephalic.      Nose: Nose normal.      Mouth/Throat:      Pharynx: No oropharyngeal exudate.   Eyes:      General:         Right eye: No discharge.         Left eye: No discharge.      Conjunctiva/sclera: Conjunctivae normal.      Pupils: Pupils are equal, round, and reactive to light.   Neck:      Thyroid: No thyromegaly.      Vascular: No carotid bruit or JVD.      Trachea: No tracheal deviation.   Cardiovascular:      Rate and Rhythm: Normal rate and regular rhythm.      Pulses:           Carotid pulses are 2+ on the right side and 2+ on the left side.       Dorsalis pedis pulses are 2+ on the right side and 2+ on the left side.        Posterior tibial pulses are 2+ on the right side and 2+ on the left side.      Heart sounds: Normal heart sounds. No murmur heard.  Pulmonary:      Effort: Pulmonary effort is " normal. No respiratory distress.      Breath sounds: Normal breath sounds. No stridor. No wheezing, rhonchi or rales.   Abdominal:      General: Bowel sounds are normal. There is no distension.      Palpations: Abdomen is soft.      Tenderness: There is no abdominal tenderness. There is no guarding.      Comments: central obesity   Musculoskeletal:      Cervical back: Decreased range of motion (with extension).      Right lower leg: No edema.      Left lower leg: No edema.   Lymphadenopathy:      Cervical: No cervical adenopathy.   Skin:     General: Skin is warm and dry.      Capillary Refill: Capillary refill takes less than 2 seconds.      Findings: No erythema or rash.   Neurological:      Mental Status: He is alert and oriented to person, place, and time.      Coordination: Coordination normal.        Significant Labs:  Lab Results   Component Value Date    WBC 5.74 11/19/2022    HGB 15.1 11/19/2022    HCT 46.8 11/19/2022     11/19/2022    CHOL 173 11/19/2022    TRIG 174 (H) 11/19/2022    HDL 40 11/19/2022    ALT 30 11/19/2022    AST 22 11/19/2022     11/19/2022    K 3.9 01/10/2023     11/19/2022    CREATININE 1.1 11/19/2022    BUN 13 11/19/2022    CO2 24 11/19/2022    TSH 0.946 11/19/2022    PSA 0.37 11/19/2022    INR 1.1 01/10/2023    HGBA1C 6.0 (H) 11/19/2022       Diagnostic Studies: No relevant studies.    EKG:   Results for orders placed or performed during the hospital encounter of 06/18/18   SCHEDULED EKG 12-LEAD (to Muse)    Collection Time: 06/18/18  9:33 AM    Narrative    Test Reason : Z01.818,Z01.818,    Vent. Rate : 086 BPM     Atrial Rate : 086 BPM     P-R Int : 134 ms          QRS Dur : 102 ms      QT Int : 394 ms       P-R-T Axes : 044 -52 032 degrees     QTc Int : 471 ms    Normal sinus rhythm  Left axis deviation  Abnormal ECG  No previous ECGs available  Confirmed by MAXIMILIANO CLINTON MD ([id number]) on 6/18/2018 10:57:12 AM    Referred By: COOKIE GRANDE           Confirmed By:MAXIMILIANO  MARY BETH WARD         Active Cardiac Conditions: None      Revised Cardiac Risk Index   High -Risk Surgery  Intraperitoneal; Intrathoracic; suprainguinal vascular Yes- + 1 No- 0   History of Ischemic Heart Disease   (Hx of MI/positive exercise test/current chest pain due to ischemia/use of nitrate therapy/EKG with pathological Q waves) Yes- + 1 No- 0   History of CHF  (Pulmonary edema/bilateral rales or S3 gallop/PND/CXR showing pulmonary vascular redistribution) Yes- + 1 No- 0   History of CVA   (Prior stroke or TIA) Yes- + 1 No- 0   Pre-operative treatment with insulin Yes- + 1 No- 0   Pre-operative creatinine > 2mg/dl Yes- + 1 No- 0   Total: 0      Risk Status:  Estimated risk of cardiac complications after non-cardiac surgery using the Revised Cardiac Risk Index for Preoperative risk is 3.9 %      ARISCAT (Canet) risk index: Low: 1.6% risk of post-op pulmonary complications; Intermediate: 13.3% risk of post-op pulmonary complications if surgery is > 3 hours.     American Society of Anesthesiologists Physical Status classification (ASA): 2           No further cardiac workup needed prior to surgery.          Orders Placed This Encounter    Protime-INR    Potassium           Assessment/Plan:     Restless legs  Treated with Requip; stable.  Followed per Neurology; last seen 11/9/22.    Anxiety  Treated with: Lexapro 20 mg- some improvement noted. Recent change to this medication approximately one month ago; followed per PCP.   Denies suicidal/ homicidal ideations.   Recommend regular exercise and healthy diet; appropriate sleep. Limit caffeine intake.       Mixed hyperlipidemia  Recommend  weight loss, healthy diet (DASH/Mediterranean) and exercise.   Patient should exercise 30 minutes at least five times weekly.   Treated with: atorvastatin/fenofibrate    Vitamin D deficiency  Not taking 50,000 units weekly as prescribed; currently taking MVI which includes Vitamin D.  Last level was low on 11/19/22  Discussed lab  result with patient- he will start taking Vitamin D as prescribed.     Prediabetes  I recommend monitoring patient's glucose level during the perioperative period. Glucose may be elevated from stress hyperglycemia and may require insulin.  A1c is 6.0 on 11/19/22 labs    BMI 32.0-32.9,adult  Lifestyle changes should be made by eating healthy, exercising at least 150 minutes weekly, and avoiding sedentary behavior.       GERD (gastroesophageal reflux disease)  Not currently treated; controlled.   Weight loss encouraged as well as dietary changes such as reduction or avoidance of fatty foods, caffeine, spicy foods, and chocolate.        Osteoarthritis of left hip  Scheduled with Dr. Springer on 1/27/23 for left hip arthroplasty.    Abnormal iron saturation  Taking ferrous sulfate daily; stomach upset    Component      Latest Ref Rng & Units 11/19/2022   Iron      45 - 160 ug/dL 47   Transferrin      200 - 375 mg/dL 336   TIBC      250 - 450 ug/dL 497 (H)   Saturated Iron      20 - 50 % 9 (L)       KELLY (obstructive sleep apnea)  CPAP compliance: Yes.   Encouraged weight loss, increased exercise.  Recommend caution with sedating medication that can cause respiratory depression.       Elevated BP without diagnosis of hypertension  BP today: 151/79  Clinic readings since December 2021: Systolic range (120-140 ) and Diastolic range (70-86 )  Denies headaches/urine volume changes/dizziness/syncope/vision changes.  Recommend follow-up with PCP for continued BP monitoring.      Discussion/Management of Perioperative Care    Thromboembolic prophylaxis (VTE) Care: Risk factors for thrombosis include: obesity and surgical procedure.  I recommend prophylaxis of thromboembolism with the use of compression stockings/pneumatic devices, and/or pharmacologic agents. The benefits should outweigh the risks for pharmacologic prophylaxis in the perioperative period. I also encourage early ambulation if not contraindicated during the  post-operative period.    To reduce the risk of pulmonary complications, prophylactic recommendations include: incentive spirometry use/deep breathing, early ambulation, and pain control.    I recommend monitoring sodium during the perioperative period. Pt. is currently on an SSRI which can be associated with SIADH. Surgical procedures are associated with hypersecretion of ADH as well.    To reduce the risk of postoperative renal complications, I recommend the patient maintain adequate fluid volume status by drinking 2 liters of water daily.  Avoid/reduce NSAIDS and BUTLER-2 inhibitors use as well as IV contrast for renal protection.    I recommend the use of appropriate prophylactic antibiotics to reduce the risk of surgical site infections.    The patient is at an increased risk for urinary retention due to : possible regional anesthesia. I recommend to avoid/decrease the use of benzodiazepines, anticholinergics, and Benadryl in the perioperative period. I also recommend using opioids for the shortest period of time if possible.          This visit was focused on Preoperative evaluation, Perioperative Medical management, complication reduction plans. I suggest that the patient follows up with primary care or relevant sub specialists for ongoing health care.    I appreciate the opportunity to be involved in this patients care. Please feel free to contact me if there were any questions about this consultation.    Patient is optimized for surgery.      Tony Whiting, ANGEL  Perioperative Medicine  [facility]

## 2023-01-10 NOTE — ASSESSMENT & PLAN NOTE
BP today: 151/79  Clinic readings since December 2021: Systolic range (120-140 ) and Diastolic range (70-86 )  Denies headaches/urine volume changes/dizziness/syncope/vision changes.  Recommend follow-up with PCP for continued BP monitoring.

## 2023-01-10 NOTE — HPI
This is a 62 y.o. male  who presents today for a preoperative evaluation in preparation for an Orthopedic  procedure.  Scheduled for  left hip arthroplasty.   Surgery is indicated for osteoarthritis of left hip.  Patient is new to me.  Details of current problem: The duration of problem is  two years. Patient reports history of left knee pain and was treated with steroid injections but reported left hip and  groin pain to the ortho team.   He had a hip xray done in October 2022  revealing left hip arthritis. Pain is becoming progressively worse.   Reports symptoms of intermittent aching pain to left hip/groin radiating down to left knee.   Aggravating factors include: getting in/out car and decreased ADLs.     Relieving factors are gabapentin/movement.   Reports pain: 4/10 to left hip today; no use of assistive devices.    The history has been obtained by speaking with the patient and reviewing the electronic medical record and/or outside health information. Significant health conditions for the perioperative period are discussed below in assessment and plan.     Patient reports current health status to be Good.  Denies any new symptoms before surgery.

## 2023-01-10 NOTE — PRE-PROCEDURE INSTRUCTIONS
AVS given to patient. Medication and surgery instructions with be provided at [facility] pre admit appointment.

## 2023-01-10 NOTE — ASSESSMENT & PLAN NOTE
Not taking 50,000 units weekly as prescribed; currently taking MVI which includes Vitamin D.  Last level was low on 11/19/22  Discussed lab result with patient- he will start taking Vitamin D as prescribed.

## 2023-01-10 NOTE — DISCHARGE INSTRUCTIONS
Your surgery has been scheduled for:_______1/27/23___________________________________    You should report to:  ____[facility], located on the [place] side of the first floor of the               __X____[facility] ([phone])  ____[facility], located on the [facility] side of the            Second floor of the [facility] ([phone])  ____3rd Floor [facility] located on the [facility] side of the [facility] [phone]  ____[facility]: located at [address] [place] [address]. [address].     Please Note   Tell your doctor if you take Aspirin, products containing Aspirin, herbal medications  or blood thinners, such as Coumadin, Ticlid, or Plavix.  (Consult your provider regarding holding or stopping before surgery).  Arrange for someone to drive you home following surgery.  You will not be allowed to leave the surgical facility alone or drive yourself home following sedation and anesthesia.    Before Surgery  Stop taking all herbal medications, vitamins, and supplements 7 days prior to surgery  No Motrin/Advil (Ibuprofen) 7 days before surgery  No Aleve (Naproxen) 7 days before surgery  Stop Taking Asprin, products containing Aspirin ___7__days before surgery   No Goody's/BC Powder 7 days before surgery  Refrain from drinking alcoholic beverages for 24 hours before and after surgery  Stop or limit smoking at least 24 hours prior to surgery  You may take Tylenol for pain    Night before Surgery  Do not eat or drink after midnight  Take a shower or bath (shower is recommended).  Bathe with Hibiclens soap or an antibacterial soap from the neck down.  If not supplied by your surgeon, hibiclens soap will need to be purchased over the counter in pharmacy.  Rinse soap off thoroughly.  Shampoo your hair with your regular shampoo    The Day of  Surgery  Take another bath or shower with hibiclens or any antibacterial soap, to reduce the chance of infection.  Take heart and blood pressure medications with a small sip of water, as advised by the perioperative team.  Do not take fluid pills  You may brush your teeth and rinse your mouth, but do not swallow any additional water.   Do not apply perfumes, powder, body lotions or deodorant on the day of surgery.  Nail polish should be removed.  Do not wear makeup or moisturizer  Wear comfortable clothes, such as a button front shirt and loose fitting pants.  Leave all jewelry, including body piercings, and valuables at home.    Bring any devices you will neeed after surgery such as crutches or canes.  If you have sleep apnea, please bring your CPAP machine  In the event that your physical condition changes including the onset of a cold or respiratory illness, or if you have to delay or cancel your surgery, please notify your surgeon.

## 2023-01-10 NOTE — ASSESSMENT & PLAN NOTE
Recommend  weight loss, healthy diet (DASH/Mediterranean) and exercise.   Patient should exercise 30 minutes at least five times weekly.   Treated with: atorvastatin/fenofibrate

## 2023-01-15 RX ORDER — CELECOXIB 200 MG/1
400 CAPSULE ORAL ONCE
Status: CANCELLED | OUTPATIENT
Start: 2023-01-15 | End: 2023-01-15

## 2023-01-15 RX ORDER — AMOXICILLIN 250 MG
1 CAPSULE ORAL 2 TIMES DAILY
Status: CANCELLED | OUTPATIENT
Start: 2023-01-15

## 2023-01-15 RX ORDER — FAMOTIDINE 20 MG/1
20 TABLET, FILM COATED ORAL 2 TIMES DAILY
Status: CANCELLED | OUTPATIENT
Start: 2023-01-15

## 2023-01-15 RX ORDER — MUPIROCIN 20 MG/G
1 OINTMENT TOPICAL 2 TIMES DAILY
Status: CANCELLED | OUTPATIENT
Start: 2023-01-15 | End: 2023-01-20

## 2023-01-15 RX ORDER — NALOXONE HCL 0.4 MG/ML
0.02 VIAL (ML) INJECTION
Status: CANCELLED | OUTPATIENT
Start: 2023-01-15

## 2023-01-15 RX ORDER — SODIUM CHLORIDE 9 MG/ML
INJECTION, SOLUTION INTRAVENOUS
Status: CANCELLED | OUTPATIENT
Start: 2023-01-15

## 2023-01-15 RX ORDER — MORPHINE SULFATE 2 MG/ML
2 INJECTION, SOLUTION INTRAMUSCULAR; INTRAVENOUS
Status: CANCELLED | OUTPATIENT
Start: 2023-01-15

## 2023-01-15 RX ORDER — ASPIRIN 81 MG/1
81 TABLET ORAL 2 TIMES DAILY
Status: CANCELLED | OUTPATIENT
Start: 2023-01-15

## 2023-01-15 RX ORDER — ACETAMINOPHEN 500 MG
1000 TABLET ORAL EVERY 6 HOURS
Status: CANCELLED | OUTPATIENT
Start: 2023-01-15

## 2023-01-15 RX ORDER — PREGABALIN 75 MG/1
75 CAPSULE ORAL
Status: CANCELLED | OUTPATIENT
Start: 2023-01-15

## 2023-01-15 RX ORDER — CELECOXIB 200 MG/1
200 CAPSULE ORAL DAILY
Status: CANCELLED | OUTPATIENT
Start: 2023-01-16

## 2023-01-15 RX ORDER — LIDOCAINE HYDROCHLORIDE 10 MG/ML
1 INJECTION, SOLUTION EPIDURAL; INFILTRATION; INTRACAUDAL; PERINEURAL
Status: CANCELLED | OUTPATIENT
Start: 2023-01-15

## 2023-01-15 RX ORDER — FENTANYL CITRATE 50 UG/ML
25 INJECTION, SOLUTION INTRAMUSCULAR; INTRAVENOUS EVERY 5 MIN PRN
Status: CANCELLED | OUTPATIENT
Start: 2023-01-15

## 2023-01-15 RX ORDER — METHOCARBAMOL 750 MG/1
750 TABLET, FILM COATED ORAL 3 TIMES DAILY
Status: CANCELLED | OUTPATIENT
Start: 2023-01-15

## 2023-01-15 RX ORDER — POLYETHYLENE GLYCOL 3350 17 G/17G
17 POWDER, FOR SOLUTION ORAL DAILY
Status: CANCELLED | OUTPATIENT
Start: 2023-01-16

## 2023-01-15 RX ORDER — POLYETHYLENE GLYCOL 3350 17 G/17G
17 POWDER, FOR SOLUTION ORAL DAILY PRN
Status: CANCELLED | OUTPATIENT
Start: 2023-01-15

## 2023-01-15 RX ORDER — MUPIROCIN 20 MG/G
1 OINTMENT TOPICAL
Status: CANCELLED | OUTPATIENT
Start: 2023-01-15

## 2023-01-15 RX ORDER — OXYCODONE HYDROCHLORIDE 5 MG/1
5 TABLET ORAL
Status: CANCELLED | OUTPATIENT
Start: 2023-01-15

## 2023-01-15 RX ORDER — ACETAMINOPHEN 500 MG
1000 TABLET ORAL
Status: CANCELLED | OUTPATIENT
Start: 2023-01-15

## 2023-01-15 RX ORDER — FENTANYL CITRATE 50 UG/ML
100 INJECTION, SOLUTION INTRAMUSCULAR; INTRAVENOUS
Status: CANCELLED | OUTPATIENT
Start: 2023-01-15 | End: 2023-01-16

## 2023-01-15 RX ORDER — SODIUM CHLORIDE 9 MG/ML
INJECTION, SOLUTION INTRAVENOUS CONTINUOUS
Status: CANCELLED | OUTPATIENT
Start: 2023-01-15 | End: 2023-01-16

## 2023-01-15 RX ORDER — PREGABALIN 75 MG/1
75 CAPSULE ORAL NIGHTLY
Status: CANCELLED | OUTPATIENT
Start: 2023-01-15

## 2023-01-15 RX ORDER — PROCHLORPERAZINE EDISYLATE 5 MG/ML
5 INJECTION INTRAMUSCULAR; INTRAVENOUS EVERY 6 HOURS PRN
Status: CANCELLED | OUTPATIENT
Start: 2023-01-15

## 2023-01-15 RX ORDER — ONDANSETRON 2 MG/ML
4 INJECTION INTRAMUSCULAR; INTRAVENOUS EVERY 8 HOURS PRN
Status: CANCELLED | OUTPATIENT
Start: 2023-01-15

## 2023-01-15 RX ORDER — OXYCODONE HYDROCHLORIDE 5 MG/1
10 TABLET ORAL
Status: CANCELLED | OUTPATIENT
Start: 2023-01-15

## 2023-01-15 RX ORDER — MIDAZOLAM HYDROCHLORIDE 1 MG/ML
4 INJECTION INTRAMUSCULAR; INTRAVENOUS
Status: CANCELLED | OUTPATIENT
Start: 2023-01-15 | End: 2023-01-16

## 2023-01-16 NOTE — H&P
CC:  Left hip pain    Dylan Sawant is a 62 y.o. male with Left hip pain.  Pain is worse with activity and weight bearing.  Patient has experienced interference of activities of daily living due to increased pain and decreased range of motion. Patient has failed non-operative treatment including NSAIDs, as well as greater than 3 months of activity modification. Dylan Sawant ambulates independently.     Relevant medical conditions of significance in perioperative period:  DM- on medication managed by pcp  Depression- on medication managed by pcp  HLD- on medication managed by pcp      Past Medical History:   Diagnosis Date    Allergy     Anxiety     Elevated BP     GERD (gastroesophageal reflux disease)     Hyperlipidemia     KELLY (obstructive sleep apnea)     Restless leg syndrome        Past Surgical History:   Procedure Laterality Date    ADENOIDECTOMY      COLONOSCOPY N/A 4/8/2017    Procedure: COLONOSCOPY;  Surgeon: Kyle Moreno MD;  Location: [facility] (4TH FLR);  Service: Endoscopy;  Laterality: N/A;    NASAL SEPTUM SURGERY      RECONSTRUCTION OF NOSE N/A 6/22/2018    Procedure: RECONSTRUCTION, NOSE;  Surgeon: Tulio Echols III, MD;  Location: [facility] OR [facility] FLR;  Service: ENT;  Laterality: N/A;  1 HOUR TOTAL / LATERA  NASAL VALVE IMPLANTS    TONSILLECTOMY         Family History   Problem Relation Age of Onset    Diabetes Mother     Depression Mother     Arthritis Mother     COPD Mother     Heart disease Father     Cancer Father         pancreatic    Diabetes Father     Diverticulitis Father     Restless legs syndrome Father     No Known Problems Sister     Heart disease Paternal Uncle        Review of patient's allergies indicates:  No Known Allergies      Current Outpatient Medications:     atorvastatin (LIPITOR) 40 MG tablet, Take 1 tablet (40 mg total) by mouth once daily., Disp: 90 tablet, Rfl: 3    diclofenac sodium (VOLTAREN) 1 % Gel, Apply 2 g topically once daily., Disp: 100 g, Rfl: 0     ergocalciferol (ERGOCALCIFEROL) 50,000 unit Cap, Take 1 capsule (50,000 Units total) by mouth every 7 days. Then start daily OTC replacement after this Rx is complete, Disp: 12 capsule, Rfl: 3    EScitalopram oxalate (LEXAPRO) 20 MG tablet, Take 1 tablet (20 mg total) by mouth every evening., Disp: 90 tablet, Rfl: 1    fenofibrate micronized (LOFIBRA) 134 MG Cap, Take 1 capsule (134 mg total) by mouth every evening., Disp: 90 capsule, Rfl: 3    ferrous sulfate 325 (65 FE) MG EC tablet, Take 1 tablet (325 mg total) by mouth once daily., Disp: 90 tablet, Rfl: 3    gabapentin (NEURONTIN) 300 MG capsule, Take 3 capsules (900 mg total) by mouth daily with lunch., Disp: 90 capsule, Rfl: 3    ipratropium (ATROVENT) 42 mcg (0.06 %) nasal spray, 2 sprays by Each Nostril route 4 (four) times daily., Disp: 15 mL, Rfl: 2    meloxicam (MOBIC) 15 MG tablet, Take 1 tablet (15 mg total) by mouth once daily., Disp: 30 tablet, Rfl: 0    metFORMIN (GLUCOPHAGE-XR) 500 MG ER 24hr tablet, Take 1 tablet (500 mg total) by mouth daily with breakfast., Disp: 90 tablet, Rfl: 3    multivitamin (THERAGRAN) per tablet, Take 1 tablet by mouth once daily., Disp: , Rfl:     rOPINIRole (REQUIP) 4 MG tablet, TAKE 1 TABLET(4 MG) BY MOUTH EVERY NIGHT, Disp: 90 tablet, Rfl: 0    traZODone (DESYREL) 150 MG tablet, Take 1 tablet (150 mg total) by mouth nightly as needed for Insomnia., Disp: 90 tablet, Rfl: 1    Review of Systems:   Constitutional: no fever or chills  Eyes: no visual changes  ENT: no nasal congestion or sore throat  Respiratory: no cough or shortness of breath  Cardiovascular: no chest pain or palpitations  Gastrointestinal: no nausea or vomiting, tolerating diet  Genitourinary: no hematuria or dysuria  Integument/Breast: no rash or pruritis  Hematologic/Lymphatic: no easy bruising or lymphadenopathy  Musculoskeletal: positive for hip pain  Neurological: no seizures or tremors  Behavioral/Psych: no auditory or visual  "hallucinations  Endocrine: no heat or cold intolerance    PE:  Ht 5' 9" (1.753 m)   Wt 99.8 kg (220 lb)   BMI 32.49 kg/m²   General: Pleasant, cooperative, NAD   Gait: antalgic  HEENT: NCAT, sclera nonicteric   Lungs: Respirations are clear, equal and unlabored.   CV: S1S2; 2+ bilateral upper and lower extremity pulses.   Skin: Intact throughout LE with no rashes, erythema, or lesions  Extremities: No LE edema, NVI lower extremities    Left Hip Exam:  100 degrees flexion  0 degrees extension   0 degrees internal rotation  35 degrees external rotation  30 degrees abduction  20 degrees adduction  There is pain with passive range of motion.     Radiographs: Radiographs reveal advanced degenerative changes including subchondral cyst formation, subchondral sclerosis, osteophyte formation, joint space narrowing.     Diagnosis: Primary osteoarthritis Left hip    Plan: Left total hip arthroplasty     Due to the serious nature of total joint infection and the high prevalence of community acquired MRSA, vancomycin will be used perioperatively.              "

## 2023-01-16 NOTE — H&P (VIEW-ONLY)
CC:  Left hip pain    Dylan Sawant is a 62 y.o. male with Left hip pain.  Pain is worse with activity and weight bearing.  Patient has experienced interference of activities of daily living due to increased pain and decreased range of motion. Patient has failed non-operative treatment including NSAIDs, as well as greater than 3 months of activity modification. Dylan Sawant ambulates independently.     Relevant medical conditions of significance in perioperative period:  DM- on medication managed by pcp  Depression- on medication managed by pcp  HLD- on medication managed by pcp      Past Medical History:   Diagnosis Date    Allergy     Anxiety     Elevated BP     GERD (gastroesophageal reflux disease)     Hyperlipidemia     KELLY (obstructive sleep apnea)     Restless leg syndrome        Past Surgical History:   Procedure Laterality Date    ADENOIDECTOMY      COLONOSCOPY N/A 4/8/2017    Procedure: COLONOSCOPY;  Surgeon: Kyle Moreno MD;  Location: [facility] (4TH FLR);  Service: Endoscopy;  Laterality: N/A;    NASAL SEPTUM SURGERY      RECONSTRUCTION OF NOSE N/A 6/22/2018    Procedure: RECONSTRUCTION, NOSE;  Surgeon: Tulio Echols III, MD;  Location: [facility] OR [facility] FLR;  Service: ENT;  Laterality: N/A;  1 HOUR TOTAL / LATERA  NASAL VALVE IMPLANTS    TONSILLECTOMY         Family History   Problem Relation Age of Onset    Diabetes Mother     Depression Mother     Arthritis Mother     COPD Mother     Heart disease Father     Cancer Father         pancreatic    Diabetes Father     Diverticulitis Father     Restless legs syndrome Father     No Known Problems Sister     Heart disease Paternal Uncle        Review of patient's allergies indicates:  No Known Allergies      Current Outpatient Medications:     atorvastatin (LIPITOR) 40 MG tablet, Take 1 tablet (40 mg total) by mouth once daily., Disp: 90 tablet, Rfl: 3    diclofenac sodium (VOLTAREN) 1 % Gel, Apply 2 g topically once daily., Disp: 100 g, Rfl: 0     ergocalciferol (ERGOCALCIFEROL) 50,000 unit Cap, Take 1 capsule (50,000 Units total) by mouth every 7 days. Then start daily OTC replacement after this Rx is complete, Disp: 12 capsule, Rfl: 3    EScitalopram oxalate (LEXAPRO) 20 MG tablet, Take 1 tablet (20 mg total) by mouth every evening., Disp: 90 tablet, Rfl: 1    fenofibrate micronized (LOFIBRA) 134 MG Cap, Take 1 capsule (134 mg total) by mouth every evening., Disp: 90 capsule, Rfl: 3    ferrous sulfate 325 (65 FE) MG EC tablet, Take 1 tablet (325 mg total) by mouth once daily., Disp: 90 tablet, Rfl: 3    gabapentin (NEURONTIN) 300 MG capsule, Take 3 capsules (900 mg total) by mouth daily with lunch., Disp: 90 capsule, Rfl: 3    ipratropium (ATROVENT) 42 mcg (0.06 %) nasal spray, 2 sprays by Each Nostril route 4 (four) times daily., Disp: 15 mL, Rfl: 2    meloxicam (MOBIC) 15 MG tablet, Take 1 tablet (15 mg total) by mouth once daily., Disp: 30 tablet, Rfl: 0    metFORMIN (GLUCOPHAGE-XR) 500 MG ER 24hr tablet, Take 1 tablet (500 mg total) by mouth daily with breakfast., Disp: 90 tablet, Rfl: 3    multivitamin (THERAGRAN) per tablet, Take 1 tablet by mouth once daily., Disp: , Rfl:     rOPINIRole (REQUIP) 4 MG tablet, TAKE 1 TABLET(4 MG) BY MOUTH EVERY NIGHT, Disp: 90 tablet, Rfl: 0    traZODone (DESYREL) 150 MG tablet, Take 1 tablet (150 mg total) by mouth nightly as needed for Insomnia., Disp: 90 tablet, Rfl: 1    Review of Systems:   Constitutional: no fever or chills  Eyes: no visual changes  ENT: no nasal congestion or sore throat  Respiratory: no cough or shortness of breath  Cardiovascular: no chest pain or palpitations  Gastrointestinal: no nausea or vomiting, tolerating diet  Genitourinary: no hematuria or dysuria  Integument/Breast: no rash or pruritis  Hematologic/Lymphatic: no easy bruising or lymphadenopathy  Musculoskeletal: positive for hip pain  Neurological: no seizures or tremors  Behavioral/Psych: no auditory or visual  "hallucinations  Endocrine: no heat or cold intolerance    PE:  Ht 5' 9" (1.753 m)   Wt 99.8 kg (220 lb)   BMI 32.49 kg/m²   General: Pleasant, cooperative, NAD   Gait: antalgic  HEENT: NCAT, sclera nonicteric   Lungs: Respirations are clear, equal and unlabored.   CV: S1S2; 2+ bilateral upper and lower extremity pulses.   Skin: Intact throughout LE with no rashes, erythema, or lesions  Extremities: No LE edema, NVI lower extremities    Left Hip Exam:  100 degrees flexion  0 degrees extension   0 degrees internal rotation  35 degrees external rotation  30 degrees abduction  20 degrees adduction  There is pain with passive range of motion.     Radiographs: Radiographs reveal advanced degenerative changes including subchondral cyst formation, subchondral sclerosis, osteophyte formation, joint space narrowing.     Diagnosis: Primary osteoarthritis Left hip    Plan: Left total hip arthroplasty     Due to the serious nature of total joint infection and the high prevalence of community acquired MRSA, vancomycin will be used perioperatively.              "

## 2023-01-16 NOTE — PROGRESS NOTES
Dylan Sawant is a 62 y.o. year old here today for pre surgery optimization visit  in preparation for a Left total hip arthroplasty to be performed by Dr. Springer  on 1/27/2023.  he was last seen and treated in the clinic on 1/10/2023. he will be medically optimized by the pre op center. There has been no significant change in medical status since last visit. No fever, chills, malaise, or unexplained weight change.      Allergies, Medications, past medical and surgical history reviewed.    Focused examination performed.    Patient saw surgeon in clinic today. All questions answered. Patient encouraged to call with questions. Contact information given.     Pre, avani, and post operative procedures and expectations discussed. Goals of successful surgery reviewed and include manageable pain levels, surgical site free of infection, medication management, and ambulation with PT/OT assistance. Healthy weight management discussed with patient and caregiver who were receptive to eduction of healthy diet and activity. No other necessary lifestyle changes identified. Educated patient about signs and symptoms of infection, medication management, anticoagulation therapy, risk of tobacco and alcohol use, and self-care to promote healing. Surgical guide given for future reference. Hibiclens given to patient with instructions. All questions were answered.     Dylan Sawant verbalized an understanding to the education and goals. Patient has displayed readiness to engage in care and is ready to proceed with surgery.  Patient reports his family is able and ready to provide assistance at home after discharge.    Surgical and blood consents signed.    Dylan Sawant will contact us if there are any questions, concerns, or changes in medical status prior to surgery.       Joint class: completed    Patient has discussed discharge planning with surgeon. Patient will be discharged to home following surgery.   patient will be  scheduled with Home Health during hospitalization.     30 minutes of time was spent on patient education, review of records, templating, H&P, , appointment scheduling and optimizing patient for surgery.

## 2023-01-18 ENCOUNTER — HOSPITAL ENCOUNTER (OUTPATIENT)
Dept: PREADMISSION TESTING | Facility: OTHER | Age: 63
Discharge: HOME | End: 2023-01-18
Attending: ORTHOPAEDIC SURGERY
Payer: COMMERCIAL

## 2023-01-18 VITALS
RESPIRATION RATE: 16 BRPM | HEIGHT: 69 IN | SYSTOLIC BLOOD PRESSURE: 155 MMHG | WEIGHT: 215 LBS | TEMPERATURE: 98 F | OXYGEN SATURATION: 9 % | HEART RATE: 67 BPM | DIASTOLIC BLOOD PRESSURE: 76 MMHG | BODY MASS INDEX: 31.84 KG/M2

## 2023-01-18 RX ORDER — LIDOCAINE HYDROCHLORIDE 10 MG/ML
0.5 INJECTION, SOLUTION EPIDURAL; INFILTRATION; INTRACAUDAL; PERINEURAL ONCE
Status: CANCELLED | OUTPATIENT
Start: 2023-01-18 | End: 2023-01-18

## 2023-01-18 RX ORDER — PREGABALIN 75 MG/1
75 CAPSULE ORAL ONCE
Status: CANCELLED | OUTPATIENT
Start: 2023-01-18 | End: 2023-01-18

## 2023-01-18 RX ORDER — ACETAMINOPHEN 500 MG
1000 TABLET ORAL
Status: CANCELLED | OUTPATIENT
Start: 2023-01-18 | End: 2023-01-18

## 2023-01-18 RX ORDER — SODIUM CHLORIDE, SODIUM LACTATE, POTASSIUM CHLORIDE, CALCIUM CHLORIDE 600; 310; 30; 20 MG/100ML; MG/100ML; MG/100ML; MG/100ML
INJECTION, SOLUTION INTRAVENOUS CONTINUOUS
Status: CANCELLED | OUTPATIENT
Start: 2023-01-18

## 2023-01-18 NOTE — ANESTHESIA PREPROCEDURE EVALUATION
01/18/2023  Dylan Sawant is a 62 y.o., male.      Pre-op Assessment    I have reviewed the Patient Summary Reports.     I have reviewed the Nursing Notes.    I have reviewed the Medications.     Review of Systems  Anesthesia Hx:  No problems with previous Anesthesia  Denies Family Hx of Anesthesia complications.   Denies Personal Hx of Anesthesia complications.   Social:  Non-Smoker    Hematology/Oncology:  Hematology Normal   Oncology Normal     EENT/Dental:EENT/Dental Normal   Cardiovascular:  Cardiovascular Normal     Pulmonary:   Sleep Apnea    Renal/:  Renal/ Normal     Hepatic/GI:   GERD    Musculoskeletal:   Arthritis  Restless leg   Neurological:  Neurology Normal    Endocrine:   Diabetes    Dermatological:  Skin Normal    Psych:  Psychiatric Normal           Physical Exam  General: Well nourished and Alert    Airway:  Mallampati: II   Mouth Opening: Normal  Tongue: Normal    Dental:  Intact        Anesthesia Plan  Type of Anesthesia, risks & benefits discussed:    Anesthesia Type: Spinal  Intra-op Monitoring Plan: Standard ASA Monitors  Post Op Pain Control Plan: multimodal analgesia  Induction:  IV  Informed Consent: Informed consent signed with the Patient and all parties understand the risks and agree with anesthesia plan.  All questions answered.   ASA Score: 2  Anesthesia Plan Notes: Cleared by Ceasar ROWLAND discussed- check with Dr Springer about a block    Ready For Surgery From Anesthesia Perspective.     .

## 2023-01-18 NOTE — DISCHARGE INSTRUCTIONS
Information to Prepare you for your Surgery    PRE-ADMIT TESTING -  [phone]    [address] [facility]          Your surgery has been scheduled at [facility]. We are pleased to have the opportunity to serve you. For Further Information please call [phone].    On the day of surgery please report to the Information Desk on the 1st floor.    CONTACT YOUR PHYSICIAN'S OFFICE THE DAY PRIOR TO YOUR SURGERY TO OBTAIN YOUR ARRIVAL TIME.     The evening before surgery do not eat anything after 9 p.m. ( this includes hard candy, chewing gum and mints).  You may only have WATER  from 9 p.m. until you leave your home.   DO NOT DRINK ANY LIQUIDS ON THE WAY TO THE HOSPITAL.      Current Visitor policy(12/27/2021) - Patients may have 2 visitors pre and post procedure. Only 2 visitors will be allowed in the Surgical building with the patient. No one under the age of 12 will be allowed into the facility.    SPECIAL MEDICATION INSTRUCTIONS: TAKE medications checked off by the Anesthesiologist on your Medication List.    Angiogram Patients: Take medications as instructed by your physician, including aspirin.     Surgery Patients:    If you take ASPIRIN - Your PHYSICIAN/SURGEON will need to inform you IF/OR when you need to stop taking aspirin prior to your surgery.     The week prior to surgery do not ot take any medications containing IBUPROFEN or NSAIDS ( Advil, Motrin, Goodys, BC, Aleve, Naproxen etc) If you are not sure if you should take a medicine please call your surgeon's office.  Ok to take Tylenol    Do Not Wear any make-up (especially eye make-up) to surgery. Please remove any false eyelashes or eyelash extensions. If you arrive the day of surgery with makeup/eyelashes on you will be required to remove prior to surgery. (There is a risk of corneal abrasions if eye makeup/eyelash extensions are not removed)      Leave all valuables at home.   Do Not wear any  jewelry or watches, including any metal in body piercings. Jewelry must be removed prior to coming to the hospital.  There is a possibility that rings that are unable to be removed may be cut off if they are on the surgical extremity.    Please remove all hair extensions, wigs, clips and any other metal accessories/ ornaments from your hair.  These items may pose a flammable/fire risk in Surgery and must be removed.    Do not shave your surgical area at least 5 days prior to your surgery. The surgical prep will be performed at the hospital according to Infection Control regulations.    Contact Lens must be removed before surgery. Either do not wear the contact lens or bring a case and solution for storage.  Please bring a container for eyeglasses or dentures as required.  Bring any paperwork your physician has provided, such as consent forms,  history and physicals, doctor's orders, etc.   Bring comfortable clothes that are loose fitting to wear upon discharge. Take into consideration the type of surgery being performed.  Maintain your diet as advised per your physician the day prior to surgery.      Adequate rest the night before surgery is advised.   Park in the Parking lot behind the hospital or in the [place] [facility] across the street from the parking lot. Parking is complimentary.  If you will be discharged the same day as your procedure, please arrange for a responsible adult to drive you home or to accompany you if traveling by taxi.   YOU WILL NOT BE PERMITTED TO DRIVE OR TO LEAVE THE HOSPITAL ALONE AFTER SURGERY.   If you are being discharged the same day, it is strongly recommended that you arrange for someone to remain with you for the first 24 hrs following your surgery.    The Surgeon will speak to your family/visitor after your surgery regarding the outcome of your surgery and post op care.  The Surgeon may speak to you after your surgery, but there is a possibility you may not remember the  details.  Please check with your family members regarding the conversation with the Surgeon.    We strongly recommend whoever is bringing you home be present for discharge instructions.  This will ensure a thorough understanding for your post op home care.    ALL CHILDREN MUST ALWAYS BE ACCOMPANIED BY AN ADULT.    Visitors-Refer to current Visitor policy handouts.    Thank you for your cooperation.  The Staff of [facility].            Bathing Instructions with Hibiclens    Shower the evening before and morning of your procedure with Chlorhexidine (Hibiclens)  do not use Chlorhexidine on your face or genitals. Do not get in your eyes.  Wash your face with water and your regular face wash/soap  Use your regular shampoo  Apply Chlorhexidine (Hibiclens) directly on your skin or on a wet washcloth and wash gently. When showering: Move away from the shower stream when applying Chlorhexidine (Hibiclens) to avoid rinsing off too soon.  Rinse thoroughly with warm water  Do not dilute Chlorhexidine (Hibiclens)   Dry off as usual, do not use any deodorant, powder, body lotions, perfume, after shave or cologne.

## 2023-01-20 ENCOUNTER — TELEPHONE (OUTPATIENT)
Dept: ORTHOPEDICS | Facility: CLINIC | Age: 63
End: 2023-01-20
Payer: COMMERCIAL

## 2023-01-20 NOTE — TELEPHONE ENCOUNTER
I called the patient today regarding surgery on 1/27/2023 with Dr. Karlo Springer. I informed the patient that his surgery will be at  [facility]. I informed the patient they must arrive at 5:00am and their surgery will start at 7:00am.     Per the [facility] COVID-19 Pre-Procedural Testing Policy (administered 10/26/2022), patients do not need tested for COVID-19 regardless of vaccination status.    I reminded the patient that they cannot eat or drink after midnight, the night before surgery.     I reminded the patient to be careful of their skin over the next few days to make sure they do not get any cuts, scratches or scrapes.    The patient that they have a walker, bedside commode and shower chair from a previous surgery and do not need another order for them.    The patient verbalized understanding and has no further questions.

## 2023-01-24 DIAGNOSIS — Z96.642 STATUS POST LEFT HIP REPLACEMENT: Primary | ICD-10-CM

## 2023-01-24 RX ORDER — DEXTROMETHORPHAN HYDROBROMIDE, GUAIFENESIN 5; 100 MG/5ML; MG/5ML
650 LIQUID ORAL EVERY 8 HOURS
Qty: 120 TABLET | Refills: 0 | Status: ON HOLD | OUTPATIENT
Start: 2023-01-24 | End: 2023-11-15 | Stop reason: HOSPADM

## 2023-01-24 RX ORDER — DOCUSATE SODIUM 100 MG/1
100 CAPSULE, LIQUID FILLED ORAL 2 TIMES DAILY
Qty: 60 CAPSULE | Refills: 0 | Status: SHIPPED | OUTPATIENT
Start: 2023-01-24 | End: 2023-06-15

## 2023-01-24 RX ORDER — ASPIRIN 81 MG/1
81 TABLET ORAL 2 TIMES DAILY
Qty: 60 TABLET | Refills: 0 | Status: SHIPPED | OUTPATIENT
Start: 2023-01-24 | End: 2023-06-15

## 2023-01-24 RX ORDER — METHOCARBAMOL 750 MG/1
750 TABLET, FILM COATED ORAL 4 TIMES DAILY PRN
Qty: 40 TABLET | Refills: 0 | Status: SHIPPED | OUTPATIENT
Start: 2023-01-24 | End: 2023-02-06

## 2023-01-24 RX ORDER — CEFADROXIL 500 MG/1
500 CAPSULE ORAL EVERY 12 HOURS
Qty: 14 CAPSULE | Refills: 0 | Status: SHIPPED | OUTPATIENT
Start: 2023-01-24 | End: 2023-06-15

## 2023-01-24 RX ORDER — OXYCODONE HYDROCHLORIDE 5 MG/1
TABLET ORAL
Qty: 30 TABLET | Refills: 0 | Status: SHIPPED | OUTPATIENT
Start: 2023-01-24 | End: 2023-06-15

## 2023-01-24 RX ORDER — CELECOXIB 200 MG/1
200 CAPSULE ORAL DAILY
Qty: 30 CAPSULE | Refills: 0 | Status: SHIPPED | OUTPATIENT
Start: 2023-01-24 | End: 2023-06-15

## 2023-01-27 ENCOUNTER — HOSPITAL ENCOUNTER (OUTPATIENT)
Facility: OTHER | Age: 63
Discharge: HOME HEALTH | End: 2023-01-27
Attending: ORTHOPAEDIC SURGERY | Admitting: ORTHOPAEDIC SURGERY
Payer: COMMERCIAL

## 2023-01-27 ENCOUNTER — ANESTHESIA (OUTPATIENT)
Dept: SURGERY | Facility: OTHER | Age: 63
End: 2023-01-27
Payer: COMMERCIAL

## 2023-01-27 VITALS
OXYGEN SATURATION: 95 % | WEIGHT: 215 LBS | DIASTOLIC BLOOD PRESSURE: 53 MMHG | HEIGHT: 69 IN | HEART RATE: 75 BPM | SYSTOLIC BLOOD PRESSURE: 108 MMHG | TEMPERATURE: 98 F | RESPIRATION RATE: 15 BRPM | BODY MASS INDEX: 31.84 KG/M2

## 2023-01-27 DIAGNOSIS — M16.12 PRIMARY OSTEOARTHRITIS OF LEFT HIP: ICD-10-CM

## 2023-01-27 DIAGNOSIS — M16.10 HIP ARTHRITIS: ICD-10-CM

## 2023-01-27 LAB
POCT GLUCOSE: 105 MG/DL (ref 70–110)
POCT GLUCOSE: 98 MG/DL (ref 70–110)

## 2023-01-27 PROCEDURE — 63600175 PHARM REV CODE 636 W HCPCS: Performed by: STUDENT IN AN ORGANIZED HEALTH CARE EDUCATION/TRAINING PROGRAM

## 2023-01-27 PROCEDURE — 36000711: Performed by: ORTHOPAEDIC SURGERY

## 2023-01-27 PROCEDURE — 27201423 OPTIME MED/SURG SUP & DEVICES STERILE SUPPLY: Performed by: ORTHOPAEDIC SURGERY

## 2023-01-27 PROCEDURE — 63600175 PHARM REV CODE 636 W HCPCS

## 2023-01-27 PROCEDURE — 36000710: Performed by: ORTHOPAEDIC SURGERY

## 2023-01-27 PROCEDURE — 63600175 PHARM REV CODE 636 W HCPCS: Performed by: ANESTHESIOLOGY

## 2023-01-27 PROCEDURE — 97530 THERAPEUTIC ACTIVITIES: CPT

## 2023-01-27 PROCEDURE — 97116 GAIT TRAINING THERAPY: CPT

## 2023-01-27 PROCEDURE — 25000003 PHARM REV CODE 250: Performed by: NURSE PRACTITIONER

## 2023-01-27 PROCEDURE — 25000003 PHARM REV CODE 250: Performed by: STUDENT IN AN ORGANIZED HEALTH CARE EDUCATION/TRAINING PROGRAM

## 2023-01-27 PROCEDURE — 25000003 PHARM REV CODE 250: Performed by: ORTHOPAEDIC SURGERY

## 2023-01-27 PROCEDURE — 71000039 HC RECOVERY, EACH ADD'L HOUR: Performed by: ORTHOPAEDIC SURGERY

## 2023-01-27 PROCEDURE — 63600175 PHARM REV CODE 636 W HCPCS: Performed by: NURSE PRACTITIONER

## 2023-01-27 PROCEDURE — 71000033 HC RECOVERY, INTIAL HOUR: Performed by: ORTHOPAEDIC SURGERY

## 2023-01-27 PROCEDURE — 27130 TOTAL HIP ARTHROPLASTY: CPT | Mod: LT,,, | Performed by: ORTHOPAEDIC SURGERY

## 2023-01-27 PROCEDURE — 97535 SELF CARE MNGMENT TRAINING: CPT

## 2023-01-27 PROCEDURE — 37000009 HC ANESTHESIA EA ADD 15 MINS: Performed by: ORTHOPAEDIC SURGERY

## 2023-01-27 PROCEDURE — C1776 JOINT DEVICE (IMPLANTABLE): HCPCS | Performed by: ORTHOPAEDIC SURGERY

## 2023-01-27 PROCEDURE — 71000015 HC POSTOP RECOV 1ST HR: Performed by: ORTHOPAEDIC SURGERY

## 2023-01-27 PROCEDURE — 97162 PT EVAL MOD COMPLEX 30 MIN: CPT

## 2023-01-27 PROCEDURE — C1713 ANCHOR/SCREW BN/BN,TIS/BN: HCPCS | Performed by: ORTHOPAEDIC SURGERY

## 2023-01-27 PROCEDURE — 27130 PR TOTAL HIP ARTHROPLASTY: ICD-10-PCS | Mod: LT,,, | Performed by: ORTHOPAEDIC SURGERY

## 2023-01-27 PROCEDURE — 37000008 HC ANESTHESIA 1ST 15 MINUTES: Performed by: ORTHOPAEDIC SURGERY

## 2023-01-27 PROCEDURE — 97165 OT EVAL LOW COMPLEX 30 MIN: CPT

## 2023-01-27 PROCEDURE — 71000016 HC POSTOP RECOV ADDL HR: Performed by: ORTHOPAEDIC SURGERY

## 2023-01-27 PROCEDURE — 63600175 PHARM REV CODE 636 W HCPCS: Performed by: ORTHOPAEDIC SURGERY

## 2023-01-27 PROCEDURE — 82962 GLUCOSE BLOOD TEST: CPT | Performed by: ORTHOPAEDIC SURGERY

## 2023-01-27 DEVICE — CUP ACT PINNACLE SECTOR 56 TIT: Type: IMPLANTABLE DEVICE | Site: HIP | Status: FUNCTIONAL

## 2023-01-27 DEVICE — HEAD BIOLOX CERAMC FEM +5 36MM: Type: IMPLANTABLE DEVICE | Site: HIP | Status: FUNCTIONAL

## 2023-01-27 DEVICE — IMPLANTABLE DEVICE: Type: IMPLANTABLE DEVICE | Site: HIP | Status: FUNCTIONAL

## 2023-01-27 DEVICE — SCREW PINNACLE 6.5 X 25: Type: IMPLANTABLE DEVICE | Site: HIP | Status: FUNCTIONAL

## 2023-01-27 DEVICE — LINER PINNACLE ALTRX 36X56MM: Type: IMPLANTABLE DEVICE | Site: HIP | Status: FUNCTIONAL

## 2023-01-27 RX ORDER — ERGOCALCIFEROL 1.25 MG/1
50000 CAPSULE ORAL
Status: DISCONTINUED | OUTPATIENT
Start: 2023-01-27 | End: 2023-01-27 | Stop reason: HOSPADM

## 2023-01-27 RX ORDER — PROPOFOL 10 MG/ML
VIAL (ML) INTRAVENOUS CONTINUOUS PRN
Status: DISCONTINUED | OUTPATIENT
Start: 2023-01-27 | End: 2023-01-27

## 2023-01-27 RX ORDER — MIDAZOLAM HYDROCHLORIDE 1 MG/ML
INJECTION INTRAMUSCULAR; INTRAVENOUS
Status: DISCONTINUED | OUTPATIENT
Start: 2023-01-27 | End: 2023-01-27

## 2023-01-27 RX ORDER — PROPOFOL 10 MG/ML
VIAL (ML) INTRAVENOUS
Status: DISCONTINUED | OUTPATIENT
Start: 2023-01-27 | End: 2023-01-27

## 2023-01-27 RX ORDER — MIDAZOLAM HYDROCHLORIDE 1 MG/ML
4 INJECTION INTRAMUSCULAR; INTRAVENOUS
Status: DISCONTINUED | OUTPATIENT
Start: 2023-01-27 | End: 2023-01-27 | Stop reason: HOSPADM

## 2023-01-27 RX ORDER — PREGABALIN 75 MG/1
75 CAPSULE ORAL NIGHTLY
Status: DISCONTINUED | OUTPATIENT
Start: 2023-01-27 | End: 2023-01-27 | Stop reason: HOSPADM

## 2023-01-27 RX ORDER — GLUCAGON 1 MG
1 KIT INJECTION
Status: DISCONTINUED | OUTPATIENT
Start: 2023-01-27 | End: 2023-01-27 | Stop reason: HOSPADM

## 2023-01-27 RX ORDER — ESCITALOPRAM OXALATE 10 MG/1
20 TABLET ORAL NIGHTLY
Status: DISCONTINUED | OUTPATIENT
Start: 2023-01-27 | End: 2023-01-27 | Stop reason: HOSPADM

## 2023-01-27 RX ORDER — OXYCODONE HYDROCHLORIDE 5 MG/1
10 TABLET ORAL
Status: DISCONTINUED | OUTPATIENT
Start: 2023-01-27 | End: 2023-01-27 | Stop reason: HOSPADM

## 2023-01-27 RX ORDER — CEFADROXIL 500 MG/1
500 CAPSULE ORAL EVERY 12 HOURS
Status: DISCONTINUED | OUTPATIENT
Start: 2023-01-27 | End: 2023-01-27 | Stop reason: HOSPADM

## 2023-01-27 RX ORDER — ROPINIROLE 1 MG/1
4 TABLET, FILM COATED ORAL NIGHTLY PRN
Status: DISCONTINUED | OUTPATIENT
Start: 2023-01-27 | End: 2023-01-27 | Stop reason: HOSPADM

## 2023-01-27 RX ORDER — AMOXICILLIN 250 MG
1 CAPSULE ORAL 2 TIMES DAILY
Status: DISCONTINUED | OUTPATIENT
Start: 2023-01-27 | End: 2023-01-27 | Stop reason: HOSPADM

## 2023-01-27 RX ORDER — ROPIVACAINE HYDROCHLORIDE 5 MG/ML
INJECTION, SOLUTION EPIDURAL; INFILTRATION; PERINEURAL
Status: COMPLETED | OUTPATIENT
Start: 2023-01-27 | End: 2023-01-27

## 2023-01-27 RX ORDER — ASPIRIN 81 MG/1
81 TABLET ORAL 2 TIMES DAILY
Status: DISCONTINUED | OUTPATIENT
Start: 2023-01-27 | End: 2023-01-27

## 2023-01-27 RX ORDER — OXYCODONE HYDROCHLORIDE 5 MG/1
5 TABLET ORAL
Status: DISCONTINUED | OUTPATIENT
Start: 2023-01-27 | End: 2023-01-27 | Stop reason: HOSPADM

## 2023-01-27 RX ORDER — DEXTROMETHORPHAN HYDROBROMIDE, GUAIFENESIN 5; 100 MG/5ML; MG/5ML
650 LIQUID ORAL EVERY 8 HOURS
Status: DISCONTINUED | OUTPATIENT
Start: 2023-01-27 | End: 2023-01-27

## 2023-01-27 RX ORDER — METHOCARBAMOL 750 MG/1
750 TABLET, FILM COATED ORAL 4 TIMES DAILY PRN
Status: DISCONTINUED | OUTPATIENT
Start: 2023-01-27 | End: 2023-01-27 | Stop reason: HOSPADM

## 2023-01-27 RX ORDER — LIDOCAINE HYDROCHLORIDE 10 MG/ML
0.5 INJECTION, SOLUTION EPIDURAL; INFILTRATION; INTRACAUDAL; PERINEURAL ONCE
Status: DISCONTINUED | OUTPATIENT
Start: 2023-01-27 | End: 2023-01-27

## 2023-01-27 RX ORDER — LIDOCAINE HYDROCHLORIDE 20 MG/ML
INJECTION INTRAVENOUS
Status: DISCONTINUED | OUTPATIENT
Start: 2023-01-27 | End: 2023-01-27

## 2023-01-27 RX ORDER — FENOFIBRATE 134 MG/1
134 CAPSULE ORAL NIGHTLY
Status: DISCONTINUED | OUTPATIENT
Start: 2023-01-27 | End: 2023-01-27 | Stop reason: HOSPADM

## 2023-01-27 RX ORDER — CELECOXIB 200 MG/1
200 CAPSULE ORAL DAILY
Status: DISCONTINUED | OUTPATIENT
Start: 2023-01-28 | End: 2023-01-27 | Stop reason: HOSPADM

## 2023-01-27 RX ORDER — KETOROLAC TROMETHAMINE 30 MG/ML
INJECTION, SOLUTION INTRAMUSCULAR; INTRAVENOUS
Status: DISCONTINUED | OUTPATIENT
Start: 2023-01-27 | End: 2023-01-27 | Stop reason: HOSPADM

## 2023-01-27 RX ORDER — METFORMIN HYDROCHLORIDE 500 MG/1
500 TABLET, EXTENDED RELEASE ORAL
Status: DISCONTINUED | OUTPATIENT
Start: 2023-01-27 | End: 2023-01-27 | Stop reason: HOSPADM

## 2023-01-27 RX ORDER — NALOXONE HCL 0.4 MG/ML
0.02 VIAL (ML) INJECTION
Status: DISCONTINUED | OUTPATIENT
Start: 2023-01-27 | End: 2023-01-27 | Stop reason: HOSPADM

## 2023-01-27 RX ORDER — ATORVASTATIN CALCIUM 20 MG/1
40 TABLET, FILM COATED ORAL DAILY
Status: DISCONTINUED | OUTPATIENT
Start: 2023-01-27 | End: 2023-01-27 | Stop reason: HOSPADM

## 2023-01-27 RX ORDER — MUPIROCIN 20 MG/G
1 OINTMENT TOPICAL
Status: COMPLETED | OUTPATIENT
Start: 2023-01-27 | End: 2023-01-27

## 2023-01-27 RX ORDER — PHENYLEPHRINE HYDROCHLORIDE 10 MG/ML
INJECTION INTRAVENOUS
Status: DISCONTINUED | OUTPATIENT
Start: 2023-01-27 | End: 2023-01-27

## 2023-01-27 RX ORDER — PREGABALIN 75 MG/1
75 CAPSULE ORAL ONCE
Status: DISCONTINUED | OUTPATIENT
Start: 2023-01-27 | End: 2023-01-27

## 2023-01-27 RX ORDER — CELECOXIB 200 MG/1
400 CAPSULE ORAL ONCE
Status: COMPLETED | OUTPATIENT
Start: 2023-01-27 | End: 2023-01-27

## 2023-01-27 RX ORDER — DOCUSATE SODIUM 100 MG/1
100 CAPSULE, LIQUID FILLED ORAL 2 TIMES DAILY
Status: DISCONTINUED | OUTPATIENT
Start: 2023-01-27 | End: 2023-01-27 | Stop reason: HOSPADM

## 2023-01-27 RX ORDER — SODIUM CHLORIDE, SODIUM LACTATE, POTASSIUM CHLORIDE, CALCIUM CHLORIDE 600; 310; 30; 20 MG/100ML; MG/100ML; MG/100ML; MG/100ML
INJECTION, SOLUTION INTRAVENOUS CONTINUOUS
Status: DISCONTINUED | OUTPATIENT
Start: 2023-01-27 | End: 2023-01-27 | Stop reason: HOSPADM

## 2023-01-27 RX ORDER — LIDOCAINE HYDROCHLORIDE 10 MG/ML
1 INJECTION, SOLUTION EPIDURAL; INFILTRATION; INTRACAUDAL; PERINEURAL
Status: DISCONTINUED | OUTPATIENT
Start: 2023-01-27 | End: 2023-01-27 | Stop reason: HOSPADM

## 2023-01-27 RX ORDER — PROCHLORPERAZINE EDISYLATE 5 MG/ML
5 INJECTION INTRAMUSCULAR; INTRAVENOUS EVERY 6 HOURS PRN
Status: DISCONTINUED | OUTPATIENT
Start: 2023-01-27 | End: 2023-01-27 | Stop reason: HOSPADM

## 2023-01-27 RX ORDER — ACETAMINOPHEN 500 MG
1000 TABLET ORAL EVERY 6 HOURS
Status: DISCONTINUED | OUTPATIENT
Start: 2023-01-27 | End: 2023-01-27 | Stop reason: HOSPADM

## 2023-01-27 RX ORDER — ONDANSETRON 2 MG/ML
4 INJECTION INTRAMUSCULAR; INTRAVENOUS EVERY 8 HOURS PRN
Status: DISCONTINUED | OUTPATIENT
Start: 2023-01-27 | End: 2023-01-27 | Stop reason: HOSPADM

## 2023-01-27 RX ORDER — ASPIRIN 81 MG/1
81 TABLET ORAL 2 TIMES DAILY
Status: DISCONTINUED | OUTPATIENT
Start: 2023-01-27 | End: 2023-01-27 | Stop reason: HOSPADM

## 2023-01-27 RX ORDER — TRAZODONE HYDROCHLORIDE 50 MG/1
150 TABLET ORAL NIGHTLY PRN
Status: DISCONTINUED | OUTPATIENT
Start: 2023-01-27 | End: 2023-01-27 | Stop reason: HOSPADM

## 2023-01-27 RX ORDER — ACETAMINOPHEN 500 MG
1000 TABLET ORAL
Status: DISCONTINUED | OUTPATIENT
Start: 2023-01-27 | End: 2023-01-27

## 2023-01-27 RX ORDER — SODIUM CHLORIDE 9 MG/ML
INJECTION, SOLUTION INTRAVENOUS
Status: DISCONTINUED | OUTPATIENT
Start: 2023-01-27 | End: 2023-01-27 | Stop reason: HOSPADM

## 2023-01-27 RX ORDER — IBUPROFEN 200 MG
16 TABLET ORAL
Status: DISCONTINUED | OUTPATIENT
Start: 2023-01-27 | End: 2023-01-27 | Stop reason: HOSPADM

## 2023-01-27 RX ORDER — VANCOMYCIN HYDROCHLORIDE 1 G/20ML
INJECTION, POWDER, LYOPHILIZED, FOR SOLUTION INTRAVENOUS
Status: DISCONTINUED | OUTPATIENT
Start: 2023-01-27 | End: 2023-01-27 | Stop reason: HOSPADM

## 2023-01-27 RX ORDER — MORPHINE SULFATE 4 MG/ML
INJECTION, SOLUTION INTRAMUSCULAR; INTRAVENOUS
Status: DISCONTINUED | OUTPATIENT
Start: 2023-01-27 | End: 2023-01-27 | Stop reason: HOSPADM

## 2023-01-27 RX ORDER — ONDANSETRON 2 MG/ML
INJECTION INTRAMUSCULAR; INTRAVENOUS
Status: DISCONTINUED | OUTPATIENT
Start: 2023-01-27 | End: 2023-01-27

## 2023-01-27 RX ORDER — FENTANYL CITRATE 50 UG/ML
INJECTION, SOLUTION INTRAMUSCULAR; INTRAVENOUS
Status: DISCONTINUED | OUTPATIENT
Start: 2023-01-27 | End: 2023-01-27

## 2023-01-27 RX ORDER — ACETAMINOPHEN 500 MG
1000 TABLET ORAL
Status: COMPLETED | OUTPATIENT
Start: 2023-01-27 | End: 2023-01-27

## 2023-01-27 RX ORDER — SODIUM CHLORIDE 9 MG/ML
INJECTION, SOLUTION INTRAVENOUS CONTINUOUS
Status: DISCONTINUED | OUTPATIENT
Start: 2023-01-27 | End: 2023-01-27 | Stop reason: HOSPADM

## 2023-01-27 RX ORDER — FENTANYL CITRATE 50 UG/ML
25 INJECTION, SOLUTION INTRAMUSCULAR; INTRAVENOUS EVERY 5 MIN PRN
Status: DISCONTINUED | OUTPATIENT
Start: 2023-01-27 | End: 2023-01-27 | Stop reason: HOSPADM

## 2023-01-27 RX ORDER — FAMOTIDINE 20 MG/1
20 TABLET, FILM COATED ORAL 2 TIMES DAILY
Status: DISCONTINUED | OUTPATIENT
Start: 2023-01-27 | End: 2023-01-27 | Stop reason: HOSPADM

## 2023-01-27 RX ORDER — POLYETHYLENE GLYCOL 3350 17 G/17G
17 POWDER, FOR SOLUTION ORAL DAILY PRN
Status: DISCONTINUED | OUTPATIENT
Start: 2023-01-27 | End: 2023-01-27 | Stop reason: HOSPADM

## 2023-01-27 RX ORDER — METHYLPREDNISOLONE ACETATE 40 MG/ML
INJECTION, SUSPENSION INTRA-ARTICULAR; INTRALESIONAL; INTRAMUSCULAR; SOFT TISSUE
Status: DISCONTINUED | OUTPATIENT
Start: 2023-01-27 | End: 2023-01-27 | Stop reason: HOSPADM

## 2023-01-27 RX ORDER — MORPHINE SULFATE 10 MG/ML
2 INJECTION INTRAMUSCULAR; INTRAVENOUS; SUBCUTANEOUS
Status: DISCONTINUED | OUTPATIENT
Start: 2023-01-27 | End: 2023-01-27 | Stop reason: HOSPADM

## 2023-01-27 RX ORDER — FENTANYL CITRATE 50 UG/ML
100 INJECTION, SOLUTION INTRAMUSCULAR; INTRAVENOUS
Status: DISCONTINUED | OUTPATIENT
Start: 2023-01-27 | End: 2023-01-27 | Stop reason: HOSPADM

## 2023-01-27 RX ORDER — PREGABALIN 75 MG/1
75 CAPSULE ORAL
Status: COMPLETED | OUTPATIENT
Start: 2023-01-27 | End: 2023-01-27

## 2023-01-27 RX ORDER — IBUPROFEN 200 MG
24 TABLET ORAL
Status: DISCONTINUED | OUTPATIENT
Start: 2023-01-27 | End: 2023-01-27 | Stop reason: HOSPADM

## 2023-01-27 RX ORDER — GABAPENTIN 300 MG/1
900 CAPSULE ORAL
Status: DISCONTINUED | OUTPATIENT
Start: 2023-01-27 | End: 2023-01-27 | Stop reason: HOSPADM

## 2023-01-27 RX ADMIN — PHENYLEPHRINE HYDROCHLORIDE 200 MCG: 10 INJECTION INTRAVENOUS at 08:01

## 2023-01-27 RX ADMIN — ACETAMINOPHEN 1000 MG: 500 TABLET, FILM COATED ORAL at 05:01

## 2023-01-27 RX ADMIN — TRANEXAMIC ACID 1000 MG: 100 INJECTION, SOLUTION INTRAVENOUS at 07:01

## 2023-01-27 RX ADMIN — OXYCODONE HYDROCHLORIDE 5 MG: 5 TABLET ORAL at 02:01

## 2023-01-27 RX ADMIN — CEFAZOLIN 2 G: 2 INJECTION, POWDER, FOR SOLUTION INTRAMUSCULAR; INTRAVENOUS at 07:01

## 2023-01-27 RX ADMIN — LIDOCAINE HYDROCHLORIDE 100 MG: 20 INJECTION, SOLUTION INTRAVENOUS at 07:01

## 2023-01-27 RX ADMIN — PREGABALIN 75 MG: 75 CAPSULE ORAL at 05:01

## 2023-01-27 RX ADMIN — PROPOFOL 100 MG: 10 INJECTION, EMULSION INTRAVENOUS at 07:01

## 2023-01-27 RX ADMIN — PHENYLEPHRINE HYDROCHLORIDE 50 MCG: 10 INJECTION INTRAVENOUS at 07:01

## 2023-01-27 RX ADMIN — MIDAZOLAM HYDROCHLORIDE 2 MG: 1 INJECTION, SOLUTION INTRAMUSCULAR; INTRAVENOUS at 06:01

## 2023-01-27 RX ADMIN — SODIUM CHLORIDE, SODIUM LACTATE, POTASSIUM CHLORIDE, AND CALCIUM CHLORIDE: .6; .31; .03; .02 INJECTION, SOLUTION INTRAVENOUS at 06:01

## 2023-01-27 RX ADMIN — TRANEXAMIC ACID 1000 MG: 100 INJECTION, SOLUTION INTRAVENOUS at 08:01

## 2023-01-27 RX ADMIN — PROPOFOL 200 MCG/KG/MIN: 10 INJECTION, EMULSION INTRAVENOUS at 07:01

## 2023-01-27 RX ADMIN — FENTANYL CITRATE 50 MCG: 50 INJECTION, SOLUTION INTRAMUSCULAR; INTRAVENOUS at 07:01

## 2023-01-27 RX ADMIN — PHENYLEPHRINE HYDROCHLORIDE 100 MCG: 10 INJECTION INTRAVENOUS at 08:01

## 2023-01-27 RX ADMIN — SODIUM CHLORIDE, POTASSIUM CHLORIDE, SODIUM LACTATE AND CALCIUM CHLORIDE: 600; 310; 30; 20 INJECTION, SOLUTION INTRAVENOUS at 06:01

## 2023-01-27 RX ADMIN — MUPIROCIN 1 G: 20 OINTMENT TOPICAL at 05:01

## 2023-01-27 RX ADMIN — VANCOMYCIN HYDROCHLORIDE 1500 MG: 1.5 INJECTION, POWDER, LYOPHILIZED, FOR SOLUTION INTRAVENOUS at 06:01

## 2023-01-27 RX ADMIN — SODIUM CHLORIDE: 9 INJECTION, SOLUTION INTRAVENOUS at 01:01

## 2023-01-27 RX ADMIN — OXYCODONE HYDROCHLORIDE 5 MG: 5 TABLET ORAL at 09:01

## 2023-01-27 RX ADMIN — CEFAZOLIN 2 G: 2 INJECTION, POWDER, FOR SOLUTION INTRAMUSCULAR; INTRAVENOUS at 02:01

## 2023-01-27 RX ADMIN — GLYCOPYRROLATE 0.2 MG: 0.2 INJECTION, SOLUTION INTRAMUSCULAR; INTRAVITREAL at 06:01

## 2023-01-27 RX ADMIN — CELECOXIB 400 MG: 200 CAPSULE ORAL at 05:01

## 2023-01-27 RX ADMIN — ROPIVACAINE HYDROCHLORIDE 3 ML: 5 INJECTION, SOLUTION EPIDURAL; INFILTRATION; PERINEURAL at 06:01

## 2023-01-27 RX ADMIN — SODIUM CHLORIDE, SODIUM LACTATE, POTASSIUM CHLORIDE, AND CALCIUM CHLORIDE: .6; .31; .03; .02 INJECTION, SOLUTION INTRAVENOUS at 08:01

## 2023-01-27 RX ADMIN — ONDANSETRON HYDROCHLORIDE 4 MG: 2 INJECTION INTRAMUSCULAR; INTRAVENOUS at 06:01

## 2023-01-27 RX ADMIN — GABAPENTIN 900 MG: 300 CAPSULE ORAL at 01:01

## 2023-01-27 NOTE — PT/OT/SLP EVAL
Occupational Therapy   Evaluation and Treatment    Name: Dylan Sawant  MRN: [id number]  Admitting Diagnosis: Primary osteoarthritis of left hip  Recent Surgery: Procedure(s) (LRB):  ARTHROPLASTY, HIP, TOTAL, ANTERIOR APPROACH: LEFT: DEPUY - ACTIS + PINNACLE (Left) Day of Surgery    Recommendations:     Discharge Recommendations: home health OT, home health PT  Discharge Equipment Recommendations:  none (Has RW and SPC)  Barriers to discharge:  None    Assessment:     Dylan Sawant is a 62 y.o. male with a medical diagnosis of Primary osteoarthritis of left hip.  He presents with wife in room for education and training in pt's left hip precautions and safety strategies for ADL and ADL mobility using adaptive equipment and DME.   Pt and wife receptive to all education and training and have no further questions/concerns about pt's hip precautions prior to discharging home today.  Pt to have Home Health services follow up tomorrow.  Performance deficits affecting function: weakness, impaired endurance, impaired self care skills, impaired functional mobility, gait instability, impaired balance, decreased coordination, decreased lower extremity function, decreased safety awareness, pain, impaired skin, edema, orthopedic precautions, impaired joint extensibility, decreased ROM.      Rehab Prognosis: Good; patient would benefit from acute skilled OT services to address these deficits and reach maximum level of function.       Plan:     Patient to be seen  (Today) to address the above listed problems via self-care/home management, therapeutic activities  Plan of Care Expires: 01/27/23  Plan of Care Reviewed with: patient, spouse    Subjective     Chief Complaint: None  Patient/Family Comments/goals:     Occupational Profile:  Living Environment: Lives with wife in [facility] with walk-in shower with seat  Previous level of function: Indep/Mod I, limited by hip pain   Roles and Routines: Retired from [employer], ,  Fishing from [place]  Equipment Used at Home:  None but has access to needed  DME (RW, BSC and shower seat)  and just purchased a Hip Kit; elevated toilet seat  Assistance upon Discharge: Wife    Pain/Comfort:  Pain Rating 1:  (Pt not rating pain and able to perform all tasks asked of him during evaluation and tx session.)  Location - Side 1: Left  Location 1: hip  Pain Addressed 1: Reposition, Distraction (pt was premedicated prior to OT session.)  Pain Rating Post-Intervention 1:  (Pt not rating pain .)    Patients cultural, spiritual, Sabianist conflicts given the current situation: no    Objective:     Communicated with: nurse prior to session.  Patient found up in chair with cryotherapy, peripheral IV upon OT entry to room.    General Precautions: Standard, fall  Orthopedic Precautions: LLE weight bearing as tolerated, LLE anterior precautions (No hip flexion > 90 degrees.)  Braces: N/A  Respiratory Status: Room air    Occupational Performance:    Bed Mobility:    NT    Functional Mobility/Transfers:  Sit to stand: SBA with RW, verbal cues for adhering to hip precautions and correct hand placement for RW use  Chair transfer: CGA with RW progressing to SBA with RW, verbal cues for extending left LE prior to sitting  Education/training on correct placement of RW when standing at toilet to urinate  Pt and wife educated on and OT demonstrated technique for car transfer with pt adhering to hip precautions.   Functional Mobility: No LOB, pt's wife demonstrating ability to assist with cueing pt to adhere to hip precautions after education and training    Activities of Daily Living:  Feeding: Indep  UB dressing: Set up  LB Dressing: Max A prior to education and training on hip precautions and use of adaptive equipment for LB dressing  Toileting: SBA standing at toilet to urinate after education and training on correct placement of RW when standing to urinate; pt and wife educated on technique for pt to perform hygiene  after BM and adhere to hip precautions    Cognitive/Visual Perceptual:  Intact; no deficits; pt receptive to all education and training on how to adhere to precautions and demonstrating good follow through at end of tx session.  Pt's wife demonstrating ability to assist with cueing pt on adhering to hip precautions.     Physical Exam:  Sitting Balance: Good within hip precautions  Standing Balance: SBA with RW   UE AROM: No deficits  UE Strength: 5/5  UE Coordination: No deficits  Skin: Visible skin intact  Sensation: Light touch grossly intact    AMPAC 6 Click ADL:  AMPAC Total Score: 16    Treatment & Education:  Role of OT, POC, left anterior CASSANDRA precautions including restricted hip flexion to 90 degrees and how to adhere to precautions during ADL and ADL mobility, safety strategies to reduce fall risk, education and training in use of adpative equipment and DME, recommend Home Health OT address showering and shower transfers within MD orders    Patient left up in chair with all lines intact, call button in reach, nurse notified, and wife present    GOALS:   Multidisciplinary Problems       Occupational Therapy Goals       Not on file              Multidisciplinary Problems (Resolved)          Problem: Occupational Therapy    Goal Priority Disciplines Outcome Interventions   Occupational Therapy Goal   (Resolved)     OT, PT/OT Met    Description: Goals to be met by: 1/27/2023      Patient will increase functional independence with ADLs by performing:    Demonstrate understanding of left anterior CASSANDRA precautions including avoiding hip flexion past 90 degrees and how to adhere to precautions during LB self care tasks.  Demonstrate understanding of left anterior CASSANDRA precautions including avoiding hip flexion past 90 degrees and how to adhere to precautions during ADL mobility and transfers using RW.                          History:     Past Medical History:   Diagnosis Date    Allergy     Anxiety     Diabetes  mellitus     Elevated BP     GERD (gastroesophageal reflux disease)     Hyperlipidemia     KELLY (obstructive sleep apnea)     wears cpap    Restless leg syndrome          Past Surgical History:   Procedure Laterality Date    ADENOIDECTOMY      COLONOSCOPY N/A 4/8/2017    Procedure: COLONOSCOPY;  Surgeon: Kyle Moreno MD;  Location: [facility] ([facility]TH [facility]);  Service: Endoscopy;  Laterality: N/A;    NASAL SEPTUM SURGERY      RECONSTRUCTION OF NOSE N/A 6/22/2018    Procedure: RECONSTRUCTION, NOSE;  Surgeon: Tulio Echols III, MD;  Location: [address];  Service: ENT;  Laterality: N/A;  1 HOUR TOTAL / LATERA  NASAL VALVE IMPLANTS    TONSILLECTOMY         Time Tracking:     OT Date of Treatment: 01/27/23  OT Start Time: 1448  OT Stop Time: 1534  OT Total Time (min): 46 min    Billable Minutes:Evaluation 10  Self Care/Home Management 36    1/27/2023

## 2023-01-27 NOTE — PT/OT/SLP EVAL
Physical Therapy Evaluation and Treatment    Patient Name:  Dylan Sawant   MRN:  [id number]    Recommendations:     Discharge Recommendations: home health PT   Discharge Equipment Recommendations: walker, rolling, bedside commode (has RW and SC)   Barriers to discharge: None    Assessment:     Dylan Sawant is a 62 y.o. male admitted with a medical diagnosis of Primary osteoarthritis of left hip.  He presents with the following impairments/functional limitations: weakness, impaired self care skills, impaired functional mobility, gait instability, impaired balance, decreased coordination, decreased lower extremity function, decreased safety awareness, pain, decreased ROM, impaired skin, edema, orthopedic precautions.    Patient evaluated by PT and goals established. Patient with minimal pain throughout session and  hip precautions reviewed prior to OOB mobility with pt requiring moderate cueing (spouse at bedside and assisting with cueing for adherence). Gait, bed mobility, stairs, transfer, and car transfer training performed with pt requiring CGA progressing to SBA. Tendency to forward flexion trunk but easily corrected. Patient appropriate for dc to home with HH at this time.    Rehab Prognosis:  Excellent ; patient would benefit from acute skilled PT services to address these deficits and reach maximum level of function.    Recent Surgery: Procedure(s) (LRB):  ARTHROPLASTY, HIP, TOTAL, ANTERIOR APPROACH: LEFT: DEPUY - ACTIS + PINNACLE (Left) Day of Surgery    Plan:     During this hospitalization, patient to be seen daily to address the identified rehab impairments via gait training, therapeutic activities, therapeutic exercises, neuromuscular re-education and progress toward the following goals:    Plan of Care Expires:  01/28/23    Subjective     Chief Complaint: Pain in hip, worsened with WB  Patient/Family Comments/goals: Goal to return to PLOF including fishing from his kayak; Patient agreeable to  evaluation.  Pain/Comfort:  Pain Rating 1:  (slight)  Location - Side 1: Left  Location 1: hip  Pain Addressed 1: Reposition, Distraction, Cessation of Activity, Nurse notified  Pain Rating Post-Intervention 1:  (slight increase)    Patients cultural, spiritual, Orthodoxy conflicts given the current situation: no    Living Environment:  Pt lives with his wife in a single story home with threshold steps to enter  Pt has a walk in shower with a regular toilet.   Upon discharge, patient will have assistance from his wife (limited physical assistance d/t functional impairments) and his neighbor ( PT).  Prior level of function:  Ambulation: Indep  ADL's: Indep  IADLs: Indep  Retired  Enjoys fishing in his [place]  Falls: Denies    Objective:     Communicated with RN Gayle prior to session.  Patient found HOB elevated with peripheral IV, cryotherapy  upon PT entry to room.    General Precautions: Standard, fall  Orthopedic Precautions:LLE weight bearing as tolerated, LLE anterior precautions (no hip flexion >90 deg)   Braces: N/A  Respiratory Status: Room air    Patient donned non slip socks and gait belt for OOB mobility.    Exams:  Cognition:   Patient is oriented to name, , date, place, situation.  Pt follows approximately 100% of one step commands.    Mood: Pleasant and cooperative.   Musculoskeletal:  Posture: Protective guarding surgical joint  LE ROM/Strength: 5/5 bilateral ankle dorsiflexion and plantarflexion, nonsurgical hip flexion and extension, and non surgical knee flexion and extension. Surgical hip flexion/extension and knee flexion/ext grossly 3+/5 but formal MMT deferred at this time. AROM surgical hip flexion limited to 90 degrees. AROM nonsurgical extremity WFL.  Neuromuscular:  Sensation: Intact to light touch bilateral LEs. Pt denied paresthesias.   Coordination/Tone/Reflexes: No impairments identified with functional mobility. No formal testing performed.   Balance: CGA for dynamic standing  "with bilateral UE support.   Visual-vestibular: No impairments identified with functional mobility. No formal testing performed.  Integument:  Visible skin intact and surgical extremity dressing clean and dry.   Cardiopulmonary:  Edema: Mild surgical extremity.    Color/temperature/pulses: Equal      Functional Mobility:  Bed Mobility:     Supine to Sit: contact guard assistance  Transfers:     Sit to Stand:  stand by assistance and contact guard assistance with rolling walker  Tendency for anterior flexion of trunk  Gait: 2x120 ft with RW and CGA progressing to SBA.   Gait deviations of decreased stride length with step to gait pattern, decreased surgical knee flexion, and decreased surgical heel strike/toe off. Increased double limb support time.  Stairs:  Pt ascended/descended 4" curb step with Rolling Walker with no handrails with Contact Guard Assistance.       AM-PAC 6 CLICK MOBILITY  Total Score:18       Treatment & Education:  Pt performed supine therapeutic exercises including ankle pumps, quad sets, glute sets, SAQs x 10 reps with verbal and tactile cues.   HO provided  .Reviewed orthopedic precautions: anterior hip + no hip flexion >90 deg with patient. Patient verbalized understanding. Orthopedic precautions reinforced during session with verbal and tactile cues.   Gait:  Demo of use of RW with adherence to hip precautions  Cueing for step thru gait with appropriate carry over  Demo of CECILIA sequencing with minimal cueing for performance  PT educated patient and spouse re:   PT plan of care/role of PT  Safety with OOB mobility  Use of RW  Positioning of LE and use of ice  Orthopedic precautions  Gait deviations  Therapeutic exercises  Car transfers  Discharge disposition    Pt and spouse verbalized understanding , HO provided      Patient left up in chair with all lines intact, call button in reach, RN and OT notified, and wife present.    GOALS:   Multidisciplinary Problems       Physical Therapy Goals  "         Problem: Physical Therapy    Goal Priority Disciplines Outcome Goal Variances Interventions   Physical Therapy Goal     PT, PT/OT Ongoing, Progressing     Description: Goals to be met by: 23    Patient will increase functional independence with mobility by performin. Supine<>sit with supervision without use of hospital bed features.   2. Sit<>stand with supervision with RW.  3. Gait x 150 feet with supervision with RW.  4. Ascend/descend threshold step(s) with least restrictive assistive device and supervision.  5. Verbalize anterior hip precautions + no hip flexion >90 deg without cueing.                         History:     Past Medical History:   Diagnosis Date    Allergy     Anxiety     Diabetes mellitus     Elevated BP     GERD (gastroesophageal reflux disease)     Hyperlipidemia     KELLY (obstructive sleep apnea)     wears cpap    Restless leg syndrome        Past Surgical History:   Procedure Laterality Date    ADENOIDECTOMY      COLONOSCOPY N/A 2017    Procedure: COLONOSCOPY;  Surgeon: Kyle Moreno MD;  Location: [address]);  Service: Endoscopy;  Laterality: N/A;    NASAL SEPTUM SURGERY      RECONSTRUCTION OF NOSE N/A 2018    Procedure: RECONSTRUCTION, NOSE;  Surgeon: Tulio Echols III, MD;  Location: [address];  Service: ENT;  Laterality: N/A;  1 HOUR TOTAL / LATERA  NASAL VALVE IMPLANTS    TONSILLECTOMY         Time Tracking:     PT Received On: 23  PT Start Time: 1342     PT Stop Time: 1414  PT Total Time (min): 32 min     Billable Minutes: Evaluation 8, Gait Training 16, and Therapeutic Exercise 8      2023

## 2023-01-27 NOTE — ANESTHESIA PROCEDURE NOTES
Spinal    Diagnosis: arthritis  Patient location during procedure: holding area  Start time: 1/27/2023 6:43 AM  Timeout: 1/27/2023 6:42 AM  End time: 1/27/2023 6:52 AM    Staffing  Authorizing Provider: Jose Elias Carter MD  Performing Provider: Jose Elias Carter MD    Preanesthetic Checklist  Completed: patient identified, IV checked, site marked, risks and benefits discussed, surgical consent, monitors and equipment checked, pre-op evaluation and timeout performed  Spinal Block  Patient position: sitting  Prep: Betadine  Patient monitoring: cardiac monitor, continuous pulse ox and frequent blood pressure checks  Approach: midline  Location: L2-3  Injection technique: single shot  CSF Fluid: clear free-flowing CSF  Needle  Needle gauge: 25 G  Needle length: 3.5 in  Needle localization: anatomical landmarks  Assessment  Sensory level: T4  Patient's tolerance of the procedure: comfortable throughout block  Medications:    Medications: ropivacaine (NAROPIN) injection 0.5% - Intraspinal   3 mL - 1/27/2023 6:47:00 AM

## 2023-01-27 NOTE — ANESTHESIA POSTPROCEDURE EVALUATION
Anesthesia Post Evaluation    Patient: Dylan Sawant    Procedure(s) Performed: Procedure(s) (LRB):  ARTHROPLASTY, HIP, TOTAL, ANTERIOR APPROACH: LEFT: DEPUY - ACTIS + PINNACLE (Left)    Final Anesthesia Type: spinal      Patient location during evaluation: PACU  Patient participation: Yes- Able to Participate  Level of consciousness: awake and alert  Post-procedure vital signs: reviewed and stable  Pain management: adequate  Airway patency: patent    PONV status at discharge: No PONV  Anesthetic complications: no      Cardiovascular status: blood pressure returned to baseline and stable  Respiratory status: room air  Hydration status: euvolemic  Follow-up not needed.          Vitals Value Taken Time   /68 01/27/23 0851   Temp  01/27/23 0900   Pulse 65 01/27/23 0900   Resp  01/27/23 0900   SpO2 97 % 01/27/23 0900   Vitals shown include unvalidated device data.      No case tracking events are documented in the log.      Pain/Gee Score: Pain Rating Prior to Med Admin: -- (preop) (1/27/2023  5:57 AM)

## 2023-01-27 NOTE — TRANSFER OF CARE
"Anesthesia Transfer of Care Note    Patient: Dylan Sawant    Procedure(s) Performed: Procedure(s) (LRB):  ARTHROPLASTY, HIP, TOTAL, ANTERIOR APPROACH: LEFT: DEPUY - ACTIS + PINNACLE (Left)    Patient location: PACU    Anesthesia Type: general    Transport from OR: Transported from OR on 6-10 L/min O2 by face mask with adequate spontaneous ventilation    Post pain: adequate analgesia    Post assessment: no apparent anesthetic complications and tolerated procedure well    Post vital signs: stable    Level of consciousness: sedated    Nausea/Vomiting: no nausea/vomiting    Complications: none    Transfer of care protocol was followed      Last vitals:   Visit Vitals  BP (!) 159/89 (BP Location: Left arm, Patient Position: Sitting)   Pulse 62   Temp 36.3 °C (97.3 °F) (Temporal)   Resp 20   Ht 5' 9" (1.753 m)   Wt 97.5 kg (215 lb)   SpO2 99%   BMI 31.75 kg/m²     "

## 2023-01-27 NOTE — PLAN OF CARE
Problem: Occupational Therapy  Goal: Occupational Therapy Goal  Description: Goals to be met by: 1/27/2023      Patient will increase functional independence with ADLs by performing:    Demonstrate understanding of left anterior CASSANDRA precautions including avoiding hip flexion past 90 degrees and how to adhere to precautions during LB self care tasks.  Demonstrate understanding of left anterior CASSANDRA precautions including avoiding hip flexion past 90 degrees and how to adhere to precautions during ADL mobility and transfers using RW.     Outcome: Met   Evaluation complete and goals established. Pt has met established goals during tx session prior to discharge to home today.  Pt and wife receptive to all education and training on pt's left LE precautions and safety strategies using DME and adaptive equipment.

## 2023-01-27 NOTE — OR NURSING
Pt's V/S stable on room air while in PACU. S/p left hip arthroplasty, dressing clean/dry/intact with ice pack in place. Pt denies pain, discomfort. Spinal anesthesia wearing off gradually, pt able to move feet and legs, dorsiflex feet, with good sensation and pulses. Dr. Springer at bedside to assess, gave ok to discharge pt. Will transfer to ACU shortly, safety precautions in place. Wife updated on plan of care.

## 2023-01-27 NOTE — PLAN OF CARE
YRIS spoke with the patient's wife, Robyn, to do the assessment.    The patient is independent at home.    His wife states that he has a shower chair, RW, transport chair, and a raised toliet seat at home.  She is to get the RW out of their car to see it if is the right size for the patient.     The patient chose the HH recommended by his Md although they know they have a choice.       Patient be driven home by his wife.       01/27/23 1240   Discharge Assessment   Assessment Type Discharge Planning Assessment   Confirmed/corrected address, phone number and insurance Yes   Confirmed Demographics Correct on Facesheet   Source of Information family   Communicated SALENA with patient/caregiver Yes   Reason For Admission L CASSANDRA   People in Home spouse   Do you expect to return to your current living situation? Yes   Do you have help at home or someone to help you manage your care at home? Yes   Who are your caregiver(s) and their phone number(s)? Robyn Sawant (Spouse)   [phone]   Prior to hospitilization cognitive status: Alert/Oriented   Current cognitive status: Alert/Oriented   Equipment Currently Used at Home shower chair;raised toilet;bedside commode   Readmission within 30 days? No   Patient currently being followed by outpatient case management? No   Do you currently have service(s) that help you manage your care at home? No   Do you take prescription medications? Yes   Do you have prescription coverage? Yes   Do you have any problems affording any of your prescribed medications? No   Is the patient taking medications as prescribed? yes   Who is going to help you get home at discharge? Robyn Sawant (Spouse)   [phone]   How do you get to doctors appointments? car, drives self;family or friend will provide   Are you on dialysis? No   Do you take coumadin? No   Discharge Plan A Home with family;Home Health   DME Needed Upon Discharge  none   Discharge Plan discussed with: Spouse/sig other   Name(s) and Number(s) Robyn Sawant  (Spouse)   [phone]   Discharge Barriers Identified None   Physical Activity   On average, how many days per week do you engage in moderate to strenuous exercise (like a brisk walk)? 0 days   On average, how many minutes do you engage in exercise at this level? 0 min   Financial Resource Strain   How hard is it for you to pay for the very basics like food, housing, medical care, and heating? Patient refu   Housing Stability   In the last 12 months, was there a time when you were not able to pay the mortgage or rent on time? N   In the last 12 months, how many places have you lived? 1   In the last 12 months, was there a time when you did not have a steady place to sleep or slept in a shelter (including now)? N   Transportation Needs   In the past 12 months, has lack of transportation kept you from medical appointments or from getting medications? no   In the past 12 months, has lack of transportation kept you from meetings, work, or from getting things needed for daily living? No   Food Insecurity   Within the past 12 months, you worried that your food would run out before you got the money to buy more. Never true   Within the past 12 months, the food you bought just didn't last and you didn't have money to get more. Never true   Stress   Do you feel stress - tense, restless, nervous, or anxious, or unable to sleep at night because your mind is troubled all the time - these days? Not at all   Social Connections   In a typical week, how many times do you talk on the phone with family, friends, or neighbors? More than 3   How often do you get together with friends or relatives? Three times   How often do you attend Zoroastrianism or Jainism services? More than 4   Do you belong to any clubs or organizations such as Zoroastrianism groups, unions, fraternal or athletic groups, or school groups? Yes   How often do you attend meetings of the clubs or organizations you belong to? 1 to 4   Are you , , ,  , never , or living with a partner?    Alcohol Use   Q1: How often do you have a drink containing alcohol? Never   Q2: How many drinks containing alcohol do you have on a typical day when you are drinking? None   Q3: How often do you have six or more drinks on one occasion? Never   OTHER   Name(s) of People in Home Robyn Sawant (Spouse)   [phone]

## 2023-01-27 NOTE — PLAN OF CARE
Problem: Physical Therapy  Goal: Physical Therapy Goal  Description: Goals to be met by: 23    Patient will increase functional independence with mobility by performin. Supine<>sit with supervision without use of hospital bed features.   2. Sit<>stand with supervision with RW.  3. Gait x 150 feet with supervision with RW.  4. Ascend/descend threshold step(s) with least restrictive assistive device and supervision.  5. Verbalize anterior hip precautions + no hip flexion >90 deg without cueing.    Outcome: Ongoing, Progressing     Anticipated d/c to home this PM, POC established to ensure continuity of care in case of change of d/c plans.    Patient evaluated by PT and goals established. Patient with minimal pain throughout session and  hip precautions reviewed prior to OOB mobility with pt requiring moderate cueing (spouse at bedside and assisting with cueing for adherence). Gait, bed mobility, stairs, transfer, and car transfer training performed with pt requiring CGA progressing to SBA. Tendency to forward flexion trunk but easily corrected. Patient appropriate for dc to home with HH at this time. Please see progress note for detailed plan of care and recommendations.

## 2023-01-27 NOTE — PLAN OF CARE
[facility] ([place])    HOME HEALTH ORDERS  FACE TO FACE ENCOUNTER    Patient Name: Dylan Sawant  YOB: 1960    PCP: Roland Swenson DO   PCP Address: [address] / [place] [address]  PCP Phone Number: [phone]  PCP Fax: [phone]    Encounter Date: 01/27/2023    Admit to Home Health    Diagnoses:  Active Hospital Problems    Diagnosis  POA    *Primary osteoarthritis of left hip [M16.12]  Unknown      Resolved Hospital Problems   No resolved problems to display.       Future Appointments   Date Time Provider Department Center   2/6/2023  1:30 PM TELEMED NURSE, [facility] ORTHO [facility] [address] [address]   2/10/2023 10:00 AM ANGEL Daigle [address]TH[address]   3/10/2023 11:20 AM MD [address]RON [address]   4/21/2023 11:20 AM MD LOBITO [address]   6/15/2023  8:20 AM DO GRETTA Masterson [facility]           I have seen and examined this patient face to face today. My clinical findings that support the need for the home health skilled services and home bound status are the following:  Weakness/numbness causing balance and gait disturbance due to Joint Replacement making it taxing to leave home.  Requiring assistive device to leave home due to unsteady gait caused by Joint Replacement.    Allergies:Review of patient's allergies indicates:  No Known Allergies    Diet: regular diet    Activities: activity as tolerated    Nursing:   SN to complete comprehensive assessment including routine vital signs. Instruct on disease process and s/s of complications to report to MD. Follow specific home health arthoplasty protocol. Review/verify medication list sent home with the patient at time of discharge  and instruct patient/caregiver as needed. If coumadin ordered, coumadin clinic to manage INR with INR draws 2x per week with a goal to maintain INR between 1.8 and 2.2. Frequency may be adjusted depending on start of care date.    Notify  MD if SBP > 160 or < 90; DBP > 90 or < 50; HR > 120 or < 50; Temp > 101    Home Medical Equipment:  Walker, 3-1 bedside commode, transfer tub bench    CONSULTS:    Physical Therapy to evaluate and treat. Evaluate for home safety and equipment needs; Establish/upgrade home exercise program. Perform / instruct on therapeutic exercises, gait training, transfer training, and Range of Motion.    OTHER:  Occupational Therapy to evaluate and treat. Evaluate home environment for safety and equipment needs. Perform/Instruct on transfers, ADL training, ROM, and therapeutic exercises.   to evaluate for community resources/long-range planning.  Aide to provide assistance with personal care, ADLs, and vital signs.    MISCELLANEOUS CARE:  Routine Skin for Bedridden Patients: Instruct patient/caregiver to apply moisture barrier cream to all skin folds and wet areas in perineal area daily and after baths and all bowel movements.    WOUND CARE ORDERS  Follow Home Health specific protocol.    Leave dressing on, notify total joint on call hotline with any drainage, ok to shower, running water towel dry, seated (no standing in shower) for first 2 weeks     Medications: Review discharge medications with patient and family and provide education.      Current Discharge Medication List        CONTINUE these medications which have NOT CHANGED    Details   atorvastatin (LIPITOR) 40 MG tablet Take 1 tablet (40 mg total) by mouth once daily.  Qty: 90 tablet, Refills: 3    Associated Diagnoses: Mixed hyperlipidemia      ergocalciferol (ERGOCALCIFEROL) 50,000 unit Cap Take 1 capsule (50,000 Units total) by mouth every 7 days. Then start daily OTC replacement after this Rx is complete  Qty: 12 capsule, Refills: 3    Associated Diagnoses: Vitamin D deficiency      EScitalopram oxalate (LEXAPRO) 20 MG tablet Take 1 tablet (20 mg total) by mouth every evening.  Qty: 90 tablet, Refills: 1    Associated Diagnoses: CASTILLO (generalized anxiety  disorder)      fenofibrate micronized (LOFIBRA) 134 MG Cap Take 1 capsule (134 mg total) by mouth every evening.  Qty: 90 capsule, Refills: 3    Associated Diagnoses: Mixed hyperlipidemia      gabapentin (NEURONTIN) 300 MG capsule Take 3 capsules (900 mg total) by mouth daily with lunch.  Qty: 90 capsule, Refills: 3      metFORMIN (GLUCOPHAGE-XR) 500 MG ER 24hr tablet Take 1 tablet (500 mg total) by mouth daily with breakfast.  Qty: 90 tablet, Refills: 3    Associated Diagnoses: Elevated fasting glucose      multivitamin (THERAGRAN) per tablet Take 1 tablet by mouth once daily.      rOPINIRole (REQUIP) 4 MG tablet TAKE 1 TABLET(4 MG) BY MOUTH EVERY NIGHT  Qty: 90 tablet, Refills: 0    Associated Diagnoses: Right leg pain      traZODone (DESYREL) 150 MG tablet Take 1 tablet (150 mg total) by mouth nightly as needed for Insomnia.  Qty: 90 tablet, Refills: 1    Associated Diagnoses: Primary insomnia      acetaminophen (TYLENOL) 650 MG TbSR Take 1 tablet (650 mg total) by mouth every 8 (eight) hours.  Qty: 120 tablet, Refills: 0    Comments: Bedside delivery. Surgery 1/27  Associated Diagnoses: Status post left hip replacement      aspirin (ECOTRIN) 81 MG EC tablet Take 1 tablet (81 mg total) by mouth 2 (two) times a day.  Qty: 60 tablet, Refills: 0    Comments: Bedside delivery. Surgery 1/27  Associated Diagnoses: Status post left hip replacement      cefadroxil (DURICEF) 500 MG Cap Take 1 capsule (500 mg total) by mouth every 12 (twelve) hours.  Qty: 14 capsule, Refills: 0    Comments: Bedside delivery. Surgery 1/27  Associated Diagnoses: Status post left hip replacement      celecoxib (CELEBREX) 200 MG capsule Take 1 capsule (200 mg total) by mouth once daily.  Qty: 30 capsule, Refills: 0    Comments: Bedside delivery. Surgery 1/27  Associated Diagnoses: Status post left hip replacement      docusate sodium (COLACE) 100 MG capsule Take 1 capsule (100 mg total) by mouth 2 (two) times daily.  Qty: 60 capsule, Refills:  0    Comments: Bedside delivery. Surgery 1/27  Associated Diagnoses: Status post left hip replacement      methocarbamoL (ROBAXIN) 750 MG Tab Take 1 tablet (750 mg total) by mouth 4 (four) times daily as needed (muscle spasms).  Qty: 40 tablet, Refills: 0    Comments: Bedside deliver. Surgery 1/27  Associated Diagnoses: Status post left hip replacement      oxyCODONE (ROXICODONE) 5 MG immediate release tablet Take 1 tablet by mouth every 4-6 hours as needed for pain  Qty: 30 tablet, Refills: 0    Comments: Greater than 7 day supply is medically necessary. Deliver to bedside for surgery 1/27/2023.  Associated Diagnoses: Status post left hip replacement           STOP taking these medications       diclofenac sodium (VOLTAREN) 1 % Gel Comments:   Reason for Stopping:         meloxicam (MOBIC) 15 MG tablet Comments:   Reason for Stopping:               I certify that this patient is confined to his home and needs physical therapy.

## 2023-01-27 NOTE — DISCHARGE SUMMARY
[facility]  Orthopedics  Discharge Summary      Patient Name: Dylan Sawant  MRN: [id number]  Admission Date: 1/27/2023  Hospital Length of Stay: 0 days  Discharge Date and Time:  01/27/2023 11:40 AM  Attending Physician: Karlo Springer III, MD   Discharging Provider: LORIN CASIANO MD  Primary Care Provider: Roland Swenson DO    HPI: CC:  Left hip pain     Dylan Sawant is a 62 y.o. male with Left hip pain.  Pain is worse with activity and weight bearing.  Patient has experienced interference of activities of daily living due to increased pain and decreased range of motion. Patient has failed non-operative treatment including NSAIDs, as well as greater than 3 months of activity modification. Dylan Sawant ambulates independently.      Relevant medical conditions of significance in perioperative period:  DM- on medication managed by pcp  Depression- on medication managed by pcp  HLD- on medication managed by pcp    Procedure(s) (LRB):  ARTHROPLASTY, HIP, TOTAL, ANTERIOR APPROACH: LEFT: DEPUY - ACTIS + PINNACLE (Left)      Hospital Course:   Patient presented for above procedure.  Tolerated it well and was discharged home POD0 after voiding, tolerating diet, ambulating, pain controlled. Discharge instructions, follow-up appointment, and med rec are below.      Consults (From admission, onward)          Status Ordering Provider     Inpatient consult to Respiratory Care  Once        Provider:  (Not yet assigned)    Acknowledged HORTENSIA WINKLER            Significant Diagnostic Studies: No pertinent studies.    Pending Diagnostic Studies:       None          Final Active Diagnoses:    Diagnosis Date Noted POA    PRINCIPAL PROBLEM:  Primary osteoarthritis of left hip [M16.12] 11/09/2022 Yes      Problems Resolved During this Admission:      Discharged Condition: good    Disposition: Home-Health Care [facility] [place] [facility]    Follow Up:    Patient Instructions:      Diet general      Medications:  Reconciled Home Medications:      Medication List        CONTINUE taking these medications      ARTHRITIS PAIN RELIEF (ACETAM) 650 MG Tbsr  Generic drug: acetaminophen  Take 1 tablet (650 mg total) by mouth every 8 (eight) hours.     aspirin 81 MG EC tablet  Commonly known as: ECOTRIN  Take 1 tablet (81 mg total) by mouth 2 (two) times a day.     atorvastatin 40 MG tablet  Commonly known as: LIPITOR  Take 1 tablet (40 mg total) by mouth once daily.     cefadroxil 500 MG Cap  Commonly known as: DURICEF  Take 1 capsule (500 mg total) by mouth every 12 (twelve) hours.     celecoxib 200 MG capsule  Commonly known as: CeleBREX  Take 1 capsule (200 mg total) by mouth once daily.     docusate sodium 100 MG capsule  Commonly known as: COLACE  Take 1 capsule (100 mg total) by mouth 2 (two) times daily.     ergocalciferol 50,000 unit Cap  Commonly known as: ERGOCALCIFEROL  Take 1 capsule (50,000 Units total) by mouth every 7 days. Then start daily OTC replacement after this Rx is complete     EScitalopram oxalate 20 MG tablet  Commonly known as: LEXAPRO  Take 1 tablet (20 mg total) by mouth every evening.     fenofibrate micronized 134 MG Cap  Commonly known as: LOFIBRA  Take 1 capsule (134 mg total) by mouth every evening.     gabapentin 300 MG capsule  Commonly known as: NEURONTIN  Take 3 capsules (900 mg total) by mouth daily with lunch.     metFORMIN 500 MG ER 24hr tablet  Commonly known as: GLUCOPHAGE-XR  Take 1 tablet (500 mg total) by mouth daily with breakfast.     methocarbamoL 750 MG Tab  Commonly known as: ROBAXIN  Take 1 tablet (750 mg total) by mouth 4 (four) times daily as needed (muscle spasms).     multivitamin per tablet  Commonly known as: THERAGRAN  Take 1 tablet by mouth once daily.     oxyCODONE 5 MG immediate release tablet  Commonly known as: ROXICODONE  Take 1 tablet by mouth every 4-6 hours as needed for pain     rOPINIRole 4 MG tablet  Commonly known as: REQUIP  TAKE 1 TABLET(4 MG)  BY MOUTH EVERY NIGHT     traZODone 150 MG tablet  Commonly known as: DESYREL  Take 1 tablet (150 mg total) by mouth nightly as needed for Insomnia.            STOP taking these medications      diclofenac sodium 1 % Gel  Commonly known as: VOLTAREN     meloxicam 15 MG tablet  Commonly known as: ROQUE CASIANO MD  Orthopedics  [facility] - Surgery ([place])

## 2023-01-27 NOTE — OP NOTE
Gian BYRNE left hip  OPERATIVE NOTE      Date of Operation:   1/27/2023    Providers Performing:   Surgeon(s):  Karlo Springer III, MD  Assistant: Martell Marcelo MD    Preoperative Diagnosis:   Left hip osteoarthritis, M16.12    Postoperative Diagnosis:   Same    Operative Procedure:   Left Total Hip Arthroplasty, Anterior Approach, CPT 90576    Anesthesia:   Spinal+catheter  NICOLAS cocktail: Springer block with toradol    Estimated Blood Loss:   400 cc    Specimens:   None    Complications:   None, stable to PACU.    Implants Utilized:   Depuy  Christiana Sector Gription acetabular shell; Size 56  Christiana Altrx polyethylene liner; 56 OD, 36 ID, neutral  Actis size 6 HO  Biolox Delta Ceramic 12/14 taper 36mm + 5  Acetabular screw 25mm, 25mm    Depuy velys: combined +3 length, +9 offset    Implant Name Type Inv. Item Serial No.  Lot No. LRB No. Used Action   CUP ACT PINNACLE SECTOR 56 TIT - [id number]  CUP ACT PINNACLE SECTOR 56 TIT  [employer] [id number] Left 1 Implanted   SCREW PINNACLE 6.5 X 25 - [id number]  SCREW PINNACLE 6.5 X 25  [employer] [id number] Left 2 Implanted   LINER PINNACLE ALTRX [id number] - [id number]  LINER PINNACLE ALTRX [id number]  [employer] [id number] Left 1 Implanted   Femoral Stem 12/14 size 6     SYNTHES  Left 1 Implanted   HEAD BIOLOX CERAMC FEM +5 36MM - [id number]  HEAD BIOLOX CERAMC FEM +5 36MM  SYNTHES [id number] Left 1 Implanted       Indications:   The patient has longstanding left hip pain secondary to the above diagnosis. They have failed conservative management which includes anti-inflammatory medications and activity modification. We reviewed the risks and benefits of the direct anterior hip replacement with the patient prior to surgery including risk of infection, lateral thigh numbness, blood clot, leg length inequality, dislocation, components loosening, components wearing out, need for revision surgery, continued pain, limping, and injury to nerves and vessels.  After discussing the  risks and benefits of the procedure they would like to proceed with surgery.    Operative Notes:   The patient was met in the holding area. The operative site was marked. Anesthesia administered spinal anesthesia. The patient was placed supine on a [place] table and the operative site was identified. The hip was prepped and draped in the usual fashion. IV antibiotics were administered and a timeout was performed.     I performed a direct anterior approach to the hip. Skin incision was made and the fascia of the tensor fascia tio was identified. I utilized the interval between the tensor fascia tio and sartorious for direct dissection to the hip joint. I identified and coagulated the ascending branch of the lateral circumflex vessel. Standard retractors over the anterior hip capsule were placed and the capsulotomy was performed. The capsule was tagged for retraction. The femoral head was identified and the femoral neck osteotomy was made in accordance with preoperative templating. The femoral head was then removed with a corkscrew.    The standard anterior, posterior, and superior retractors were placed around the acetabulum. The capsular labrum and foveal contents were removed. Sequential acetabular reamers were used to prepare the acetabulum. Once good good fit was achieved, the final acetabular cup was selected to be one millimeter larger in diameter than the last reamer used. The cup was checked for a good rim fit and a provisional impaction was performed to verify positioning on fluoroscopy. Once this was satisfactory, the impaction was completed. I checked to make sure there was no overhanging osteophytes anteriorly as well as making sure that there was not excessive acetabular cup exposed. Screws were placed in the cup to augment initial fixation. The final liner was impacted into place and I confirmed that it was well seated.    The hydraulic hook was placed around the femur. The femur was externally rotated  and the standard calcar and greater trochanter retractors were placed. A provisional posterior capsulotomy was performed. Then, gross traction was released and the leg was extended and adducted. The appropriate release was performed to allow elevation of the femur for good visualization of the femoral canal.     A box osteotome was used to open the femoral canal and a canal finder was used to sound the canal. The starter broach and sequential broaches were used until stability was appropriate. A trial reduction was performed and intraoperative fluoroscopy was used to confirm appropriate leg lengths and offset.  Once the trial femoral components appeared appropriately positioned, the hip was dislocated, the femur re-exposed, and the trial femoral components were removed. The canal was irrigated and the final femoral stem was impacted to the level of the neck cut. The final ball was impacted onto a dry trunnion and the hip was reduced checking for appropriate soft tissue tension. Final intra-operative fluoroscopy was obtained to confirm implant positioning, leg length, and offset.     While checking xray, a 0.35% betadine solution was left to soak in the wound. The wound was copiously irrigated. I checked for any residual bleeders and made sure that there was appropriate hemostasis. The local anesthetic cocktail was administered. 1 gram of vancomycin powder was placed in the wound. The wound was closed in layers using #1 vicryl at the fascia, then 2-0 vicryl, 3-0 monocryl, 4-0 monocryl and Prineo Mesh+Dermabond. A sterile dressing was placed.     There were no complications or evidence of intraoperative fracture. All counts were correct.    The first-assistant was critical to all steps of the operation, including retraction during exposure and bone preparation, as well as the deep and superficial wound closure.  Dr. Springer performed and/or supervised the key portions of this surgical procedure, including evaluation of  the bone cuts, reshaping of the bony elements, and insertion of the provisional and final components.    Post Op Plan:   The patient will be weightbearing as tolerated with a walker with no extremes of motion  ASA for DVT prophylaxis (81mg BID for one month)  24 hours of IV antibiotics.    Due patient and or surgical factors the patient will receive an Rx for cefadroxil 500mg BID x7 days after discharge per [place] data:   Shayla MM, Reginald JE, Laurent EMERSON, Mary CALVIN. [facility] Clinical Research Award: Extended Oral Antibiotics Prevent Periprosthetic Joint Infection in High-Risk Cases: 3855 Patients With 1-Year Follow-Up. J Arthroplasty. 2021 Jul;36(7S):S18-S25. doi: 10.1016/j.arth.2021.01.051. Epub 2021 Jan 23. PMID: [id number].        Signed by: Karlo Springer III, MD

## 2023-01-27 NOTE — PLAN OF CARE
Dylan Sawant has met all discharge criteria from Phase II. Vital Signs are stable, ambulating  without difficulty. Discharge instructions given, patient verbalized understanding. Discharged from facility via wheelchair in stable condition.

## 2023-01-28 PROCEDURE — G0180 MD CERTIFICATION HHA PATIENT: HCPCS | Mod: ,,, | Performed by: NURSE PRACTITIONER

## 2023-01-28 PROCEDURE — G0180 PR HOME HEALTH MD CERTIFICATION: ICD-10-PCS | Mod: ,,, | Performed by: NURSE PRACTITIONER

## 2023-01-28 NOTE — PLAN OF CARE
01/27/23 1933   Final Note   Assessment Type Final Discharge Note   Anticipated Discharge Disposition Home-Health   Hospital Resources/Appts/Education Provided Appointments scheduled by Navigator/Coordinator   Post-Acute Status   Post-Acute Authorization Home Health   Home Health Status Set-up Complete/Auth obtained   Patient choice form signed by patient/caregiver List with quality metrics by geographic area provided   Discharge Delays None known at this time     YRIS visited the patient and her spouse at the bedside.     YRIS discussed with them how home health is set up and when the services start.     They patient knows he has a choice of agencies, and chose the one his Md recommended.     He signed the Choices Form.      She will have HH with [facility].    YRIS spoke with Bonnie at [facility] to set up services, which are to start tomorrow.    Patient had all needed DME at d/c.     Mr. Sawant is driving the patient home at d/c.

## 2023-02-01 ENCOUNTER — TELEPHONE (OUTPATIENT)
Dept: ORTHOPEDICS | Facility: CLINIC | Age: 63
End: 2023-02-01
Payer: COMMERCIAL

## 2023-02-01 NOTE — TELEPHONE ENCOUNTER
Spoke to Terrell with [facility] to confirm they received the referral. Terrell states that someone went his house on Saturday and again on Monday. Called pt he states that a lady came on Saturday to fill out paperwork but he has not received a call or seen anyone until they called a few min ago. He states that no one has come to the house since Saturday. He states HHPT is coming today at 1230 pm. Informed pt to call us if they do not come out. Pt pleased and verbalized understanding.

## 2023-02-01 NOTE — TELEPHONE ENCOUNTER
----- Message from Martell Marcelo sent at 2/1/2023  9:55 AM CST -----  Contact: [phone]  Good morning,     Do you mind assisting with this? The patient had surgery on Friday at [facility].     Sincerely,   Michael Marcelo MS, OTC  OR & Clinical Assistant to Dr. Karlo Springer III  Phone: [phone]  Fax: [phone]    ----- Message -----  From: Vern Herrera  Sent: 2/1/2023   8:09 AM CST  To: Gian [facility]TR[facility] Staff    Pt calling to inform the dr that homehealth hasn't made to him as of yet please advise [phone]

## 2023-02-06 ENCOUNTER — CLINICAL SUPPORT (OUTPATIENT)
Dept: ORTHOPEDICS | Facility: CLINIC | Age: 63
End: 2023-02-06
Payer: COMMERCIAL

## 2023-02-06 DIAGNOSIS — Z96.649 STATUS POST HIP REPLACEMENT, UNSPECIFIED LATERALITY: Primary | ICD-10-CM

## 2023-02-06 PROCEDURE — 99499 UNLISTED E&M SERVICE: CPT | Mod: 95,,, | Performed by: ORTHOPAEDIC SURGERY

## 2023-02-06 PROCEDURE — 99499 NO LOS: ICD-10-PCS | Mod: 95,,, | Performed by: ORTHOPAEDIC SURGERY

## 2023-02-06 NOTE — PROGRESS NOTES
I called the patient today regarding his surgery with Dr. Karlo Springer III. The patient had a left anterior CASSANDRA on 2/2.     Pain Scale: 5 / 10    Any issues with Fever: No.    Any issues with medications (specifically DVT prophylaxis): No. ASA 81 BID    Any issues with bowel movements:  Passing pedro: No.                                                                 Urination: No.                                                                 Constipation: No. Last BM 2/6    Completing at home exercises: Yes:     Any concerns regarding their dressing/bandage:  No.    Patient confirmed first HH-PT appointment:  HHPT on  2/6 at am.     Any other concerns: No.        The patient was informed that if they have any urgent issues with their bandage, medications or any other health concerns regarding their surgery to call the 24/7 Orthopedic Post-op Hot Line at [phone]. The patient was reminded that if they have any chest pain or shortness of breath to call 911 or go to the ER.    The patient verbalized understanding and does not have any other questions

## 2023-02-10 ENCOUNTER — OFFICE VISIT (OUTPATIENT)
Dept: ORTHOPEDICS | Facility: CLINIC | Age: 63
End: 2023-02-10
Payer: COMMERCIAL

## 2023-02-10 ENCOUNTER — PATIENT MESSAGE (OUTPATIENT)
Dept: ORTHOPEDICS | Facility: CLINIC | Age: 63
End: 2023-02-10

## 2023-02-10 VITALS — WEIGHT: 214.94 LBS | HEIGHT: 69 IN | BODY MASS INDEX: 31.84 KG/M2

## 2023-02-10 DIAGNOSIS — Z96.649 STATUS POST HIP REPLACEMENT, UNSPECIFIED LATERALITY: Primary | ICD-10-CM

## 2023-02-10 DIAGNOSIS — Z96.642 S/P HIP REPLACEMENT, LEFT: Primary | ICD-10-CM

## 2023-02-10 PROCEDURE — 1160F RVW MEDS BY RX/DR IN RCRD: CPT | Mod: CPTII,S$GLB,, | Performed by: NURSE PRACTITIONER

## 2023-02-10 PROCEDURE — 3008F PR BODY MASS INDEX (BMI) DOCUMENTED: ICD-10-PCS | Mod: CPTII,S$GLB,, | Performed by: NURSE PRACTITIONER

## 2023-02-10 PROCEDURE — 1160F PR REVIEW ALL MEDS BY PRESCRIBER/CLIN PHARMACIST DOCUMENTED: ICD-10-PCS | Mod: CPTII,S$GLB,, | Performed by: NURSE PRACTITIONER

## 2023-02-10 PROCEDURE — 99024 PR POST-OP FOLLOW-UP VISIT: ICD-10-PCS | Mod: S$GLB,,, | Performed by: NURSE PRACTITIONER

## 2023-02-10 PROCEDURE — 99024 POSTOP FOLLOW-UP VISIT: CPT | Mod: S$GLB,,, | Performed by: NURSE PRACTITIONER

## 2023-02-10 PROCEDURE — 99999 PR PBB SHADOW E&M-EST. PATIENT-LVL III: ICD-10-PCS | Mod: PBBFAC,,, | Performed by: NURSE PRACTITIONER

## 2023-02-10 PROCEDURE — 3008F BODY MASS INDEX DOCD: CPT | Mod: CPTII,S$GLB,, | Performed by: NURSE PRACTITIONER

## 2023-02-10 PROCEDURE — 99999 PR PBB SHADOW E&M-EST. PATIENT-LVL III: CPT | Mod: PBBFAC,,, | Performed by: NURSE PRACTITIONER

## 2023-02-10 PROCEDURE — 1159F PR MEDICATION LIST DOCUMENTED IN MEDICAL RECORD: ICD-10-PCS | Mod: CPTII,S$GLB,, | Performed by: NURSE PRACTITIONER

## 2023-02-10 PROCEDURE — 1159F MED LIST DOCD IN RCRD: CPT | Mod: CPTII,S$GLB,, | Performed by: NURSE PRACTITIONER

## 2023-02-13 NOTE — PROGRESS NOTES
"Dylan Sawant presents for initial post-operative visit following a left total hip arthroplasty performed by Dr. Springer on 1/27/2023.       Exam:  Height 5' 9" (1.753 m), weight 97.5 kg (214 lb 15.2 oz).   Patient is ambulating well with walker for long distances and none a home  Incision is clean and dry without drainage or erythema.      Initial post-operative radiographs reviewed today revealing satisfactory position of the prosthesis.    A/P  2 weeks s/p left total hip replacement  - Patient advised to keep the incision clean and dry for the next 24 hours after which he  may wash the area with antibacterial soap in the shower, but will not submerge incision until one month post op.  - Continue aspirin for 1 month post op  - Pain medication refill not needed  - Follow up in 4 weeks with surgeon. Pt will call clinic immediately with problems/concerns.        "
No

## 2023-03-02 ENCOUNTER — EXTERNAL HOME HEALTH (OUTPATIENT)
Dept: HOME HEALTH SERVICES | Facility: HOSPITAL | Age: 63
End: 2023-03-02
Payer: COMMERCIAL

## 2023-03-09 ENCOUNTER — TELEPHONE (OUTPATIENT)
Dept: ORTHOPEDICS | Facility: CLINIC | Age: 63
End: 2023-03-09
Payer: COMMERCIAL

## 2023-03-09 NOTE — TELEPHONE ENCOUNTER
I called and spoke to the patient and confirmed his xray appointment at  before seeing Dr. Springer.    The patient verbalized understanding and has no further questions.

## 2023-03-10 ENCOUNTER — OFFICE VISIT (OUTPATIENT)
Dept: ORTHOPEDICS | Facility: CLINIC | Age: 63
End: 2023-03-10
Payer: COMMERCIAL

## 2023-03-10 ENCOUNTER — HOSPITAL ENCOUNTER (OUTPATIENT)
Dept: RADIOLOGY | Facility: HOSPITAL | Age: 63
Discharge: HOME | End: 2023-03-10
Attending: ORTHOPAEDIC SURGERY
Payer: COMMERCIAL

## 2023-03-10 VITALS — BODY MASS INDEX: 31.7 KG/M2 | WEIGHT: 214 LBS | HEIGHT: 69 IN

## 2023-03-10 DIAGNOSIS — Z96.649 STATUS POST HIP REPLACEMENT, UNSPECIFIED LATERALITY: ICD-10-CM

## 2023-03-10 DIAGNOSIS — Z96.642 S/P HIP REPLACEMENT, LEFT: Primary | ICD-10-CM

## 2023-03-10 PROCEDURE — 99999 PR PBB SHADOW E&M-EST. PATIENT-LVL III: CPT | Mod: PBBFAC,,, | Performed by: ORTHOPAEDIC SURGERY

## 2023-03-10 PROCEDURE — 99999 PR PBB SHADOW E&M-EST. PATIENT-LVL III: ICD-10-PCS | Mod: PBBFAC,,, | Performed by: ORTHOPAEDIC SURGERY

## 2023-03-10 PROCEDURE — 3008F BODY MASS INDEX DOCD: CPT | Mod: CPTII,S$GLB,, | Performed by: ORTHOPAEDIC SURGERY

## 2023-03-10 PROCEDURE — 1159F MED LIST DOCD IN RCRD: CPT | Mod: CPTII,S$GLB,, | Performed by: ORTHOPAEDIC SURGERY

## 2023-03-10 PROCEDURE — 99024 POSTOP FOLLOW-UP VISIT: CPT | Mod: S$GLB,,, | Performed by: ORTHOPAEDIC SURGERY

## 2023-03-10 PROCEDURE — 73502 XR HIP WITH PELVIS WHEN PERFORMED, 2 OR 3 VIEWS LEFT: ICD-10-PCS | Mod: 26,LT,, | Performed by: RADIOLOGY

## 2023-03-10 PROCEDURE — 99024 PR POST-OP FOLLOW-UP VISIT: ICD-10-PCS | Mod: S$GLB,,, | Performed by: ORTHOPAEDIC SURGERY

## 2023-03-10 PROCEDURE — 3008F PR BODY MASS INDEX (BMI) DOCUMENTED: ICD-10-PCS | Mod: CPTII,S$GLB,, | Performed by: ORTHOPAEDIC SURGERY

## 2023-03-10 PROCEDURE — 1159F PR MEDICATION LIST DOCUMENTED IN MEDICAL RECORD: ICD-10-PCS | Mod: CPTII,S$GLB,, | Performed by: ORTHOPAEDIC SURGERY

## 2023-03-10 PROCEDURE — 73502 X-RAY EXAM HIP UNI 2-3 VIEWS: CPT | Mod: 26,LT,, | Performed by: RADIOLOGY

## 2023-03-10 PROCEDURE — 73502 X-RAY EXAM HIP UNI 2-3 VIEWS: CPT | Mod: TC,LT

## 2023-03-10 RX ORDER — MELOXICAM 15 MG/1
15 TABLET ORAL DAILY
Qty: 90 TABLET | Refills: 0 | Status: SHIPPED | OUTPATIENT
Start: 2023-03-10 | End: 2023-06-15

## 2023-03-10 NOTE — PROGRESS NOTES
Subjective:     HPI:   Dylan Sawant is a 62 y.o. male who presents 6 weeks out from left CASSANDRA     Date of surgery: 1/27/23    Medications: none, would like a refill for mobic was on pre-op    Assistive Devices: none    PT: finished    Limitations: none, able to cut gras and go fishing    Doing well, no pain/problems/concerns  Impressed by Catholic experience  Some increased RLS post op  Some LFCN symptoms     Objective:   Body mass index is 31.6 kg/m².  Exam:    Gait: limp/antalgic/trendelenburg none    Incision: healed    Active straight leg raise: good strength, no discomfort    Extension: 0    Flexion: 100    Abduction: 30    Adduction: 20    External rotation: 40    Internal rotation: 20    LLD: 0 cm supine       Imaging:  Indication:  Exam status post left total hip arthroplasty  Exam Ordered: Radiographs taken today include an anteroposterior pelvis and cross table lateral view of the proximal femur including the hip joint  Details of Examination: Today's exam show a well fixed, well positioned total hip arthroplasty with no evidence of wear, osteolysis, or loosening.  Impression:  Status post left total hip arthroplasty, implant in good position with no abnormality      Assessment:       ICD-10-CM ICD-9-CM   1. S/P hip replacement, left  Z96.642 V43.64      Doing well     Plan:       Patient is doing very well with the hip replacement at this time.  They will continue routine care of their hip and see me for their routine follow-up.  If there are problems in the interim they will see me back sooner.     Rx mobic 90 days, further refills to come from PCP  Rx compound cream for LFCN    Phase 2 hip exercises    6 week follow up    R CASSANDRA towards end of the year    No orders of the defined types were placed in this encounter.            Past Medical History:   Diagnosis Date    Allergy     Anxiety     Diabetes mellitus     Elevated BP     GERD (gastroesophageal reflux disease)     Hyperlipidemia     KELLY  (obstructive sleep apnea)     wears cpap    Restless leg syndrome        Past Surgical History:   Procedure Laterality Date    ADENOIDECTOMY      ARTHROPLASTY OF HIP BY ANTERIOR APPROACH Left 1/27/2023    Procedure: ARTHROPLASTY, HIP TOTAL,;  Surgeon: Karlo Springer III, MD;  Location: [facility];  Service: Orthopedics;  Laterality: Left;   ANTERIOR APPROACH: LEFT: DEPUY - ACTIS + PINNACLE    COLONOSCOPY N/A 4/8/2017    Procedure: COLONOSCOPY;  Surgeon: Kyle Moreno MD;  Location: [facility] ENDO (4TH FLR);  Service: Endoscopy;  Laterality: N/A;    NASAL SEPTUM SURGERY      RECONSTRUCTION OF NOSE N/A 6/22/2018    Procedure: RECONSTRUCTION, NOSE;  Surgeon: Tulio Echols III, MD;  Location: [facility] 2ND FLR;  Service: ENT;  Laterality: N/A;  1 HOUR TOTAL / LATERA  NASAL VALVE IMPLANTS    TONSILLECTOMY         Family History   Problem Relation Age of Onset    Diabetes Mother     Depression Mother     Arthritis Mother     COPD Mother     Heart disease Father     Cancer Father         pancreatic    Diabetes Father     Diverticulitis Father     Restless legs syndrome Father     No Known Problems Sister     Heart disease Paternal Uncle        Social History     Socioeconomic History    Marital status:    Occupational History     Employer: [employer]   Tobacco Use    Smoking status: Never    Smokeless tobacco: Never   Substance and Sexual Activity    Alcohol use: Not Currently    Drug use: No    Sexual activity: Not Currently     Partners: Female     Birth control/protection: Abstinence   Social History Narrative    12/13/2022: lives with my patient, Robyn. Dog passed away a year ago. Sister lives nearby as does mom. Retiring this week. No smokers at home.      Social Determinants of Health     Financial Resource Strain: Unknown    Difficulty of Paying Living Expenses: Patient refused   Food Insecurity: No Food Insecurity    Worried About Running Out of Food in the Last Year: Never true    Ran Out of Food in the Last  Year: Never true   Transportation Needs: No Transportation Needs    Lack of Transportation (Medical): No    Lack of Transportation (Non-Medical): No   Physical Activity: Inactive    Days of Exercise per Week: 0 days    Minutes of Exercise per Session: 0 min   Stress: No Stress Concern Present    Feeling of Stress : Not at all   Social Connections: Socially Integrated    Frequency of Communication with Friends and Family: More than three times a week    Frequency of Social Gatherings with Friends and Family: Three times a week    Attends Latter-day Services: More than 4 times per year    Active Member of Clubs or Organizations: Yes    Attends Club or Organization Meetings: 1 to 4 times per year    Marital Status:    Housing Stability: Low Risk     Unable to Pay for Housing in the Last Year: No    Number of Places Lived in the Last Year: 1    Unstable Housing in the Last Year: No

## 2023-04-21 ENCOUNTER — OFFICE VISIT (OUTPATIENT)
Dept: ORTHOPEDICS | Facility: CLINIC | Age: 63
End: 2023-04-21
Payer: COMMERCIAL

## 2023-04-21 VITALS — WEIGHT: 236.69 LBS | BODY MASS INDEX: 35.06 KG/M2 | HEIGHT: 69 IN

## 2023-04-21 DIAGNOSIS — Z96.642 S/P HIP REPLACEMENT, LEFT: Primary | ICD-10-CM

## 2023-04-21 PROCEDURE — 3008F PR BODY MASS INDEX (BMI) DOCUMENTED: ICD-10-PCS | Mod: CPTII,S$GLB,, | Performed by: ORTHOPAEDIC SURGERY

## 2023-04-21 PROCEDURE — 99024 POSTOP FOLLOW-UP VISIT: CPT | Mod: S$GLB,,, | Performed by: ORTHOPAEDIC SURGERY

## 2023-04-21 PROCEDURE — 3008F BODY MASS INDEX DOCD: CPT | Mod: CPTII,S$GLB,, | Performed by: ORTHOPAEDIC SURGERY

## 2023-04-21 PROCEDURE — 1159F MED LIST DOCD IN RCRD: CPT | Mod: CPTII,S$GLB,, | Performed by: ORTHOPAEDIC SURGERY

## 2023-04-21 PROCEDURE — 99999 PR PBB SHADOW E&M-EST. PATIENT-LVL III: CPT | Mod: PBBFAC,,, | Performed by: ORTHOPAEDIC SURGERY

## 2023-04-21 PROCEDURE — 99999 PR PBB SHADOW E&M-EST. PATIENT-LVL III: ICD-10-PCS | Mod: PBBFAC,,, | Performed by: ORTHOPAEDIC SURGERY

## 2023-04-21 PROCEDURE — 99024 PR POST-OP FOLLOW-UP VISIT: ICD-10-PCS | Mod: S$GLB,,, | Performed by: ORTHOPAEDIC SURGERY

## 2023-04-21 PROCEDURE — 1159F PR MEDICATION LIST DOCUMENTED IN MEDICAL RECORD: ICD-10-PCS | Mod: CPTII,S$GLB,, | Performed by: ORTHOPAEDIC SURGERY

## 2023-04-21 NOTE — PROGRESS NOTES
Subjective:     HPI:   Dylan Sawant is a 62 y.o. male who presents 12 weeks out from left CASSANDRA     Date of surgery: 1/27/23    Medications: none off everything    Assistive Devices: none    Limitations: back kayak fishing but difficult getting up and down on it     Doing well, happy with progress  LFCN persists, some hypersensitivity, slowly improving, did not get compound cream       Objective:   Body mass index is 34.95 kg/m².  Exam:    Gait: limp/antalgic/trendelenburg none    Incision: healed    Active straight leg raise: good strength, no discomfort    Extension: 0    Flexion: 100    Abduction: 30    Adduction: 20    External rotation: 40    Internal rotation: 20    LLD: 0 cm supine     Slight hypersensitivity LFCN    Pain with R hip IR/ER      Imaging:  None today      Assessment:       ICD-10-CM ICD-9-CM   1. S/P hip replacement, left  Z96.642 V43.64      Doing well     Plan:       Patient is doing very well with the hip replacement at this time.  They will continue routine care of their hip and see me for their routine follow-up.  If there are problems in the interim they will see me back sooner. Prophylactic antibiotic protocol given and explained to patient.     R ant CASSANDRA tentative plan for 11/15 same day at [facility]  Was a Latter day patient    Re-rx compound cream    F/u for pre-op visit R CASSANDRA      No orders of the defined types were placed in this encounter.            Past Medical History:   Diagnosis Date    Allergy     Anxiety     Diabetes mellitus     Elevated BP     GERD (gastroesophageal reflux disease)     Hyperlipidemia     KELLY (obstructive sleep apnea)     wears cpap    Restless leg syndrome        Past Surgical History:   Procedure Laterality Date    ADENOIDECTOMY      ARTHROPLASTY OF HIP BY ANTERIOR APPROACH Left 1/27/2023    Procedure: ARTHROPLASTY, HIP TOTAL,;  Surgeon: Karlo Springer III, MD;  Location: [facility];  Service: Orthopedics;  Laterality: Left;   ANTERIOR APPROACH: LEFT: VeroldUY  - ACTIS + PINNACLE    COLONOSCOPY N/A 4/8/2017    Procedure: COLONOSCOPY;  Surgeon: Kyle Moreno MD;  Location: [facility] (4TH FLR);  Service: Endoscopy;  Laterality: N/A;    NASAL SEPTUM SURGERY      RECONSTRUCTION OF NOSE N/A 6/22/2018    Procedure: RECONSTRUCTION, NOSE;  Surgeon: Tulio Echols III, MD;  Location: [facility] OR 2ND FLR;  Service: ENT;  Laterality: N/A;  1 HOUR TOTAL / LATERA  NASAL VALVE IMPLANTS    TONSILLECTOMY         Family History   Problem Relation Age of Onset    Diabetes Mother     Depression Mother     Arthritis Mother     COPD Mother     Heart disease Father     Cancer Father         pancreatic    Diabetes Father     Diverticulitis Father     Restless legs syndrome Father     No Known Problems Sister     Heart disease Paternal Uncle        Social History     Socioeconomic History    Marital status:    Occupational History     Employer: [employer]   Tobacco Use    Smoking status: Never    Smokeless tobacco: Never   Substance and Sexual Activity    Alcohol use: Not Currently    Drug use: No    Sexual activity: Not Currently     Partners: Female     Birth control/protection: Abstinence   Social History Narrative    12/13/2022: lives with my patient, Robyn. Dog passed away a year ago. Sister lives nearby as does mom. Retiring this week. No smokers at home.      Social Determinants of Health     Financial Resource Strain: Unknown    Difficulty of Paying Living Expenses: Patient refused   Food Insecurity: No Food Insecurity    Worried About Running Out of Food in the Last Year: Never true    Ran Out of Food in the Last Year: Never true   Transportation Needs: No Transportation Needs    Lack of Transportation (Medical): No    Lack of Transportation (Non-Medical): No   Physical Activity: Inactive    Days of Exercise per Week: 0 days    Minutes of Exercise per Session: 0 min   Stress: No Stress Concern Present    Feeling of Stress : Not at all   Social Connections: Socially Integrated     Frequency of Communication with Friends and Family: More than three times a week    Frequency of Social Gatherings with Friends and Family: Three times a week    Attends Orthodox Services: More than 4 times per year    Active Member of Clubs or Organizations: Yes    Attends Club or Organization Meetings: 1 to 4 times per year    Marital Status:    Housing Stability: Low Risk     Unable to Pay for Housing in the Last Year: No    Number of Places Lived in the Last Year: 1    Unstable Housing in the Last Year: No

## 2023-04-27 ENCOUNTER — TELEPHONE (OUTPATIENT)
Dept: ORTHOPEDICS | Facility: CLINIC | Age: 63
End: 2023-04-27
Payer: COMMERCIAL

## 2023-04-27 NOTE — TELEPHONE ENCOUNTER
I called the patient today regarding his voice message. I left a message for the patient to call me back. I left my name and phone number.      ----- Message from Martell Marcelo sent at 4/24/2023  8:28 AM CDT -----  Regarding: [facility] Prescription  Check to make sure the patient received their [facility] Compound Cream

## 2023-04-29 ENCOUNTER — PATIENT MESSAGE (OUTPATIENT)
Dept: ORTHOPEDICS | Facility: CLINIC | Age: 63
End: 2023-04-29
Payer: COMMERCIAL

## 2023-05-01 ENCOUNTER — TELEPHONE (OUTPATIENT)
Dept: ORTHOPEDICS | Facility: CLINIC | Age: 63
End: 2023-05-01
Payer: COMMERCIAL

## 2023-05-01 NOTE — TELEPHONE ENCOUNTER
I called and spoke to the patient regarding the compound cream, the patient has not received it yet and does not recall getting a phone call from them. I informed the patient that we will refax the form to [facility] and that they should call them once they receive it.     The patient verbalized understanding and has no further questions.    ----- Message from Sonja Rivero sent at 5/1/2023  3:16 PM CDT -----  Regarding: Patient Advice  Contact: Pt [phone]  Pt is calling stating the Michael from provider clinic called and want to know if pt received cream please call

## 2023-05-08 ENCOUNTER — PATIENT MESSAGE (OUTPATIENT)
Dept: ORTHOPEDICS | Facility: CLINIC | Age: 63
End: 2023-05-08
Payer: COMMERCIAL

## 2023-06-05 DIAGNOSIS — M79.604 RIGHT LEG PAIN: ICD-10-CM

## 2023-06-05 RX ORDER — ROPINIROLE 4 MG/1
TABLET, FILM COATED ORAL
Qty: 90 TABLET | Refills: 0 | Status: SHIPPED | OUTPATIENT
Start: 2023-06-05 | End: 2023-06-15 | Stop reason: SDUPTHER

## 2023-06-07 ENCOUNTER — PATIENT MESSAGE (OUTPATIENT)
Dept: ADMINISTRATIVE | Facility: OTHER | Age: 63
End: 2023-06-07
Payer: COMMERCIAL

## 2023-06-07 ENCOUNTER — PATIENT MESSAGE (OUTPATIENT)
Dept: ORTHOPEDICS | Facility: CLINIC | Age: 63
End: 2023-06-07
Payer: COMMERCIAL

## 2023-06-15 ENCOUNTER — LAB VISIT (OUTPATIENT)
Dept: LAB | Facility: HOSPITAL | Age: 63
End: 2023-06-15
Attending: FAMILY MEDICINE
Payer: COMMERCIAL

## 2023-06-15 ENCOUNTER — TELEPHONE (OUTPATIENT)
Dept: FAMILY MEDICINE | Facility: CLINIC | Age: 63
End: 2023-06-15

## 2023-06-15 ENCOUNTER — OFFICE VISIT (OUTPATIENT)
Dept: FAMILY MEDICINE | Facility: CLINIC | Age: 63
End: 2023-06-15
Payer: COMMERCIAL

## 2023-06-15 VITALS
HEART RATE: 79 BPM | WEIGHT: 230.19 LBS | OXYGEN SATURATION: 97 % | SYSTOLIC BLOOD PRESSURE: 125 MMHG | HEIGHT: 69 IN | BODY MASS INDEX: 34.09 KG/M2 | DIASTOLIC BLOOD PRESSURE: 82 MMHG

## 2023-06-15 DIAGNOSIS — R73.01 ELEVATED FASTING GLUCOSE: ICD-10-CM

## 2023-06-15 DIAGNOSIS — F51.01 PRIMARY INSOMNIA: ICD-10-CM

## 2023-06-15 DIAGNOSIS — M79.604 RIGHT LEG PAIN: ICD-10-CM

## 2023-06-15 DIAGNOSIS — E78.2 MIXED HYPERLIPIDEMIA: ICD-10-CM

## 2023-06-15 DIAGNOSIS — E11.9 TYPE 2 DIABETES MELLITUS WITHOUT COMPLICATION, WITHOUT LONG-TERM CURRENT USE OF INSULIN: Primary | ICD-10-CM

## 2023-06-15 DIAGNOSIS — R79.0 ABNORMAL IRON SATURATION: ICD-10-CM

## 2023-06-15 DIAGNOSIS — Z12.5 SCREENING PSA (PROSTATE SPECIFIC ANTIGEN): ICD-10-CM

## 2023-06-15 DIAGNOSIS — Z00.00 VISIT FOR WELL MAN HEALTH CHECK: ICD-10-CM

## 2023-06-15 DIAGNOSIS — F41.1 GAD (GENERALIZED ANXIETY DISORDER): ICD-10-CM

## 2023-06-15 DIAGNOSIS — E87.6 HYPOKALEMIA: ICD-10-CM

## 2023-06-15 DIAGNOSIS — E87.6 HYPOKALEMIA: Primary | ICD-10-CM

## 2023-06-15 PROBLEM — R73.03 PREDIABETES: Status: RESOLVED | Noted: 2021-10-26 | Resolved: 2023-06-15

## 2023-06-15 LAB
ALBUMIN SERPL BCP-MCNC: 4.2 G/DL (ref 3.5–5.2)
ALP SERPL-CCNC: 52 U/L (ref 55–135)
ALT SERPL W/O P-5'-P-CCNC: 44 U/L (ref 10–44)
ANION GAP SERPL CALC-SCNC: 12 MMOL/L (ref 8–16)
AST SERPL-CCNC: 32 U/L (ref 10–40)
BASOPHILS # BLD AUTO: 0.05 K/UL (ref 0–0.2)
BASOPHILS NFR BLD: 0.7 % (ref 0–1.9)
BILIRUB SERPL-MCNC: 0.6 MG/DL (ref 0.1–1)
BUN SERPL-MCNC: 12 MG/DL (ref 6–20)
CALCIUM SERPL-MCNC: 10 MG/DL (ref 8.7–10.5)
CHLORIDE SERPL-SCNC: 107 MMOL/L (ref 95–110)
CHOLEST SERPL-MCNC: 150 MG/DL (ref 120–199)
CHOLEST/HDLC SERPL: 3.7 {RATIO} (ref 2–5)
CO2 SERPL-SCNC: 22 MMOL/L (ref 23–29)
CREAT SERPL-MCNC: 1.1 MG/DL (ref 0.5–1.4)
DIFFERENTIAL METHOD: ABNORMAL
EOSINOPHIL # BLD AUTO: 0.1 K/UL (ref 0–0.5)
EOSINOPHIL NFR BLD: 1.6 % (ref 0–8)
ERYTHROCYTE [DISTWIDTH] IN BLOOD BY AUTOMATED COUNT: 14.8 % (ref 11.5–14.5)
EST. GFR  (NO RACE VARIABLE): >60 ML/MIN/1.73 M^2
ESTIMATED AVG GLUCOSE: 143 MG/DL (ref 68–131)
FERRITIN SERPL-MCNC: 62 NG/ML (ref 20–300)
GLUCOSE SERPL-MCNC: 94 MG/DL (ref 70–110)
HBA1C MFR BLD: 6.6 % (ref 4–5.6)
HCT VFR BLD AUTO: 49.8 % (ref 40–54)
HDLC SERPL-MCNC: 41 MG/DL (ref 40–75)
HDLC SERPL: 27.3 % (ref 20–50)
HGB BLD-MCNC: 16 G/DL (ref 14–18)
IMM GRANULOCYTES # BLD AUTO: 0.03 K/UL (ref 0–0.04)
IMM GRANULOCYTES NFR BLD AUTO: 0.4 % (ref 0–0.5)
IRON SERPL-MCNC: 125 UG/DL (ref 45–160)
LDLC SERPL CALC-MCNC: 86 MG/DL (ref 63–159)
LYMPHOCYTES # BLD AUTO: 2.1 K/UL (ref 1–4.8)
LYMPHOCYTES NFR BLD: 27.8 % (ref 18–48)
MCH RBC QN AUTO: 30 PG (ref 27–31)
MCHC RBC AUTO-ENTMCNC: 32.1 G/DL (ref 32–36)
MCV RBC AUTO: 93 FL (ref 82–98)
MONOCYTES # BLD AUTO: 0.7 K/UL (ref 0.3–1)
MONOCYTES NFR BLD: 8.8 % (ref 4–15)
NEUTROPHILS # BLD AUTO: 4.6 K/UL (ref 1.8–7.7)
NEUTROPHILS NFR BLD: 60.7 % (ref 38–73)
NONHDLC SERPL-MCNC: 109 MG/DL
NRBC BLD-RTO: 0 /100 WBC
PLATELET # BLD AUTO: 264 K/UL (ref 150–450)
PMV BLD AUTO: 11 FL (ref 9.2–12.9)
POTASSIUM SERPL-SCNC: 3.9 MMOL/L (ref 3.5–5.1)
PROT SERPL-MCNC: 7.4 G/DL (ref 6–8.4)
RBC # BLD AUTO: 5.34 M/UL (ref 4.6–6.2)
SATURATED IRON: 24 % (ref 20–50)
SODIUM SERPL-SCNC: 141 MMOL/L (ref 136–145)
TOTAL IRON BINDING CAPACITY: 528 UG/DL (ref 250–450)
TRANSFERRIN SERPL-MCNC: 357 MG/DL (ref 200–375)
TRIGL SERPL-MCNC: 115 MG/DL (ref 30–150)
WBC # BLD AUTO: 7.53 K/UL (ref 3.9–12.7)

## 2023-06-15 PROCEDURE — 1159F MED LIST DOCD IN RCRD: CPT | Mod: CPTII,S$GLB,, | Performed by: FAMILY MEDICINE

## 2023-06-15 PROCEDURE — 1160F PR REVIEW ALL MEDS BY PRESCRIBER/CLIN PHARMACIST DOCUMENTED: ICD-10-PCS | Mod: CPTII,S$GLB,, | Performed by: FAMILY MEDICINE

## 2023-06-15 PROCEDURE — 3008F PR BODY MASS INDEX (BMI) DOCUMENTED: ICD-10-PCS | Mod: CPTII,S$GLB,, | Performed by: FAMILY MEDICINE

## 2023-06-15 PROCEDURE — 36415 COLL VENOUS BLD VENIPUNCTURE: CPT | Mod: PO | Performed by: FAMILY MEDICINE

## 2023-06-15 PROCEDURE — 99999 PR PBB SHADOW E&M-EST. PATIENT-LVL IV: ICD-10-PCS | Mod: PBBFAC,,, | Performed by: FAMILY MEDICINE

## 2023-06-15 PROCEDURE — 1159F PR MEDICATION LIST DOCUMENTED IN MEDICAL RECORD: ICD-10-PCS | Mod: CPTII,S$GLB,, | Performed by: FAMILY MEDICINE

## 2023-06-15 PROCEDURE — 3074F SYST BP LT 130 MM HG: CPT | Mod: CPTII,S$GLB,, | Performed by: FAMILY MEDICINE

## 2023-06-15 PROCEDURE — 84466 ASSAY OF TRANSFERRIN: CPT | Performed by: FAMILY MEDICINE

## 2023-06-15 PROCEDURE — 1160F RVW MEDS BY RX/DR IN RCRD: CPT | Mod: CPTII,S$GLB,, | Performed by: FAMILY MEDICINE

## 2023-06-15 PROCEDURE — 85025 COMPLETE CBC W/AUTO DIFF WBC: CPT | Performed by: FAMILY MEDICINE

## 2023-06-15 PROCEDURE — 3079F DIAST BP 80-89 MM HG: CPT | Mod: CPTII,S$GLB,, | Performed by: FAMILY MEDICINE

## 2023-06-15 PROCEDURE — 3074F PR MOST RECENT SYSTOLIC BLOOD PRESSURE < 130 MM HG: ICD-10-PCS | Mod: CPTII,S$GLB,, | Performed by: FAMILY MEDICINE

## 2023-06-15 PROCEDURE — 80053 COMPREHEN METABOLIC PANEL: CPT | Performed by: FAMILY MEDICINE

## 2023-06-15 PROCEDURE — 99999 PR PBB SHADOW E&M-EST. PATIENT-LVL IV: CPT | Mod: PBBFAC,,, | Performed by: FAMILY MEDICINE

## 2023-06-15 PROCEDURE — 99214 PR OFFICE/OUTPT VISIT, EST, LEVL IV, 30-39 MIN: ICD-10-PCS | Mod: S$GLB,,, | Performed by: FAMILY MEDICINE

## 2023-06-15 PROCEDURE — 3079F PR MOST RECENT DIASTOLIC BLOOD PRESSURE 80-89 MM HG: ICD-10-PCS | Mod: CPTII,S$GLB,, | Performed by: FAMILY MEDICINE

## 2023-06-15 PROCEDURE — 80061 LIPID PANEL: CPT | Performed by: FAMILY MEDICINE

## 2023-06-15 PROCEDURE — 82728 ASSAY OF FERRITIN: CPT | Performed by: FAMILY MEDICINE

## 2023-06-15 PROCEDURE — 3008F BODY MASS INDEX DOCD: CPT | Mod: CPTII,S$GLB,, | Performed by: FAMILY MEDICINE

## 2023-06-15 PROCEDURE — 83036 HEMOGLOBIN GLYCOSYLATED A1C: CPT | Performed by: FAMILY MEDICINE

## 2023-06-15 PROCEDURE — 99214 OFFICE O/P EST MOD 30 MIN: CPT | Mod: S$GLB,,, | Performed by: FAMILY MEDICINE

## 2023-06-15 RX ORDER — ESCITALOPRAM OXALATE 20 MG/1
20 TABLET ORAL NIGHTLY
Qty: 90 TABLET | Refills: 1 | Status: SHIPPED | OUTPATIENT
Start: 2023-06-15 | End: 2023-08-28 | Stop reason: SDUPTHER

## 2023-06-15 RX ORDER — METFORMIN HYDROCHLORIDE 500 MG/1
500 TABLET, EXTENDED RELEASE ORAL 2 TIMES DAILY WITH MEALS
Qty: 180 TABLET | Refills: 3 | Status: SHIPPED | OUTPATIENT
Start: 2023-06-15 | End: 2023-08-28 | Stop reason: SDUPTHER

## 2023-06-15 RX ORDER — METFORMIN HYDROCHLORIDE 500 MG/1
500 TABLET, EXTENDED RELEASE ORAL
Qty: 90 TABLET | Refills: 3 | Status: SHIPPED | OUTPATIENT
Start: 2023-06-15 | End: 2023-06-15 | Stop reason: SDUPTHER

## 2023-06-15 RX ORDER — FENOFIBRATE 134 MG/1
134 CAPSULE ORAL NIGHTLY
Qty: 90 CAPSULE | Refills: 3 | Status: SHIPPED | OUTPATIENT
Start: 2023-06-15 | End: 2023-09-06 | Stop reason: SDUPTHER

## 2023-06-15 RX ORDER — TRAZODONE HYDROCHLORIDE 150 MG/1
150 TABLET ORAL NIGHTLY PRN
Qty: 90 TABLET | Refills: 1 | Status: SHIPPED | OUTPATIENT
Start: 2023-06-15 | End: 2023-09-06 | Stop reason: SDUPTHER

## 2023-06-15 RX ORDER — ROPINIROLE 4 MG/1
4 TABLET, FILM COATED ORAL NIGHTLY
Qty: 90 TABLET | Refills: 1 | Status: SHIPPED | OUTPATIENT
Start: 2023-06-15 | End: 2023-07-30 | Stop reason: SDUPTHER

## 2023-06-15 RX ORDER — ATORVASTATIN CALCIUM 40 MG/1
40 TABLET, FILM COATED ORAL DAILY
Qty: 90 TABLET | Refills: 3 | Status: SHIPPED | OUTPATIENT
Start: 2023-06-15 | End: 2023-11-03 | Stop reason: SDUPTHER

## 2023-06-15 NOTE — TELEPHONE ENCOUNTER
Pt informed of results, appt for labs and follow up sched, verb understanding of instructions per Dr Swenson

## 2023-06-15 NOTE — PROGRESS NOTES
"Subjective:       Patient ID: Dylan Sawant is a 62 y.o. male.    Chief Complaint: Anxiety    Dylan is a 62 y.o. male who presents today for f/u    Labs had been ordered, not done.     Anxiety: on lexapro 20 mg. Taking trazodone 150 mg nightly. Better after trazodone was increased.   DLD: on atorvastatin and fenofibrate. Changed to atorvastatin in late 2022.   RLS: has been using sleep apnea machine. Has Restless legs, starts at night. Mostly the left leg. On requip. Neurology increased gabapentin. Not taking consistently.   Prediabetes: on metformin.    Low iron: taking MVI with iron.     S/p left hip replacement, in early 2023, doing well. He is active. He bikes occasionally, twice a week.     Pharmacy messed up his fenofibrate and requip. He had sedation. He filed an incident report.     Review of Systems   Constitutional:  Negative for chills and fever.   Respiratory:  Negative for shortness of breath.    Cardiovascular:  Negative for chest pain.   Gastrointestinal:  Negative for nausea and vomiting.   Psychiatric/Behavioral:  Positive for sleep disturbance.            Objective:     Vitals:    06/15/23 0753   Pulse: 79   SpO2: 97%   Weight: 104.4 kg (230 lb 2.6 oz)   Height: 5' 9" (1.753 m)        Physical Exam  Vitals and nursing note reviewed.   Constitutional:       General: He is not in acute distress.     Appearance: Normal appearance. He is well-developed.   Cardiovascular:      Rate and Rhythm: Normal rate and regular rhythm.   Pulmonary:      Effort: Pulmonary effort is normal.      Breath sounds: Normal breath sounds.   Abdominal:      Palpations: Abdomen is soft.      Tenderness: There is no abdominal tenderness.   Skin:     Findings: No rash.   Neurological:      Mental Status: He is alert and oriented to person, place, and time.   Psychiatric:         Mood and Affect: Mood normal.         Behavior: Behavior normal.         Thought Content: Thought content normal.         Judgment: Judgment " normal.       Assessment:       1. Hypokalemia    2. Primary insomnia    3. CASTILLO (generalized anxiety disorder)    4. Mixed hyperlipidemia    5. Right leg pain    6. Elevated fasting glucose    7. Abnormal iron saturation        Plan:       Continue atorvastatin  Continue fenofibrate     Continue gabapentin per neurology, try at least one pill nightly, consistently.   Continue requip     Continue lexapro 20 mg  Continue trazodone at 150 mg      Check iron today.    You have low vitamin D and a Rx has been sent to your pharmacy. Take this pill once a week and when the prescription and refills are done, start taking an OTC vitamin D supplementation at 1000 IU daily.     F/u 6 months, annual. Labs today. Labs at next visit TBD.        Hypokalemia  -     Comprehensive Metabolic Panel; Future; Expected date: 06/15/2023    Primary insomnia  -     traZODone (DESYREL) 150 MG tablet; Take 1 tablet (150 mg total) by mouth nightly as needed for Insomnia.  Dispense: 90 tablet; Refill: 1    CASTILLO (generalized anxiety disorder)  -     EScitalopram oxalate (LEXAPRO) 20 MG tablet; Take 1 tablet (20 mg total) by mouth every evening.  Dispense: 90 tablet; Refill: 1    Mixed hyperlipidemia  -     atorvastatin (LIPITOR) 40 MG tablet; Take 1 tablet (40 mg total) by mouth once daily.  Dispense: 90 tablet; Refill: 3  -     fenofibrate micronized (LOFIBRA) 134 MG Cap; Take 1 capsule (134 mg total) by mouth every evening.  Dispense: 90 capsule; Refill: 3  -     Lipid Panel; Future; Expected date: 06/15/2023    Right leg pain  -     rOPINIRole (REQUIP) 4 MG tablet; Take 1 tablet (4 mg total) by mouth nightly.  Dispense: 90 tablet; Refill: 1    Elevated fasting glucose  -     metFORMIN (GLUCOPHAGE-XR) 500 MG ER 24hr tablet; Take 1 tablet (500 mg total) by mouth daily with breakfast.  Dispense: 90 tablet; Refill: 3  -     Hemoglobin A1C; Future; Expected date: 06/15/2023    Abnormal iron saturation  -     CBC Auto Differential; Future; Expected  date: 06/15/2023  -     Iron and TIBC; Future; Expected date: 06/15/2023  -     Ferritin; Future; Expected date: 06/15/2023            Warning signs discussed, patient to call with any further issues or worsening of symptoms. Above note may have utilized dictation, please excuse any transcription errors.

## 2023-06-15 NOTE — TELEPHONE ENCOUNTER
Please call patient    You are now DIABETIC  A1c >6.5  Increase metformin to 2 pills daily  Please set up with diabetic education and optometry.   If a1c still high at that next apt, can consider injectable medication such as ozempic or mounjaro if covered by insurance.     Iron sat better  Continue MVI with iron    Potassium better    Cholesterol better.     Keep scheduled f/u  Labs prior, ordered today

## 2023-06-15 NOTE — PATIENT INSTRUCTIONS
Continue atorvastatin  Continue fenofibrate     Continue gabapentin per neurology, try at least one pill nightly, consistently.   Continue requip     Continue lexapro 20 mg  Continue trazodone at 150 mg      Check iron today.    You have low vitamin D and a Rx has been sent to your pharmacy. Take this pill once a week and when the prescription and refills are done, start taking an OTC vitamin D supplementation at 1000 IU daily.     F/u 6 months, annual. Labs today. Labs at next visit TBD.      Extra dose of requip can cause sedation and fatigue.   dizziness  nausea or vomiting  coughing  excessive sweating  heart palpitations  extreme fatigue  somnolence  confusion  agitation

## 2023-06-16 ENCOUNTER — PATIENT MESSAGE (OUTPATIENT)
Dept: FAMILY MEDICINE | Facility: CLINIC | Age: 63
End: 2023-06-16
Payer: COMMERCIAL

## 2023-06-29 RX ORDER — GABAPENTIN 300 MG/1
900 CAPSULE ORAL
Qty: 90 CAPSULE | Refills: 3 | Status: SHIPPED | OUTPATIENT
Start: 2023-06-29 | End: 2023-07-30 | Stop reason: SDUPTHER

## 2023-07-07 ENCOUNTER — CLINICAL SUPPORT (OUTPATIENT)
Dept: DIABETES | Facility: CLINIC | Age: 63
End: 2023-07-07
Payer: COMMERCIAL

## 2023-07-07 ENCOUNTER — OFFICE VISIT (OUTPATIENT)
Dept: OPTOMETRY | Facility: CLINIC | Age: 63
End: 2023-07-07
Payer: COMMERCIAL

## 2023-07-07 DIAGNOSIS — Z00.00 VISIT FOR WELL MAN HEALTH CHECK: ICD-10-CM

## 2023-07-07 DIAGNOSIS — E11.9 TYPE 2 DIABETES MELLITUS WITHOUT COMPLICATION, WITHOUT LONG-TERM CURRENT USE OF INSULIN: ICD-10-CM

## 2023-07-07 DIAGNOSIS — H25.013 CORTICAL AGE-RELATED CATARACT, BILATERAL: ICD-10-CM

## 2023-07-07 DIAGNOSIS — Z13.5 GLAUCOMA SCREENING: ICD-10-CM

## 2023-07-07 DIAGNOSIS — E11.9 TYPE 2 DIABETES MELLITUS WITHOUT RETINOPATHY: Primary | ICD-10-CM

## 2023-07-07 DIAGNOSIS — H52.203 MYOPIA OF BOTH EYES WITH ASTIGMATISM AND PRESBYOPIA: ICD-10-CM

## 2023-07-07 DIAGNOSIS — H52.13 MYOPIA OF BOTH EYES WITH ASTIGMATISM AND PRESBYOPIA: ICD-10-CM

## 2023-07-07 DIAGNOSIS — H52.4 MYOPIA OF BOTH EYES WITH ASTIGMATISM AND PRESBYOPIA: ICD-10-CM

## 2023-07-07 PROCEDURE — 92015 PR REFRACTION: ICD-10-PCS | Mod: S$GLB,,, | Performed by: OPTOMETRIST

## 2023-07-07 PROCEDURE — 1160F PR REVIEW ALL MEDS BY PRESCRIBER/CLIN PHARMACIST DOCUMENTED: ICD-10-PCS | Mod: CPTII,S$GLB,, | Performed by: OPTOMETRIST

## 2023-07-07 PROCEDURE — 92014 PR EYE EXAM, EST PATIENT,COMPREHESV: ICD-10-PCS | Mod: S$GLB,,, | Performed by: OPTOMETRIST

## 2023-07-07 PROCEDURE — 2023F PR DILATED RETINAL EXAM W/O EVID OF RETINOPATHY: ICD-10-PCS | Mod: CPTII,S$GLB,, | Performed by: OPTOMETRIST

## 2023-07-07 PROCEDURE — 3044F HG A1C LEVEL LT 7.0%: CPT | Mod: CPTII,S$GLB,, | Performed by: OPTOMETRIST

## 2023-07-07 PROCEDURE — 3044F PR MOST RECENT HEMOGLOBIN A1C LEVEL <7.0%: ICD-10-PCS | Mod: CPTII,S$GLB,, | Performed by: OPTOMETRIST

## 2023-07-07 PROCEDURE — 99999 PR PBB SHADOW E&M-EST. PATIENT-LVL II: CPT | Mod: PBBFAC,,, | Performed by: DIETITIAN, REGISTERED

## 2023-07-07 PROCEDURE — G0108 DIAB MANAGE TRN  PER INDIV: HCPCS | Mod: S$GLB,,, | Performed by: DIETITIAN, REGISTERED

## 2023-07-07 PROCEDURE — 92015 DETERMINE REFRACTIVE STATE: CPT | Mod: S$GLB,,, | Performed by: OPTOMETRIST

## 2023-07-07 PROCEDURE — 99999 PR PBB SHADOW E&M-EST. PATIENT-LVL III: CPT | Mod: PBBFAC,,, | Performed by: OPTOMETRIST

## 2023-07-07 PROCEDURE — 1159F PR MEDICATION LIST DOCUMENTED IN MEDICAL RECORD: ICD-10-PCS | Mod: CPTII,S$GLB,, | Performed by: OPTOMETRIST

## 2023-07-07 PROCEDURE — 1160F RVW MEDS BY RX/DR IN RCRD: CPT | Mod: CPTII,S$GLB,, | Performed by: OPTOMETRIST

## 2023-07-07 PROCEDURE — 2023F DILAT RTA XM W/O RTNOPTHY: CPT | Mod: CPTII,S$GLB,, | Performed by: OPTOMETRIST

## 2023-07-07 PROCEDURE — 1159F MED LIST DOCD IN RCRD: CPT | Mod: CPTII,S$GLB,, | Performed by: OPTOMETRIST

## 2023-07-07 PROCEDURE — G0108 PR DIAB MANAGE TRN  PER INDIV: ICD-10-PCS | Mod: S$GLB,,, | Performed by: DIETITIAN, REGISTERED

## 2023-07-07 PROCEDURE — 99999 PR PBB SHADOW E&M-EST. PATIENT-LVL II: ICD-10-PCS | Mod: PBBFAC,,, | Performed by: DIETITIAN, REGISTERED

## 2023-07-07 PROCEDURE — 92014 COMPRE OPH EXAM EST PT 1/>: CPT | Mod: S$GLB,,, | Performed by: OPTOMETRIST

## 2023-07-07 PROCEDURE — 99999 PR PBB SHADOW E&M-EST. PATIENT-LVL III: ICD-10-PCS | Mod: PBBFAC,,, | Performed by: OPTOMETRIST

## 2023-07-07 NOTE — PROGRESS NOTES
Diabetes Care Specialist Progress Note  Author: Tea Cruz RD, CDE  Date: 7/7/2023    Program Intake  Reason for Diabetes Program Visit:: Initial Diabetes Assessment  Current diabetes risk level:: low  In the last 12 months, have you:: none    Lab Results   Component Value Date    HGBA1C 6.6 (H) 06/15/2023       Clinical    Problem Review  Reviewed Problem List with Patient: yes  Active comorbidities affecting diabetes self-care.: no  Reviewed health maintenance: yes    Clinical Assessment  Current Diabetes Treatment: Oral Medication  Have you ever experienced hypoglycemia (low blood sugar)?: no  Have you ever experienced hyperglycemia (high blood sugar)?: no    Medication Information  How do you obtain your medications?: Patient drives  How many days a week do you miss your medications?: Never  Do you sometimes have difficulty refilling your medications?: No  Medication adherence impacting ability to self-manage diabetes?: No    Labs  Do you have regular lab work to monitor your medications?: Yes  Type of Regular Lab Work: A1c    Nutritional Status  Diet: Regular (2 meals plus snacks; drinks diet coke, water, diet sport drinks)  Change in appetite?: No  Dentation:: Intact  Recent Changes in Weight: No Recent Weight Change  Current nutritional status an area of need that is impacting patient's ability to self-manage diabetes?: No    Additional Social History    Support  Does anyone support you with your diabetes care?: yes  Who supports you?: spouse, self  Who takes you to your medical appointments?: self  Does the current support meet the patient's needs?: Yes  Is Support an area impacting ability to self-manage diabetes?: No    Access to Mass Media & Technology  Does the patient have access to any of the following devices or technologies?: Smart phone  Media or technology needs impacting ability to self-manage diabetes?: No    Cognitive/Behavioral Health  Alert and Oriented: Yes  Difficulty Thinking:  No  Requires Prompting: No  Requires assistance for routine expression?: No  Cognitive or behavioral barriers impacting ability to self-manage diabetes?: No    Culture/Protestant  Culture or Yazdanism beliefs that may impact ability to access healthcare: No    Communication  Language preference: English  Hearing Problems: No  Vision Problems: No  Communication needs impacting ability to self-manage diabetes?: No    Health Literacy  Preferred Learning Method: Face to Face  How often do you need to have someone help you read instructions, pamphlets, or written material from your doctor or pharmacy?: Never  Health literacy needs impacting ability to self-manage diabetes?: No      Diabetes Self-Management Skills Assessment    Diabetes Disease Process/Treatment Options  Patient/caregiver able to state what happens when someone has diabetes.: no  Patient/caregiver knows what type of diabetes they have.: yes  Diabetes Type : Type II  Patient/caregiver able to identify at least three signs and symptoms of diabetes.: no  Patient able to identify at least three risk factors for diabetes.: no  Diabetes Disease Process/Treatment Options: Skills Assessment Completed: Yes  Assessment indicates:: Instruction Needed  Area of need?: Yes    Nutrition/Healthy Eating  Method of carbohydrate measurement:: no method  Patient can identify foods that impact blood sugar.: no (see comments)  Nutrition/Healthy Eating Skills Assessment Completed:: Yes  Assessment indicates:: Instruction Needed  Area of need?: Yes    Physical Activity/Exercise  Patient's daily activity level:: moderately active  Patient formally exercises outside of work.: yes  Exercise Type: walking  Frequency: four or more times a week  Duration: 45 min  Patient can identify forms of physical activity.: yes  Stated forms of physical activity:: any movement performed by muscles that uses energy, yardwork  Patient can identify reasons why exercise/physical activity is important  in diabetes management.: no  Physical Activity/Exercise Skills Assessment Completed: : Yes  Assessment indicates:: Instruction Needed  Area of need?: Yes    Medications  Patient is able to describe current diabetes management routine.: yes  Diabetes management routine:: oral medications  Patient is able to identify current diabetes medications, dosages, and appropriate timing of medications.: yes  Patient understands the purpose of the medications taken for diabetes.: no  Patient reports problems or concerns with current medication regimen.: no  Medication Skills Assessment Completed:: Yes  Assessment indicates:: Instruction Needed  Area of need?: Yes    Home Blood Glucose Monitoring  Patient states that blood sugar is checked at home daily.: no  Reasons for not monitoring:: new diabetes diagnosis  Home Blood Glucose Monitoring Skills Assessment Completed: : Yes  Assessment indicates:: Other (comment) (Does not wish to SMBG at this time)  Area of need?: No    Acute Complications  Patient is able to identify types of acute complications: No  Acute Complications Skills Assessment Completed: : Yes  Assessment indicates:: Instruction Needed  Area of need?: Yes    Chronic Complications  Patient can identify major chronic complications of diabetes.: no  Patient is taking statin?: Yes  Chronic Complications Skills Assessment Completed: : Yes  Assessment indicates:: Instruction Needed  Area of need?: Yes    Psychosocial/Coping  Patient can identify ways of coping with chronic disease.: yes  Patient-stated ways of coping with chronic disease:: support from loved ones  Psychosocial/Coping Skills Assessment Completed: : Yes  Assessment indicates:: Adequate understanding  Area of need?: No    Assessment Summary and Plan    Based on today's diabetes care assessment, the following areas of need were identified:      Social 7/7/2023   Support No   Access to Mass Media/Tech No   Cognitive/Behavioral Health No   Culture/Advent No    Communication No   Health Literacy No        Clinical 7/7/2023   Medication Adherence No   Nutritional Status No        Diabetes Self-Management Skills 7/7/2023   Diabetes Disease Process/Treatment Options Yes - reviewed diabetes disease process and treatment options   Nutrition/Healthy Eating Yes - reviewed CHO counting, label reading and addt'l resources to assist w/ CHO counting; plate method reviewed; alternatives to sugary beverages discussed   Physical Activity/Exercise Yes - reviewed goals and benefits   Medication Yes - reviewed MOA of medication and regimen   Home Blood Glucose Monitoring No   Acute Complications Yes - reviewed s/s and treatment of hypo/hyperglycemia   Chronic Complications Yes - reviewed standards of care   Psychosocial/Coping No          Today's interventions were provided through individual discussion, instruction, and written materials were provided.      Patient verbalized understanding of instruction and written materials.  Pt was able to return back demonstration of instructions today. Patient understood key points, needs reinforcement and further instruction.     Diabetes Self-Management Care Plan:    Today's Diabetes Self-Management Care Plan was developed with Dylan Mann's input. Dylan Mann has agreed to work toward the following goal(s) to improve his/her overall diabetes control.      Care Plan: Diabetes Management   Updates made since 6/7/2023 12:00 AM        Problem: Medications         Long-Range Goal: Patient Agrees to take Diabetes Medication(s) as prescribed.    Start Date: 7/7/2023   Expected End Date: 1/8/2024   Priority: High   Barriers: No Barriers Identified        Task: Reviewed with patient all current diabetes medications and provided basic review of the purpose, dosage, frequency, side effects, and storage of both oral and injectable diabetes medications. Completed 7/7/2023       Follow Up Plan     Follow up in about 6 months (around 1/7/2024).    Today's  care plan and follow up schedule was discussed with patient.  Dylan Mann verbalized understanding of the care plan, goals, and agrees to follow up plan.        The patient was encouraged to communicate with his/her health care provider/physician and care team regarding his/her condition(s) and treatment.  I provided the patient with my contact information today and encouraged to contact me via phone or [facility] Patient Portal as needed.     Length of Visit   Total Time: 60 Minutes

## 2023-07-07 NOTE — PROGRESS NOTES
HPI    63 Y/o male is here for routine eye exam with no C/o about ocular health    Pt denies pain and discomfort   No f/f    Eye med:   Last edited by Melchor Jiang MA on 7/7/2023  9:52 AM.            Assessment /Plan     For exam results, see Encounter Report.    Type 2 diabetes mellitus without retinopathy    Type 2 diabetes mellitus without complication, without long-term current use of insulin  -     Ambulatory referral/consult to Optometry    Visit for well man health check  -     Ambulatory referral/consult to Optometry    Cortical age-related cataract, bilateral    Glaucoma screening    Myopia of both eyes with astigmatism and presbyopia      Small cats OD>OS--pt wishes new Rx.  Discussed bifocals, or can continue w separate dist/near spex  DM- WITHOUT RETINOPATHY.  Advised yearly DFE     PLAN:    Rtc 1 yr

## 2023-07-30 DIAGNOSIS — M79.604 RIGHT LEG PAIN: ICD-10-CM

## 2023-07-30 RX ORDER — ROPINIROLE 4 MG/1
4 TABLET, FILM COATED ORAL NIGHTLY
Qty: 90 TABLET | Refills: 1 | Status: SHIPPED | OUTPATIENT
Start: 2023-07-30 | End: 2023-12-11 | Stop reason: SDUPTHER

## 2023-07-31 RX ORDER — GABAPENTIN 300 MG/1
900 CAPSULE ORAL
Qty: 90 CAPSULE | Refills: 3 | Status: SHIPPED | OUTPATIENT
Start: 2023-07-31 | End: 2024-03-09 | Stop reason: SDUPTHER

## 2023-08-28 DIAGNOSIS — E11.9 TYPE 2 DIABETES MELLITUS WITHOUT COMPLICATION, WITHOUT LONG-TERM CURRENT USE OF INSULIN: ICD-10-CM

## 2023-08-28 DIAGNOSIS — F41.1 GAD (GENERALIZED ANXIETY DISORDER): ICD-10-CM

## 2023-08-28 RX ORDER — ESCITALOPRAM OXALATE 20 MG/1
20 TABLET ORAL NIGHTLY
Qty: 90 TABLET | Refills: 1 | Status: SHIPPED | OUTPATIENT
Start: 2023-08-28 | End: 2023-12-11 | Stop reason: SDUPTHER

## 2023-08-28 RX ORDER — METFORMIN HYDROCHLORIDE 500 MG/1
500 TABLET, EXTENDED RELEASE ORAL 2 TIMES DAILY WITH MEALS
Qty: 180 TABLET | Refills: 3 | Status: SHIPPED | OUTPATIENT
Start: 2023-08-28 | End: 2023-12-11 | Stop reason: SDUPTHER

## 2023-08-28 NOTE — TELEPHONE ENCOUNTER
No care due was identified.  [facility] Embedded Care Due Messages. Reference number: [id number].   8/28/2023 6:21:06 PM CDT

## 2023-09-06 DIAGNOSIS — F51.01 PRIMARY INSOMNIA: ICD-10-CM

## 2023-09-06 DIAGNOSIS — E78.2 MIXED HYPERLIPIDEMIA: ICD-10-CM

## 2023-09-06 RX ORDER — FENOFIBRATE 134 MG/1
134 CAPSULE ORAL NIGHTLY
Qty: 90 CAPSULE | Refills: 3 | Status: SHIPPED | OUTPATIENT
Start: 2023-09-06 | End: 2023-12-11

## 2023-09-06 RX ORDER — TRAZODONE HYDROCHLORIDE 150 MG/1
150 TABLET ORAL NIGHTLY PRN
Qty: 90 TABLET | Refills: 1 | Status: SHIPPED | OUTPATIENT
Start: 2023-09-06 | End: 2023-12-02 | Stop reason: SDUPTHER

## 2023-09-06 NOTE — TELEPHONE ENCOUNTER
Unable to retrieve patient chart and identify care due.  [facility] Embedded Care Due Messages. Reference number: [id number].   9/06/2023 8:02:40 AM CDT

## 2023-09-22 ENCOUNTER — TELEPHONE (OUTPATIENT)
Dept: ORTHOPEDICS | Facility: CLINIC | Age: 63
End: 2023-09-22
Payer: COMMERCIAL

## 2023-09-22 DIAGNOSIS — Z96.642 S/P HIP REPLACEMENT, LEFT: Primary | ICD-10-CM

## 2023-09-22 DIAGNOSIS — M16.11 PRIMARY OSTEOARTHRITIS OF RIGHT HIP: ICD-10-CM

## 2023-09-22 NOTE — TELEPHONE ENCOUNTER
I called the patient today regarding his voice message. The patient is scheduled for his pre-op appointments. The patient verbalized understanding and has no further questions.             ----- Message from Abby Powell sent at 9/22/2023 12:54 PM CDT -----  Needs advice from nurse:      Who Called:pt  Regarding:states surgery hasn't been scheduled yet and it's suppose to be in November and he doesn't have a pre op scheduled as well  Would the patient rather a call back or [facility]?  Best Call Back number:[phone]  Additional Info:

## 2023-10-18 DIAGNOSIS — M16.11 PRIMARY OSTEOARTHRITIS OF RIGHT HIP: Primary | ICD-10-CM

## 2023-10-23 ENCOUNTER — TELEPHONE (OUTPATIENT)
Dept: PREADMISSION TESTING | Facility: HOSPITAL | Age: 63
End: 2023-10-23
Payer: COMMERCIAL

## 2023-10-23 DIAGNOSIS — M79.604 RIGHT LEG PAIN: ICD-10-CM

## 2023-10-23 DIAGNOSIS — Z01.818 PRE-OP TESTING: ICD-10-CM

## 2023-10-23 DIAGNOSIS — E11.9 TYPE 2 DIABETES MELLITUS WITHOUT COMPLICATION, WITHOUT LONG-TERM CURRENT USE OF INSULIN: Primary | ICD-10-CM

## 2023-10-23 NOTE — ANESTHESIA PAT ROS NOTE
10/23/2023  Dylan Sawant is a 62 y.o., male.      Pre-op Assessment          Review of Systems           Anesthesia Assessment: Preoperative EQUATION    Planned Procedure: Procedure(s) (LRB):  ARTHROPLASTY, HIP, TOTAL, ANTERIOR APPROACH: RIGHT: DEPUY - ACTIS + PINNACLE (Right)  Requested Anesthesia Type:Spinal/Epidural  Surgeon: Karlo Springer III, MD  Service: Orthopedics  Known or anticipated Date of Surgery:11/15/2023    Surgeon notes: reviewed    Electronic QUestionnaire Assessment completed via nurse interview with patient.        Triage considerations:     The patient has no apparent active cardiac condition (No unstable coronary Syndrome such as severe unstable angina or recent [<1 month] myocardial infarction, decompensated CHF, severe valvular   disease or significant arrhythmia)    Previous anesthesia records:GETA, MAC, Spinal Anesthesia , and No problems  1/27/23  ARTHROPLASTY, HIP TOTAL, (Left: Hip)   Airway:  Mallampati: II   Mouth Opening: Normal  Tongue: Normal    Last PCP note: 3-6 months ago , within [facility] , Dr. Madrecha  Subspecialty notes: Ortho    Other important co-morbidities: DM2, GERD, HLD, Obesity, KELLY, and uses CPAP, CASTILLO, RLS        Tests already available:  Available tests,  within [facility] .   6/15/23 A1C, CMP, CBC  1/10/23 PT/INR  6/18/18 EKG            Instructions given. (See in Nurse's note)    Optimization:  Anesthesia Preop Clinic Assessment  Indicated POC    Medical Opinion Indicated       Sub-specialist consult indicated:   [facility] NP      Plan:    Testing:  A1C, CMP, EKG, Hematology Profile, and PT/INR   Pre-anesthesia  visit       Visit focus: possible regional anesthesia and/or nerve block      Consultation: PCC NP for medical and anesthesia optimization     Patient  has previously scheduled Medical Appointment: 10/31    Navigation: Tests Scheduled.               Consults scheduled.             Results will be tracked by Preop Clinic.      10/31/23 Patient is optimized for surgery.         Tony Whiting NP  Perioperative Medicine  [facility]

## 2023-10-23 NOTE — TELEPHONE ENCOUNTER
----- Message from Elyssa Garcia RN sent at 10/23/2023  1:45 PM CDT -----  (11/15) Please schedule EKG and labs (A1C, CBC, CMP, PT/INR).  Thanks,  Elyssa

## 2023-10-30 ENCOUNTER — TELEPHONE (OUTPATIENT)
Dept: ORTHOPEDICS | Facility: CLINIC | Age: 63
End: 2023-10-30
Payer: COMMERCIAL

## 2023-10-30 ENCOUNTER — TELEPHONE (OUTPATIENT)
Dept: INTERNAL MEDICINE | Facility: CLINIC | Age: 63
End: 2023-10-30
Payer: COMMERCIAL

## 2023-10-30 PROBLEM — M16.11 PRIMARY OSTEOARTHRITIS OF RIGHT HIP: Status: ACTIVE | Noted: 2023-10-30

## 2023-10-30 NOTE — ASSESSMENT & PLAN NOTE
Recommend  weight loss, healthy diet (DASH/Mediterranean) and exercise.   Patient should exercise 30 minutes at least five times weekly once recovered from surgery.   Treated with: atorvastatin/fenofibrate

## 2023-10-30 NOTE — TELEPHONE ENCOUNTER
"I called the patient regarding the message below. The patient did not answer. I left a voicemail with a call back number.        ----- Message from Angeli Whitley sent at 10/30/2023  7:56 AM CDT -----  Regarding: missed call  Contact: [phone]  Name Of Caller: self     Contact Preference?: [phone]     What is the nature of the call?: returning a call and not sure if it was regarding his appts     Additional Notes:    "Thank you for all that you do for our patients"        "

## 2023-10-30 NOTE — ASSESSMENT & PLAN NOTE
Not taking iron supplements at this time.    Component      Latest Ref Rng 6/15/2023   Iron      45 - 160 ug/dL 125    Transferrin      200 - 375 mg/dL 357    TIBC      250 - 450 ug/dL 528 (H)    Saturated Iron      20 - 50 % 24

## 2023-10-30 NOTE — ASSESSMENT & PLAN NOTE
Treated with: Lexapro 20 mg- some improvement noted.   Followed per PCP.   Denies suicidal/ homicidal ideations.

## 2023-10-30 NOTE — ASSESSMENT & PLAN NOTE
Lifestyle changes should be made by eating healthy, exercising at least 150 minutes weekly, and avoiding sedentary behavior.

## 2023-10-30 NOTE — ASSESSMENT & PLAN NOTE
Currently takes: metformin       Most recent A1c is 6.6    Denies peripheral neuropathy.   Denies visual changes. Up to date with eye exams.   Micro changes: Denies- Retinopathy, Nephropathy   Macro changes: Denies-  Carotid, Coronary , Peripheral disease     Maintain healthy weight. Exercise at least 150 minutes weekly. Encouraged diet rich in nutrients such as fruits, vegetables, and whole grains; reduce sugar intake from cakes, candy, and sugared drinks.

## 2023-10-31 ENCOUNTER — HOSPITAL ENCOUNTER (OUTPATIENT)
Dept: CARDIOLOGY | Facility: CLINIC | Age: 63
Discharge: HOME | End: 2023-10-31
Payer: COMMERCIAL

## 2023-10-31 ENCOUNTER — OFFICE VISIT (OUTPATIENT)
Dept: INTERNAL MEDICINE | Facility: CLINIC | Age: 63
End: 2023-10-31
Payer: COMMERCIAL

## 2023-10-31 ENCOUNTER — OFFICE VISIT (OUTPATIENT)
Dept: ORTHOPEDICS | Facility: CLINIC | Age: 63
End: 2023-10-31
Payer: COMMERCIAL

## 2023-10-31 ENCOUNTER — HOSPITAL ENCOUNTER (OUTPATIENT)
Dept: RADIOLOGY | Facility: HOSPITAL | Age: 63
Discharge: HOME | End: 2023-10-31
Attending: ORTHOPAEDIC SURGERY
Payer: COMMERCIAL

## 2023-10-31 VITALS
BODY MASS INDEX: 33.63 KG/M2 | DIASTOLIC BLOOD PRESSURE: 73 MMHG | TEMPERATURE: 98 F | HEART RATE: 63 BPM | WEIGHT: 227.06 LBS | OXYGEN SATURATION: 98 % | HEIGHT: 69 IN | SYSTOLIC BLOOD PRESSURE: 147 MMHG

## 2023-10-31 VITALS — WEIGHT: 227 LBS | HEIGHT: 69 IN | BODY MASS INDEX: 33.62 KG/M2

## 2023-10-31 DIAGNOSIS — M16.11 PRIMARY OSTEOARTHRITIS OF RIGHT HIP: ICD-10-CM

## 2023-10-31 DIAGNOSIS — G25.81 RESTLESS LEGS: ICD-10-CM

## 2023-10-31 DIAGNOSIS — G47.33 OSA (OBSTRUCTIVE SLEEP APNEA): ICD-10-CM

## 2023-10-31 DIAGNOSIS — K21.9 GASTROESOPHAGEAL REFLUX DISEASE, UNSPECIFIED WHETHER ESOPHAGITIS PRESENT: ICD-10-CM

## 2023-10-31 DIAGNOSIS — E78.2 MIXED HYPERLIPIDEMIA: ICD-10-CM

## 2023-10-31 DIAGNOSIS — R79.0 ABNORMAL IRON SATURATION: ICD-10-CM

## 2023-10-31 DIAGNOSIS — R03.0 ELEVATED BP WITHOUT DIAGNOSIS OF HYPERTENSION: ICD-10-CM

## 2023-10-31 DIAGNOSIS — E55.9 VITAMIN D DEFICIENCY: ICD-10-CM

## 2023-10-31 DIAGNOSIS — E11.9 TYPE 2 DIABETES MELLITUS WITHOUT COMPLICATION, WITHOUT LONG-TERM CURRENT USE OF INSULIN: ICD-10-CM

## 2023-10-31 DIAGNOSIS — M16.11 PRIMARY OSTEOARTHRITIS OF RIGHT HIP: Primary | ICD-10-CM

## 2023-10-31 DIAGNOSIS — Z01.818 PREOPERATIVE EXAMINATION: Primary | ICD-10-CM

## 2023-10-31 DIAGNOSIS — Z01.818 PRE-OP TESTING: ICD-10-CM

## 2023-10-31 DIAGNOSIS — Z96.641 S/P HIP REPLACEMENT, RIGHT: Primary | ICD-10-CM

## 2023-10-31 DIAGNOSIS — F41.9 ANXIETY: ICD-10-CM

## 2023-10-31 PROCEDURE — 3078F PR MOST RECENT DIASTOLIC BLOOD PRESSURE < 80 MM HG: ICD-10-PCS | Mod: CPTII,S$GLB,, | Performed by: NURSE PRACTITIONER

## 2023-10-31 PROCEDURE — 3044F HG A1C LEVEL LT 7.0%: CPT | Mod: CPTII,S$GLB,, | Performed by: NURSE PRACTITIONER

## 2023-10-31 PROCEDURE — 3044F PR MOST RECENT HEMOGLOBIN A1C LEVEL <7.0%: ICD-10-PCS | Mod: CPTII,S$GLB,, | Performed by: NURSE PRACTITIONER

## 2023-10-31 PROCEDURE — 72170 X-RAY EXAM OF PELVIS: CPT | Mod: TC

## 2023-10-31 PROCEDURE — 99999 PR PBB SHADOW E&M-EST. PATIENT-LVL IV: CPT | Mod: PBBFAC,,, | Performed by: NURSE PRACTITIONER

## 2023-10-31 PROCEDURE — 93005 ELECTROCARDIOGRAM TRACING: CPT | Mod: S$GLB,,, | Performed by: ANESTHESIOLOGY

## 2023-10-31 PROCEDURE — 1160F PR REVIEW ALL MEDS BY PRESCRIBER/CLIN PHARMACIST DOCUMENTED: ICD-10-PCS | Mod: CPTII,S$GLB,, | Performed by: NURSE PRACTITIONER

## 2023-10-31 PROCEDURE — 3008F BODY MASS INDEX DOCD: CPT | Mod: CPTII,S$GLB,, | Performed by: NURSE PRACTITIONER

## 2023-10-31 PROCEDURE — 99214 PR OFFICE/OUTPT VISIT, EST, LEVL IV, 30-39 MIN: ICD-10-PCS | Mod: S$GLB,,, | Performed by: ORTHOPAEDIC SURGERY

## 2023-10-31 PROCEDURE — 99999 PR PBB SHADOW E&M-EST. PATIENT-LVL III: ICD-10-PCS | Mod: PBBFAC,,, | Performed by: NURSE PRACTITIONER

## 2023-10-31 PROCEDURE — 99999 PR PBB SHADOW E&M-EST. PATIENT-LVL II: CPT | Mod: PBBFAC,,, | Performed by: ORTHOPAEDIC SURGERY

## 2023-10-31 PROCEDURE — 72170 X-RAY EXAM OF PELVIS: CPT | Mod: 26,,, | Performed by: RADIOLOGY

## 2023-10-31 PROCEDURE — 1159F PR MEDICATION LIST DOCUMENTED IN MEDICAL RECORD: ICD-10-PCS | Mod: CPTII,S$GLB,, | Performed by: NURSE PRACTITIONER

## 2023-10-31 PROCEDURE — 93010 ELECTROCARDIOGRAM REPORT: CPT | Mod: S$GLB,,, | Performed by: INTERNAL MEDICINE

## 2023-10-31 PROCEDURE — 1160F RVW MEDS BY RX/DR IN RCRD: CPT | Mod: CPTII,S$GLB,, | Performed by: NURSE PRACTITIONER

## 2023-10-31 PROCEDURE — 1159F MED LIST DOCD IN RCRD: CPT | Mod: CPTII,S$GLB,, | Performed by: NURSE PRACTITIONER

## 2023-10-31 PROCEDURE — 72170 XR PELVIS ROUTINE AP: ICD-10-PCS | Mod: 26,,, | Performed by: RADIOLOGY

## 2023-10-31 PROCEDURE — 99215 OFFICE O/P EST HI 40 MIN: CPT | Mod: S$GLB,,, | Performed by: NURSE PRACTITIONER

## 2023-10-31 PROCEDURE — 99215 PR OFFICE/OUTPT VISIT, EST, LEVL V, 40-54 MIN: ICD-10-PCS | Mod: S$GLB,,, | Performed by: NURSE PRACTITIONER

## 2023-10-31 PROCEDURE — 93010 EKG 12-LEAD: ICD-10-PCS | Mod: S$GLB,,, | Performed by: INTERNAL MEDICINE

## 2023-10-31 PROCEDURE — 3077F SYST BP >= 140 MM HG: CPT | Mod: CPTII,S$GLB,, | Performed by: NURSE PRACTITIONER

## 2023-10-31 PROCEDURE — 3008F PR BODY MASS INDEX (BMI) DOCUMENTED: ICD-10-PCS | Mod: CPTII,S$GLB,, | Performed by: NURSE PRACTITIONER

## 2023-10-31 PROCEDURE — 99999 PR PBB SHADOW E&M-EST. PATIENT-LVL II: ICD-10-PCS | Mod: PBBFAC,,, | Performed by: ORTHOPAEDIC SURGERY

## 2023-10-31 PROCEDURE — 93005 EKG 12-LEAD: ICD-10-PCS | Mod: S$GLB,,, | Performed by: ANESTHESIOLOGY

## 2023-10-31 PROCEDURE — 3078F DIAST BP <80 MM HG: CPT | Mod: CPTII,S$GLB,, | Performed by: NURSE PRACTITIONER

## 2023-10-31 PROCEDURE — 99999 PR PBB SHADOW E&M-EST. PATIENT-LVL IV: ICD-10-PCS | Mod: PBBFAC,,, | Performed by: NURSE PRACTITIONER

## 2023-10-31 PROCEDURE — 99999 PR PBB SHADOW E&M-EST. PATIENT-LVL III: CPT | Mod: PBBFAC,,, | Performed by: NURSE PRACTITIONER

## 2023-10-31 PROCEDURE — 3077F PR MOST RECENT SYSTOLIC BLOOD PRESSURE >= 140 MM HG: ICD-10-PCS | Mod: CPTII,S$GLB,, | Performed by: NURSE PRACTITIONER

## 2023-10-31 PROCEDURE — 99214 OFFICE O/P EST MOD 30 MIN: CPT | Mod: S$GLB,,, | Performed by: ORTHOPAEDIC SURGERY

## 2023-10-31 RX ORDER — ONDANSETRON 2 MG/ML
4 INJECTION INTRAMUSCULAR; INTRAVENOUS EVERY 8 HOURS PRN
Status: CANCELLED | OUTPATIENT
Start: 2023-10-31

## 2023-10-31 RX ORDER — NALOXONE HCL 0.4 MG/ML
0.02 VIAL (ML) INJECTION
Status: CANCELLED | OUTPATIENT
Start: 2023-10-31

## 2023-10-31 RX ORDER — PROCHLORPERAZINE EDISYLATE 5 MG/ML
5 INJECTION INTRAMUSCULAR; INTRAVENOUS EVERY 6 HOURS PRN
Status: CANCELLED | OUTPATIENT
Start: 2023-10-31

## 2023-10-31 RX ORDER — LIDOCAINE HYDROCHLORIDE 10 MG/ML
1 INJECTION, SOLUTION EPIDURAL; INFILTRATION; INTRACAUDAL; PERINEURAL
Status: CANCELLED | OUTPATIENT
Start: 2023-10-31

## 2023-10-31 RX ORDER — FENTANYL CITRATE 50 UG/ML
100 INJECTION, SOLUTION INTRAMUSCULAR; INTRAVENOUS
Status: CANCELLED | OUTPATIENT
Start: 2023-10-31 | End: 2023-11-01

## 2023-10-31 RX ORDER — OXYCODONE HYDROCHLORIDE 5 MG/1
10 TABLET ORAL
Status: CANCELLED | OUTPATIENT
Start: 2023-10-31

## 2023-10-31 RX ORDER — SODIUM CHLORIDE 9 MG/ML
INJECTION, SOLUTION INTRAVENOUS CONTINUOUS
Status: CANCELLED | OUTPATIENT
Start: 2023-10-31 | End: 2023-11-01

## 2023-10-31 RX ORDER — MUPIROCIN 20 MG/G
1 OINTMENT TOPICAL
Status: CANCELLED | OUTPATIENT
Start: 2023-10-31

## 2023-10-31 RX ORDER — PREGABALIN 75 MG/1
75 CAPSULE ORAL NIGHTLY
Status: CANCELLED | OUTPATIENT
Start: 2023-10-31

## 2023-10-31 RX ORDER — MORPHINE SULFATE 2 MG/ML
2 INJECTION, SOLUTION INTRAMUSCULAR; INTRAVENOUS
Status: CANCELLED | OUTPATIENT
Start: 2023-10-31

## 2023-10-31 RX ORDER — POLYETHYLENE GLYCOL 3350 17 G/17G
17 POWDER, FOR SOLUTION ORAL DAILY PRN
Status: CANCELLED | OUTPATIENT
Start: 2023-10-31

## 2023-10-31 RX ORDER — AMOXICILLIN 250 MG
1 CAPSULE ORAL 2 TIMES DAILY
Status: CANCELLED | OUTPATIENT
Start: 2023-10-31

## 2023-10-31 RX ORDER — CELECOXIB 200 MG/1
400 CAPSULE ORAL ONCE
Status: CANCELLED | OUTPATIENT
Start: 2023-10-31 | End: 2023-10-31

## 2023-10-31 RX ORDER — ACETAMINOPHEN 500 MG
1000 TABLET ORAL EVERY 6 HOURS
Status: CANCELLED | OUTPATIENT
Start: 2023-10-31

## 2023-10-31 RX ORDER — FAMOTIDINE 20 MG/1
20 TABLET, FILM COATED ORAL 2 TIMES DAILY
Status: CANCELLED | OUTPATIENT
Start: 2023-10-31

## 2023-10-31 RX ORDER — ACETAMINOPHEN 500 MG
1000 TABLET ORAL
Status: CANCELLED | OUTPATIENT
Start: 2023-10-31

## 2023-10-31 RX ORDER — MIDAZOLAM HYDROCHLORIDE 1 MG/ML
4 INJECTION INTRAMUSCULAR; INTRAVENOUS
Status: CANCELLED | OUTPATIENT
Start: 2023-10-31 | End: 2023-11-01

## 2023-10-31 RX ORDER — CELECOXIB 200 MG/1
200 CAPSULE ORAL DAILY
Status: CANCELLED | OUTPATIENT
Start: 2023-11-01

## 2023-10-31 RX ORDER — MUPIROCIN 20 MG/G
1 OINTMENT TOPICAL 2 TIMES DAILY
Status: CANCELLED | OUTPATIENT
Start: 2023-10-31 | End: 2023-11-05

## 2023-10-31 RX ORDER — OXYCODONE HYDROCHLORIDE 5 MG/1
5 TABLET ORAL
Status: CANCELLED | OUTPATIENT
Start: 2023-10-31

## 2023-10-31 RX ORDER — METHOCARBAMOL 750 MG/1
750 TABLET, FILM COATED ORAL 3 TIMES DAILY
Status: CANCELLED | OUTPATIENT
Start: 2023-10-31

## 2023-10-31 RX ORDER — POLYETHYLENE GLYCOL 3350 17 G/17G
17 POWDER, FOR SOLUTION ORAL DAILY
Status: CANCELLED | OUTPATIENT
Start: 2023-11-01

## 2023-10-31 RX ORDER — ASPIRIN 81 MG/1
81 TABLET ORAL 2 TIMES DAILY
Status: CANCELLED | OUTPATIENT
Start: 2023-10-31

## 2023-10-31 RX ORDER — SODIUM CHLORIDE 9 MG/ML
INJECTION, SOLUTION INTRAVENOUS
Status: CANCELLED | OUTPATIENT
Start: 2023-10-31

## 2023-10-31 RX ORDER — CEFAZOLIN SODIUM 2 G/50ML
2 SOLUTION INTRAVENOUS
Status: CANCELLED | OUTPATIENT
Start: 2023-10-31 | End: 2023-11-01

## 2023-10-31 RX ORDER — FENTANYL CITRATE 50 UG/ML
25 INJECTION, SOLUTION INTRAMUSCULAR; INTRAVENOUS EVERY 5 MIN PRN
Status: CANCELLED | OUTPATIENT
Start: 2023-10-31

## 2023-10-31 RX ORDER — CEFAZOLIN SODIUM 2 G/50ML
2 SOLUTION INTRAVENOUS
Status: CANCELLED | OUTPATIENT
Start: 2023-10-31

## 2023-10-31 RX ORDER — PREGABALIN 75 MG/1
75 CAPSULE ORAL
Status: CANCELLED | OUTPATIENT
Start: 2023-10-31

## 2023-10-31 NOTE — H&P (VIEW-ONLY)
CC:  Right hip pain    Dylan Sawant is a 62 y.o. male with Right hip pain.  Pain is worse with activity and weight bearing.  Patient has experienced interference of activities of daily living due to increased pain and decreased range of motion. Patient has failed non-operative treatment including NSAIDs, as well as greater than 3 months of activity modification. Dylan Sawant ambulates independently.     Relevant medical conditions of significance in perioperative period:  Anxiety- on medication managed by pcp  Type 2 DM- on medication managed by pcp  HLD- on medication managed by pcp  Insomnia- on medication managed by pcp      Past Medical History:   Diagnosis Date    Allergy     Anxiety     Diabetes mellitus     Elevated BP     GERD (gastroesophageal reflux disease)     Hyperlipidemia     KELLY (obstructive sleep apnea)     wears cpap    Restless leg syndrome     Type 2 diabetes mellitus without complication, without long-term current use of insulin 6/15/2023       Past Surgical History:   Procedure Laterality Date    ADENOIDECTOMY      ARTHROPLASTY OF HIP BY ANTERIOR APPROACH Left 1/27/2023    Procedure: ARTHROPLASTY, HIP TOTAL,;  Surgeon: Karlo Springer III, MD;  Location: [facility];  Service: Orthopedics;  Laterality: Left;   ANTERIOR APPROACH: LEFT: DEPUY - ACTIS + PINNACLE    COLONOSCOPY N/A 4/8/2017    Procedure: COLONOSCOPY;  Surgeon: Kyle Moreno MD;  Location: [facility] (4TH FLR);  Service: Endoscopy;  Laterality: N/A;    NASAL SEPTUM SURGERY      RECONSTRUCTION OF NOSE N/A 6/22/2018    Procedure: RECONSTRUCTION, NOSE;  Surgeon: Tulio Echols III, MD;  Location: [facility] OR 2ND FLR;  Service: ENT;  Laterality: N/A;  1 HOUR TOTAL / LATERA  NASAL VALVE IMPLANTS    TONSILLECTOMY         Family History   Problem Relation Age of Onset    Diabetes Mother     Depression Mother     Arthritis Mother     COPD Mother     Heart disease Father     Cancer Father         pancreatic    Diabetes Father      Diverticulitis Father     Restless legs syndrome Father     No Known Problems Sister     Heart disease Paternal Uncle        Review of patient's allergies indicates:  No Known Allergies      Current Outpatient Medications:     acetaminophen (TYLENOL) 650 MG TbSR, Take 1 tablet (650 mg total) by mouth every 8 (eight) hours. (Patient not taking: Reported on 10/31/2023), Disp: 120 tablet, Rfl: 0    atorvastatin (LIPITOR) 40 MG tablet, Take 1 tablet (40 mg total) by mouth once daily., Disp: 90 tablet, Rfl: 3    ergocalciferol (ERGOCALCIFEROL) 50,000 unit Cap, Take 1 capsule (50,000 Units total) by mouth every 7 days. Then start daily OTC replacement after this Rx is complete (Patient not taking: Reported on 10/31/2023), Disp: 12 capsule, Rfl: 3    EScitalopram oxalate (LEXAPRO) 20 MG tablet, Take 1 tablet (20 mg total) by mouth every evening., Disp: 90 tablet, Rfl: 1    fenofibrate micronized (LOFIBRA) 134 MG Cap, Take 1 capsule (134 mg total) by mouth every evening., Disp: 90 capsule, Rfl: 3    gabapentin (NEURONTIN) 300 MG capsule, Take 3 capsules (900 mg total) by mouth daily with lunch., Disp: 90 capsule, Rfl: 3    metFORMIN (GLUCOPHAGE-XR) 500 MG ER 24hr tablet, Take 1 tablet (500 mg total) by mouth 2 (two) times daily with meals., Disp: 180 tablet, Rfl: 3    multivitamin (THERAGRAN) per tablet, Take 1 tablet by mouth once daily., Disp: , Rfl:     rOPINIRole (REQUIP) 4 MG tablet, Take 1 tablet (4 mg total) by mouth nightly., Disp: 90 tablet, Rfl: 1    traZODone (DESYREL) 150 MG tablet, Take 1 tablet (150 mg total) by mouth nightly as needed for Insomnia., Disp: 90 tablet, Rfl: 1    Review of Systems:   Constitutional: no fever or chills  Eyes: no visual changes  ENT: no nasal congestion or sore throat  Respiratory: no cough or shortness of breath  Cardiovascular: no chest pain or palpitations  Gastrointestinal: no nausea or vomiting, tolerating diet  Genitourinary: no hematuria or dysuria  Integument/Breast: no  rash or pruritis  Hematologic/Lymphatic: no easy bruising or lymphadenopathy  Musculoskeletal: positive for hip pain  Neurological: no seizures or tremors  Behavioral/Psych: no auditory or visual hallucinations  Endocrine: no heat or cold intolerance    PE:  There were no vitals taken for this visit.  General: Pleasant, cooperative, NAD   Gait: antalgic  HEENT: NCAT, sclera nonicteric   Lungs: Respirations are clear, equal and unlabored.   CV: S1S2; 2+ bilateral upper and lower extremity pulses.   Skin: Intact throughout LE with no rashes, erythema, or lesions  Extremities: No LE edema, NVI lower extremities    Right Hip Exam:  90 degrees flexion  0 degrees extension   10 degrees internal rotation  20 degrees external rotation  30 degrees abduction  20 degrees adduction  There is pain with passive range of motion.     Radiographs: Radiographs reveal advanced degenerative changes including subchondral cyst formation, subchondral sclerosis, osteophyte formation, joint space narrowing.     Diagnosis: Primary osteoarthritis Right hip    Plan: Right total hip arthroplasty     Due to the serious nature of total joint infection and the high prevalence of community acquired MRSA, vancomycin will be used perioperatively.

## 2023-10-31 NOTE — PROGRESS NOTES
Dylan Sawant is a 62 y.o. year old here today for pre surgery optimization visit  in preparation for a Right total hip arthroplasty to be performed by Dr. Springer  on 11/15/2023.  he was last seen and treated in the clinic on 10/31/2023. he will be medically optimized by the pre op center. There has been no significant change in medical status since last visit. No fever, chills, malaise, or unexplained weight change.      Allergies, Medications, past medical and surgical history reviewed.    Focused examination performed.    Patient saw surgeon in clinic today. All questions answered. Patient encouraged to call with questions. Contact information given.     Pre, avani, and post operative procedures and expectations discussed. Goals of successful surgery reviewed and include manageable pain levels, surgical site free of infection, medication management, and ambulation with PT/OT assistance. Healthy weight management discussed with patient and caregiver who were receptive to eduction of healthy diet and activity. No other necessary lifestyle changes identified. Educated patient about signs and symptoms of infection, medication management, anticoagulation therapy, risk of tobacco and alcohol use, and self-care to promote healing. Surgical guide given for future reference. Hibiclens given to patient with instructions. All questions were answered.     Dylan Sawant verbalized an understanding to the education and goals. Patient has displayed readiness to engage in care and is ready to proceed with surgery.  Patient reports his wife is able and ready to provide assistance at home after discharge.    Surgical and blood consents signed.    Dylan Sawant will contact us if there are any questions, concerns, or changes in medical status prior to surgery.       Patient has discussed discharge planning with surgeon. Patient will be discharged to home following surgery.   patient will be scheduled with Home Health  during hospitalization.     30 minutes of time was spent on patient education, review of records, templating, H&P, , appointment scheduling and optimizing patient for surgery.

## 2023-10-31 NOTE — H&P
CC:  Right hip pain    Dylan Sawant is a 62 y.o. male with Right hip pain.  Pain is worse with activity and weight bearing.  Patient has experienced interference of activities of daily living due to increased pain and decreased range of motion. Patient has failed non-operative treatment including NSAIDs, as well as greater than 3 months of activity modification. Dylan Sawant ambulates independently.     Relevant medical conditions of significance in perioperative period:  Anxiety- on medication managed by pcp  Type 2 DM- on medication managed by pcp  HLD- on medication managed by pcp  Insomnia- on medication managed by pcp      Past Medical History:   Diagnosis Date    Allergy     Anxiety     Diabetes mellitus     Elevated BP     GERD (gastroesophageal reflux disease)     Hyperlipidemia     KELLY (obstructive sleep apnea)     wears cpap    Restless leg syndrome     Type 2 diabetes mellitus without complication, without long-term current use of insulin 6/15/2023       Past Surgical History:   Procedure Laterality Date    ADENOIDECTOMY      ARTHROPLASTY OF HIP BY ANTERIOR APPROACH Left 1/27/2023    Procedure: ARTHROPLASTY, HIP TOTAL,;  Surgeon: Karlo Springer III, MD;  Location: [facility];  Service: Orthopedics;  Laterality: Left;   ANTERIOR APPROACH: LEFT: DEPUY - ACTIS + PINNACLE    COLONOSCOPY N/A 4/8/2017    Procedure: COLONOSCOPY;  Surgeon: Kyle Moreno MD;  Location: [facility] (4TH FLR);  Service: Endoscopy;  Laterality: N/A;    NASAL SEPTUM SURGERY      RECONSTRUCTION OF NOSE N/A 6/22/2018    Procedure: RECONSTRUCTION, NOSE;  Surgeon: Tulio Echols III, MD;  Location: [facility] OR 2ND FLR;  Service: ENT;  Laterality: N/A;  1 HOUR TOTAL / LATERA  NASAL VALVE IMPLANTS    TONSILLECTOMY         Family History   Problem Relation Age of Onset    Diabetes Mother     Depression Mother     Arthritis Mother     COPD Mother     Heart disease Father     Cancer Father         pancreatic    Diabetes Father      Diverticulitis Father     Restless legs syndrome Father     No Known Problems Sister     Heart disease Paternal Uncle        Review of patient's allergies indicates:  No Known Allergies      Current Outpatient Medications:     acetaminophen (TYLENOL) 650 MG TbSR, Take 1 tablet (650 mg total) by mouth every 8 (eight) hours. (Patient not taking: Reported on 10/31/2023), Disp: 120 tablet, Rfl: 0    atorvastatin (LIPITOR) 40 MG tablet, Take 1 tablet (40 mg total) by mouth once daily., Disp: 90 tablet, Rfl: 3    ergocalciferol (ERGOCALCIFEROL) 50,000 unit Cap, Take 1 capsule (50,000 Units total) by mouth every 7 days. Then start daily OTC replacement after this Rx is complete (Patient not taking: Reported on 10/31/2023), Disp: 12 capsule, Rfl: 3    EScitalopram oxalate (LEXAPRO) 20 MG tablet, Take 1 tablet (20 mg total) by mouth every evening., Disp: 90 tablet, Rfl: 1    fenofibrate micronized (LOFIBRA) 134 MG Cap, Take 1 capsule (134 mg total) by mouth every evening., Disp: 90 capsule, Rfl: 3    gabapentin (NEURONTIN) 300 MG capsule, Take 3 capsules (900 mg total) by mouth daily with lunch., Disp: 90 capsule, Rfl: 3    metFORMIN (GLUCOPHAGE-XR) 500 MG ER 24hr tablet, Take 1 tablet (500 mg total) by mouth 2 (two) times daily with meals., Disp: 180 tablet, Rfl: 3    multivitamin (THERAGRAN) per tablet, Take 1 tablet by mouth once daily., Disp: , Rfl:     rOPINIRole (REQUIP) 4 MG tablet, Take 1 tablet (4 mg total) by mouth nightly., Disp: 90 tablet, Rfl: 1    traZODone (DESYREL) 150 MG tablet, Take 1 tablet (150 mg total) by mouth nightly as needed for Insomnia., Disp: 90 tablet, Rfl: 1    Review of Systems:   Constitutional: no fever or chills  Eyes: no visual changes  ENT: no nasal congestion or sore throat  Respiratory: no cough or shortness of breath  Cardiovascular: no chest pain or palpitations  Gastrointestinal: no nausea or vomiting, tolerating diet  Genitourinary: no hematuria or dysuria  Integument/Breast: no  rash or pruritis  Hematologic/Lymphatic: no easy bruising or lymphadenopathy  Musculoskeletal: positive for hip pain  Neurological: no seizures or tremors  Behavioral/Psych: no auditory or visual hallucinations  Endocrine: no heat or cold intolerance    PE:  There were no vitals taken for this visit.  General: Pleasant, cooperative, NAD   Gait: antalgic  HEENT: NCAT, sclera nonicteric   Lungs: Respirations are clear, equal and unlabored.   CV: S1S2; 2+ bilateral upper and lower extremity pulses.   Skin: Intact throughout LE with no rashes, erythema, or lesions  Extremities: No LE edema, NVI lower extremities    Right Hip Exam:  90 degrees flexion  0 degrees extension   10 degrees internal rotation  20 degrees external rotation  30 degrees abduction  20 degrees adduction  There is pain with passive range of motion.     Radiographs: Radiographs reveal advanced degenerative changes including subchondral cyst formation, subchondral sclerosis, osteophyte formation, joint space narrowing.     Diagnosis: Primary osteoarthritis Right hip    Plan: Right total hip arthroplasty     Due to the serious nature of total joint infection and the high prevalence of community acquired MRSA, vancomycin will be used perioperatively.

## 2023-10-31 NOTE — DISCHARGE INSTRUCTIONS
Your surgery has been scheduled for:_______11/15/23___________________________________    You should report to:  ____[facility], located on the [place] side of the first floor of the           [facility] ([phone])  ____[facility], located on the [facility] side of the            Second floor of the [facility] ([phone])  ____3rd Floor [facility] located on the [facility] side of the [facility] [phone]  __X__[facility]: located at [address] [place] [address]. [address].     Please Note   Tell your doctor if you take Aspirin, products containing Aspirin, herbal medications  or blood thinners, such as Coumadin, Ticlid, or Plavix.  (Consult your provider regarding holding or stopping before surgery).  Arrange for someone to drive you home following surgery.  You will not be allowed to leave the surgical facility alone or drive yourself home following sedation and anesthesia.    Before Surgery  Stop taking all herbal medications, vitamins, and supplements 7 days prior to surgery  No Motrin/Advil (Ibuprofen) 7 days before surgery  No Aleve (Naproxen) 7 days before surgery  Stop Taking Asprin, products containing Aspirin __7___days before surgery   No Goody's/BC Powder 7 days before surgery  Refrain from drinking alcoholic beverages for 24 hours before and after surgery  Stop or limit smoking at least 24 hours prior to surgery  You may take Tylenol for pain    Night before Surgery  Do not eat or drink after midnight  Take a shower or bath (shower is recommended).  Bathe with Hibiclens soap or an antibacterial soap from the neck down.  If not supplied by your surgeon, hibiclens soap will need to be purchased over the counter in pharmacy.  Rinse soap off thoroughly.  Shampoo your hair with your regular shampoo    The Day of Surgery  Take another bath or shower with hibiclens  or any antibacterial soap, to reduce the chance of infection.  Take heart and blood pressure medications with a small sip of water, as advised by the perioperative team.  Do not take fluid pills  You may brush your teeth and rinse your mouth, but do not swallow any additional water.   Do not apply perfumes, powder, body lotions or deodorant on the day of surgery.  Nail polish should be removed.  Do not wear makeup or moisturizer  Wear comfortable clothes, such as a button front shirt and loose fitting pants.  Leave all jewelry, including body piercings, and valuables at home.    Bring any devices you will neeed after surgery such as crutches or canes.  If you have sleep apnea, please bring your CPAP machine  In the event that your physical condition changes including the onset of a cold or respiratory illness, or if you have to delay or cancel your surgery, please notify your surgeon.

## 2023-10-31 NOTE — PROGRESS NOTES
Pre-op visit R ant CASSANDRA  11/15/23    L ant CASSANDRA 23: same day [facility] (first case), Doing well    He complains of groin pain anterior and medial thigh pain radiating down to the knee occasional radiation to the anterior shin.  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      Social support: Wife has neurofibromatosis, paralyzed from knees down, still working but doesn't drive much, he drives her every day     Has friends that could help with rides  No kids  Mother in town but not in good shape       PE:   Slight limp and antalgic gait on the right hip.  Right side groin pain with straight leg raise 0 90 hip flexion 30 abduction 20 adduction 20 external 10 internal rotation which recreates his symptoms.  Skin is intact to the anterior hip.  1/2 centimeter leg length discrepancy right side short he does not notice a difference.  Left hip doing well    HIP R ARTHRITIS        Indication:  Right hip pain  Exam Ordered: Radiographs include an anteroposterior pelvis, an anteroposterior and lateral view of the proximal femur including the hip joint.  Details of Examination: Exam shows evidence of joint space narrowing, osteophyte formation, and subchondral sclerosis, all consistent with degenerative arthritis of the hip.  No other significant findings are noted.  Impression:  Degenerative Arthritis, Right Hip    R hip Tg3 hip arthritis, severe bone  on bone    R hip OA  L CASSANDRA    DM, now on meds  KELLY + CPAP  RLS    This patient has significant symptoms in their hip that are affecting their quality of life and daily activities.  They have tried non-operative treatment including analgesics, an exercise program, and activity modification, but the symptoms have persisted. I believe they make a good candidate for hip arthroplasty.      The risks and benefits of hip arthroplasty have been discussed with the patient which include, but are not limited to infections (including severe sequelae), potential component failure, fracture, DVT, pulmonary embolus, nerve palsy, dislocation, leg length inequality, persistent pain, wound healing complications, transfusions, medical complications and death.      We discussed surgical options including implant type and why I believe the implant selected is a good match for the patient's needs. The patient expressed understanding and agrees to proceed with the planned surgery.      Pre-operative planning will include the following:  A pre-surgical evaluation will be arranged.  Pre-op orders will be placed.  We will make arrangements with the operating room for proper time and staffing.  We will make Social Service arrangements for the patient.     Approach: Anterior     Implants:   Company: [employer]  Cup: [place]  Stem: [employer]     DVT prophylaxis: ASA 81mg BID x1 month  Dispo: home health PT     Admission status: Outpatient/23hr OBS                   Location: [facility] ant CASSANDRA 11/15/23   Same day  aquamantys  Rx cefadroxil      Scheduled for wisdom tooth extraction 11/6/23  Needs to be healed for CASSANDRA and needs letter of dental clearance  Needs abx for extraction

## 2023-10-31 NOTE — PROGRESS NOTES
[facility]    HOME HEALTH ORDERS  FACE TO FACE ENCOUNTER    Patient Name: Dylan Sawant  YOB: 1960    PCP: Roland Swenson DO   PCP Address: [address] / [place] [address]  PCP Phone Number: [phone]  PCP Fax: [phone]    Encounter Date: 10/31/2023    Admit to Home Health    Diagnoses:  There are no hospital problems to display for this patient.      Future Appointments   Date Time Provider Department Center   11/17/2023  2:00 PM TELEMED NURSE, [facility]   12/1/2023 10:20 AM Abigail Mendes NP [facility]   12/6/2023  8:30 AM [facility]   12/6/2023  9:00 AM LAB, [facility]   12/11/2023  8:00 AM Roland Swenson DO [facility]           I have seen and examined this patient face to face today. My clinical findings that support the need for the home health skilled services and home bound status are the following:  Weakness/numbness causing balance and gait disturbance due to Joint Replacement making it taxing to leave home.  Requiring assistive device to leave home due to unsteady gait caused by Joint Replacement.    Allergies:Review of patient's allergies indicates:  No Known Allergies    Diet: regular diet    Activities: activity as tolerated    Home Health Admitting Clinician:   SN/PT to complete comprehensive assessment including routine vital signs. Instruct on disease process and s/s of complications to report to MD. Follow specific home health arthoplasty protocol. Review/verify medication list sent home with the patient at time of discharge  and instruct patient/caregiver as needed. If coumadin ordered, coumadin clinic to manage INR with INR draws 2x per week with a goal to maintain INR between 1.8 and 2.2. Frequency may be adjusted depending on start of care date.    Notify MD if SBP > 160 or < 90; DBP > 90 or < 50; HR > 120 or < 50; Temp > 101    Home  Medical Equipment:  Walker, 3-1 bedside commode, transfer tub bench    CONSULTS:    Physical Therapy may admit if patient not on coumadin, PT to perform comprehensive assessment if performing admit visit and generate therapy plan of care. Evaluate for home safety and equipment needs; Establish/upgrade home exercise program. Perform/instruct on therapeutic exercises, gait training, transfer training, and Range of Motion.      OTHER: (only select if patient needs other therapy disciplines)  Occupational Therapy to evaluate and treat. Evaluate home environment for safety and equipment needs. Perform/Instruct on transfers, ADL training, ROM, and therapeutic exercises.    WOUND CARE ORDERS:  Assess Surgical Incision/DSRG each TX  Aquacel AG drsg applied post-op leave on 14 days post op. Call MD if any drainage reaches border to border of drsg horizontally, s/s of infection, temp >101, induration, swelling or redness.  If dressing is removed per MD order, then apply island dressing, change/teach caregiver to perform daily dressing change if island dressing present.    Medications: Review discharge medications with patient and family and provide education.      Cannot display discharge medications since this is not an admission.      I certify that this patient is confined to his home and needs physical therapy and occupational therapy.

## 2023-10-31 NOTE — ASSESSMENT & PLAN NOTE
/73  Denies headaches/urine volume changes/dizziness/syncope/vision changes  Recommend follow-up with PCP for continued monitoring.

## 2023-10-31 NOTE — PROGRESS NOTES
[facility]  Progress Note    Patient Name: Dylan Sawant  MRN: [id number]  Date of Evaluation- 11/01/2023  PCP- Roland Swenson, DO    Future cases for Dylan Sawant [[id number]]       Case ID Status Date Time Jerod Procedure Provider Location    [id number] [facility] 11/15/2023  1:37  ARTHROPLASTY, HIP, TOTAL, ANTERIOR APPROACH: RIGHT: DEPUY - ACTIS + Karlo Jefferson III, MD [[id number]] [facility]            HPI:  This is a 62 y.o. male  who presents today for a preoperative evaluation in preparation for right hip arthroplasty.   Surgery is indicated for osteoarthritis of right hip .  I have seen this patient before in January 2023 for preop eval before left hip arthroplasty.   The history has been obtained by speaking with the patient and reviewing the electronic medical record and/or outside health information. Significant health conditions for the perioperative period are discussed below in assessment and plan.   Patient reports current health status to be Good.  Denies any new symptoms before surgery.        Subjective/ Objective:     Chief Complaint: Preoperative evaulation, perioperative medical management, and complication reduction plan.     Functional Capacity: Fishing and bowling without CP/SOB. Walked to POC without CP/SOB.       Anesthesia issues: None    Difficulty mouth opening: No    Steroid use in the last 12 months:  No    Dental Issues: None    Family anesthesia difficulty: None           Family Hx of Thrombosis: None    Past Medical History:   Diagnosis Date    Allergy     Anxiety     Diabetes mellitus     Elevated BP     GERD (gastroesophageal reflux disease)     Hyperlipidemia     KELLY (obstructive sleep apnea)     wears cpap    Restless leg syndrome     Type 2 diabetes mellitus without complication, without long-term current use of insulin 6/15/2023         Past Medical History Pertinent Negatives:   Diagnosis Date Noted    Asthma 01/10/2023    CHF (congestive heart failure)  01/10/2023    COPD (chronic obstructive pulmonary disease) 01/10/2023    Coronary artery disease 01/10/2023    Deep vein thrombosis 01/10/2023    Diabetes mellitus type I 01/23/2019    Disorder of kidney and ureter 01/10/2023    Hypertension 01/10/2023    Myocardial infarction 01/10/2023    Pulmonary embolism 01/10/2023    Seizures 01/10/2023    Stroke 01/10/2023    Thyroid disease 01/10/2023         Past Surgical History:   Procedure Laterality Date    ADENOIDECTOMY      ARTHROPLASTY OF HIP BY ANTERIOR APPROACH Left 1/27/2023    Procedure: ARTHROPLASTY, HIP TOTAL,;  Surgeon: Karlo Springer III, MD;  Location: [facility];  Service: Orthopedics;  Laterality: Left;   ANTERIOR APPROACH: LEFT: DEPUY - ACTIS + PINNACLE    COLONOSCOPY N/A 4/8/2017    Procedure: COLONOSCOPY;  Surgeon: Kyle Moreno MD;  Location: [facility] (4TH FLR);  Service: Endoscopy;  Laterality: N/A;    NASAL SEPTUM SURGERY      RECONSTRUCTION OF NOSE N/A 6/22/2018    Procedure: RECONSTRUCTION, NOSE;  Surgeon: Tulio Echols III, MD;  Location: [facility] 2ND FLR;  Service: ENT;  Laterality: N/A;  1 HOUR TOTAL / LATERA  NASAL VALVE IMPLANTS    TONSILLECTOMY         Review of Systems   Constitutional:  Negative for chills, fatigue, fever and unexpected weight change.   HENT:  Positive for congestion (left nostril- Flonase prn). Negative for dental problem, hearing loss, postnasal drip, rhinorrhea, sore throat, tinnitus and trouble swallowing.    Eyes:  Negative for photophobia, pain, discharge and visual disturbance.   Respiratory:  Negative for apnea, cough, chest tightness, shortness of breath and wheezing.    Cardiovascular:  Negative for chest pain, palpitations and leg swelling.   Gastrointestinal:  Negative for abdominal pain, blood in stool, constipation, nausea and vomiting.        Denies Fatty liver, Hepatitis   Endocrine: Negative for cold intolerance and heat intolerance.   Genitourinary:  Positive for frequency. Negative for decreased  "urine volume, difficulty urinating, dysuria, hematuria and urgency.   Musculoskeletal:  Positive for arthralgias (right hip). Negative for back pain, neck pain and neck stiffness.   Skin:  Negative for rash and wound.   Neurological:  Negative for dizziness, tremors, seizures, syncope, weakness, numbness and headaches.   Hematological:  Negative for adenopathy. Does not bruise/bleed easily.   Psychiatric/Behavioral:  Negative for confusion, sleep disturbance and suicidal ideas.               VITALS  Visit Vitals  BP (!) 147/73 (BP Location: Left arm, Patient Position: Sitting)   Pulse 63   Temp 98.1 °F (36.7 °C) (Oral)   Ht 5' 9" (1.753 m)   Wt 103 kg (227 lb 1.2 oz)   SpO2 98%   BMI 33.53 kg/m²          Physical Exam  Vitals reviewed.   Constitutional:       General: He is not in acute distress.     Appearance: He is well-developed. He is obese.   HENT:      Head: Normocephalic.      Nose: Nose normal.      Mouth/Throat:      Pharynx: No oropharyngeal exudate.   Eyes:      General:         Right eye: No discharge.         Left eye: No discharge.      Conjunctiva/sclera: Conjunctivae normal.      Pupils: Pupils are equal, round, and reactive to light.   Neck:      Thyroid: No thyromegaly.      Vascular: No carotid bruit or JVD.      Trachea: No tracheal deviation.   Cardiovascular:      Rate and Rhythm: Regular rhythm. Bradycardia present.      Pulses:           Carotid pulses are 2+ on the right side and 2+ on the left side.       Dorsalis pedis pulses are 2+ on the right side and 2+ on the left side.        Posterior tibial pulses are 2+ on the right side and 2+ on the left side.      Heart sounds: Normal heart sounds. No murmur heard.  Pulmonary:      Effort: Pulmonary effort is normal. No respiratory distress.      Breath sounds: Normal breath sounds. No stridor. No wheezing, rhonchi or rales.   Abdominal:      General: Bowel sounds are normal. There is no distension.      Palpations: Abdomen is soft.      " Tenderness: There is no abdominal tenderness. There is no guarding.      Comments: central obesity   Musculoskeletal:      Cervical back: No pain with movement. Decreased range of motion (with extension).      Right lower leg: Edema (trace) present.      Left lower leg: Edema (trace) present.   Lymphadenopathy:      Cervical: No cervical adenopathy.   Skin:     General: Skin is warm and dry.      Capillary Refill: Capillary refill takes less than 2 seconds.      Findings: No erythema or rash.   Neurological:      Mental Status: He is alert and oriented to person, place, and time.   Psychiatric:         Behavior: Behavior normal.          Significant Labs:  Lab Results   Component Value Date    WBC 8.48 10/31/2023    HGB 16.5 10/31/2023    HCT 48.5 10/31/2023     10/31/2023    CHOL 150 06/15/2023    TRIG 115 06/15/2023    HDL 41 06/15/2023    ALT 41 10/31/2023    AST 31 10/31/2023     10/31/2023    K 3.9 10/31/2023     10/31/2023    CREATININE 0.9 10/31/2023    BUN 12 10/31/2023    CO2 28 10/31/2023    TSH 0.946 11/19/2022    PSA 0.37 11/19/2022    INR 1.1 10/31/2023    HGBA1C 6.6 (H) 10/31/2023       Diagnostic Studies: No relevant studies.    EKG:     Normal sinus rhythm   Left anterior fascicular block   Abnormal ECG   When compared with ECG of 18-JUN-2018 09:33,   No significant change was found   Confirmed by Sondra Mendez MD ([id number]) on 10/31/2023 12:30:37 PM         Active Cardiac Conditions: None      Revised Cardiac Risk Index   High -Risk Surgery  Intraperitoneal; Intrathoracic; suprainguinal vascular Yes- + 1 No- 0   History of Ischemic Heart Disease   (Hx of MI/positive exercise test/current chest pain due to ischemia/use of nitrate therapy/EKG with pathological Q waves) Yes- + 1 No- 0   History of CHF  (Pulmonary edema/bilateral rales or S3 gallop/PND/CXR showing pulmonary vascular redistribution) Yes- + 1 No- 0   History of CVA   (Prior stroke or TIA) Yes- + 1 No- 0   Pre-operative  treatment with insulin Yes- + 1 No- 0   Pre-operative creatinine > 2mg/dl Yes- + 1 No- 0   Total: 0      Risk Status:  Estimated risk of cardiac complications after non-cardiac surgery using the Revised Cardiac Risk Index for Preoperative risk is 3.9 %      ARISCAT (Canet) risk index: Low: 1.6% risk of post-op pulmonary complications; Intermediate: 13.3% risk of post-op pulmonary complications if surgery is > 3 hours.     American Society of Anesthesiologists Physical Status classification (ASA): 2           No further cardiac workup needed prior to surgery.                   Assessment/Plan:     Restless legs  Treated with Requip; stable.  Followed per Neurology; last seen 11/9/22.    Anxiety  Treated with: Lexapro 20 mg- some improvement noted.   Followed per PCP.   Denies suicidal/ homicidal ideations.       Mixed hyperlipidemia  Recommend  weight loss, healthy diet (DASH/Mediterranean) and exercise.   Patient should exercise 30 minutes at least five times weekly once recovered from surgery.   Treated with: atorvastatin/fenofibrate    Abnormal iron saturation  Not taking iron supplements at this time.    Component      Latest Ref Rng 6/15/2023   Iron      45 - 160 ug/dL 125    Transferrin      200 - 375 mg/dL 357    TIBC      250 - 450 ug/dL 528 (H)    Saturated Iron      20 - 50 % 24           Vitamin D deficiency  Last level low in November 2022  Reports not take Vitamin D only MVI  Suggest follow-up with PCP    BMI 33.0-33.9,adult  Lifestyle changes should be made by eating healthy, exercising at least 150 minutes weekly, and avoiding sedentary behavior.     Type 2 diabetes mellitus without complication, without long-term current use of insulin  Currently takes: metformin       Most recent A1c is 6.6    Denies peripheral neuropathy.   Denies visual changes. Up to date with eye exams.   Micro changes: Denies- Retinopathy, Nephropathy   Macro changes: Denies-  Carotid, Coronary , Peripheral disease     Maintain  healthy weight. Exercise at least 150 minutes weekly. Encouraged diet rich in nutrients such as fruits, vegetables, and whole grains; reduce sugar intake from cakes, candy, and sugared drinks.    GERD (gastroesophageal reflux disease)  Not currently treated; controlled.   Weight loss encouraged as well as dietary changes such as reduction or avoidance of fatty foods, caffeine, spicy foods, and chocolate.      KELLY (obstructive sleep apnea)  CPAP compliance: Yes.   Encouraged weight loss, increased exercise.  Recommend caution with sedating medication that can cause respiratory depression.     Primary osteoarthritis of right hip  Scheduled with Dr. Springer 11/15/23 for right hip arthroplasty.     Elevated BP without diagnosis of hypertension  /73  Denies headaches/urine volume changes/dizziness/syncope/vision changes  Recommend follow-up with PCP for continued monitoring.      Discussion/Management of Perioperative Care    Thromboembolic prophylaxis (VTE) Care: Risk factors for thrombosis include: obesity and surgical procedure.  I recommend prophylaxis of thromboembolism with the use of compression stockings/pneumatic devices, and/or pharmacologic agents. The benefits should outweigh the risks for pharmacologic prophylaxis in the perioperative period. I also encourage early ambulation if not contraindicated during the post-operative period.    Risk factors for post-operative pulmonary complications include:diabetes mellitus and HTN. To reduce the risk of pulmonary complications, prophylactic recommendations include: incentive spirometry use/deep breathing, early ambulation, and pain control.    I recommend monitoring sodium during the perioperative period. Pt. is currently on an SSRI which can be associated with SIADH. Surgical procedures are associated with hypersecretion of ADH as well.    Risk factors for renal complications include: diabetes mellitus and HTN. To reduce the risk of postoperative renal  complications, I recommend the patient maintain adequate fluid volume status by drinking 2 liters of water daily.  Avoid/reduce NSAIDS and BUTLER-2 inhibitors use as well as IV contrast for renal protection.    I recommend the use of appropriate prophylactic antibiotics to reduce the risk of surgical site infections.    The patient is at an increased risk for urinary retention due to : possible regional anesthesia. I recommend to avoid/decrease the use of benzodiazepines, anticholinergics, and Benadryl in the perioperative period. I also recommend using opioids for the shortest period of time if possible.          This visit was focused on Preoperative evaluation, Perioperative Medical management, complication reduction plans. I suggest that the patient follows up with primary care or relevant sub specialists for ongoing health care.    I appreciate the opportunity to be involved in this patients care. Please feel free to contact me if there were any questions about this consultation.        I spent a total of 55 minutes on the day of the visit.This includes face to face time and non-face to face time preparing to see the patient (e.g., review of tests), obtaining and/or reviewing separately obtained history, documenting clinical information in the electronic or other health record, independently interpreting results and communicating results to the patient/family/caregiver, or care coordinator.       Patient is optimized for surgery.       Tony Whiting NP  Perioperative Medicine  [facility]

## 2023-10-31 NOTE — HPI
This is a 62 y.o. male  who presents today for a preoperative evaluation in preparation for right hip arthroplasty.   Surgery is indicated for osteoarthritis of right hip .  I have seen this patient before in January 2023 for preop eval before left hip arthroplasty.   The history has been obtained by speaking with the patient and reviewing the electronic medical record and/or outside health information. Significant health conditions for the perioperative period are discussed below in assessment and plan.   Patient reports current health status to be Good.  Denies any new symptoms before surgery.

## 2023-10-31 NOTE — OUTPATIENT SUBJECTIVE & OBJECTIVE
Outpatient Subjective & Objective      Chief Complaint: Preoperative evaulation, perioperative medical management, and complication reduction plan.     Functional Capacity: Fishing and bowling without CP/SOB. Walked to POC without CP/SOB.       Anesthesia issues: None    Difficulty mouth opening: No    Steroid use in the last 12 months:  No    Dental Issues: None    Family anesthesia difficulty: None           Family Hx of Thrombosis: None    Past Medical History:   Diagnosis Date    Allergy     Anxiety     Diabetes mellitus     Elevated BP     GERD (gastroesophageal reflux disease)     Hyperlipidemia     KELLY (obstructive sleep apnea)     wears cpap    Restless leg syndrome     Type 2 diabetes mellitus without complication, without long-term current use of insulin 6/15/2023         Past Medical History Pertinent Negatives:   Diagnosis Date Noted    Asthma 01/10/2023    CHF (congestive heart failure) 01/10/2023    COPD (chronic obstructive pulmonary disease) 01/10/2023    Coronary artery disease 01/10/2023    Deep vein thrombosis 01/10/2023    Diabetes mellitus type I 01/23/2019    Disorder of kidney and ureter 01/10/2023    Hypertension 01/10/2023    Myocardial infarction 01/10/2023    Pulmonary embolism 01/10/2023    Seizures 01/10/2023    Stroke 01/10/2023    Thyroid disease 01/10/2023         Past Surgical History:   Procedure Laterality Date    ADENOIDECTOMY      ARTHROPLASTY OF HIP BY ANTERIOR APPROACH Left 1/27/2023    Procedure: ARTHROPLASTY, HIP TOTAL,;  Surgeon: Karlo Springer III, MD;  Location: [facility];  Service: Orthopedics;  Laterality: Left;   ANTERIOR APPROACH: LEFT: DEPUY - ACTIS + PINNACLE    COLONOSCOPY N/A 4/8/2017    Procedure: COLONOSCOPY;  Surgeon: Kyle Moreno MD;  Location: [address]);  Service: Endoscopy;  Laterality: N/A;    NASAL SEPTUM SURGERY      RECONSTRUCTION OF NOSE N/A 6/22/2018    Procedure: RECONSTRUCTION, NOSE;  Surgeon: Tulio Echols III, MD;  Location: [facility]  "2ND FLR;  Service: ENT;  Laterality: N/A;  1 HOUR TOTAL / LATERA  NASAL VALVE IMPLANTS    TONSILLECTOMY         Review of Systems   Constitutional:  Negative for chills, fatigue, fever and unexpected weight change.   HENT:  Positive for congestion (left nostril- Flonase prn). Negative for dental problem, hearing loss, postnasal drip, rhinorrhea, sore throat, tinnitus and trouble swallowing.    Eyes:  Negative for photophobia, pain, discharge and visual disturbance.   Respiratory:  Negative for apnea, cough, chest tightness, shortness of breath and wheezing.    Cardiovascular:  Negative for chest pain, palpitations and leg swelling.   Gastrointestinal:  Negative for abdominal pain, blood in stool, constipation, nausea and vomiting.        Denies Fatty liver, Hepatitis   Endocrine: Negative for cold intolerance and heat intolerance.   Genitourinary:  Positive for frequency. Negative for decreased urine volume, difficulty urinating, dysuria, hematuria and urgency.   Musculoskeletal:  Positive for arthralgias (right hip). Negative for back pain, neck pain and neck stiffness.   Skin:  Negative for rash and wound.   Neurological:  Negative for dizziness, tremors, seizures, syncope, weakness, numbness and headaches.   Hematological:  Negative for adenopathy. Does not bruise/bleed easily.   Psychiatric/Behavioral:  Negative for confusion, sleep disturbance and suicidal ideas.               VITALS  Visit Vitals  BP (!) 147/73 (BP Location: Left arm, Patient Position: Sitting)   Pulse 63   Temp 98.1 °F (36.7 °C) (Oral)   Ht 5' 9" (1.753 m)   Wt 103 kg (227 lb 1.2 oz)   SpO2 98%   BMI 33.53 kg/m²          Physical Exam  Vitals reviewed.   Constitutional:       General: He is not in acute distress.     Appearance: He is well-developed. He is obese.   HENT:      Head: Normocephalic.      Nose: Nose normal.      Mouth/Throat:      Pharynx: No oropharyngeal exudate.   Eyes:      General:         Right eye: No discharge.         " Left eye: No discharge.      Conjunctiva/sclera: Conjunctivae normal.      Pupils: Pupils are equal, round, and reactive to light.   Neck:      Thyroid: No thyromegaly.      Vascular: No carotid bruit or JVD.      Trachea: No tracheal deviation.   Cardiovascular:      Rate and Rhythm: Regular rhythm. Bradycardia present.      Pulses:           Carotid pulses are 2+ on the right side and 2+ on the left side.       Dorsalis pedis pulses are 2+ on the right side and 2+ on the left side.        Posterior tibial pulses are 2+ on the right side and 2+ on the left side.      Heart sounds: Normal heart sounds. No murmur heard.  Pulmonary:      Effort: Pulmonary effort is normal. No respiratory distress.      Breath sounds: Normal breath sounds. No stridor. No wheezing, rhonchi or rales.   Abdominal:      General: Bowel sounds are normal. There is no distension.      Palpations: Abdomen is soft.      Tenderness: There is no abdominal tenderness. There is no guarding.      Comments: central obesity   Musculoskeletal:      Cervical back: No pain with movement. Decreased range of motion (with extension).      Right lower leg: Edema (trace) present.      Left lower leg: Edema (trace) present.   Lymphadenopathy:      Cervical: No cervical adenopathy.   Skin:     General: Skin is warm and dry.      Capillary Refill: Capillary refill takes less than 2 seconds.      Findings: No erythema or rash.   Neurological:      Mental Status: He is alert and oriented to person, place, and time.   Psychiatric:         Behavior: Behavior normal.          Significant Labs:  Lab Results   Component Value Date    WBC 8.48 10/31/2023    HGB 16.5 10/31/2023    HCT 48.5 10/31/2023     10/31/2023    CHOL 150 06/15/2023    TRIG 115 06/15/2023    HDL 41 06/15/2023    ALT 41 10/31/2023    AST 31 10/31/2023     10/31/2023    K 3.9 10/31/2023     10/31/2023    CREATININE 0.9 10/31/2023    BUN 12 10/31/2023    CO2 28 10/31/2023    TSH  0.946 11/19/2022    PSA 0.37 11/19/2022    INR 1.1 10/31/2023    HGBA1C 6.6 (H) 10/31/2023       Diagnostic Studies: No relevant studies.    EKG:     Normal sinus rhythm   Left anterior fascicular block   Abnormal ECG   When compared with ECG of 18-JUN-2018 09:33,   No significant change was found   Confirmed by Sondra Mendez MD ([id number]) on 10/31/2023 12:30:37 PM         Active Cardiac Conditions: None      Revised Cardiac Risk Index   High -Risk Surgery  Intraperitoneal; Intrathoracic; suprainguinal vascular Yes- + 1 No- 0   History of Ischemic Heart Disease   (Hx of MI/positive exercise test/current chest pain due to ischemia/use of nitrate therapy/EKG with pathological Q waves) Yes- + 1 No- 0   History of CHF  (Pulmonary edema/bilateral rales or S3 gallop/PND/CXR showing pulmonary vascular redistribution) Yes- + 1 No- 0   History of CVA   (Prior stroke or TIA) Yes- + 1 No- 0   Pre-operative treatment with insulin Yes- + 1 No- 0   Pre-operative creatinine > 2mg/dl Yes- + 1 No- 0   Total: 0      Risk Status:  Estimated risk of cardiac complications after non-cardiac surgery using the Revised Cardiac Risk Index for Preoperative risk is 3.9 %      ARISCAT (Canet) risk index: Low: 1.6% risk of post-op pulmonary complications; Intermediate: 13.3% risk of post-op pulmonary complications if surgery is > 3 hours.     American Society of Anesthesiologists Physical Status classification (ASA): 2           No further cardiac workup needed prior to surgery.    Outpatient Subjective & Objective

## 2023-11-03 DIAGNOSIS — E78.2 MIXED HYPERLIPIDEMIA: ICD-10-CM

## 2023-11-03 NOTE — TELEPHONE ENCOUNTER
No care due was identified.  [facility] Embedded Care Due Messages. Reference number: [id number].   11/03/2023 4:40:00 PM CDT

## 2023-11-04 RX ORDER — ATORVASTATIN CALCIUM 40 MG/1
40 TABLET, FILM COATED ORAL DAILY
Qty: 90 TABLET | Refills: 2 | Status: SHIPPED | OUTPATIENT
Start: 2023-11-04 | End: 2023-12-11 | Stop reason: SDUPTHER

## 2023-11-04 NOTE — TELEPHONE ENCOUNTER
Refill Decision Note   Dylan Mann Jese  is requesting a refill authorization.  Brief Assessment and Rationale for Refill:  Approve     Medication Therapy Plan:       Medication Reconciliation Completed: No   Comments:     No Care Gaps recommended.     Note composed:12:25 PM 11/04/2023

## 2023-11-09 ENCOUNTER — PATIENT MESSAGE (OUTPATIENT)
Dept: ORTHOPEDICS | Facility: CLINIC | Age: 63
End: 2023-11-09
Payer: COMMERCIAL

## 2023-11-09 ENCOUNTER — PATIENT MESSAGE (OUTPATIENT)
Dept: FAMILY MEDICINE | Facility: CLINIC | Age: 63
End: 2023-11-09
Payer: COMMERCIAL

## 2023-11-13 ENCOUNTER — OFFICE VISIT (OUTPATIENT)
Dept: URGENT CARE | Facility: CLINIC | Age: 63
End: 2023-11-13
Payer: COMMERCIAL

## 2023-11-13 VITALS
DIASTOLIC BLOOD PRESSURE: 76 MMHG | WEIGHT: 222 LBS | HEART RATE: 66 BPM | OXYGEN SATURATION: 98 % | RESPIRATION RATE: 17 BRPM | BODY MASS INDEX: 32.88 KG/M2 | HEIGHT: 69 IN | SYSTOLIC BLOOD PRESSURE: 152 MMHG | TEMPERATURE: 98 F
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
## 2023-11-13 DIAGNOSIS — S46.819A STRAIN OF TRAPEZIUS MUSCLE, UNSPECIFIED LATERALITY, INITIAL ENCOUNTER: ICD-10-CM

## 2023-11-13 DIAGNOSIS — M54.6 ACUTE MIDLINE THORACIC BACK PAIN: ICD-10-CM

## 2023-11-13 DIAGNOSIS — M54.50 ACUTE MIDLINE LOW BACK PAIN WITHOUT SCIATICA: ICD-10-CM

## 2023-11-13 PROCEDURE — 99203 OFFICE O/P NEW LOW 30 MIN: CPT | Mod: 25,S$GLB,, | Performed by: NURSE PRACTITIONER

## 2023-11-13 PROCEDURE — 96372 PR INJECTION,THERAP/PROPH/DIAG2ST, IM OR SUBCUT: ICD-10-PCS | Mod: S$GLB,,, | Performed by: NURSE PRACTITIONER

## 2023-11-13 PROCEDURE — 96372 THER/PROPH/DIAG INJ SC/IM: CPT | Mod: S$GLB,,, | Performed by: NURSE PRACTITIONER

## 2023-11-13 PROCEDURE — 99203 PR OFFICE/OUTPT VISIT, NEW, LEVL III, 30-44 MIN: ICD-10-PCS | Mod: 25,S$GLB,, | Performed by: NURSE PRACTITIONER

## 2023-11-13 RX ORDER — CYCLOBENZAPRINE HCL 10 MG
10 TABLET ORAL EVERY 8 HOURS PRN
Qty: 30 TABLET | Refills: 0 | Status: ON HOLD | OUTPATIENT
Start: 2023-11-13 | End: 2023-11-15 | Stop reason: HOSPADM

## 2023-11-13 RX ORDER — KETOROLAC TROMETHAMINE 30 MG/ML
30 INJECTION, SOLUTION INTRAMUSCULAR; INTRAVENOUS
Status: COMPLETED | OUTPATIENT
Start: 2023-11-13 | End: 2023-11-13

## 2023-11-13 RX ADMIN — KETOROLAC TROMETHAMINE 30 MG: 30 INJECTION, SOLUTION INTRAMUSCULAR; INTRAVENOUS at 10:11
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
## 2023-11-13 NOTE — PATIENT INSTRUCTIONS
Stretching/gentle massage for back pain  Heat therapy: heating pads; hot shower or sizt baths  Rest

## 2023-11-14 ENCOUNTER — TELEPHONE (OUTPATIENT)
Dept: ORTHOPEDICS | Facility: CLINIC | Age: 63
End: 2023-11-14
Payer: COMMERCIAL

## 2023-11-14 DIAGNOSIS — Z96.641 S/P HIP REPLACEMENT, RIGHT: Primary | ICD-10-CM

## 2023-11-14 RX ORDER — PANTOPRAZOLE SODIUM 40 MG/1
40 TABLET, DELAYED RELEASE ORAL DAILY
Qty: 30 TABLET | Refills: 0 | Status: SHIPPED | OUTPATIENT
Start: 2023-11-14 | End: 2023-12-11

## 2023-11-14 RX ORDER — OXYCODONE HYDROCHLORIDE 5 MG/1
TABLET ORAL
Qty: 30 TABLET | Refills: 0 | Status: SHIPPED | OUTPATIENT
Start: 2023-11-14 | End: 2023-12-11

## 2023-11-14 RX ORDER — DEXTROMETHORPHAN HYDROBROMIDE, GUAIFENESIN 5; 100 MG/5ML; MG/5ML
650 LIQUID ORAL EVERY 8 HOURS
Qty: 120 TABLET | Refills: 0 | Status: SHIPPED | OUTPATIENT
Start: 2023-11-14 | End: 2023-12-11

## 2023-11-14 RX ORDER — METHOCARBAMOL 750 MG/1
750 TABLET, FILM COATED ORAL 4 TIMES DAILY PRN
Qty: 40 TABLET | Refills: 0 | Status: SHIPPED | OUTPATIENT
Start: 2023-11-14 | End: 2023-12-11

## 2023-11-14 RX ORDER — ASPIRIN 81 MG/1
81 TABLET ORAL 2 TIMES DAILY
Qty: 60 TABLET | Refills: 0 | Status: SHIPPED | OUTPATIENT
Start: 2023-11-14 | End: 2023-12-11

## 2023-11-14 RX ORDER — CELECOXIB 200 MG/1
200 CAPSULE ORAL DAILY
Qty: 30 CAPSULE | Refills: 0 | Status: SHIPPED | OUTPATIENT
Start: 2023-11-14 | End: 2023-12-11

## 2023-11-14 RX ORDER — CEFADROXIL 500 MG/1
500 CAPSULE ORAL EVERY 12 HOURS
Qty: 14 CAPSULE | Refills: 0 | Status: SHIPPED | OUTPATIENT
Start: 2023-11-14 | End: 2023-12-11

## 2023-11-14 RX ORDER — AMOXICILLIN 250 MG
1 CAPSULE ORAL DAILY
Qty: 30 TABLET | Refills: 0 | Status: SHIPPED | OUTPATIENT
Start: 2023-11-14 | End: 2023-12-11

## 2023-11-14 NOTE — TELEPHONE ENCOUNTER
I called the patient today regarding surgery on 11/15/2023 with Dr. Karlo Springer. I informed the patient that his surgery will be at  [facility] ([facility] [place] [address]). I informed the patient they must arrive at 5:00am and their surgery will start at 7:00am.     Per the [facility] COVID-19 Pre-Procedural Testing Policy (administered 10/26/2022), patients do not need tested for COVID-19 regardless of vaccination status.    I reminded the patient that they cannot eat or drink after midnight, the night before surgery.      I reminded the patient to be careful of their skin over the next few days to make sure they do not get any cuts, scratches or scrapes.    The patient that they have a walker, bedside commode and shower chair from a previous surgery and do not need another order for them.    The patient verbalized understanding and has no further questions.

## 2023-11-15 ENCOUNTER — HOSPITAL ENCOUNTER (OUTPATIENT)
Facility: HOSPITAL | Age: 63
Discharge: HOME | End: 2023-11-15
Attending: ORTHOPAEDIC SURGERY | Admitting: ORTHOPAEDIC SURGERY
Payer: COMMERCIAL

## 2023-11-15 ENCOUNTER — ANESTHESIA (OUTPATIENT)
Dept: SURGERY | Facility: HOSPITAL | Age: 63
End: 2023-11-15
Payer: COMMERCIAL

## 2023-11-15 ENCOUNTER — ANESTHESIA EVENT (OUTPATIENT)
Dept: SURGERY | Facility: HOSPITAL | Age: 63
End: 2023-11-15
Payer: COMMERCIAL

## 2023-11-15 VITALS
RESPIRATION RATE: 20 BRPM | DIASTOLIC BLOOD PRESSURE: 72 MMHG | HEART RATE: 65 BPM | WEIGHT: 222 LBS | TEMPERATURE: 98 F | BODY MASS INDEX: 32.88 KG/M2 | HEIGHT: 69 IN | OXYGEN SATURATION: 97 % | SYSTOLIC BLOOD PRESSURE: 133 MMHG

## 2023-11-15 DIAGNOSIS — M16.11 PRIMARY OSTEOARTHRITIS OF RIGHT HIP: ICD-10-CM

## 2023-11-15 DIAGNOSIS — Z96.641 S/P TOTAL RIGHT HIP ARTHROPLASTY: Primary | ICD-10-CM

## 2023-11-15 LAB
GLUCOSE SERPL-MCNC: 101 MG/DL (ref 70–110)
POCT GLUCOSE: 101 MG/DL (ref 70–110)
POCT GLUCOSE: 121 MG/DL (ref 70–110)

## 2023-11-15 PROCEDURE — D9220A PRA ANESTHESIA: ICD-10-PCS | Mod: ANES,,, | Performed by: ANESTHESIOLOGY

## 2023-11-15 PROCEDURE — 97530 THERAPEUTIC ACTIVITIES: CPT

## 2023-11-15 PROCEDURE — C1776 JOINT DEVICE (IMPLANTABLE): HCPCS | Performed by: ORTHOPAEDIC SURGERY

## 2023-11-15 PROCEDURE — 27130 TOTAL HIP ARTHROPLASTY: CPT | Mod: 79,RT,, | Performed by: ORTHOPAEDIC SURGERY

## 2023-11-15 PROCEDURE — D9220A PRA ANESTHESIA: ICD-10-PCS | Mod: CRNA,,, | Performed by: NURSE ANESTHETIST, CERTIFIED REGISTERED

## 2023-11-15 PROCEDURE — 71000033 HC RECOVERY, INTIAL HOUR: Performed by: ORTHOPAEDIC SURGERY

## 2023-11-15 PROCEDURE — 25000003 PHARM REV CODE 250: Performed by: NURSE ANESTHETIST, CERTIFIED REGISTERED

## 2023-11-15 PROCEDURE — 97116 GAIT TRAINING THERAPY: CPT

## 2023-11-15 PROCEDURE — 25000003 PHARM REV CODE 250: Performed by: ORTHOPAEDIC SURGERY

## 2023-11-15 PROCEDURE — 25000003 PHARM REV CODE 250: Performed by: ANESTHESIOLOGY

## 2023-11-15 PROCEDURE — 36000710: Performed by: ORTHOPAEDIC SURGERY

## 2023-11-15 PROCEDURE — 37000008 HC ANESTHESIA 1ST 15 MINUTES: Performed by: ORTHOPAEDIC SURGERY

## 2023-11-15 PROCEDURE — 97161 PT EVAL LOW COMPLEX 20 MIN: CPT

## 2023-11-15 PROCEDURE — 63600175 PHARM REV CODE 636 W HCPCS: Performed by: ANESTHESIOLOGY

## 2023-11-15 PROCEDURE — 99900035 HC TECH TIME PER 15 MIN (STAT)

## 2023-11-15 PROCEDURE — 71000015 HC POSTOP RECOV 1ST HR: Performed by: ORTHOPAEDIC SURGERY

## 2023-11-15 PROCEDURE — 94761 N-INVAS EAR/PLS OXIMETRY MLT: CPT

## 2023-11-15 PROCEDURE — 27201423 OPTIME MED/SURG SUP & DEVICES STERILE SUPPLY: Performed by: ORTHOPAEDIC SURGERY

## 2023-11-15 PROCEDURE — 25000003 PHARM REV CODE 250: Performed by: NURSE PRACTITIONER

## 2023-11-15 PROCEDURE — 82962 GLUCOSE BLOOD TEST: CPT | Performed by: ORTHOPAEDIC SURGERY

## 2023-11-15 PROCEDURE — 63600175 PHARM REV CODE 636 W HCPCS: Performed by: NURSE PRACTITIONER

## 2023-11-15 PROCEDURE — 97535 SELF CARE MNGMENT TRAINING: CPT

## 2023-11-15 PROCEDURE — D9220A PRA ANESTHESIA: Mod: CRNA,,, | Performed by: NURSE ANESTHETIST, CERTIFIED REGISTERED

## 2023-11-15 PROCEDURE — 36000711: Performed by: ORTHOPAEDIC SURGERY

## 2023-11-15 PROCEDURE — 27100025 HC TUBING, SET FLUID WARMER: Performed by: ANESTHESIOLOGY

## 2023-11-15 PROCEDURE — 63600175 PHARM REV CODE 636 W HCPCS: Performed by: ORTHOPAEDIC SURGERY

## 2023-11-15 PROCEDURE — C1713 ANCHOR/SCREW BN/BN,TIS/BN: HCPCS | Performed by: ORTHOPAEDIC SURGERY

## 2023-11-15 PROCEDURE — D9220A PRA ANESTHESIA: Mod: ANES,,, | Performed by: ANESTHESIOLOGY

## 2023-11-15 PROCEDURE — 27130 TOTAL HIP ARTHROPLASTY: CPT | Mod: 79,AS,RT,

## 2023-11-15 PROCEDURE — 37000009 HC ANESTHESIA EA ADD 15 MINS: Performed by: ORTHOPAEDIC SURGERY

## 2023-11-15 PROCEDURE — 27130 PR TOTAL HIP ARTHROPLASTY: ICD-10-PCS | Mod: 79,AS,RT,

## 2023-11-15 PROCEDURE — 97165 OT EVAL LOW COMPLEX 30 MIN: CPT

## 2023-11-15 PROCEDURE — 27130 PR TOTAL HIP ARTHROPLASTY: ICD-10-PCS | Mod: 79,RT,, | Performed by: ORTHOPAEDIC SURGERY

## 2023-11-15 PROCEDURE — 71000016 HC POSTOP RECOV ADDL HR: Performed by: ORTHOPAEDIC SURGERY

## 2023-11-15 PROCEDURE — 63600175 PHARM REV CODE 636 W HCPCS: Performed by: NURSE ANESTHETIST, CERTIFIED REGISTERED

## 2023-11-15 DEVICE — CUP ACT PINNACLE SECTOR 56 TIT: Type: IMPLANTABLE DEVICE | Site: HIP | Status: FUNCTIONAL

## 2023-11-15 DEVICE — STEM ACTIS FEM COLLARED HIGH 6: Type: IMPLANTABLE DEVICE | Site: HIP | Status: FUNCTIONAL

## 2023-11-15 DEVICE — SCREW PINNACLE 6.5 X 25: Type: IMPLANTABLE DEVICE | Site: HIP | Status: FUNCTIONAL

## 2023-11-15 DEVICE — HEAD BIOLOX CERAMC FEM +5 36MM: Type: IMPLANTABLE DEVICE | Site: HIP | Status: FUNCTIONAL

## 2023-11-15 DEVICE — LINER PINNACLE ALTRX 36X56MM: Type: IMPLANTABLE DEVICE | Site: HIP | Status: FUNCTIONAL

## 2023-11-15 RX ORDER — KETAMINE HCL IN 0.9 % NACL 50 MG/5 ML
SYRINGE (ML) INTRAVENOUS
Status: DISCONTINUED | OUTPATIENT
Start: 2023-11-15 | End: 2023-11-15

## 2023-11-15 RX ORDER — DEXAMETHASONE SODIUM PHOSPHATE 4 MG/ML
INJECTION, SOLUTION INTRA-ARTICULAR; INTRALESIONAL; INTRAMUSCULAR; INTRAVENOUS; SOFT TISSUE
Status: DISCONTINUED | OUTPATIENT
Start: 2023-11-15 | End: 2023-11-15

## 2023-11-15 RX ORDER — CELECOXIB 200 MG/1
200 CAPSULE ORAL DAILY
Status: DISCONTINUED | OUTPATIENT
Start: 2023-11-15 | End: 2023-11-15 | Stop reason: HOSPADM

## 2023-11-15 RX ORDER — SODIUM CHLORIDE 0.9 % (FLUSH) 0.9 %
10 SYRINGE (ML) INJECTION
Status: DISCONTINUED | OUTPATIENT
Start: 2023-11-15 | End: 2023-11-15 | Stop reason: HOSPADM

## 2023-11-15 RX ORDER — FENTANYL CITRATE 50 UG/ML
25 INJECTION, SOLUTION INTRAMUSCULAR; INTRAVENOUS EVERY 5 MIN PRN
Status: DISCONTINUED | OUTPATIENT
Start: 2023-11-15 | End: 2023-11-15 | Stop reason: HOSPADM

## 2023-11-15 RX ORDER — MIDAZOLAM HYDROCHLORIDE 1 MG/ML
INJECTION, SOLUTION INTRAMUSCULAR; INTRAVENOUS
Status: DISCONTINUED | OUTPATIENT
Start: 2023-11-15 | End: 2023-11-15

## 2023-11-15 RX ORDER — ACETAMINOPHEN 500 MG
1000 TABLET ORAL EVERY 6 HOURS
Status: DISCONTINUED | OUTPATIENT
Start: 2023-11-15 | End: 2023-11-15 | Stop reason: HOSPADM

## 2023-11-15 RX ORDER — TRANEXAMIC ACID 100 MG/ML
INJECTION, SOLUTION INTRAVENOUS
Status: DISCONTINUED | OUTPATIENT
Start: 2023-11-15 | End: 2023-11-15 | Stop reason: HOSPADM

## 2023-11-15 RX ORDER — METFORMIN HYDROCHLORIDE 500 MG/1
500 TABLET, EXTENDED RELEASE ORAL 2 TIMES DAILY WITH MEALS
Status: DISCONTINUED | OUTPATIENT
Start: 2023-11-15 | End: 2023-11-15 | Stop reason: HOSPADM

## 2023-11-15 RX ORDER — OXYCODONE HYDROCHLORIDE 5 MG/1
5 TABLET ORAL
Status: DISCONTINUED | OUTPATIENT
Start: 2023-11-15 | End: 2023-11-15 | Stop reason: HOSPADM

## 2023-11-15 RX ORDER — ONDANSETRON 2 MG/ML
INJECTION INTRAMUSCULAR; INTRAVENOUS
Status: DISCONTINUED | OUTPATIENT
Start: 2023-11-15 | End: 2023-11-15

## 2023-11-15 RX ORDER — ROPIVACAINE/EPI/CLONIDINE/KET 2.46-0.005
SYRINGE (ML) INJECTION
Status: DISCONTINUED | OUTPATIENT
Start: 2023-11-15 | End: 2023-11-15 | Stop reason: HOSPADM

## 2023-11-15 RX ORDER — PREGABALIN 75 MG/1
75 CAPSULE ORAL
Status: COMPLETED | OUTPATIENT
Start: 2023-11-15 | End: 2023-11-15

## 2023-11-15 RX ORDER — DIPHENHYDRAMINE HYDROCHLORIDE 50 MG/ML
INJECTION INTRAMUSCULAR; INTRAVENOUS
Status: DISCONTINUED | OUTPATIENT
Start: 2023-11-15 | End: 2023-11-15

## 2023-11-15 RX ORDER — VANCOMYCIN HYDROCHLORIDE 1 G/20ML
INJECTION, POWDER, LYOPHILIZED, FOR SOLUTION INTRAVENOUS
Status: DISCONTINUED | OUTPATIENT
Start: 2023-11-15 | End: 2023-11-15 | Stop reason: HOSPADM

## 2023-11-15 RX ORDER — NICARDIPINE HYDROCHLORIDE 2.5 MG/ML
INJECTION INTRAVENOUS
Status: DISCONTINUED | OUTPATIENT
Start: 2023-11-15 | End: 2023-11-15

## 2023-11-15 RX ORDER — PROPOFOL 10 MG/ML
VIAL (ML) INTRAVENOUS
Status: DISCONTINUED | OUTPATIENT
Start: 2023-11-15 | End: 2023-11-15

## 2023-11-15 RX ORDER — ATORVASTATIN CALCIUM 20 MG/1
40 TABLET, FILM COATED ORAL DAILY
Status: DISCONTINUED | OUTPATIENT
Start: 2023-11-15 | End: 2023-11-15 | Stop reason: HOSPADM

## 2023-11-15 RX ORDER — PREGABALIN 75 MG/1
75 CAPSULE ORAL NIGHTLY
Status: DISCONTINUED | OUTPATIENT
Start: 2023-11-15 | End: 2023-11-15 | Stop reason: HOSPADM

## 2023-11-15 RX ORDER — ONDANSETRON 2 MG/ML
4 INJECTION INTRAMUSCULAR; INTRAVENOUS EVERY 8 HOURS PRN
Status: DISCONTINUED | OUTPATIENT
Start: 2023-11-15 | End: 2023-11-15 | Stop reason: HOSPADM

## 2023-11-15 RX ORDER — CELECOXIB 200 MG/1
400 CAPSULE ORAL ONCE
Status: COMPLETED | OUTPATIENT
Start: 2023-11-15 | End: 2023-11-15

## 2023-11-15 RX ORDER — IBUPROFEN 200 MG
16 TABLET ORAL
Status: DISCONTINUED | OUTPATIENT
Start: 2023-11-15 | End: 2023-11-15 | Stop reason: HOSPADM

## 2023-11-15 RX ORDER — FAMOTIDINE 20 MG/1
20 TABLET, FILM COATED ORAL 2 TIMES DAILY
Status: DISCONTINUED | OUTPATIENT
Start: 2023-11-15 | End: 2023-11-15 | Stop reason: HOSPADM

## 2023-11-15 RX ORDER — AMOXICILLIN 250 MG
1 CAPSULE ORAL 2 TIMES DAILY
Status: DISCONTINUED | OUTPATIENT
Start: 2023-11-15 | End: 2023-11-15 | Stop reason: HOSPADM

## 2023-11-15 RX ORDER — MIDAZOLAM HYDROCHLORIDE 1 MG/ML
4 INJECTION INTRAMUSCULAR; INTRAVENOUS
Status: DISCONTINUED | OUTPATIENT
Start: 2023-11-15 | End: 2023-11-15 | Stop reason: HOSPADM

## 2023-11-15 RX ORDER — POLYETHYLENE GLYCOL 3350 17 G/17G
17 POWDER, FOR SOLUTION ORAL DAILY
Status: DISCONTINUED | OUTPATIENT
Start: 2023-11-15 | End: 2023-11-15 | Stop reason: HOSPADM

## 2023-11-15 RX ORDER — OXYCODONE HYDROCHLORIDE 5 MG/1
10 TABLET ORAL
Status: DISCONTINUED | OUTPATIENT
Start: 2023-11-15 | End: 2023-11-15 | Stop reason: HOSPADM

## 2023-11-15 RX ORDER — SODIUM CHLORIDE 9 MG/ML
INJECTION, SOLUTION INTRAVENOUS CONTINUOUS
Status: DISCONTINUED | OUTPATIENT
Start: 2023-11-15 | End: 2023-11-15 | Stop reason: HOSPADM

## 2023-11-15 RX ORDER — NALOXONE HCL 0.4 MG/ML
0.02 VIAL (ML) INJECTION
Status: DISCONTINUED | OUTPATIENT
Start: 2023-11-15 | End: 2023-11-15 | Stop reason: HOSPADM

## 2023-11-15 RX ORDER — PROCHLORPERAZINE EDISYLATE 5 MG/ML
5 INJECTION INTRAMUSCULAR; INTRAVENOUS EVERY 6 HOURS PRN
Status: DISCONTINUED | OUTPATIENT
Start: 2023-11-15 | End: 2023-11-15 | Stop reason: HOSPADM

## 2023-11-15 RX ORDER — FENTANYL CITRATE 50 UG/ML
100 INJECTION, SOLUTION INTRAMUSCULAR; INTRAVENOUS
Status: DISCONTINUED | OUTPATIENT
Start: 2023-11-15 | End: 2023-11-15 | Stop reason: HOSPADM

## 2023-11-15 RX ORDER — ROPINIROLE 2 MG/1
4 TABLET, FILM COATED ORAL NIGHTLY
Status: DISCONTINUED | OUTPATIENT
Start: 2023-11-15 | End: 2023-11-15 | Stop reason: HOSPADM

## 2023-11-15 RX ORDER — MORPHINE SULFATE 2 MG/ML
2 INJECTION, SOLUTION INTRAMUSCULAR; INTRAVENOUS
Status: DISCONTINUED | OUTPATIENT
Start: 2023-11-15 | End: 2023-11-15 | Stop reason: HOSPADM

## 2023-11-15 RX ORDER — LIDOCAINE HYDROCHLORIDE 10 MG/ML
1 INJECTION, SOLUTION EPIDURAL; INFILTRATION; INTRACAUDAL; PERINEURAL
Status: DISCONTINUED | OUTPATIENT
Start: 2023-11-15 | End: 2023-11-15 | Stop reason: HOSPADM

## 2023-11-15 RX ORDER — FENTANYL CITRATE 50 UG/ML
INJECTION, SOLUTION INTRAMUSCULAR; INTRAVENOUS
Status: DISCONTINUED | OUTPATIENT
Start: 2023-11-15 | End: 2023-11-15

## 2023-11-15 RX ORDER — ASPIRIN 81 MG/1
81 TABLET ORAL 2 TIMES DAILY
Status: DISCONTINUED | OUTPATIENT
Start: 2023-11-15 | End: 2023-11-15 | Stop reason: HOSPADM

## 2023-11-15 RX ORDER — ESCITALOPRAM OXALATE 20 MG/1
20 TABLET ORAL NIGHTLY
Status: DISCONTINUED | OUTPATIENT
Start: 2023-11-15 | End: 2023-11-15 | Stop reason: HOSPADM

## 2023-11-15 RX ORDER — POLYETHYLENE GLYCOL 3350 17 G/17G
17 POWDER, FOR SOLUTION ORAL DAILY PRN
Status: DISCONTINUED | OUTPATIENT
Start: 2023-11-15 | End: 2023-11-15 | Stop reason: HOSPADM

## 2023-11-15 RX ORDER — MUPIROCIN 20 MG/G
1 OINTMENT TOPICAL
Status: COMPLETED | OUTPATIENT
Start: 2023-11-15 | End: 2023-11-15

## 2023-11-15 RX ORDER — IBUPROFEN 200 MG
24 TABLET ORAL
Status: DISCONTINUED | OUTPATIENT
Start: 2023-11-15 | End: 2023-11-15 | Stop reason: HOSPADM

## 2023-11-15 RX ORDER — GLUCAGON 1 MG
1 KIT INJECTION
Status: DISCONTINUED | OUTPATIENT
Start: 2023-11-15 | End: 2023-11-15 | Stop reason: HOSPADM

## 2023-11-15 RX ORDER — ACETAMINOPHEN 500 MG
1000 TABLET ORAL
Status: COMPLETED | OUTPATIENT
Start: 2023-11-15 | End: 2023-11-15

## 2023-11-15 RX ORDER — LIDOCAINE HYDROCHLORIDE 20 MG/ML
INJECTION INTRAVENOUS
Status: DISCONTINUED | OUTPATIENT
Start: 2023-11-15 | End: 2023-11-15

## 2023-11-15 RX ORDER — MUPIROCIN 20 MG/G
1 OINTMENT TOPICAL 2 TIMES DAILY
Status: DISCONTINUED | OUTPATIENT
Start: 2023-11-15 | End: 2023-11-15 | Stop reason: HOSPADM

## 2023-11-15 RX ORDER — METHOCARBAMOL 750 MG/1
750 TABLET, FILM COATED ORAL 3 TIMES DAILY
Status: DISCONTINUED | OUTPATIENT
Start: 2023-11-15 | End: 2023-11-15

## 2023-11-15 RX ORDER — SODIUM CHLORIDE 9 MG/ML
INJECTION, SOLUTION INTRAVENOUS
Status: DISCONTINUED | OUTPATIENT
Start: 2023-11-15 | End: 2023-11-15 | Stop reason: HOSPADM

## 2023-11-15 RX ORDER — FENOFIBRATE 134 MG/1
134 CAPSULE ORAL NIGHTLY
Status: DISCONTINUED | OUTPATIENT
Start: 2023-11-15 | End: 2023-11-15 | Stop reason: HOSPADM

## 2023-11-15 RX ORDER — METHOCARBAMOL 500 MG/1
1000 TABLET, FILM COATED ORAL
Status: DISCONTINUED | OUTPATIENT
Start: 2023-11-15 | End: 2023-11-15 | Stop reason: HOSPADM

## 2023-11-15 RX ORDER — LIDOCAINE HYDROCHLORIDE AND EPINEPHRINE 15; 5 MG/ML; UG/ML
INJECTION, SOLUTION EPIDURAL
Status: DISCONTINUED | OUTPATIENT
Start: 2023-11-15 | End: 2023-11-15

## 2023-11-15 RX ORDER — METHOCARBAMOL 750 MG/1
750 TABLET, FILM COATED ORAL 3 TIMES DAILY
Status: DISCONTINUED | OUTPATIENT
Start: 2023-11-15 | End: 2023-11-15 | Stop reason: HOSPADM

## 2023-11-15 RX ADMIN — DEXAMETHASONE SODIUM PHOSPHATE 8 MG: 4 INJECTION, SOLUTION INTRAMUSCULAR; INTRAVENOUS at 07:11

## 2023-11-15 RX ADMIN — OXYCODONE HYDROCHLORIDE 5 MG: 5 TABLET ORAL at 12:11

## 2023-11-15 RX ADMIN — MUPIROCIN 1 G: 20 OINTMENT TOPICAL at 05:11

## 2023-11-15 RX ADMIN — ONDANSETRON 4 MG: 2 INJECTION INTRAMUSCULAR; INTRAVENOUS at 09:11

## 2023-11-15 RX ADMIN — LIDOCAINE HYDROCHLORIDE 60 MG: 20 INJECTION INTRAVENOUS at 07:11

## 2023-11-15 RX ADMIN — MIDAZOLAM HYDROCHLORIDE 2 MG: 1 INJECTION, SOLUTION INTRAMUSCULAR; INTRAVENOUS at 06:11

## 2023-11-15 RX ADMIN — CEFAZOLIN 2 G: 2 INJECTION, POWDER, FOR SOLUTION INTRAMUSCULAR; INTRAVENOUS at 07:11

## 2023-11-15 RX ADMIN — NICARDIPINE HYDROCHLORIDE 0.4 MG: 25 INJECTION, SOLUTION INTRAVENOUS at 07:11

## 2023-11-15 RX ADMIN — SODIUM CHLORIDE, SODIUM GLUCONATE, SODIUM ACETATE, POTASSIUM CHLORIDE, MAGNESIUM CHLORIDE, SODIUM PHOSPHATE, DIBASIC, AND POTASSIUM PHOSPHATE: .53; .5; .37; .037; .03; .012; .00082 INJECTION, SOLUTION INTRAVENOUS at 07:11

## 2023-11-15 RX ADMIN — FENTANYL CITRATE 50 MCG: 50 INJECTION, SOLUTION INTRAMUSCULAR; INTRAVENOUS at 08:11

## 2023-11-15 RX ADMIN — DIPHENHYDRAMINE HYDROCHLORIDE 12.5 MG: 50 INJECTION, SOLUTION INTRAMUSCULAR; INTRAVENOUS at 07:11

## 2023-11-15 RX ADMIN — CELECOXIB 400 MG: 200 CAPSULE ORAL at 05:11

## 2023-11-15 RX ADMIN — TRANEXAMIC ACID 1000 MG: 100 INJECTION INTRAVENOUS at 08:11

## 2023-11-15 RX ADMIN — SODIUM CHLORIDE: 0.9 INJECTION, SOLUTION INTRAVENOUS at 06:11

## 2023-11-15 RX ADMIN — PROPOFOL 50 MG: 10 INJECTION, EMULSION INTRAVENOUS at 07:11

## 2023-11-15 RX ADMIN — PROPOFOL 50 MCG/KG/MIN: 10 INJECTION, EMULSION INTRAVENOUS at 07:11

## 2023-11-15 RX ADMIN — PREGABALIN 75 MG: 75 CAPSULE ORAL at 05:11

## 2023-11-15 RX ADMIN — Medication 30 MG: at 07:11

## 2023-11-15 RX ADMIN — METHOCARBAMOL 750 MG: 750 TABLET ORAL at 09:11

## 2023-11-15 RX ADMIN — NICARDIPINE HYDROCHLORIDE 0.6 MG: 25 INJECTION, SOLUTION INTRAVENOUS at 07:11

## 2023-11-15 RX ADMIN — VANCOMYCIN HYDROCHLORIDE 1500 MG: 1.5 INJECTION, POWDER, LYOPHILIZED, FOR SOLUTION INTRAVENOUS at 06:11

## 2023-11-15 RX ADMIN — LIDOCAINE HYDROCHLORIDE,EPINEPHRINE BITARTRATE 3 ML: 15; .005 INJECTION, SOLUTION EPIDURAL; INFILTRATION; INTRACAUDAL; PERINEURAL at 07:11

## 2023-11-15 RX ADMIN — ACETAMINOPHEN 1000 MG: 500 TABLET ORAL at 05:11

## 2023-11-15 RX ADMIN — FENTANYL CITRATE 50 MCG: 50 INJECTION, SOLUTION INTRAMUSCULAR; INTRAVENOUS at 07:11

## 2023-11-15 RX ADMIN — Medication 10 MG: at 07:11

## 2023-11-15 RX ADMIN — TRANEXAMIC ACID 1000 MG: 100 INJECTION INTRAVENOUS at 07:11

## 2023-11-15 RX ADMIN — SODIUM CHLORIDE, SODIUM GLUCONATE, SODIUM ACETATE, POTASSIUM CHLORIDE, MAGNESIUM CHLORIDE, SODIUM PHOSPHATE, DIBASIC, AND POTASSIUM PHOSPHATE: .53; .5; .37; .037; .03; .012; .00082 INJECTION, SOLUTION INTRAVENOUS at 08:11

## 2023-11-15 RX ADMIN — PROPOFOL 100 MG: 10 INJECTION, EMULSION INTRAVENOUS at 07:11

## 2023-11-15 RX ADMIN — MEPIVACAINE HYDROCHLORIDE 3 ML: 20 INJECTION, SOLUTION EPIDURAL; INFILTRATION at 07:11

## 2023-11-15 NOTE — OP NOTE
Gian BYRNE right hip  OPERATIVE NOTE      Date of Operation:   11/15/2023    Providers Performing:   Surgeon(s):  Karlo Springer III, MD  Assistant: Jaime Marr PA-C, I certify that the services for which payment is claimed were medically necessary, and that no qualified resident was available to perform the services.       Preoperative Diagnosis:   Right hip osteoarthritis, M16.11    Postoperative Diagnosis:   Same    Operative Procedure:   Right Total Hip Arthroplasty, Anterior Approach, CPT 29430    Anesthesia:   Spinal+catheter  NICOLAS cocktail: SACHIN    Estimated Blood Loss:   500 cc     Specimens:   None    Complications:   None, stable to PACU.    Implants Utilized:   Depuy  [place] Sector Gription acetabular shell; Size 56  [place] Altrx polyethylene liner; 56 OD, 36 ID, neutral  Actis size 6 HO  Biolox Delta Ceramic 12/14 taper 36mm + 5  Acetabular screw 25mm, 25mm    [place]  3gm TXA    Implant Name Type Inv. Item Serial No.  Lot No. LRB No. Used Action   LINER PINNACLE ALTRX [id number] - [id number]  LINER PINNACLE ALTRX [id number]  [employer] [id number] Right 1 Implanted   SCREW PINNACLE 6.5 X 25 - [id number]  SCREW PINNACLE 6.5 X 25  [employer] [id number] Right 2 Implanted   CUP ACT PINNACLE SECTOR 56 TIT - [id number]  CUP ACT PINNACLE SECTOR 56 TIT  [employer] [id number] Right 1 Implanted   STEM ACTIS FEM COLLARED HIGH 6 - [id number]  STEM ACTIS FEM COLLARED HIGH 6  SYNTHES [id number] Right 1 Implanted   HEAD BIOLOX CERAMC FEM +5 36MM - [id number]  HEAD BIOLOX CERAMC FEM +5 36MM  [employer]ES [id number] Right 1 Implanted       Indications:   The patient has longstanding right hip pain secondary to the above diagnosis.. They have failed conservative management which includes anti-inflammatory medications and activity modification. We reviewed the risks and benefits of the direct anterior hip replacement with the patient prior to surgery including risk of infection, lateral thigh numbness, blood clot, leg length  inequality, dislocation, components loosening, components wearing out, need for revision surgery, continued pain, limping, and injury to nerves and vessels.  After discussing the risks and benefits of the procedure they would like to proceed with surgery.    Operative Notes:   The patient was met in the holding area. The operative site was marked. Anesthesia administered spinal anesthesia. The patient was placed supine on a [place] table and the operative site was identified. The hip was prepped and draped in the usual fashion. IV antibiotics were administered and a timeout was performed.     I performed a direct anterior approach to the hip. Skin incision was made and the fascia of the tensor fascia tio was identified. I utilized the interval between the tensor fascia tio and sartorious for direct dissection to the hip joint. I identified and coagulated the ascending branch of the lateral circumflex vessel. Standard retractors over the anterior hip capsule were placed and the capsulotomy was performed. The capsule was tagged for retraction. The femoral head was identified and the femoral neck osteotomy was made in accordance with preoperative templating. The femoral head was then removed with a corkscrew.    The standard anterior, posterior, and superior retractors were placed around the acetabulum. The capsular labrum and foveal contents were removed. Sequential acetabular reamers were used to prepare the acetabulum. Once good good fit was achieved, the final acetabular cup was selected to be one millimeter larger in diameter than the last reamer used. The cup was checked for a good rim fit and a provisional impaction was performed to verify positioning on fluoroscopy. Once this was satisfactory, the impaction was completed. I checked to make sure there was no overhanging osteophytes anteriorly as well as making sure that there was not excessive acetabular cup exposed. Screws were placed in the cup to augment  initial fixation. The final liner was impacted into place and I confirmed that it was well seated.    The hydraulic hook was placed around the femur. The femur was externally rotated and the standard calcar and greater trochanter retractors were placed. A provisional posterior capsulotomy was performed. Then, gross traction was released and the leg was extended and adducted. The appropriate release was performed to allow elevation of the femur for good visualization of the femoral canal.     A box osteotome was used to open the femoral canal and a canal finder was used to sound the canal. The starter broach and sequential broaches were used until stability was appropriate. A trial reduction was performed and intraoperative fluoroscopy was used to confirm appropriate leg lengths and offset.  Once the trial femoral components appeared appropriately positioned, the hip was dislocated, the femur re-exposed, and the trial femoral components were removed. The canal was irrigated and the final femoral stem was impacted to the level of the neck cut. The final ball was impacted onto a dry trunnion and the hip was reduced checking for appropriate soft tissue tension. Final intra-operative fluoroscopy was obtained to confirm implant positioning, leg length, and offset.     While checking xray, a 0.35% betadine solution was left to soak in the wound. The wound was copiously irrigated. I checked for any residual bleeders and made sure that there was appropriate hemostasis. The local anesthetic cocktail was administered. 1 gram of vancomycin powder was placed in the wound. The wound was closed in layers using #1 vicryl at the fascia, then 2-0 vicryl, 3-0 monocryl, 4-0 monocryl and Prineo Mesh+Dermabond. A sterile dressing was placed.     There were no complications or evidence of intraoperative fracture. All counts were correct.    The first-assistant was critical to all steps of the operation, including retraction during  exposure and bone preparation, as well as the deep and superficial wound closure.  Dr. Springer performed and/or supervised the key portions of this surgical procedure, including evaluation of the bone cuts, reshaping of the bony elements, and insertion of the provisional and final components.    Post Op Plan:   The patient will be weightbearing as tolerated with a walker with no extremes of motion  ASA for DVT prophylaxis (81mg BID for one month)  24 hours of IV antibiotics.    Same day    Due patient and or surgical factors the patient will receive an Rx for cefadroxil 500mg BID x7 days after discharge per [place] data:   Shayla MM, Reginald JE, Laurent EMERSON, Mary RM. [facility] Clinical Research Award: Extended Oral Antibiotics Prevent Periprosthetic Joint Infection in High-Risk Cases: 3855 Patients With 1-Year Follow-Up. J Arthroplasty. 2021 Jul;36(7S):S18-S25. doi: 10.1016/j.arth.2021.01.051. Epub 2021 Jan 23. PMID: [id number].        Signed by: Karlo Springer III, MD

## 2023-11-15 NOTE — ANESTHESIA PROCEDURE NOTES
CSE    Patient location during procedure: OR  Start time: 11/15/2023 7:07 AM  Timeout: 11/15/2023 7:05 AM  End time: 11/15/2023 7:10 AM      Staffing  Authorizing Provider: Марина Mora MD  Performing Provider: Марина Mora MD    Staffing  Performed by: Марина Mora MD  Authorized by: Марина Mora MD    Preanesthetic Checklist  Completed: patient identified, IV checked, site marked, risks and benefits discussed, surgical consent, monitors and equipment checked, pre-op evaluation and timeout performed  CSE  Patient position: sitting  Prep: ChloraPrep  Patient monitoring: heart rate, continuous pulse ox and frequent blood pressure checks  Approach: midline  Spinal Needle  Needle type: Zhane   Needle gauge: 25 G  Needle length: 5 in  Epidural Needle  Injection technique: JUNO saline  Needle type: Tuohy   Needle gauge: 17 G  Needle length: 3.5 in  Needle insertion depth: 6 cm  Location: L3-4  Needle localization: anatomical landmarks   Catheter  Catheter type: springwOpenLogic  Catheter size: 19 G  Catheter at skin depth: 10 cm  Test dose: lidocaine 1.5% with Epi 1-to-200,000  Test dose: 3 mL  Additional Documentation: negative aspiration for CSF  Assessment  Intrathecal Medications:   administered: primary anesthetic mcg of    Medications:    Medications: Intrathecal - mepivacaine 20 mg/mL (2 %) injection - Intrathecal   3 mL - 11/15/2023 7:09:00 AM

## 2023-11-15 NOTE — PLAN OF CARE
Poc reviewed with pt, verbalized understanding, questions answered, reassurance provided.     Walker in car

## 2023-11-15 NOTE — PLAN OF CARE
Delay secondary to waiting for Dr. Springer.  VSS. Instr. Complete handout to pt.  Pt escorted to car via wheelchair accompanied by RN

## 2023-11-15 NOTE — PROGRESS NOTES
1135 Pt up with OT, passed OT but unable to void at this time.  1205 Pt up with PT able to void at this time.  Tolerated and passed PT.

## 2023-11-15 NOTE — TRANSFER OF CARE
"Anesthesia Transfer of Care Note    Patient: Dylan Sawant    Procedure(s) Performed: Procedure(s) (LRB):  ARTHROPLASTY, HIP, TOTAL, ANTERIOR APPROACH: RIGHT: DEPUY - ACTIS + PINNACLE (Right)    Patient location: PACU    Anesthesia Type: CSE    Transport from OR: Transported from OR on 6-10 L/min O2 by face mask with adequate spontaneous ventilation    Post pain: adequate analgesia    Post assessment: no apparent anesthetic complications and tolerated procedure well    Post vital signs: stable    Level of consciousness: responds to stimulation and sedated    Nausea/Vomiting: no nausea/vomiting    Complications: none    Transfer of care protocol was followed      Last vitals: Visit Vitals  BP 90/55   Pulse 66   Temp 36.4 °C (97.6 °F) (Oral)   Resp 18   Ht 5' 9" (1.753 m)   Wt 100.7 kg (222 lb)   SpO2 98%   BMI 32.78 kg/m²     "

## 2023-11-15 NOTE — OPERATIVE NOTE ADDENDUM
Certification of Assistant at Surgery       Surgery Date: 11/15/2023     Participating Surgeons:  Surgeon(s) and Role:     * Karlo Springer III, MD - Primary    Procedures:  Procedure(s) (LRB):  ARTHROPLASTY, HIP, TOTAL, ANTERIOR APPROACH: RIGHT: DEPUY - ACTIS + PINNACLE (Right)    Assistant Surgeon's Certification of Necessity:  I understand that section 1842 (b) (6) (d) of the Social Security Act generally prohibits Medicare Part B reasonable charge payment for the services of assistants at surgery in teaching hospitals when qualified residents are available to furnish such services. I certify that the services for which payment is claimed were medically necessary, and that no qualified resident was available to perform the services. I further understand that these services are subject to post-payment review by the Medicare carrier.      Jaime Marr PA-C    11/15/2023  9:15 AM

## 2023-11-15 NOTE — PT/OT/SLP EVAL
"Occupational Therapy Evaluation and Treatment    Name: Dylan Sawant  MRN: [id number]  Admitting Diagnosis: Primary osteoarthritis of right hip Day of Surgery  Recent Surgery: Procedure(s) (LRB):  ARTHROPLASTY, HIP, TOTAL, ANTERIOR APPROACH: RIGHT: DEPUY - ACTIS + PINNACLE (Right) Day of Surgery    Recommendations:     Discharge Recommendations: Low Intensity Therapy  Level of Assistance Recommended: 24 hours supervision  Discharge Equipment Recommendations: none  Barriers to discharge: None    Assessment:     Dylan Sawant is a 62 y.o. male with a medical diagnosis of Primary osteoarthritis of right hip. He presents with performance deficits affecting function including impaired self care skills, impaired functional mobility, orthopedic precautions. Pt was able to perform supine/sit T/F c S and Sit <> Stand Transfer with modified independence with rolling walker Bed <> Chair Transfer using Stand Pivot technique with modified independence with rolling walker Toilet Transfer Stand Pivot technique with modified independence with rolling walker and bedside commode.  Able to perform UB/LB dressing c modified independence  Educated pt on bathing, car transfers, and cryotherapy.       Rehab Prognosis: Good; patient would benefit from acute OT services to address these deficits and reach maximum level of function.    Plan:     Patient to be seen daily to address the above listed problems via self-care/home management, therapeutic activities, therapeutic exercises  Plan of Care Expires: 11/15/23  Plan of Care Reviewed with: patient, spouse    Subjective     Chief Complaint: R CASSANDRA  Patient Comments/Goals: "I want to walk back to the room."  Pain/Comfort:  Pain Rating 1: 5/10    Patients cultural, spiritual, Mormon conflicts given the current situation: no    Social History:  Living Environment: Patient lives with their spouse in a single story home with number of outside stair(s): 1 and walk-in shower  Prior Level " of Function: Prior to admission, patient was independent.  Roles and Routines: Patient was driving and working prior to admission.  Equipment Used at Home: shower chair, bedside commode, hip kit, walker, rolling  DME owned (not currently used): rolling walker, bedside commode, and shower chair  Assistance Upon Discharge: significant other    Objective:     Communicated with RN prior to session. Patient found supine with cryotherapy, FCD upon OT entry to room.    General Precautions: Standard, fall   Orthopedic Precautions: RLE weight bearing as tolerated (0-90* of hip flexion and no extremes of motion)   Braces: N/A    Respiratory Status: Room air    Occupational Performance    Gait belt applied - Yes    Bed Mobility:   Supine to sit from left side of bed with modified independence    Functional Mobility/Transfers:  Sit <> Stand Transfer with modified independence with rolling walker  Bed <> Chair Transfer using Stand Pivot technique with modified independence with rolling walker  Toilet Transfer Stand Pivot technique with modified independence with rolling walker and bedside commode  Functional Mobility: Pt was able to walk to bathroom c CGA and RW.    Activities of Daily Living:  Grooming: modified independence to wash hands while standing at sink c RW.  Upper Body Dressing: modified independence to don shirt.  Lower Body Dressing: modified independence to don underwear, shorts, socks, and shoes c reacher, sock-aid, and long handled shoe horn.  Toileting: modified independence for toilet hygiene.    Physical Exam:  Upper Extremity Range of Motion:     -       Right Upper Extremity: WFL  -       Left Upper Extremity: WFL  Upper Extremity Strength:    -       Right Upper Extremity: WFL  -       Left Upper Extremity: WFL    AMPAC 6 Click ADL:  AMPAC Total Score: 23    Treatment & Education:  Educated on the importance of mobility to maximize recovery  Educated on the importance of OOB mobility within safe range in  order to decrease adverse effects of prolonged bedrest  Educated on Anterior hip precautions - patient recalled 3 hip precautions  Educated on use of hip kit during LB dressing  Educated on safety with functional mobility; hand placement to ensure safe transfers to various surfaces in prep for ADLs  Educated on performing functional mobility and ADLs in adherence to orthopedic precautions  Educated on weight bearing status  Will continue to educate as needed      Patient clear to ambulate to/from bathroom with RN/PCT, assist x1 .    Patient left up in chair with all lines intact, call button in reach, and RN notified.    GOALS:   Multidisciplinary Problems       Occupational Therapy Goals          Problem: Occupational Therapy    Goal Priority Disciplines Outcome Interventions   Occupational Therapy Goal     OT, PT/OT Ongoing, Progressing    Description: Goals to be met by: 11/15/23     Patient will increase functional independence with ADLs by performing:    UE Dressing with Modified [place].  LE Dressing with Modified [place] and Assistive Devices as needed.  Grooming while standing at sink with Modified [place].  Toileting from bedside commode with Modified [place] for hygiene and clothing management.   Bathing from  shower chair/bench with Modified [place].  Toilet transfer to bedside commode with Modified [place].                         History:     Past Medical History:   Diagnosis Date    Allergy     Anxiety     Diabetes mellitus     Elevated BP     GERD (gastroesophageal reflux disease)     Hyperlipidemia     KELLY (obstructive sleep apnea)     wears cpap    Restless leg syndrome     Type 2 diabetes mellitus without complication, without long-term current use of insulin 6/15/2023         Past Surgical History:   Procedure Laterality Date    ADENOIDECTOMY      ARTHROPLASTY OF HIP BY ANTERIOR APPROACH Left 1/27/2023    Procedure: ARTHROPLASTY, HIP TOTAL,;  Surgeon: Karlo Springer  MD MAILE;  [facility];  Service: Orthopedics;  Laterality: Left;   ANTERIOR APPROACH: LEFT: DEPUY - ACTIS + PINNACLE    COLONOSCOPY N/A 4/8/2017    Procedure: COLONOSCOPY;  Surgeon: Kyle Moreno MD;  Location: [facility] (4TH FLR);  Service: Endoscopy;  Laterality: N/A;    NASAL SEPTUM SURGERY      RECONSTRUCTION OF NOSE N/A 6/22/2018    Procedure: RECONSTRUCTION, NOSE;  Surgeon: Tulio Echols III, MD;  Location: [facility] 2ND FLR;  Service: ENT;  Laterality: N/A;  1 HOUR TOTAL / LATERA  NASAL VALVE IMPLANTS    TONSILLECTOMY         Time Tracking:     OT Date of Treatment: 11/15/23  OT Start Time: 1123  OT Stop Time: 1201  OT Total Time (min): 38 min    Billable Minutes: Evaluation 8, Self Care/Home Management 15, and Therapeutic Activity 15    11/15/2023

## 2023-11-15 NOTE — PLAN OF CARE
Problem: Occupational Therapy  Goal: Occupational Therapy Goal  Description: Goals to be met by: 11/15/23     Patient will increase functional independence with ADLs by performing:    UE Dressing with Modified [place].  LE Dressing with Modified [place] and Assistive Devices as needed.  Grooming while standing at sink with Modified [place].  Toileting from bedside commode with Modified [place] for hygiene and clothing management.   Bathing from  shower chair/bench with Modified [place].  Toilet transfer to bedside commode with Modified [place].    Outcome: Ongoing, Progressing

## 2023-11-15 NOTE — ANESTHESIA PREPROCEDURE EVALUATION
11/15/2023  Dylan Sawant is a 62 y.o., male.  Past Medical History:   Diagnosis Date    Allergy     Anxiety     Diabetes mellitus     Elevated BP     GERD (gastroesophageal reflux disease)     Hyperlipidemia     KELLY (obstructive sleep apnea)     wears cpap    Restless leg syndrome     Type 2 diabetes mellitus without complication, without long-term current use of insulin 6/15/2023     Past Surgical History:   Procedure Laterality Date    ADENOIDECTOMY      ARTHROPLASTY OF HIP BY ANTERIOR APPROACH Left 1/27/2023    Procedure: ARTHROPLASTY, HIP TOTAL,;  Surgeon: Karlo Springer III, MD;  Location: [facility];  Service: Orthopedics;  Laterality: Left;   ANTERIOR APPROACH: LEFT: DEPUY - ACTIS + PINNACLE    COLONOSCOPY N/A 4/8/2017    Procedure: COLONOSCOPY;  Surgeon: Kyle Moreno MD;  Location: [facility] (4TH FLR);  Service: Endoscopy;  Laterality: N/A;    NASAL SEPTUM SURGERY      RECONSTRUCTION OF NOSE N/A 6/22/2018    Procedure: RECONSTRUCTION, NOSE;  Surgeon: Tulio Echols III, MD;  Location: [facility] 2ND FLR;  Service: ENT;  Laterality: N/A;  1 HOUR TOTAL / LATERA  NASAL VALVE IMPLANTS    TONSILLECTOMY           Pre-op Assessment    I have reviewed the Patient Summary Reports.    I have reviewed the NPO Status.   I have reviewed the Medications.     Review of Systems  Anesthesia Hx:  No problems with previous Anesthesia   History of prior surgery of interest to airway management or planning:          Denies Family Hx of Anesthesia complications.    Denies Personal Hx of Anesthesia complications.                    Social:  Non-Smoker       Cardiovascular:     Hypertension                                        Pulmonary:        Sleep Apnea, CPAP                Renal/:  Renal/ Normal                 Hepatic/GI:     GERD             Endocrine:  Diabetes, type 2                Physical Exam  General: Well nourished    Airway:  Mallampati: III / III  Mouth Opening: Normal  TM Distance: 4 - 6 cm  Tongue: Normal    Dental:  Intact    Chest/Lungs:  Normal Respiratory Rate    Heart:  Rate: Normal    Anesthesia Plan  Type of Anesthesia, risks & benefits discussed:    Anesthesia Type: CSE, Spinal, Epidural, Gen Natural Airway  Intra-op Monitoring Plan: Standard ASA Monitors  Induction:  IV  Informed Consent: Informed consent signed with the Patient and all parties understand the risks and agree with anesthesia plan.  All questions answered. Patient consented to blood products? Yes  ASA Score: 3  Day of Surgery Review of History & Physical: H&P Update referred to the surgeon/provider.    Ready For Surgery From Anesthesia Perspective.   .

## 2023-11-16 PROCEDURE — G0180 MD CERTIFICATION HHA PATIENT: HCPCS | Mod: ,,, | Performed by: ORTHOPAEDIC SURGERY

## 2023-11-16 NOTE — PLAN OF CARE
"Patient tolerated PT session well. Patient ambulated 100ft with RW and stand by assistance. No LOB or SOB noted. Patient ascended/descended 4" curb step with RW and stand by assistance. Patient educated in anterior hip precautions. Patient ready to discharge home from PT standpoint.      Problem: Physical Therapy  Goal: Physical Therapy Goal  Outcome: Met     "

## 2023-11-16 NOTE — ANESTHESIA RELEASE NOTE
"Anesthesia Release from PACU Note    Patient: Dylan Sawant    Procedure(s) Performed: Procedure(s) (LRB):  ARTHROPLASTY, HIP, TOTAL, ANTERIOR APPROACH: RIGHT: DEPUY - ACTIS + PINNACLE (Right)    Anesthesia type: CSE    Post pain: Adequate analgesia    Post assessment: no apparent anesthetic complications and tolerated procedure well    Last Vitals: Visit Vitals  /72 (BP Location: Right arm, Patient Position: Lying)   Pulse 65   Temp 36.6 °C (97.8 °F) (Temporal)   Resp 20   Ht 5' 9" (1.753 m)   Wt 100.7 kg (222 lb)   SpO2 97%   BMI 32.78 kg/m²       Post vital signs: stable    Level of consciousness: awake and alert     Nausea/Vomiting: no nausea/no vomiting    Complications: none    Airway Patency: patent    Respiratory: unassisted, spontaneous ventilation, room air    Cardiovascular: stable and blood pressure at baseline    Hydration: euvolemic  "

## 2023-11-16 NOTE — DISCHARGE SUMMARY
[facility] - [facility] (Hospital)  Discharge Note  Short Stay    Procedure(s) (LRB):  ARTHROPLASTY, HIP, TOTAL, ANTERIOR APPROACH: RIGHT: DEPUY - ACTIS + PINNACLE (Right)      OUTCOME: Patient tolerated treatment/procedure well without complication and is now ready for discharge.    DISPOSITION: Home-Health Care [facility]    FINAL DIAGNOSIS:  Primary osteoarthritis of right hip    FOLLOWUP: In clinic    DISCHARGE INSTRUCTIONS:    Discharge Procedure Orders   Activity as tolerated     Lifting restrictions   Order Comments: No strenuous exercise or lifting of > 10 lbs     Weight bearing as tolerated     No driving, operating heavy equipment or signing legal documents while taking pain medication     Leave dressing on - Keep it clean, dry, and intact until clinic visit   Order Comments: Do not remove surgical dressing for 2 weeks post-op. This will be done only by MD at initial post-op visit. If dressing is completely saturated, replace with identical dressing - silver-impregnated hydrocolloid dressing.    Do not get dressings wet. Do not shower.    If dressing continues to be saturated or there are signs of infection, please call [facility] [phone] for further instructions and to make appt to be seen.     Call MD for: fluid/liquid/drainage on dressing     Call MD for:  temperature >100.4     Call MD for:  persistent nausea and vomiting     Call MD for:  severe uncontrolled pain     Call MD for:  difficulty breathing, headache or visual disturbances     Call MD for:  redness, tenderness, or signs of infection (pain, swelling, redness, odor or green/yellow discharge around incision site)     Call MD for:  hives     Call MD for:  persistent dizziness or light-headedness     Call MD for:  extreme fatigue     Ok to shower at home, running water only, towel dry, use shower chair for safety, no soaking in a tub or pool for one month.        TIME SPENT ON DISCHARGE: 15 minutes

## 2023-11-16 NOTE — ANESTHESIA POSTPROCEDURE EVALUATION
Anesthesia Post Evaluation    Patient: Dylan Sawant    Procedure(s) Performed: Procedure(s) (LRB):  ARTHROPLASTY, HIP, TOTAL, ANTERIOR APPROACH: RIGHT: DEPUY - ACTIS + PINNACLE (Right)    Final Anesthesia Type: CSE      Patient location during evaluation: PACU  Patient participation: Yes- Able to Participate  Level of consciousness: awake and alert  Post-procedure vital signs: reviewed and stable  Pain management: adequate  Airway patency: patent    PONV status at discharge: No PONV  Anesthetic complications: no      Cardiovascular status: hemodynamically stable  Respiratory status: unassisted, spontaneous ventilation and room air  Hydration status: euvolemic  Follow-up not needed.      Vitals Value Taken Time   /72 11/15/23 1239   Temp 36.6 °C (97.8 °F) 11/15/23 1230   Pulse 65 11/15/23 1230   Resp 20 11/15/23 1230   SpO2 97 % 11/15/23 1130   Vitals shown include unvalidated device data.      Event Time   Out of Recovery 10:00:00         Pain/Gee Score: Pain Rating Prior to Med Admin: 5 (11/15/2023  1:30 PM)  Pain Rating Post Med Admin: 4 (11/15/2023  1:30 PM)  Gee Score: 10 (11/15/2023 10:00 AM)

## 2023-11-16 NOTE — PT/OT/SLP EVAL
"Physical Therapy Evaluation, Treatment, and Discharge Note    Patient Name:  Dylan Sawant   MRN:  [id number]    Recommendations:     Discharge Recommendations: Low Intensity Therapy  Discharge Equipment Recommendations: none   Barriers to discharge: None    Assessment:     Dylan Sawant is a 62 y.o. male admitted with a medical diagnosis of Primary osteoarthritis of right hip. Patient tolerated PT session well. Patient ambulated 100ft with RW and stand by assistance. No LOB or SOB noted. Patient ascended/descended 4" curb step with RW and stand by assistance. Patient educated in anterior hip precautions. Patient ready to discharge home from PT standpoint.       Recent Surgery: Procedure(s) (LRB):  ARTHROPLASTY, HIP, TOTAL, ANTERIOR APPROACH: RIGHT: DEPUY - ACTIS + PINNACLE (Right) Day of Surgery    Plan:     During this hospitalization, patient does not require further acute PT services.  Please re-consult if situation changes.      Subjective     Chief Complaint: Right hip pain.   Patient/Family Comments/goals: To walk without pain.   Pain/Comfort:  Pain Rating 1:  (yes but did not rate with number)  Location - Side 1: Right  Location 1: hip  Pain Addressed 1: Pre-medicate for activity, Reposition, Distraction    Patients cultural, spiritual, Holiness conflicts given the current situation:  n/a    Living Environment:  Patient lives with his wife in a [facility] with 1 step to enter.   Prior to admission, patients level of function was independent for functional mobility. Equipment at home: bedside commode, shower chair, hip kit, walker, rolling. Upon discharge, patient will have assistance from wife.    Objective:     Communicated with RN prior to session.  Patient found  seated in wheelchair with cryotherapy upon PT entry to room. Wife present in room.     General Precautions: Standard, fall    Orthopedic Precautions:RLE weight bearing as tolerated, RLE anterior precautions (Anterior total hip, WBAT with walker, " "no extremes of motion, hip flexion 0-90 degrees.)   Braces: N/A  Respiratory Status: Room air    Exams:  Cognitive Exam:  Patient is oriented to Person, Place, Time, and Situation  Gross Motor Coordination:  WFL  Sensation:    -       Intact  RLE ROM: WFL within anterior hip precautions  RLE Strength: appears WFL but did not formally assess due to pain and recent surgery  LLE ROM: WFL  LLE Strength: WFL    Functional Mobility:  Transfers:     Sit to Stand:  stand by assistance with rolling walker x1 from wheelchair  Gait: Patient ambulated 100ft with Rolling Walker and stand by assistance using 3-point gait. Patient demonstrated decreased miguel and decreased step length during gait due to pain, decreased ROM, and decreased strength.  Balance: Patient stood at the toilet to urinate with supervision and rolling walker.   Stairs: Patient ascended/descended 4" curb step with RW and stand by assistance.       Treatment and Education:  Patient and wife educated in:  -PT role and POC  -safety with transfers including hand placement  -gait sequencing and RW management  -OOB activity to maximize recovery including ambulating at home to prevent DVT   -car transfer  -curb training  -HEP for therex at home with handout provided   -anterior hip precautions      AM-PAC 6 CLICK MOBILITY  Total Score:23     Patient left  seated in wheelchair with all lines intact, call button in reach, RN notified, and wife present.    GOALS:   Multidisciplinary Problems       Physical Therapy Goals       Not on file              Multidisciplinary Problems (Resolved)          Problem: Physical Therapy    Goal Priority Disciplines Outcome Goal Variances Interventions   Physical Therapy Goal   (Resolved)     PT, PT/OT Met                         History:     Past Medical History:   Diagnosis Date    Allergy     Anxiety     Diabetes mellitus     Elevated BP     GERD (gastroesophageal reflux disease)     Hyperlipidemia     KELLY (obstructive sleep " apnea)     wears cpap    Restless leg syndrome     Type 2 diabetes mellitus without complication, without long-term current use of insulin 6/15/2023       Past Surgical History:   Procedure Laterality Date    ADENOIDECTOMY      ARTHROPLASTY OF HIP BY ANTERIOR APPROACH Left 1/27/2023    Procedure: ARTHROPLASTY, HIP TOTAL,;  Surgeon: Karlo Springer III, MD;  Location: [facility];  Service: Orthopedics;  Laterality: Left;   ANTERIOR APPROACH: LEFT: DEPUY - ACTIS + PINNACLE    COLONOSCOPY N/A 4/8/2017    Procedure: COLONOSCOPY;  Surgeon: Kyle Moreno MD;  Location: [facility] (4TH FLR);  Service: Endoscopy;  Laterality: N/A;    NASAL SEPTUM SURGERY      RECONSTRUCTION OF NOSE N/A 6/22/2018    Procedure: RECONSTRUCTION, NOSE;  Surgeon: Tulio Echols III, MD;  Location: [facility] 2ND FLR;  Service: ENT;  Laterality: N/A;  1 HOUR TOTAL / LATERA  NASAL VALVE IMPLANTS    TONSILLECTOMY         Time Tracking:     PT Received On: 11/15/23  PT Start Time: 1208     PT Stop Time: 1225  PT Total Time (min): 17 min     Billable Minutes: Evaluation 8 and Gait Training 9    11/15/2023

## 2023-11-17 ENCOUNTER — CLINICAL SUPPORT (OUTPATIENT)
Dept: ORTHOPEDICS | Facility: CLINIC | Age: 63
End: 2023-11-17
Payer: COMMERCIAL

## 2023-11-17 DIAGNOSIS — Z96.641 S/P HIP REPLACEMENT, RIGHT: Primary | ICD-10-CM

## 2023-11-17 PROCEDURE — 99499 NO LOS: ICD-10-PCS | Mod: 95,,, | Performed by: ORTHOPAEDIC SURGERY

## 2023-11-17 PROCEDURE — 99499 UNLISTED E&M SERVICE: CPT | Mod: 95,,, | Performed by: ORTHOPAEDIC SURGERY

## 2023-11-17 NOTE — PROGRESS NOTES
I called the patient today regarding his surgery with Dr. Karlo Springer III. The patient had a right anterior CASSANDRA on 11/15.     Pain Scale: 6 / 10    Any issues with Fever: No.    Any issues with medications (specifically DVT prophylaxis): No.ASA 81 BID    Any issues with bowel movements:  Passing pedro: No.                                                                 Urination: No.                                                                 Constipation: No. Last BM 11/16    Completing at home exercises: Yes:     Any concerns regarding their dressing/bandage:  No.    Patient confirmed first HH-PT appointment:  HHPT on  11/16 at pm.     Any other concerns: No.        The patient was informed that if they have any urgent issues with their bandage, medications or any other health concerns regarding their surgery to call the 24/7 Orthopedic Post-op Hot Line at [phone]. The patient was reminded that if they have any chest pain or shortness of breath to call 911 or go to the ER.    The patient verbalized understanding and does not have any other questions

## 2023-11-29 ENCOUNTER — PATIENT MESSAGE (OUTPATIENT)
Dept: ORTHOPEDICS | Facility: CLINIC | Age: 63
End: 2023-11-29
Payer: COMMERCIAL

## 2023-11-29 DIAGNOSIS — Z96.641 S/P HIP REPLACEMENT, RIGHT: Primary | ICD-10-CM

## 2023-12-01 ENCOUNTER — OFFICE VISIT (OUTPATIENT)
Dept: ORTHOPEDICS | Facility: CLINIC | Age: 63
End: 2023-12-01
Payer: COMMERCIAL

## 2023-12-01 VITALS — WEIGHT: 225.63 LBS | HEIGHT: 69 IN | BODY MASS INDEX: 33.42 KG/M2

## 2023-12-01 DIAGNOSIS — Z96.641 S/P HIP REPLACEMENT, RIGHT: Primary | ICD-10-CM

## 2023-12-01 PROCEDURE — 3044F HG A1C LEVEL LT 7.0%: CPT | Mod: CPTII,S$GLB,, | Performed by: NURSE PRACTITIONER

## 2023-12-01 PROCEDURE — 1159F MED LIST DOCD IN RCRD: CPT | Mod: CPTII,S$GLB,, | Performed by: NURSE PRACTITIONER

## 2023-12-01 PROCEDURE — 3044F PR MOST RECENT HEMOGLOBIN A1C LEVEL <7.0%: ICD-10-PCS | Mod: CPTII,S$GLB,, | Performed by: NURSE PRACTITIONER

## 2023-12-01 PROCEDURE — 1160F PR REVIEW ALL MEDS BY PRESCRIBER/CLIN PHARMACIST DOCUMENTED: ICD-10-PCS | Mod: CPTII,S$GLB,, | Performed by: NURSE PRACTITIONER

## 2023-12-01 PROCEDURE — 99024 POSTOP FOLLOW-UP VISIT: CPT | Mod: S$GLB,,, | Performed by: NURSE PRACTITIONER

## 2023-12-01 PROCEDURE — 99999 PR PBB SHADOW E&M-EST. PATIENT-LVL III: ICD-10-PCS | Mod: PBBFAC,,, | Performed by: NURSE PRACTITIONER

## 2023-12-01 PROCEDURE — 99024 PR POST-OP FOLLOW-UP VISIT: ICD-10-PCS | Mod: S$GLB,,, | Performed by: NURSE PRACTITIONER

## 2023-12-01 PROCEDURE — 1159F PR MEDICATION LIST DOCUMENTED IN MEDICAL RECORD: ICD-10-PCS | Mod: CPTII,S$GLB,, | Performed by: NURSE PRACTITIONER

## 2023-12-01 PROCEDURE — 99999 PR PBB SHADOW E&M-EST. PATIENT-LVL III: CPT | Mod: PBBFAC,,, | Performed by: NURSE PRACTITIONER

## 2023-12-01 PROCEDURE — 1160F RVW MEDS BY RX/DR IN RCRD: CPT | Mod: CPTII,S$GLB,, | Performed by: NURSE PRACTITIONER

## 2023-12-01 NOTE — PROGRESS NOTES
"Dylan Sawant presents for initial post-operative visit following a right total hip arthroplasty performed by Dr. Springer on 11/15/2023     Exam:  Height 5' 9" (1.753 m), weight 102.3 kg (225 lb 10.3 oz).   Patient is ambulating well without assistive device.   Incision is clean and dry without drainage or erythema.      Initial post-operative radiographs reviewed today revealing satisfactory position of the prosthesis.    A/P  2 weeks s/p right total hip replacement  - Patient advised to keep the incision clean and dry for the next 24 hours after which he  may wash the area with antibacterial soap in the shower, but will not submerge incision until one month post op.  - Continue aspirin for 1 month post op  - Pain medication refill not needed  - Follow up in 4 weeks with surgeon. Pt will call clinic immediately with problems/concerns.      "

## 2023-12-02 DIAGNOSIS — F51.01 PRIMARY INSOMNIA: ICD-10-CM

## 2023-12-02 NOTE — TELEPHONE ENCOUNTER
No care due was identified.  [facility] Embedded Care Due Messages. Reference number: [id number].   12/02/2023 8:56:52 AM CST

## 2023-12-03 RX ORDER — TRAZODONE HYDROCHLORIDE 150 MG/1
150 TABLET ORAL NIGHTLY PRN
Qty: 90 TABLET | Refills: 0 | Status: SHIPPED | OUTPATIENT
Start: 2023-12-03 | End: 2023-12-11 | Stop reason: SDUPTHER

## 2023-12-06 ENCOUNTER — LAB VISIT (OUTPATIENT)
Dept: LAB | Facility: HOSPITAL | Age: 63
End: 2023-12-06
Attending: FAMILY MEDICINE
Payer: COMMERCIAL

## 2023-12-06 DIAGNOSIS — Z12.5 SCREENING PSA (PROSTATE SPECIFIC ANTIGEN): ICD-10-CM

## 2023-12-06 DIAGNOSIS — Z00.00 VISIT FOR WELL MAN HEALTH CHECK: ICD-10-CM

## 2023-12-06 LAB
ALBUMIN SERPL BCP-MCNC: 4.1 G/DL (ref 3.5–5.2)
ALP SERPL-CCNC: 88 U/L (ref 55–135)
ALT SERPL W/O P-5'-P-CCNC: 32 U/L (ref 10–44)
ANION GAP SERPL CALC-SCNC: 8 MMOL/L (ref 8–16)
AST SERPL-CCNC: 23 U/L (ref 10–40)
BASOPHILS # BLD AUTO: 0.07 K/UL (ref 0–0.2)
BASOPHILS NFR BLD: 1.1 % (ref 0–1.9)
BILIRUB SERPL-MCNC: 0.4 MG/DL (ref 0.1–1)
BUN SERPL-MCNC: 13 MG/DL (ref 6–20)
CALCIUM SERPL-MCNC: 10.1 MG/DL (ref 8.7–10.5)
CHLORIDE SERPL-SCNC: 106 MMOL/L (ref 95–110)
CHOLEST SERPL-MCNC: 138 MG/DL (ref 120–199)
CHOLEST/HDLC SERPL: 3.7 {RATIO} (ref 2–5)
CO2 SERPL-SCNC: 27 MMOL/L (ref 23–29)
COMPLEXED PSA SERPL-MCNC: 0.75 NG/ML (ref 0–4)
CREAT SERPL-MCNC: 1.1 MG/DL (ref 0.5–1.4)
DIFFERENTIAL METHOD: ABNORMAL
EOSINOPHIL # BLD AUTO: 0.2 K/UL (ref 0–0.5)
EOSINOPHIL NFR BLD: 3.5 % (ref 0–8)
ERYTHROCYTE [DISTWIDTH] IN BLOOD BY AUTOMATED COUNT: 13.8 % (ref 11.5–14.5)
EST. GFR  (NO RACE VARIABLE): >60 ML/MIN/1.73 M^2
ESTIMATED AVG GLUCOSE: 131 MG/DL (ref 68–131)
GLUCOSE SERPL-MCNC: 118 MG/DL (ref 70–110)
HBA1C MFR BLD: 6.2 % (ref 4–5.6)
HCT VFR BLD AUTO: 41.6 % (ref 40–54)
HDLC SERPL-MCNC: 37 MG/DL (ref 40–75)
HDLC SERPL: 26.8 % (ref 20–50)
HGB BLD-MCNC: 13.7 G/DL (ref 14–18)
IMM GRANULOCYTES # BLD AUTO: 0.03 K/UL (ref 0–0.04)
IMM GRANULOCYTES NFR BLD AUTO: 0.5 % (ref 0–0.5)
LDLC SERPL CALC-MCNC: 76.2 MG/DL (ref 63–159)
LYMPHOCYTES # BLD AUTO: 2 K/UL (ref 1–4.8)
LYMPHOCYTES NFR BLD: 30 % (ref 18–48)
MCH RBC QN AUTO: 30.6 PG (ref 27–31)
MCHC RBC AUTO-ENTMCNC: 32.9 G/DL (ref 32–36)
MCV RBC AUTO: 93 FL (ref 82–98)
MONOCYTES # BLD AUTO: 0.7 K/UL (ref 0.3–1)
MONOCYTES NFR BLD: 9.8 % (ref 4–15)
NEUTROPHILS # BLD AUTO: 3.7 K/UL (ref 1.8–7.7)
NEUTROPHILS NFR BLD: 55.1 % (ref 38–73)
NONHDLC SERPL-MCNC: 101 MG/DL
NRBC BLD-RTO: 0 /100 WBC
PLATELET # BLD AUTO: 339 K/UL (ref 150–450)
PMV BLD AUTO: 10.8 FL (ref 9.2–12.9)
POTASSIUM SERPL-SCNC: 4.2 MMOL/L (ref 3.5–5.1)
PROT SERPL-MCNC: 7.4 G/DL (ref 6–8.4)
RBC # BLD AUTO: 4.47 M/UL (ref 4.6–6.2)
SODIUM SERPL-SCNC: 141 MMOL/L (ref 136–145)
TRIGL SERPL-MCNC: 124 MG/DL (ref 30–150)
TSH SERPL DL<=0.005 MIU/L-ACNC: 0.81 UIU/ML (ref 0.4–4)
WBC # BLD AUTO: 6.63 K/UL (ref 3.9–12.7)

## 2023-12-06 PROCEDURE — 83036 HEMOGLOBIN GLYCOSYLATED A1C: CPT | Performed by: FAMILY MEDICINE

## 2023-12-06 PROCEDURE — 80053 COMPREHEN METABOLIC PANEL: CPT | Performed by: FAMILY MEDICINE

## 2023-12-06 PROCEDURE — 36415 COLL VENOUS BLD VENIPUNCTURE: CPT | Mod: PO | Performed by: FAMILY MEDICINE

## 2023-12-06 PROCEDURE — 84153 ASSAY OF PSA TOTAL: CPT | Performed by: FAMILY MEDICINE

## 2023-12-06 PROCEDURE — 84443 ASSAY THYROID STIM HORMONE: CPT | Performed by: FAMILY MEDICINE

## 2023-12-06 PROCEDURE — 85025 COMPLETE CBC W/AUTO DIFF WBC: CPT | Performed by: FAMILY MEDICINE

## 2023-12-06 PROCEDURE — 80061 LIPID PANEL: CPT | Performed by: FAMILY MEDICINE

## 2023-12-11 ENCOUNTER — OFFICE VISIT (OUTPATIENT)
Dept: FAMILY MEDICINE | Facility: CLINIC | Age: 63
End: 2023-12-11
Payer: COMMERCIAL

## 2023-12-11 VITALS
OXYGEN SATURATION: 99 % | WEIGHT: 227.5 LBS | DIASTOLIC BLOOD PRESSURE: 76 MMHG | HEIGHT: 69 IN | HEART RATE: 62 BPM | TEMPERATURE: 98 F | SYSTOLIC BLOOD PRESSURE: 130 MMHG | BODY MASS INDEX: 33.69 KG/M2

## 2023-12-11 DIAGNOSIS — F51.01 PRIMARY INSOMNIA: ICD-10-CM

## 2023-12-11 DIAGNOSIS — E78.2 MIXED HYPERLIPIDEMIA: ICD-10-CM

## 2023-12-11 DIAGNOSIS — G25.81 RESTLESS LEGS: ICD-10-CM

## 2023-12-11 DIAGNOSIS — E55.9 VITAMIN D DEFICIENCY: ICD-10-CM

## 2023-12-11 DIAGNOSIS — F41.9 ANXIETY: ICD-10-CM

## 2023-12-11 DIAGNOSIS — Z00.00 VISIT FOR WELL MAN HEALTH CHECK: Primary | ICD-10-CM

## 2023-12-11 DIAGNOSIS — M79.604 RIGHT LEG PAIN: ICD-10-CM

## 2023-12-11 DIAGNOSIS — R03.0 ELEVATED BP WITHOUT DIAGNOSIS OF HYPERTENSION: ICD-10-CM

## 2023-12-11 DIAGNOSIS — Z23 NEED FOR VACCINATION AGAINST STREPTOCOCCUS PNEUMONIAE: ICD-10-CM

## 2023-12-11 DIAGNOSIS — E11.9 TYPE 2 DIABETES MELLITUS WITHOUT COMPLICATION, WITHOUT LONG-TERM CURRENT USE OF INSULIN: ICD-10-CM

## 2023-12-11 DIAGNOSIS — F41.1 GAD (GENERALIZED ANXIETY DISORDER): ICD-10-CM

## 2023-12-11 PROCEDURE — 99396 PREV VISIT EST AGE 40-64: CPT | Mod: S$GLB,,, | Performed by: FAMILY MEDICINE

## 2023-12-11 PROCEDURE — 1159F PR MEDICATION LIST DOCUMENTED IN MEDICAL RECORD: ICD-10-PCS | Mod: CPTII,S$GLB,, | Performed by: FAMILY MEDICINE

## 2023-12-11 PROCEDURE — 3044F HG A1C LEVEL LT 7.0%: CPT | Mod: CPTII,S$GLB,, | Performed by: FAMILY MEDICINE

## 2023-12-11 PROCEDURE — 3061F NEG MICROALBUMINURIA REV: CPT | Mod: CPTII,S$GLB,, | Performed by: FAMILY MEDICINE

## 2023-12-11 PROCEDURE — 3066F PR DOCUMENTATION OF TREATMENT FOR NEPHROPATHY: ICD-10-PCS | Mod: CPTII,S$GLB,, | Performed by: FAMILY MEDICINE

## 2023-12-11 PROCEDURE — 99999 PR PBB SHADOW E&M-EST. PATIENT-LVL IV: CPT | Mod: PBBFAC,,, | Performed by: FAMILY MEDICINE

## 2023-12-11 PROCEDURE — 3078F DIAST BP <80 MM HG: CPT | Mod: CPTII,S$GLB,, | Performed by: FAMILY MEDICINE

## 2023-12-11 PROCEDURE — 3008F PR BODY MASS INDEX (BMI) DOCUMENTED: ICD-10-PCS | Mod: CPTII,S$GLB,, | Performed by: FAMILY MEDICINE

## 2023-12-11 PROCEDURE — 3066F NEPHROPATHY DOC TX: CPT | Mod: CPTII,S$GLB,, | Performed by: FAMILY MEDICINE

## 2023-12-11 PROCEDURE — 99999 PR PBB SHADOW E&M-EST. PATIENT-LVL IV: ICD-10-PCS | Mod: PBBFAC,,, | Performed by: FAMILY MEDICINE

## 2023-12-11 PROCEDURE — 3075F PR MOST RECENT SYSTOLIC BLOOD PRESS GE 130-139MM HG: ICD-10-PCS | Mod: CPTII,S$GLB,, | Performed by: FAMILY MEDICINE

## 2023-12-11 PROCEDURE — 1159F MED LIST DOCD IN RCRD: CPT | Mod: CPTII,S$GLB,, | Performed by: FAMILY MEDICINE

## 2023-12-11 PROCEDURE — 3075F SYST BP GE 130 - 139MM HG: CPT | Mod: CPTII,S$GLB,, | Performed by: FAMILY MEDICINE

## 2023-12-11 PROCEDURE — 99396 PR PREVENTIVE VISIT,EST,40-64: ICD-10-PCS | Mod: S$GLB,,, | Performed by: FAMILY MEDICINE

## 2023-12-11 PROCEDURE — 3008F BODY MASS INDEX DOCD: CPT | Mod: CPTII,S$GLB,, | Performed by: FAMILY MEDICINE

## 2023-12-11 PROCEDURE — 3044F PR MOST RECENT HEMOGLOBIN A1C LEVEL <7.0%: ICD-10-PCS | Mod: CPTII,S$GLB,, | Performed by: FAMILY MEDICINE

## 2023-12-11 PROCEDURE — 3061F PR NEG MICROALBUMINURIA RESULT DOCUMENTED/REVIEW: ICD-10-PCS | Mod: CPTII,S$GLB,, | Performed by: FAMILY MEDICINE

## 2023-12-11 PROCEDURE — 3078F PR MOST RECENT DIASTOLIC BLOOD PRESSURE < 80 MM HG: ICD-10-PCS | Mod: CPTII,S$GLB,, | Performed by: FAMILY MEDICINE

## 2023-12-11 RX ORDER — ROPINIROLE 4 MG/1
4 TABLET, FILM COATED ORAL NIGHTLY
Qty: 90 TABLET | Refills: 1 | Status: SHIPPED | OUTPATIENT
Start: 2023-12-11

## 2023-12-11 RX ORDER — ESCITALOPRAM OXALATE 20 MG/1
20 TABLET ORAL NIGHTLY
Qty: 90 TABLET | Refills: 1 | Status: SHIPPED | OUTPATIENT
Start: 2023-12-11

## 2023-12-11 RX ORDER — METFORMIN HYDROCHLORIDE 500 MG/1
500 TABLET, EXTENDED RELEASE ORAL 2 TIMES DAILY WITH MEALS
Qty: 180 TABLET | Refills: 3 | Status: SHIPPED | OUTPATIENT
Start: 2023-12-11 | End: 2024-12-10

## 2023-12-11 RX ORDER — ERGOCALCIFEROL 1.25 MG/1
50000 CAPSULE ORAL
Qty: 12 CAPSULE | Refills: 3 | Status: SHIPPED | OUTPATIENT
Start: 2023-12-11

## 2023-12-11 RX ORDER — ATORVASTATIN CALCIUM 40 MG/1
40 TABLET, FILM COATED ORAL DAILY
Qty: 90 TABLET | Refills: 2 | Status: SHIPPED | OUTPATIENT
Start: 2023-12-11 | End: 2024-09-06

## 2023-12-11 RX ORDER — TRAZODONE HYDROCHLORIDE 150 MG/1
150 TABLET ORAL NIGHTLY PRN
Qty: 90 TABLET | Refills: 1 | Status: SHIPPED | OUTPATIENT
Start: 2023-12-11

## 2023-12-11 NOTE — PATIENT INSTRUCTIONS
Continue atorvastatin  STOP fenofibrate    Continue gabapentin per neurology.   Continue requip     Continue lexapro 20 mg  Continue trazodone at 150 mg     Continue metformin BID    You have low vitamin D and a Rx has been sent to your pharmacy. Take this pill once a week and when the prescription and refills are done, start taking an OTC vitamin D supplementation at 1000 IU daily.     Get RSV vaccine at the pharmacy  PNA20 today.      F/u 6 months, DM. Labs prior    Lab Results   Component Value Date    HGBA1C 6.2 (H) 12/06/2023    HGBA1C 6.6 (H) 10/31/2023    HGBA1C 6.6 (H) 06/15/2023
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
## 2023-12-28 ENCOUNTER — OFFICE VISIT (OUTPATIENT)
Dept: ORTHOPEDICS | Facility: CLINIC | Age: 63
End: 2023-12-28
Payer: COMMERCIAL

## 2023-12-28 ENCOUNTER — HOSPITAL ENCOUNTER (OUTPATIENT)
Dept: RADIOLOGY | Facility: HOSPITAL | Age: 63
Discharge: HOME | End: 2023-12-28
Attending: ORTHOPAEDIC SURGERY
Payer: COMMERCIAL

## 2023-12-28 VITALS — HEIGHT: 69 IN | WEIGHT: 227 LBS | BODY MASS INDEX: 33.62 KG/M2

## 2023-12-28 DIAGNOSIS — Z96.641 S/P HIP REPLACEMENT, RIGHT: Primary | ICD-10-CM

## 2023-12-28 DIAGNOSIS — Z96.641 S/P HIP REPLACEMENT, RIGHT: ICD-10-CM

## 2023-12-28 PROCEDURE — 3066F PR DOCUMENTATION OF TREATMENT FOR NEPHROPATHY: ICD-10-PCS | Mod: CPTII,S$GLB,, | Performed by: ORTHOPAEDIC SURGERY

## 2023-12-28 PROCEDURE — 99024 PR POST-OP FOLLOW-UP VISIT: ICD-10-PCS | Mod: S$GLB,,, | Performed by: ORTHOPAEDIC SURGERY

## 2023-12-28 PROCEDURE — 73502 X-RAY EXAM HIP UNI 2-3 VIEWS: CPT | Mod: TC,RT

## 2023-12-28 PROCEDURE — 1159F MED LIST DOCD IN RCRD: CPT | Mod: CPTII,S$GLB,, | Performed by: ORTHOPAEDIC SURGERY

## 2023-12-28 PROCEDURE — 3044F PR MOST RECENT HEMOGLOBIN A1C LEVEL <7.0%: ICD-10-PCS | Mod: CPTII,S$GLB,, | Performed by: ORTHOPAEDIC SURGERY

## 2023-12-28 PROCEDURE — 99999 PR PBB SHADOW E&M-EST. PATIENT-LVL III: CPT | Mod: PBBFAC,,, | Performed by: ORTHOPAEDIC SURGERY

## 2023-12-28 PROCEDURE — 73502 X-RAY EXAM HIP UNI 2-3 VIEWS: CPT | Mod: 26,RT,, | Performed by: RADIOLOGY

## 2023-12-28 PROCEDURE — 3061F NEG MICROALBUMINURIA REV: CPT | Mod: CPTII,S$GLB,, | Performed by: ORTHOPAEDIC SURGERY

## 2023-12-28 PROCEDURE — 3066F NEPHROPATHY DOC TX: CPT | Mod: CPTII,S$GLB,, | Performed by: ORTHOPAEDIC SURGERY

## 2023-12-28 PROCEDURE — 3044F HG A1C LEVEL LT 7.0%: CPT | Mod: CPTII,S$GLB,, | Performed by: ORTHOPAEDIC SURGERY

## 2023-12-28 PROCEDURE — 99999 PR PBB SHADOW E&M-EST. PATIENT-LVL III: ICD-10-PCS | Mod: PBBFAC,,, | Performed by: ORTHOPAEDIC SURGERY

## 2023-12-28 PROCEDURE — 73502 XR HIP WITH PELVIS WHEN PERFORMED, 2 OR 3  VIEWS RIGHT: ICD-10-PCS | Mod: 26,RT,, | Performed by: RADIOLOGY

## 2023-12-28 PROCEDURE — 1159F PR MEDICATION LIST DOCUMENTED IN MEDICAL RECORD: ICD-10-PCS | Mod: CPTII,S$GLB,, | Performed by: ORTHOPAEDIC SURGERY

## 2023-12-28 PROCEDURE — 3061F PR NEG MICROALBUMINURIA RESULT DOCUMENTED/REVIEW: ICD-10-PCS | Mod: CPTII,S$GLB,, | Performed by: ORTHOPAEDIC SURGERY

## 2023-12-28 PROCEDURE — 99024 POSTOP FOLLOW-UP VISIT: CPT | Mod: S$GLB,,, | Performed by: ORTHOPAEDIC SURGERY

## 2023-12-28 RX ORDER — MELOXICAM 15 MG/1
15 TABLET ORAL DAILY
Qty: 90 TABLET | Refills: 0 | Status: SHIPPED | OUTPATIENT
Start: 2023-12-28 | End: 2024-03-27

## 2023-12-28 NOTE — PROGRESS NOTES
Subjective:     HPI:   Dylan Sawant is a 63 y.o. male who presents 6 weeks out from right CASSANDRA     Date of surgery:   L ant CASSANDRA 1/27/23: same day [facility] (first case)  R ant CASSANDRA 11/15/23: same day [facility]    Medications: off everything    Assistive Devices: none    PT: finished    Limitations: none, back to cutting grass    Overall doing well  Keys in right pocket irriating - used barrier cream and helps       Objective:   Body mass index is 33.52 kg/m².  Exam:    Gait: limp/antalgic/trendelenburg none    Incision: healed, not hypersensitive    Active straight leg raise: good strength, no discomfort    Extension: 0    Flexion: 95    Abduction: 30    Adduction: 20    External rotation: 30    Internal rotation: 20    LLD: 0 cm supine       Imaging:  Indication:  Annual exam status post right total hip arthroplasty  Exam Ordered: Radiographs taken today include an anteroposterior pelvis, and cross table lateral view of the proximal femur including the hip joint  Details of Examination: Today's exam shows a well fixed, well positioned total hip arthroplasty with no evidence of wear, osteolysis, or loosening.  Impression:  Status post right total hip arthroplasty, implant in good position with no abnormality       Assessment:       ICD-10-CM ICD-9-CM   1. S/P hip replacement, right  Z96.641 V43.64      Doing well     Plan:       Patient is doing very well with the hip replacement at this time.  They will continue routine care of their hip and see me for their routine follow-up.  If there are problems in the interim they will see me back sooner. Prophylactic antibiotic protocol given and explained to patient.     Phase 2 hip exercises    Was on mobic pre-op, refill today, further refills should come from PCP    6 week follow up = also be 1 year L hip no XR needed      No orders of the defined types were placed in this encounter.            Past Medical History:   Diagnosis Date    Allergy     Anxiety     Diabetes  mellitus     Elevated BP     GERD (gastroesophageal reflux disease)     Hyperlipidemia     KELLY (obstructive sleep apnea)     wears cpap    Restless leg syndrome     Type 2 diabetes mellitus without complication, without long-term current use of insulin 6/15/2023       Past Surgical History:   Procedure Laterality Date    ADENOIDECTOMY      ARTHROPLASTY OF HIP BY ANTERIOR APPROACH Left 1/27/2023    Procedure: ARTHROPLASTY, HIP TOTAL,;  Surgeon: Karlo Springer III, MD;  Location: [facility] OR;  Service: Orthopedics;  Laterality: Left;   ANTERIOR APPROACH: LEFT: DEPUY - ACTIS + PINNACLE    ARTHROPLASTY OF HIP BY ANTERIOR APPROACH Right 11/15/2023    Procedure: ARTHROPLASTY, HIP, TOTAL, ANTERIOR APPROACH: RIGHT: DEPUY - ACTIS + PINNACLE;  Surgeon: Karlo Springer III, MD;  Location: [facility] OR;  Service: Orthopedics;  Laterality: Right;    COLONOSCOPY N/A 4/8/2017    Procedure: COLONOSCOPY;  Surgeon: Kyle Moreno MD;  Location: [facility] (4TH FLR);  Service: Endoscopy;  Laterality: N/A;    NASAL SEPTUM SURGERY      RECONSTRUCTION OF NOSE N/A 6/22/2018    Procedure: RECONSTRUCTION, NOSE;  Surgeon: Tulio Echols III, MD;  Location: [facility] OR 2ND FLR;  Service: ENT;  Laterality: N/A;  1 HOUR TOTAL / LATERA  NASAL VALVE IMPLANTS    TONSILLECTOMY         Family History   Problem Relation Age of Onset    Diabetes Mother     Depression Mother     Arthritis Mother     COPD Mother     Heart disease Father     Cancer Father         pancreatic    Diabetes Father     Diverticulitis Father     Restless legs syndrome Father     No Known Problems Sister     Heart disease Paternal Uncle        Social History     Socioeconomic History    Marital status:    Occupational History     Employer: [employer]   Tobacco Use    Smoking status: Never    Smokeless tobacco: Never   Substance and Sexual Activity    Alcohol use: Not Currently    Drug use: No    Sexual activity: Not Currently     Partners: Female     Birth control/protection:  Abstinence   Social History Narrative    12/13/2022: lives with my patient, Robyn. Dog passed away a year ago. Sister lives nearby as does mom. Retiring this week. No smokers at home.      Social Determinants of Health     Financial Resource Strain: Low Risk  (12/4/2023)    Overall Financial Resource Strain (CARDIA)     Difficulty of Paying Living Expenses: Not hard at all   Food Insecurity: No Food Insecurity (12/4/2023)    Hunger Vital Sign     Worried About Running Out of Food in the Last Year: Never true     Ran Out of Food in the Last Year: Never true   Transportation Needs: No Transportation Needs (12/4/2023)    PRAPARE - Transportation     Lack of Transportation (Medical): No     Lack of Transportation (Non-Medical): No   Physical Activity: Insufficiently Active (12/4/2023)    Exercise Vital Sign     Days of Exercise per Week: 2 days     Minutes of Exercise per Session: 20 min   Stress: Stress Concern Present (12/4/2023)    [employer] [employer] [place] [employer] - Occupational Stress Questionnaire     Feeling of Stress : To some extent   Social Connections: Socially Integrated (12/4/2023)    Social Connection and Isolation Panel [NHANES]     Frequency of Communication with Friends and Family: Three times a week     Frequency of Social Gatherings with Friends and Family: Once a week     Attends Nondenominational Services: More than 4 times per year     Active Member of Clubs or Organizations: Yes     Attends Club or Organization Meetings: 1 to 4 times per year     Marital Status:    Housing Stability: Low Risk  (12/4/2023)    Housing Stability Vital Sign     Unable to Pay for Housing in the Last Year: No     Number of Places Lived in the Last Year: 1     Unstable Housing in the Last Year: No       8

## 2023-12-29 ENCOUNTER — EXTERNAL HOME HEALTH (OUTPATIENT)
Dept: HOME HEALTH SERVICES | Facility: HOSPITAL | Age: 63
End: 2023-12-29
Payer: COMMERCIAL

## 2024-02-08 ENCOUNTER — OFFICE VISIT (OUTPATIENT)
Dept: ORTHOPEDICS | Facility: CLINIC | Age: 64
End: 2024-02-08
Payer: COMMERCIAL

## 2024-02-08 VITALS — WEIGHT: 230 LBS | BODY MASS INDEX: 34.07 KG/M2 | HEIGHT: 69 IN

## 2024-02-08 DIAGNOSIS — Z96.641 S/P HIP REPLACEMENT, RIGHT: Primary | ICD-10-CM

## 2024-02-08 DIAGNOSIS — Z96.642 S/P HIP REPLACEMENT, LEFT: ICD-10-CM

## 2024-02-08 PROCEDURE — 99024 POSTOP FOLLOW-UP VISIT: CPT | Mod: S$GLB,,, | Performed by: ORTHOPAEDIC SURGERY

## 2024-02-08 PROCEDURE — 3072F LOW RISK FOR RETINOPATHY: CPT | Mod: CPTII,S$GLB,, | Performed by: ORTHOPAEDIC SURGERY

## 2024-02-08 PROCEDURE — 1159F MED LIST DOCD IN RCRD: CPT | Mod: CPTII,S$GLB,, | Performed by: ORTHOPAEDIC SURGERY

## 2024-02-08 PROCEDURE — 99999 PR PBB SHADOW E&M-EST. PATIENT-LVL III: CPT | Mod: PBBFAC,,, | Performed by: ORTHOPAEDIC SURGERY

## 2024-02-08 NOTE — PROGRESS NOTES
Subjective:     HPI:   Dylan Sawant is a 63 y.o. male who presents 12 weeks out from right CASSANDRA     Date of surgery:   L ant CASSANDRA 1/27/23: same day [facility] (first case)  R ant CASSANDRA 11/15/23: same day [facility]    Medications:   Rare mobic    Assistive Devices: none    Limitations: none    Doing well, happy with progress  Still a little tired but goes all day long  A little slow getting up and down       Objective:   Body mass index is 33.97 kg/m².  Exam:    Gait: limp/antalgic/trendelenburg none    Incision: healed    Active straight leg raise: good strength, no discomfort    Extension: 0    Flexion: 110    Abduction: 40    Adduction: 30    External rotation: 30    Internal rotation: 20    LLD: 0 cm supine       Imaging:  None today        Assessment:       ICD-10-CM ICD-9-CM   1. S/P hip replacement, right  Z96.641 V43.64   2. S/P hip replacement, left  Z96.642 V43.64      Doing well  1 year L CASSANDRA - doing well     Plan:       Patient is doing very well with the hip replacement at this time.  They will continue routine care of their hip and see me for their routine follow-up.  If there are problems in the interim they will see me back sooner. Prophylactic antibiotic protocol given and explained to patient.     9 month follow up for annual xray R CASSANDRA       No orders of the defined types were placed in this encounter.            Past Medical History:   Diagnosis Date    Allergy     Anxiety     Diabetes mellitus     Elevated BP     GERD (gastroesophageal reflux disease)     Hyperlipidemia     KELLY (obstructive sleep apnea)     wears cpap    Restless leg syndrome     Type 2 diabetes mellitus without complication, without long-term current use of insulin 6/15/2023       Past Surgical History:   Procedure Laterality Date    ADENOIDECTOMY      ARTHROPLASTY OF HIP BY ANTERIOR APPROACH Left 1/27/2023    Procedure: ARTHROPLASTY, HIP TOTAL,;  Surgeon: Karlo Springer III, MD;  Location: [facility] OR;  Service: Orthopedics;   Laterality: Left;   ANTERIOR APPROACH: LEFT: DEPUY - ACTIS + PINNACLE    ARTHROPLASTY OF HIP BY ANTERIOR APPROACH Right 11/15/2023    Procedure: ARTHROPLASTY, HIP, TOTAL, ANTERIOR APPROACH: RIGHT: DEPUY - ACTIS + PINNACLE;  Surgeon: Karlo Springer III, MD;  Location: [facility] OR;  Service: Orthopedics;  Laterality: Right;    COLONOSCOPY N/A 4/8/2017    Procedure: COLONOSCOPY;  Surgeon: Kyle Moreno MD;  Location: [facility] ENDO (4TH FLR);  Service: Endoscopy;  Laterality: N/A;    NASAL SEPTUM SURGERY      RECONSTRUCTION OF NOSE N/A 6/22/2018    Procedure: RECONSTRUCTION, NOSE;  Surgeon: Tluio Echols III, MD;  Location: [facility] OR 2ND FLR;  Service: ENT;  Laterality: N/A;  1 HOUR TOTAL / LATERA  NASAL VALVE IMPLANTS    TONSILLECTOMY         Family History   Problem Relation Age of Onset    Diabetes Mother     Depression Mother     Arthritis Mother     COPD Mother     Heart disease Father     Cancer Father         pancreatic    Diabetes Father     Diverticulitis Father     Restless legs syndrome Father     No Known Problems Sister     Heart disease Paternal Uncle        Social History     Socioeconomic History    Marital status:    Occupational History     Employer: [employer]   Tobacco Use    Smoking status: Never    Smokeless tobacco: Never   Substance and Sexual Activity    Alcohol use: Not Currently    Drug use: No    Sexual activity: Not Currently     Partners: Female     Birth control/protection: Abstinence   Social History Narrative    12/13/2022: lives with my patient, Robyn. Dog passed away a year ago. Sister lives nearby as does mom. Retiring this week. No smokers at home.      Social Determinants of Health     Financial Resource Strain: Low Risk  (12/4/2023)    Overall Financial Resource Strain (CARDIA)     Difficulty of Paying Living Expenses: Not hard at all   Food Insecurity: No Food Insecurity (12/4/2023)    Hunger Vital Sign     Worried About Running Out of Food in the Last Year: Never true     Ran  Out of Food in the Last Year: Never true   Transportation Needs: No Transportation Needs (12/4/2023)    PRAPARE - Transportation     Lack of Transportation (Medical): No     Lack of Transportation (Non-Medical): No   Physical Activity: Insufficiently Active (12/4/2023)    Exercise Vital Sign     Days of Exercise per Week: 2 days     Minutes of Exercise per Session: 20 min   Stress: Stress Concern Present (12/4/2023)    [place] [employer] - Occupational Stress Questionnaire     Feeling of Stress : To some extent   Social Connections: Unknown (12/4/2023)    Social Connection and Isolation Panel [NHANES]     Frequency of Communication with Friends and Family: Three times a week     Frequency of Social Gatherings with Friends and Family: Once a week     Active Member of Clubs or Organizations: Yes     Attends Club or Organization Meetings: 1 to 4 times per year     Marital Status:    Housing Stability: Low Risk  (12/4/2023)    Housing Stability Vital Sign     Unable to Pay for Housing in the Last Year: No     Number of Places Lived in the Last Year: 1     Unstable Housing in the Last Year: No

## 2024-03-05 ENCOUNTER — PATIENT MESSAGE (OUTPATIENT)
Dept: FAMILY MEDICINE | Facility: CLINIC | Age: 64
End: 2024-03-05
Payer: COMMERCIAL

## 2024-03-11 RX ORDER — GABAPENTIN 300 MG/1
900 CAPSULE ORAL
Qty: 90 CAPSULE | Refills: 3 | Status: SHIPPED | OUTPATIENT
Start: 2024-03-11 | End: 2025-03-11

## 2024-04-08 ENCOUNTER — OFFICE VISIT (OUTPATIENT)
Dept: FAMILY MEDICINE | Facility: CLINIC | Age: 64
End: 2024-04-08
Payer: COMMERCIAL

## 2024-04-08 VITALS
DIASTOLIC BLOOD PRESSURE: 72 MMHG | TEMPERATURE: 98 F | HEART RATE: 80 BPM | BODY MASS INDEX: 34.38 KG/M2 | WEIGHT: 232.13 LBS | SYSTOLIC BLOOD PRESSURE: 130 MMHG | HEIGHT: 69 IN | OXYGEN SATURATION: 99 %

## 2024-04-08 DIAGNOSIS — K40.90 NON-RECURRENT UNILATERAL INGUINAL HERNIA WITHOUT OBSTRUCTION OR GANGRENE: Primary | ICD-10-CM

## 2024-04-08 DIAGNOSIS — K21.9 GERD WITHOUT ESOPHAGITIS: ICD-10-CM

## 2024-04-08 PROCEDURE — 1160F RVW MEDS BY RX/DR IN RCRD: CPT | Mod: CPTII,S$GLB,, | Performed by: FAMILY MEDICINE

## 2024-04-08 PROCEDURE — 3075F SYST BP GE 130 - 139MM HG: CPT | Mod: CPTII,S$GLB,, | Performed by: FAMILY MEDICINE

## 2024-04-08 PROCEDURE — 99214 OFFICE O/P EST MOD 30 MIN: CPT | Mod: S$GLB,,, | Performed by: FAMILY MEDICINE

## 2024-04-08 PROCEDURE — 3072F LOW RISK FOR RETINOPATHY: CPT | Mod: CPTII,S$GLB,, | Performed by: FAMILY MEDICINE

## 2024-04-08 PROCEDURE — 99999 PR PBB SHADOW E&M-EST. PATIENT-LVL IV: CPT | Mod: PBBFAC,,, | Performed by: FAMILY MEDICINE

## 2024-04-08 PROCEDURE — 3078F DIAST BP <80 MM HG: CPT | Mod: CPTII,S$GLB,, | Performed by: FAMILY MEDICINE

## 2024-04-08 PROCEDURE — 1159F MED LIST DOCD IN RCRD: CPT | Mod: CPTII,S$GLB,, | Performed by: FAMILY MEDICINE

## 2024-04-08 PROCEDURE — 3008F BODY MASS INDEX DOCD: CPT | Mod: CPTII,S$GLB,, | Performed by: FAMILY MEDICINE

## 2024-04-08 RX ORDER — OMEPRAZOLE 40 MG/1
40 CAPSULE, DELAYED RELEASE ORAL DAILY
Qty: 90 CAPSULE | Refills: 3 | Status: SHIPPED | OUTPATIENT
Start: 2024-04-08 | End: 2025-04-08

## 2024-04-08 NOTE — PATIENT INSTRUCTIONS
I believe small hernia noted  Symptoms intermittent  Weight loss  Consider hernia binder/belt  If symptoms worsen, or being more irritation to your quality of life, consider surgery  If pain worsens and doesn't go away, go to the ER.   Consider US and/or general surgery in the future  Can try tylenol for pain if needed.

## 2024-04-08 NOTE — PROGRESS NOTES
Subjective:       Patient ID: Dylan Sawant is a 63 y.o. male.    Chief Complaint: Abdominal Pain and Groin Pain    Dylan is a 63 y.o. male who presents today with groin pain. Triggered by activity such as bowling. This has been ongoing for about 3 weeks. This is not constant. Comes and goes. Pain is improved with walking. Aching pain. Never had this before. He does not feel a bulge. Pain lasts for a few hours. No change in intensity of pain since onset.     Off his PPI, having some bloating and gerd. Wants to restart. Worse with pizza or pasta.     Abdominal Pain  This is a new problem. The current episode started 1 to 4 weeks ago. The onset quality is gradual. The problem occurs daily. The most recent episode lasted 2 hours. The pain is located in the suprapubic region. The pain is at a severity of 5/10. The pain is mild. The quality of the pain is sharp. The abdominal pain does not radiate. Associated symptoms include headaches. Pertinent negatives include no anorexia, arthralgias, belching, constipation, diarrhea, dysuria, fever, flatus, frequency, hematochezia, hematuria, melena, myalgias, nausea, vomiting or weight loss. The pain is aggravated by certain positions and movement. The pain is relieved by Being still and recumbency. He has tried acetaminophen for the symptoms. The treatment provided mild relief. His past medical history is significant for GERD. There is no history of abdominal surgery, colon cancer, Crohn's disease, gallstones, irritable bowel syndrome, pancreatitis, PUD or ulcerative colitis. Patient's medical history does not include kidney stones and UTI.     Review of Systems   Constitutional:  Negative for fever and weight loss.   Gastrointestinal:  Positive for abdominal pain. Negative for anorexia, constipation, diarrhea, flatus, hematochezia, melena, nausea and vomiting.   Genitourinary:  Negative for dysuria, frequency and hematuria.   Musculoskeletal:  Negative for arthralgias and  "myalgias.   Neurological:  Positive for headaches.               Objective:     Vitals:    04/08/24 1609   BP: 130/72   Pulse: 80   Temp: 97.6 °F (36.4 °C)   TempSrc: Oral   SpO2: 99%   Weight: 105.3 kg (232 lb 2.3 oz)   Height: 5' 9" (1.753 m)        Physical Exam  Constitutional:       General: He is not in acute distress.     Appearance: He is not ill-appearing, toxic-appearing or diaphoretic.   Cardiovascular:      Rate and Rhythm: Normal rate and regular rhythm.   Pulmonary:      Effort: Pulmonary effort is normal.      Breath sounds: Normal breath sounds.   Abdominal:          Comments: What appears to be a small defect in the abdominal wall.    Neurological:      Mental Status: He is alert.         Assessment:       1. Non-recurrent unilateral inguinal hernia without obstruction or gangrene    2. GERD without esophagitis        Plan:       I believe small hernia noted  Symptoms intermittent  Weight loss  Consider hernia binder/belt  If symptoms worsen, or being more irritation to your quality of life, consider surgery  If pain worsens and doesn't go away, go to the ER.   Consider US and/or general surgery in the future  Can try tylenol for pain if needed.       Non-recurrent unilateral inguinal hernia without obstruction or gangrene    GERD without esophagitis  -     omeprazole (PRILOSEC) 40 MG capsule; Take 1 capsule (40 mg total) by mouth once daily.  Dispense: 90 capsule; Refill: 3            Warning signs discussed, patient to call with any further issues or worsening of symptoms. Above note may have utilized dictation, please excuse any transcription errors.         "

## 2024-06-14 ENCOUNTER — PATIENT MESSAGE (OUTPATIENT)
Dept: FAMILY MEDICINE | Facility: CLINIC | Age: 64
End: 2024-06-14
Payer: COMMERCIAL

## 2024-06-14 DIAGNOSIS — G25.81 RESTLESS LEGS: ICD-10-CM

## 2024-06-14 RX ORDER — ROPINIROLE 4 MG/1
4 TABLET, FILM COATED ORAL NIGHTLY
Qty: 90 TABLET | Refills: 3 | Status: SHIPPED | OUTPATIENT
Start: 2024-06-14

## 2024-06-14 NOTE — TELEPHONE ENCOUNTER
Refill Decision Note   Dylan Sawant  is requesting a refill authorization.    Brief Assessment and Rationale for Refill:   Approve       Medication Therapy Plan:         Comments:     Note composed:11:08 AM 06/14/2024

## 2024-06-14 NOTE — TELEPHONE ENCOUNTER
No care due was identified.  [facility] Embedded Care Due Messages. Reference number: [id number].   6/14/2024 10:49:46 AM CDT

## 2024-06-20 ENCOUNTER — OFFICE VISIT (OUTPATIENT)
Dept: FAMILY MEDICINE | Facility: CLINIC | Age: 64
End: 2024-06-20
Payer: COMMERCIAL

## 2024-06-20 VITALS
TEMPERATURE: 98 F | HEIGHT: 69 IN | WEIGHT: 211.44 LBS | OXYGEN SATURATION: 97 % | HEART RATE: 77 BPM | DIASTOLIC BLOOD PRESSURE: 68 MMHG | SYSTOLIC BLOOD PRESSURE: 130 MMHG | BODY MASS INDEX: 31.32 KG/M2

## 2024-06-20 DIAGNOSIS — K21.9 GERD WITHOUT ESOPHAGITIS: ICD-10-CM

## 2024-06-20 DIAGNOSIS — F41.1 GAD (GENERALIZED ANXIETY DISORDER): ICD-10-CM

## 2024-06-20 DIAGNOSIS — E78.2 MIXED HYPERLIPIDEMIA: ICD-10-CM

## 2024-06-20 DIAGNOSIS — E55.9 VITAMIN D DEFICIENCY: ICD-10-CM

## 2024-06-20 DIAGNOSIS — F51.01 PRIMARY INSOMNIA: ICD-10-CM

## 2024-06-20 DIAGNOSIS — G25.81 RESTLESS LEGS: ICD-10-CM

## 2024-06-20 DIAGNOSIS — E11.9 TYPE 2 DIABETES MELLITUS WITHOUT COMPLICATION, WITHOUT LONG-TERM CURRENT USE OF INSULIN: Primary | ICD-10-CM

## 2024-06-20 PROCEDURE — 3046F HEMOGLOBIN A1C LEVEL >9.0%: CPT | Mod: CPTII,S$GLB,, | Performed by: FAMILY MEDICINE

## 2024-06-20 PROCEDURE — 3008F BODY MASS INDEX DOCD: CPT | Mod: CPTII,S$GLB,, | Performed by: FAMILY MEDICINE

## 2024-06-20 PROCEDURE — 99214 OFFICE O/P EST MOD 30 MIN: CPT | Mod: S$GLB,,, | Performed by: FAMILY MEDICINE

## 2024-06-20 PROCEDURE — 3075F SYST BP GE 130 - 139MM HG: CPT | Mod: CPTII,S$GLB,, | Performed by: FAMILY MEDICINE

## 2024-06-20 PROCEDURE — 3072F LOW RISK FOR RETINOPATHY: CPT | Mod: CPTII,S$GLB,, | Performed by: FAMILY MEDICINE

## 2024-06-20 PROCEDURE — 1159F MED LIST DOCD IN RCRD: CPT | Mod: CPTII,S$GLB,, | Performed by: FAMILY MEDICINE

## 2024-06-20 PROCEDURE — 1160F RVW MEDS BY RX/DR IN RCRD: CPT | Mod: CPTII,S$GLB,, | Performed by: FAMILY MEDICINE

## 2024-06-20 PROCEDURE — 3078F DIAST BP <80 MM HG: CPT | Mod: CPTII,S$GLB,, | Performed by: FAMILY MEDICINE

## 2024-06-20 PROCEDURE — 99999 PR PBB SHADOW E&M-EST. PATIENT-LVL V: CPT | Mod: PBBFAC,,, | Performed by: FAMILY MEDICINE

## 2024-06-20 RX ORDER — ERGOCALCIFEROL 1.25 MG/1
50000 CAPSULE ORAL
Qty: 12 CAPSULE | Refills: 3 | Status: SHIPPED | OUTPATIENT
Start: 2024-06-20

## 2024-06-20 RX ORDER — ATORVASTATIN CALCIUM 40 MG/1
40 TABLET, FILM COATED ORAL DAILY
Qty: 90 TABLET | Refills: 2 | Status: SHIPPED | OUTPATIENT
Start: 2024-06-20 | End: 2025-03-17

## 2024-06-20 RX ORDER — METFORMIN HYDROCHLORIDE 500 MG/1
1000 TABLET, EXTENDED RELEASE ORAL 2 TIMES DAILY WITH MEALS
Qty: 360 TABLET | Refills: 3 | Status: SHIPPED | OUTPATIENT
Start: 2024-06-20 | End: 2025-06-20

## 2024-06-20 RX ORDER — ESCITALOPRAM OXALATE 20 MG/1
20 TABLET ORAL NIGHTLY
Qty: 90 TABLET | Refills: 1 | Status: SHIPPED | OUTPATIENT
Start: 2024-06-20

## 2024-06-20 RX ORDER — LANCETS
EACH MISCELLANEOUS
Qty: 100 EACH | Refills: 11 | Status: SHIPPED | OUTPATIENT
Start: 2024-06-20

## 2024-06-20 RX ORDER — TRAZODONE HYDROCHLORIDE 150 MG/1
150 TABLET ORAL NIGHTLY PRN
Qty: 90 TABLET | Refills: 3 | Status: SHIPPED | OUTPATIENT
Start: 2024-06-20

## 2024-06-20 RX ORDER — INSULIN PUMP SYRINGE, 3 ML
EACH MISCELLANEOUS
Qty: 1 EACH | Refills: 0 | Status: SHIPPED | OUTPATIENT
Start: 2024-06-20 | End: 2025-06-20

## 2024-06-20 RX ORDER — ROPINIROLE 4 MG/1
4 TABLET, FILM COATED ORAL NIGHTLY
Qty: 90 TABLET | Refills: 3 | Status: SHIPPED | OUTPATIENT
Start: 2024-06-20

## 2024-06-20 RX ORDER — OMEPRAZOLE 40 MG/1
40 CAPSULE, DELAYED RELEASE ORAL DAILY
Qty: 90 CAPSULE | Refills: 3 | Status: SHIPPED | OUTPATIENT
Start: 2024-06-20 | End: 2025-06-20

## 2024-06-20 NOTE — PATIENT INSTRUCTIONS
A1c very uncontrolled  Increase exercise  No more soda  Water only    Metformin 500 mg twice daily  Consider increasing to two pills twice daily if fasting sugar is >130  Blood sugar log  Update me in 2 weeks, may need to add medications.     Continue lexapro 20 mg  Continue trazodone 150 mg nightly  Continue requip  Continue omeprazole  Continue vitamin D    Gabapentin: consider increasing to 1200 mg or taking 900 mg with lunch and then 300-600 mg later in the afternoon.     Blood sugar log given. Bring this back at the next apt    F/u 1 months, 3-4 months. DM. Referral to diabetic education.     Lab Results   Component Value Date    HGBA1C 12.8 (H) 06/19/2024    HGBA1C 6.2 (H) 12/06/2023    HGBA1C 6.6 (H) 10/31/2023       Results for orders placed or performed in visit on 06/19/24   CBC Auto Differential   Result Value Ref Range    WBC 7.76 3.90 - 12.70 K/uL    RBC 5.22 4.60 - 6.20 M/uL    Hemoglobin 14.2 14.0 - 18.0 g/dL    Hematocrit 45.0 40.0 - 54.0 %    MCV 86 82 - 98 fL    MCH 27.2 27.0 - 31.0 pg    MCHC 31.6 (L) 32.0 - 36.0 g/dL    RDW 15.6 (H) 11.5 - 14.5 %    Platelets 259 150 - 450 K/uL    MPV 11.8 9.2 - 12.9 fL    Immature Granulocytes 0.1 0.0 - 0.5 %    Gran # (ANC) 4.8 1.8 - 7.7 K/uL    Immature Grans (Abs) 0.01 0.00 - 0.04 K/uL    Lymph # 2.0 1.0 - 4.8 K/uL    Mono # 0.7 0.3 - 1.0 K/uL    Eos # 0.2 0.0 - 0.5 K/uL    Baso # 0.06 0.00 - 0.20 K/uL    nRBC 0 0 /100 WBC    Gran % 61.5 38.0 - 73.0 %    Lymph % 25.8 18.0 - 48.0 %    Mono % 9.1 4.0 - 15.0 %    Eosinophil % 2.7 0.0 - 8.0 %    Basophil % 0.8 0.0 - 1.9 %    Differential Method Automated    Comprehensive Metabolic Panel   Result Value Ref Range    Sodium 137 136 - 145 mmol/L    Potassium 3.9 3.5 - 5.1 mmol/L    Chloride 102 95 - 110 mmol/L    CO2 24 23 - 29 mmol/L    Glucose 344 (H) 70 - 110 mg/dL    BUN 9 8 - 23 mg/dL    Creatinine 1.1 0.5 - 1.4 mg/dL    Calcium 10.0 8.7 - 10.5 mg/dL    Total Protein 7.2 6.0 - 8.4 g/dL    Albumin 4.0 3.5 - 5.2  g/dL    Total Bilirubin 0.7 0.1 - 1.0 mg/dL    Alkaline Phosphatase 107 55 - 135 U/L    AST 36 10 - 40 U/L    ALT 47 (H) 10 - 44 U/L    eGFR >60.0 >60 mL/min/1.73 m^2    Anion Gap 11 8 - 16 mmol/L   Hemoglobin A1C   Result Value Ref Range    Hemoglobin A1C 12.8 (H) 4.0 - 5.6 %    Estimated Avg Glucose 321 (H) 68 - 131 mg/dL   Lipid Panel   Result Value Ref Range    Cholesterol 169 120 - 199 mg/dL    Triglycerides 303 (H) 30 - 150 mg/dL    HDL 40 40 - 75 mg/dL    LDL Cholesterol 68.4 63.0 - 159.0 mg/dL    HDL/Cholesterol Ratio 23.7 20.0 - 50.0 %    Total Cholesterol/HDL Ratio 4.2 2.0 - 5.0    Non-HDL Cholesterol 129 mg/dL

## 2024-06-20 NOTE — PROGRESS NOTES
Subjective:       Patient ID: Dylan Sawant is a 63 y.o. male.    Chief Complaint: Diabetes and Follow-up    Dylan is a 63 y.o. male who presents today for f/u    Anxiety: on lexapro 20 mg. Taking trazodone 150 mg nightly. No change in meds at this time.   DLD: on atorvastatin.   RLS: has been using sleep apnea machine. Has Restless legs, starts at night. Mostly the left leg. On requip. Neurology increased gabapentin to 900 mg. Still having issues.     Diabetes: he is not taking metformin. He does not check his sugars. He has lost 20 pounds. Mostly unintentionally. He has noticed urinary frequency over the last month or so. Does drink diet coke.     Has been more stressed. Wife is having surgery. Mother is sick. She has dementia. May be passing away soon. She is in hospice.     Review of Systems   Constitutional:  Positive for fatigue. Negative for chills and fever.   Respiratory:  Negative for chest tightness and shortness of breath.    Cardiovascular:  Negative for chest pain.   Gastrointestinal:  Negative for nausea and vomiting.   Endocrine: Positive for polyuria.             Results for orders placed or performed in visit on 06/19/24   CBC Auto Differential   Result Value Ref Range    WBC 7.76 3.90 - 12.70 K/uL    RBC 5.22 4.60 - 6.20 M/uL    Hemoglobin 14.2 14.0 - 18.0 g/dL    Hematocrit 45.0 40.0 - 54.0 %    MCV 86 82 - 98 fL    MCH 27.2 27.0 - 31.0 pg    MCHC 31.6 (L) 32.0 - 36.0 g/dL    RDW 15.6 (H) 11.5 - 14.5 %    Platelets 259 150 - 450 K/uL    MPV 11.8 9.2 - 12.9 fL    Immature Granulocytes 0.1 0.0 - 0.5 %    Gran # (ANC) 4.8 1.8 - 7.7 K/uL    Immature Grans (Abs) 0.01 0.00 - 0.04 K/uL    Lymph # 2.0 1.0 - 4.8 K/uL    Mono # 0.7 0.3 - 1.0 K/uL    Eos # 0.2 0.0 - 0.5 K/uL    Baso # 0.06 0.00 - 0.20 K/uL    nRBC 0 0 /100 WBC    Gran % 61.5 38.0 - 73.0 %    Lymph % 25.8 18.0 - 48.0 %    Mono % 9.1 4.0 - 15.0 %    Eosinophil % 2.7 0.0 - 8.0 %    Basophil % 0.8 0.0 - 1.9 %    Differential Method  "Automated    Comprehensive Metabolic Panel   Result Value Ref Range    Sodium 137 136 - 145 mmol/L    Potassium 3.9 3.5 - 5.1 mmol/L    Chloride 102 95 - 110 mmol/L    CO2 24 23 - 29 mmol/L    Glucose 344 (H) 70 - 110 mg/dL    BUN 9 8 - 23 mg/dL    Creatinine 1.1 0.5 - 1.4 mg/dL    Calcium 10.0 8.7 - 10.5 mg/dL    Total Protein 7.2 6.0 - 8.4 g/dL    Albumin 4.0 3.5 - 5.2 g/dL    Total Bilirubin 0.7 0.1 - 1.0 mg/dL    Alkaline Phosphatase 107 55 - 135 U/L    AST 36 10 - 40 U/L    ALT 47 (H) 10 - 44 U/L    eGFR >60.0 >60 mL/min/1.73 m^2    Anion Gap 11 8 - 16 mmol/L   Hemoglobin A1C   Result Value Ref Range    Hemoglobin A1C 12.8 (H) 4.0 - 5.6 %    Estimated Avg Glucose 321 (H) 68 - 131 mg/dL   Lipid Panel   Result Value Ref Range    Cholesterol 169 120 - 199 mg/dL    Triglycerides 303 (H) 30 - 150 mg/dL    HDL 40 40 - 75 mg/dL    LDL Cholesterol 68.4 63.0 - 159.0 mg/dL    HDL/Cholesterol Ratio 23.7 20.0 - 50.0 %    Total Cholesterol/HDL Ratio 4.2 2.0 - 5.0    Non-HDL Cholesterol 129 mg/dL       Objective:     Vitals:    06/20/24 0816   BP: 130/68   BP Location: Left arm   Patient Position: Sitting   BP Method: Medium (Manual)   Pulse: 77   Temp: 97.7 °F (36.5 °C)   SpO2: 97%   Weight: 95.9 kg (211 lb 6.7 oz)   Height: 5' 9" (1.753 m)        Physical Exam  Vitals and nursing note reviewed.   Constitutional:       General: He is not in acute distress.     Appearance: He is not ill-appearing, toxic-appearing or diaphoretic.   Neurological:      Mental Status: He is alert.       Assessment:       1. Type 2 diabetes mellitus without complication, without long-term current use of insulin    2. BMI 31.0-31.9,adult    3. CASTILLO (generalized anxiety disorder)    4. Primary insomnia    5. GERD without esophagitis    6. Mixed hyperlipidemia    7. Vitamin D deficiency    8. Restless legs        Plan:       A1c very uncontrolled  Increase exercise  No more soda  Water only    Metformin 500 mg twice daily  Consider increasing to two " pills twice daily if fasting sugar is >130  Blood sugar log  Update me in 2 weeks, may need to add medications.     Continue lexapro 20 mg  Continue trazodone 150 mg nightly  Continue requip  Continue omeprazole  Continue vitamin D    Do not take fenofibrate for now.    Gabapentin: consider increasing to 1200 mg or taking 900 mg with lunch and then 300-600 mg later in the afternoon.     Blood sugar log given. Bring this back at the next apt    F/u 1 months, 3-4 months. DM. Referral to diabetic education.         Type 2 diabetes mellitus without complication, without long-term current use of insulin  -     metFORMIN (GLUCOPHAGE-XR) 500 MG ER 24hr tablet; Take 2 tablets (1,000 mg total) by mouth 2 (two) times daily with meals.  Dispense: 360 tablet; Refill: 3  -     blood-glucose meter kit; To check BG 1-3 times daily, to use with insurance preferred meter  Dispense: 1 each; Refill: 0  -     lancets Misc; To check BG 1-3 times daily, to use with insurance preferred meter  Dispense: 100 each; Refill: 11  -     blood sugar diagnostic Strp; To check BG 1-3 times daily, to use with insurance preferred meter  Dispense: 100 each; Refill: 11  -     Ambulatory referral/consult to Diabetes Education; Future; Expected date: 06/27/2024    BMI 31.0-31.9,adult    CASTILLO (generalized anxiety disorder)  -     EScitalopram oxalate (LEXAPRO) 20 MG tablet; Take 1 tablet (20 mg total) by mouth every evening.  Dispense: 90 tablet; Refill: 1    Primary insomnia  -     traZODone (DESYREL) 150 MG tablet; Take 1 tablet (150 mg total) by mouth nightly as needed for Insomnia.  Dispense: 90 tablet; Refill: 3    GERD without esophagitis  -     omeprazole (PRILOSEC) 40 MG capsule; Take 1 capsule (40 mg total) by mouth once daily.  Dispense: 90 capsule; Refill: 3    Mixed hyperlipidemia  -     atorvastatin (LIPITOR) 40 MG tablet; Take 1 tablet (40 mg total) by mouth once daily.  Dispense: 90 tablet; Refill: 2    Vitamin D deficiency  -      ergocalciferol (ERGOCALCIFEROL) 50,000 unit Cap; Take 1 capsule (50,000 Units total) by mouth every 7 days. Then start daily OTC replacement after this Rx is complete  Dispense: 12 capsule; Refill: 3    Restless legs  -     rOPINIRole (REQUIP) 4 MG tablet; Take 1 tablet (4 mg total) by mouth every evening.  Dispense: 90 tablet; Refill: 3

## 2024-06-26 ENCOUNTER — PATIENT MESSAGE (OUTPATIENT)
Dept: FAMILY MEDICINE | Facility: CLINIC | Age: 64
End: 2024-06-26
Payer: COMMERCIAL

## 2024-07-09 ENCOUNTER — CLINICAL SUPPORT (OUTPATIENT)
Dept: DIABETES | Facility: CLINIC | Age: 64
End: 2024-07-09
Payer: COMMERCIAL

## 2024-07-09 ENCOUNTER — PATIENT OUTREACH (OUTPATIENT)
Dept: ADMINISTRATIVE | Facility: HOSPITAL | Age: 64
End: 2024-07-09
Payer: COMMERCIAL

## 2024-07-09 DIAGNOSIS — E11.9 TYPE 2 DIABETES MELLITUS WITHOUT COMPLICATION, WITHOUT LONG-TERM CURRENT USE OF INSULIN: Primary | ICD-10-CM

## 2024-07-09 PROCEDURE — G0108 DIAB MANAGE TRN  PER INDIV: HCPCS | Mod: S$GLB,,, | Performed by: DIETITIAN, REGISTERED

## 2024-07-09 NOTE — PROGRESS NOTES
Diabetes Care Specialist Progress Note  Author: Esther Robbins RD  Date: 7/9/2024    Program Intake  Reason for Diabetes Program Visit:: Initial Diabetes Assessment  Current diabetes risk level:: high  In the last 12 months, have you:: none  Permission to speak with others about care:: no  Continuous Glucose Monitoring  Patient has CGM: No    Lab Results   Component Value Date    HGBA1C 12.8 (H) 06/19/2024       Clinical    Problem Review  Reviewed Problem List with Patient: yes  Active comorbidities affecting diabetes self-care.: yes  Comorbidities: Other (comment) (HLD; obesity)  Reviewed health maintenance: yes    Clinical Assessment  Current Diabetes Treatment: Oral Medication  Have you ever experienced hypoglycemia (low blood sugar)?: no  Have you ever experienced hyperglycemia (high blood sugar)?: yes  In the last month, how often have you experienced high blood sugar?: more than once a day  Are you able to tell when your blood sugar is high?: No (comment)  Have you ever been hospitalized because your blood sugar was high?: no    Medication Information  How do you obtain your medications?: Patient drives  How many days a week do you miss your medications?: Never  Do you sometimes have difficulty refilling your medications?: No  Medication adherence impacting ability to self-manage diabetes?: No    Labs  Do you have regular lab work to monitor your medications?: Yes  Type of Regular Lab Work: A1c, BMP  Lab Compliance Barriers: No    Nutritional Status  Diet: Regular  Meal Plan 24 Hour Recall: Breakfast, Dinner, Lunch, Snack  Meal Plan 24 Hour Recall - Breakfast: eggs and waffle at home with sugar free syrup or [place] breakfast at [facility], diet Myrl; chick Vic A fried chicken biscuit  Meal Plan 24 Hour Recall - Lunch: skips or a turkey sandwich, diet coke  Meal Plan 24 Hour Recall - Dinner: red or white beans and rice, spaghetti, beet salad, frozen meal  Meal Plan 24 Hour Recall - Snack: sugar free  cookies; does not drink sugary beverages  Change in appetite?: No  Dentation:: Intact  Recent Changes in Weight: Weight Loss  Was weight loss intentional or unintentional?: Unintentional (pt stated he lost 20 lbs without trying)  Current nutritional status an area of need that is impacting patient's ability to self-manage diabetes?: Yes    Additional Social History    Support  Does anyone support you with your diabetes care?: no  Who takes you to your medical appointments?: self  Does the current support meet the patient's needs?: Yes  Is Support an area impacting ability to self-manage diabetes?: No    Access to Mass Media & Technology  Does the patient have access to any of the following devices or technologies?: Smart phone  Media or technology needs impacting ability to self-manage diabetes?: No    Cognitive/Behavioral Health  Alert and Oriented: Yes  Difficulty Thinking: No  Requires Prompting: No  Requires assistance for routine expression?: No  Cognitive or behavioral barriers impacting ability to self-manage diabetes?: No         Communication  Language preference: English  Hearing Problems: No  Vision Problems: No  Communication needs impacting ability to self-manage diabetes?: No    Health Literacy  Preferred Learning Method: Face to Face  How often do you need to have someone help you read instructions, pamphlets, or written material from your doctor or pharmacy?: Rarely  Health literacy needs impacting ability to self-manage diabetes?: No      Diabetes Self-Management Skills Assessment    Diabetes Disease Process/Treatment Options  Patient/caregiver able to state what happens when someone has diabetes.: somewhat  Patient/caregiver knows what type of diabetes they have.: yes  Diabetes Type : Type II  Patient/caregiver able to identify at least three signs and symptoms of diabetes.: no  Patient able to identify at least three risk factors for diabetes.: no  Diabetes Disease Process/Treatment Options:  Skills Assessment Completed: Yes  Assessment indicates:: Instruction Needed  Area of need?: Yes    Nutrition/Healthy Eating  Challenges to healthy eating:: portion control, lack of will power, other (see comments) (not wanting to change)  Method of carbohydrate measurement:: no method  Patient can identify foods that impact blood sugar.: yes  Patient-identified foods:: sweets, starches (bread, pasta, rice, cereal), starchy vegetables (corn, peas, beans), soda  Nutrition/Healthy Eating Skills Assessment Completed:: Yes  Assessment indicates:: Instruction Needed  Area of need?: Yes    Physical Activity/Exercise  Patient's daily activity level:: moderately active  Patient formally exercises outside of work.: yes  Exercise Type: other (see comments) (bowling, cutting grass)  Intensity: Low  Frequency: three times a week  Duration: > 1 hour  Patient can identify forms of physical activity.: yes  Stated forms of physical activity:: any movement performed by muscles that uses energy  Patient can identify reasons why exercise/physical activity is important in diabetes management.: no  Physical Activity/Exercise Skills Assessment Completed: : Yes  Assessment indicates:: Instruction Needed  Area of need?: Yes    Medications  Patient is able to describe current diabetes management routine.: yes  Diabetes management routine:: oral medications  Patient is able to identify current diabetes medications, dosages, and appropriate timing of medications.: no  Patient understands the purpose of the medications taken for diabetes.: no  Patient reports problems or concerns with current medication regimen.: no  Medication Skills Assessment Completed:: Yes  Assessment indicates:: Instruction Needed  Area of need?: Yes    Home Blood Glucose Monitoring  Patient states that blood sugar is checked at home daily.: yes  Monitoring Method:: home glucometer  How often do you check your blood sugar?: Twice a day  When do you check your blood  sugar?: Before breakfast, Before bedtime  When you check what is your typical blood sugar range? : Fastin-347 mg/dL; Before bed: 209-399 mg/dL  Blood glucose logs:: yes  Blood glucose logs reviewed today?: yes  Home Blood Glucose Monitoring Skills Assessment Completed: : Yes  Assessment indicates:: Instruction Needed  Area of need?: Yes    Acute Complications  Acute Complications Skills Assessment Completed: : No  Deffered due to:: Time    Chronic Complications  Chronic Complications Skills Assessment Completed: : No  Deferred due to:: Time    Psychosocial/Coping  Psychosocial/Coping Skills Assessment Completed: : No  Deffered due to:: Time      Assessment Summary and Plan    Based on today's diabetes care assessment, the following areas of need were identified:          2024    12:01 AM   Social   Support No   Access to Mass Media/Tech No   Cognitive/Behavioral Health No   Communication No   Health Literacy No            2024    12:01 AM   Clinical   Medication Adherence No   Lab Compliance No   Nutritional Status Yes, see care plan below            2024    12:01 AM   Diabetes Self-Management Skills   Diabetes Disease Process/Treatment Options Yes, reviewed pathos and phys of DM   Nutrition/Healthy Eating Yes, see care plan below   Physical Activity/Exercise Yes, reviewed effects of exercise on BG; discussed frequency, duration and intensity recs   Medication Yes, reviewed MOA of metformin   Home Blood Glucose Monitoring Yes, reviewed target blood sugar and A1c goals.          Today's interventions were provided through individual discussion, instruction, and written materials were provided.      Patient verbalized understanding of instruction and written materials.  Pt was able to return back demonstration of instructions today. Patient understood key points, needs reinforcement and further instruction.     Diabetes Self-Management Care Plan:    Today's Diabetes Self-Management Care Plan was  developed with Dylan Mann's input. Dylan Mann has agreed to work toward the following goal(s) to improve his/her overall diabetes control.      Care Plan: Diabetes Management   Updates made since 6/9/2024 12:00 AM        Problem: Healthy Eating         Goal: Eat 3 meals daily with 45g/3 servings of Carbohydrate per meal.    Start Date: 7/9/2024   Expected End Date: 7/31/2025   Priority: High   Barriers: No Barriers Identified; Lack of Motivation to Change   Note:    7/9/24: Pt eats 2 meals/day. Skips lunch most of the time. Does not drink sugary beverages; mostly diet coke and crystal light; not much water but he will try. Eats fast food 3x/week. Pt needs to focus on carbs per meal. Only likes 3 vegetables and one fruit; probably will not change.        Task: Reviewed the sources and role of Carbohydrate, Protein, and Fat and how each nutrient impacts blood sugar. Completed 7/9/2024        Task: Provided visual examples using dry measuring cups, food models, and other familiar objects such as computer mouse, deck or cards, tennis ball etc. to help with visualization of portions. Completed 7/9/2024        Task: Explained how to count carbohydrates using the food label and the use of dry measuring cups for accurate carb counting. Completed 7/9/2024       Task: Review the importance of balancing carbohydrates with each meal using portion control techniques to count servings of carbohydrate and label reading to identify serving size and amount of total carbs per serving. Completed 7/9/2024         Follow Up Plan     Follow up in about 2 months (around 9/9/2024). Review A1c, BG log; physical activity level, dietary changes, complications    Today's care plan and follow up schedule was discussed with patient.  Dylan Mann verbalized understanding of the care plan, goals, and agrees to follow up plan.        The patient was encouraged to communicate with his/her health care provider/physician and care team regarding  his/her condition(s) and treatment.  I provided the patient with my contact information today and encouraged to contact me via phone or [facility] Patient Portal as needed.     Length of Visit   Total Time: 60 Minutes

## 2024-07-09 NOTE — PROGRESS NOTES
07/09/2024  VB chart audit performed. Care Everywhere updates requested and reviewed  Overdue HM topic chart audit and/or requested. LINKS triggered and reconciled. Media reviewed

## 2024-07-12 ENCOUNTER — PATIENT MESSAGE (OUTPATIENT)
Dept: FAMILY MEDICINE | Facility: CLINIC | Age: 64
End: 2024-07-12
Payer: COMMERCIAL

## 2024-07-12 RX ORDER — GABAPENTIN 300 MG/1
900 CAPSULE ORAL
Qty: 270 CAPSULE | Refills: 3 | Status: SHIPPED | OUTPATIENT
Start: 2024-07-12 | End: 2025-07-12

## 2024-07-12 NOTE — TELEPHONE ENCOUNTER
Care Due:                  Date            Visit Type   Department     Provider  --------------------------------------------------------------------------------                                EP -                              PRIMARY      [facility]  Last Visit: 06-      CARE ([facility])   [facility]       Roland Swenson                              EP -                              PRIMARY      [facility]  Next Visit: 07-      CARE ([facility])   MEDICINE       Roland  Madrecha                                                            Last  Test          Frequency    Reason                     Performed    Due Date  --------------------------------------------------------------------------------    Vitamin D...  12 months..  ergocalciferol...........  11- 11-    Health [facility] Embedded Care Due Messages. Reference number: [id number].   7/12/2024 3:12:11 PM CDT

## 2024-07-12 NOTE — TELEPHONE ENCOUNTER
Refill Routing Note   Medication(s) are not appropriate for processing by [facility] for the following reason(s):        Outside of protocol    [facility] action(s):  Route               Appointments  past 12m or future 3m with PCP    Date Provider   Last Visit   6/20/2024 Roland Swenson DO   Next Visit   7/17/2024 Roland Swenson,    ED visits in past 90 days: 0        Note composed:5:14 PM 07/12/2024

## 2024-07-17 ENCOUNTER — OFFICE VISIT (OUTPATIENT)
Dept: FAMILY MEDICINE | Facility: CLINIC | Age: 64
End: 2024-07-17
Payer: COMMERCIAL

## 2024-07-17 ENCOUNTER — PATIENT MESSAGE (OUTPATIENT)
Dept: FAMILY MEDICINE | Facility: CLINIC | Age: 64
End: 2024-07-17

## 2024-07-17 VITALS
SYSTOLIC BLOOD PRESSURE: 118 MMHG | DIASTOLIC BLOOD PRESSURE: 70 MMHG | HEART RATE: 55 BPM | OXYGEN SATURATION: 98 % | BODY MASS INDEX: 30.36 KG/M2 | WEIGHT: 205 LBS | HEIGHT: 69 IN

## 2024-07-17 DIAGNOSIS — E11.9 TYPE 2 DIABETES MELLITUS WITHOUT COMPLICATION, WITHOUT LONG-TERM CURRENT USE OF INSULIN: Primary | ICD-10-CM

## 2024-07-17 PROCEDURE — 3072F LOW RISK FOR RETINOPATHY: CPT | Mod: CPTII,S$GLB,, | Performed by: FAMILY MEDICINE

## 2024-07-17 PROCEDURE — 1160F RVW MEDS BY RX/DR IN RCRD: CPT | Mod: CPTII,S$GLB,, | Performed by: FAMILY MEDICINE

## 2024-07-17 PROCEDURE — 99214 OFFICE O/P EST MOD 30 MIN: CPT | Mod: S$GLB,,, | Performed by: FAMILY MEDICINE

## 2024-07-17 PROCEDURE — 3078F DIAST BP <80 MM HG: CPT | Mod: CPTII,S$GLB,, | Performed by: FAMILY MEDICINE

## 2024-07-17 PROCEDURE — 3046F HEMOGLOBIN A1C LEVEL >9.0%: CPT | Mod: CPTII,S$GLB,, | Performed by: FAMILY MEDICINE

## 2024-07-17 PROCEDURE — 3008F BODY MASS INDEX DOCD: CPT | Mod: CPTII,S$GLB,, | Performed by: FAMILY MEDICINE

## 2024-07-17 PROCEDURE — 99999 PR PBB SHADOW E&M-EST. PATIENT-LVL V: CPT | Mod: PBBFAC,,, | Performed by: FAMILY MEDICINE

## 2024-07-17 PROCEDURE — 3074F SYST BP LT 130 MM HG: CPT | Mod: CPTII,S$GLB,, | Performed by: FAMILY MEDICINE

## 2024-07-17 PROCEDURE — 1159F MED LIST DOCD IN RCRD: CPT | Mod: CPTII,S$GLB,, | Performed by: FAMILY MEDICINE

## 2024-07-17 RX ORDER — GLIMEPIRIDE 1 MG/1
1 TABLET ORAL
Qty: 90 TABLET | Refills: 3 | Status: SHIPPED | OUTPATIENT
Start: 2024-07-17 | End: 2025-07-17

## 2024-07-17 NOTE — PATIENT INSTRUCTIONS
Increase metformin to 4 pills/day  Start jardiance 10 mg- be aware of Urinary infections or rectal infections with this class of medications. If you have symptoms, come see me immediately.     Goal fasting sugars

## 2024-07-17 NOTE — PROGRESS NOTES
"Subjective:       Patient ID: Dylan Sawant is a 63 y.o. male.    Chief Complaint: Diabetes    Dylan is a 63 y.o. male who presents today for f/u on his diabetes  He is taking only 3 metformin/day.     Sugars are coming down from 300, to 170 this AM  Not eating as much    He has lost weight. About 6 pounds.     Sometimes has diarrhea, maybe due to metformin?    Still stressed about family. His MINDA passed away.       Review of Systems   Constitutional:  Negative for chills and fever.   Respiratory:  Negative for shortness of breath.    Cardiovascular:  Negative for chest pain.   Gastrointestinal:  Positive for diarrhea. Negative for abdominal pain, nausea and vomiting.   Neurological:  Negative for dizziness, light-headedness and headaches.           Objective:     Vitals:    07/17/24 0903   BP: 118/70   BP Location: Left arm   Patient Position: Sitting   BP Method: Large (Manual)   Pulse: (!) 55   SpO2: 98%   Weight: 93 kg (205 lb 0.4 oz)   Height: 5' 9" (1.753 m)        Physical Exam  Constitutional:       General: He is not in acute distress.     Appearance: He is not ill-appearing, toxic-appearing or diaphoretic.   Cardiovascular:      Rate and Rhythm: Normal rate and regular rhythm.   Pulmonary:      Effort: Pulmonary effort is normal.      Breath sounds: Normal breath sounds.   Abdominal:      Palpations: Abdomen is soft.      Tenderness: There is no abdominal tenderness.   Musculoskeletal:         General: No swelling.   Neurological:      General: No focal deficit present.      Mental Status: He is alert.   Psychiatric:         Mood and Affect: Mood normal.         Behavior: Behavior normal.         Thought Content: Thought content normal.         Judgment: Judgment normal.         Assessment:       1. Type 2 diabetes mellitus without complication, without long-term current use of insulin        Plan:       Increase metformin to 4 pills/day  Start jardiance 10 mg- be aware of Urinary infections or rectal " infections with this class of medications. If you have symptoms, come see me immediately.     Goal fasting sugars         Type 2 diabetes mellitus without complication, without long-term current use of insulin  -     empagliflozin (JARDIANCE) 10 mg tablet; Take 1 tablet (10 mg total) by mouth once daily.  Dispense: 90 tablet; Refill: 1  -     Comprehensive Metabolic Panel; Future; Expected date: 07/17/2024  -     CBC Auto Differential; Future; Expected date: 07/17/2024  -     Hemoglobin A1C; Future; Expected date: 07/17/2024

## 2024-08-28 DIAGNOSIS — Z96.641 S/P HIP REPLACEMENT, RIGHT: ICD-10-CM

## 2024-08-28 DIAGNOSIS — G25.81 RESTLESS LEGS: ICD-10-CM

## 2024-08-28 NOTE — TELEPHONE ENCOUNTER
No care due was identified.  Health [facility] Embedded Care Due Messages. Reference number: [id number].   8/28/2024 6:50:10 PM CDT

## 2024-08-29 RX ORDER — MELOXICAM 15 MG/1
15 TABLET ORAL DAILY
Qty: 90 TABLET | Refills: 0 | Status: SHIPPED | OUTPATIENT
Start: 2024-08-29 | End: 2024-11-27

## 2024-08-29 RX ORDER — ROPINIROLE 4 MG/1
4 TABLET, FILM COATED ORAL NIGHTLY
Qty: 90 TABLET | Refills: 3 | Status: SHIPPED | OUTPATIENT
Start: 2024-08-29

## 2024-08-29 NOTE — TELEPHONE ENCOUNTER
Refill Decision Note   Dylan Sawant  is requesting a refill authorization.  Brief Assessment and Rationale for Refill:  Approve     Medication Therapy Plan:         Comments:     Note composed:7:26 AM 08/29/2024

## 2024-09-01 ENCOUNTER — PATIENT MESSAGE (OUTPATIENT)
Dept: ORTHOPEDICS | Facility: CLINIC | Age: 64
End: 2024-09-01
Payer: COMMERCIAL

## 2024-09-09 ENCOUNTER — CLINICAL SUPPORT (OUTPATIENT)
Dept: DIABETES | Facility: CLINIC | Age: 64
End: 2024-09-09
Payer: COMMERCIAL

## 2024-09-09 VITALS — BODY MASS INDEX: 32 KG/M2 | HEIGHT: 69 IN | WEIGHT: 216.06 LBS

## 2024-09-09 DIAGNOSIS — E11.65 TYPE 2 DIABETES MELLITUS WITH HYPERGLYCEMIA, WITHOUT LONG-TERM CURRENT USE OF INSULIN: Primary | ICD-10-CM

## 2024-09-09 PROCEDURE — G0108 DIAB MANAGE TRN  PER INDIV: HCPCS | Mod: S$GLB,,, | Performed by: DIETITIAN, REGISTERED

## 2024-09-09 PROCEDURE — 99999 PR PBB SHADOW E&M-EST. PATIENT-LVL I: CPT | Mod: PBBFAC,,, | Performed by: DIETITIAN, REGISTERED

## 2024-09-09 NOTE — PROGRESS NOTES
"Diabetes Care Specialist Progress Note  Author: Esther Robbins RD  Date: 9/9/2024    Intake    Program Intake  Reason for Diabetes Program Visit:: Intervention  Type of Intervention:: Individual  Individual: Education  Education: Nutrition and Meal Planning, Pattern Management  Current diabetes risk level:: high  In the last 12 months, have you:: none  Permission to speak with others about care:: no    Current Diabetes Treatment: Oral Medications  Oral Medication Type/Dose: Glimepiride 1 mg; Metformin 1000mg BID    Continuous Glucose Monitoring  Patient has CGM: No    Lab Results   Component Value Date    HGBA1C 12.8 (H) 06/19/2024       Weight: 98 kg (216 lb 0.8 oz)   Height: 5' 9" (175.3 cm)   Body mass index is 31.91 kg/m².    Lifestyle Coping Support & Clinical    Lifestyle/Coping/Support  Does anyone in your family have diabetes or does anyone in your family support you in your diabetes care?: does not get support; wife disabled and pt is responsible for everything at home  List anything about Diabetes that causes you stress?: nothing  How do you deal with stress/distress?: just tries to get things done  Learning Barriers:: None  Culture or Religion beliefs that may impact ability to access healthcare: No  Psychosocial/Coping Skills Assessment Completed: : Yes  Assessment indicates:: Adequate understanding  Area of need?: No    Problem Review  Active Comorbidities: Obesity, Hyperlipidemia/Dyslipidemia    Diabetes Self-Management Skills Assessment    Medication Skills Assessment  Patient is able to identify current diabetes medications, dosages, and appropriate timing of medications.: yes  Patient reports problems or concerns with current medication regimen.: no  Patient is  aware that some diabetes medications can cause low blood sugar?: Yes  Medication Skills Assessment Completed:: Yes  Assessment indicates:: Adequate understanding  Area of need?: No    Diabetes Disease Process/Treatment Options  Diabetes " Type?: Type II  Assessment indicates:: Instruction Needed  Area of need?: Yes    Nutrition/Healthy Eating  Meal Plan 24 Hour Recall - Breakfast: eggs and waffle at home with sugar free syrup or [place] breakfast at [facility], diet coke; chick Vic A fried chicken biscuit  Meal Plan 24 Hour Recall - Lunch: skips or a turkey sandwich, diet coke  Meal Plan 24 Hour Recall - Dinner: red or white beans and rice, spaghetti, beet salad, frozen meal  Meal Plan 24 Hour Recall - Snack: sugar free cookies; does not drink sugary beverages  Patient can identify foods that impact blood sugar.: yes  Challenges to healthy eating:: portion control, lack of will power, other (see comments) (not wanting to change)  Assessment indicates:: Instruction Needed  Area of need?: Yes    Physical Activity/Exercise  Patient's daily activity level:: moderately active  Patient formally exercises outside of work.: yes  Frequency: three times a week  Patient can identify forms of physical activity.: yes  Assessment indicates:: Instruction Needed  Area of need?: Yes    Home Blood Glucose Monitoring  Patient states that blood sugar is checked at home daily.: yes  Monitoring Method:: home glucometer  Fasting BG range history:: Fasting  mg/dL; Bedtime: 150s generally  What are your blood glucose targets?: Fastin-130mg/dL; before bed: 100-150mg/dL  How often do you check your blood sugar?: 2x/day  What is your A1c Target?: does not know  Home Blood Glucose Monitoring Skills Assessment Completed: : Yes  Assessment indicates:: Instruction Needed  Area of need?: Yes    Acute Complications  Deffered due to:: Time    Chronic Complications  Reviewed health maintenance: yes  Deferred due to:: Time      Assessment Summary and Plan    Based on today's diabetes care assessment, the following areas of need were identified:          2024    12:01 AM   Areas of Need   Medications/Current Diabetes Treatment No   Lifestyle Coping Support No    Diabetes Disease Process/Treatment Options Yes, reviewed previously   Nutrition/Healthy Eating Yes, see care plan below   Physical Activity/Exercise Yes, reviewed previously   Home Blood Glucose Monitoring Yes, reviewed previously       Today's interventions were provided through individual discussion, instruction, and written materials were provided.      Patient verbalized understanding of instruction and written materials.  Pt was able to return back demonstration of instructions today. Patient understood key points, needs reinforcement and further instruction.     Diabetes Self-Management Care Plan:    Today's Diabetes Self-Management Care Plan was developed with Dylan Mann's input. Dylan Mann has agreed to work toward the following goal(s) to improve his/her overall diabetes control.      Care Plan: Diabetes Management   Updates made since 8/10/2024 12:00 AM        Problem: Healthy Eating         Goal: Eat 3 meals daily with 45g/3 servings of Carbohydrate per meal.    Start Date: 7/9/2024   Expected End Date: 7/31/2025   Priority: High   Barriers: No Barriers Identified; Lack of Motivation to Change   Note:    9/9/24: Pt's BG has come down quite a bit to fastings of mostly  mg/dL. He has stopped eating sugary cereals and now eats cheerios. Has not changed much else in his diet. He takes his medication which has most likely helped the most. He is a very picky eater, does not like a lot of fruits and veggies and will not try any new ones. Pt stays active by bowling 2x/week and cutting grass 3x/week. Recommended that when he stops grass cutting weekly, he stay active during that time by walking or bike riding.    7/9/24: Pt eats 2 meals/day. Skips lunch most of the time. Does not drink sugary beverages; mostly diet coke and crystal light; not much water but he will try. Eats fast food 3x/week. Pt needs to focus on carbs per meal. Only likes 3 vegetables and one fruit; probably will not change.           Follow Up Plan     Follow up in about 3 months (around 12/9/2024). Pt declined to schedule f/u appt.    Today's care plan and follow up schedule was discussed with patient.  Dylan Mann verbalized understanding of the care plan, goals, and agrees to follow up plan.        The patient was encouraged to communicate with his/her health care provider/physician and care team regarding his/her condition(s) and treatment.  I provided the patient with my contact information today and encouraged to contact me via phone or [facility] Patient Portal as needed.     Length of Visit   Total Time: 30 Minutes

## 2024-10-01 ENCOUNTER — PATIENT OUTREACH (OUTPATIENT)
Dept: ADMINISTRATIVE | Facility: HOSPITAL | Age: 64
End: 2024-10-01
Payer: COMMERCIAL

## 2024-10-21 ENCOUNTER — LAB VISIT (OUTPATIENT)
Dept: LAB | Facility: HOSPITAL | Age: 64
End: 2024-10-21
Attending: FAMILY MEDICINE
Payer: COMMERCIAL

## 2024-10-21 DIAGNOSIS — E11.9 TYPE 2 DIABETES MELLITUS WITHOUT COMPLICATION, WITHOUT LONG-TERM CURRENT USE OF INSULIN: ICD-10-CM

## 2024-10-21 LAB
ALBUMIN SERPL BCP-MCNC: 4.1 G/DL (ref 3.5–5.2)
ALP SERPL-CCNC: 77 U/L (ref 40–150)
ALT SERPL W/O P-5'-P-CCNC: 26 U/L (ref 10–44)
ANION GAP SERPL CALC-SCNC: 10 MMOL/L (ref 8–16)
AST SERPL-CCNC: 19 U/L (ref 10–40)
BASOPHILS # BLD AUTO: 0.05 K/UL (ref 0–0.2)
BASOPHILS NFR BLD: 0.6 % (ref 0–1.9)
BILIRUB SERPL-MCNC: 1.2 MG/DL (ref 0.1–1)
BUN SERPL-MCNC: 15 MG/DL (ref 8–23)
CALCIUM SERPL-MCNC: 10 MG/DL (ref 8.7–10.5)
CHLORIDE SERPL-SCNC: 103 MMOL/L (ref 95–110)
CO2 SERPL-SCNC: 27 MMOL/L (ref 23–29)
CREAT SERPL-MCNC: 0.9 MG/DL (ref 0.5–1.4)
DIFFERENTIAL METHOD BLD: ABNORMAL
EOSINOPHIL # BLD AUTO: 0.2 K/UL (ref 0–0.5)
EOSINOPHIL NFR BLD: 2.1 % (ref 0–8)
ERYTHROCYTE [DISTWIDTH] IN BLOOD BY AUTOMATED COUNT: 15.9 % (ref 11.5–14.5)
EST. GFR  (NO RACE VARIABLE): >60 ML/MIN/1.73 M^2
ESTIMATED AVG GLUCOSE: 154 MG/DL (ref 68–131)
GLUCOSE SERPL-MCNC: 120 MG/DL (ref 70–110)
HBA1C MFR BLD: 7 % (ref 4–5.6)
HCT VFR BLD AUTO: 45.5 % (ref 40–54)
HGB BLD-MCNC: 14.4 G/DL (ref 14–18)
IMM GRANULOCYTES # BLD AUTO: 0.03 K/UL (ref 0–0.04)
IMM GRANULOCYTES NFR BLD AUTO: 0.3 % (ref 0–0.5)
LYMPHOCYTES # BLD AUTO: 2.3 K/UL (ref 1–4.8)
LYMPHOCYTES NFR BLD: 25.1 % (ref 18–48)
MCH RBC QN AUTO: 26.6 PG (ref 27–31)
MCHC RBC AUTO-ENTMCNC: 31.6 G/DL (ref 32–36)
MCV RBC AUTO: 84 FL (ref 82–98)
MONOCYTES # BLD AUTO: 0.7 K/UL (ref 0.3–1)
MONOCYTES NFR BLD: 8.1 % (ref 4–15)
NEUTROPHILS # BLD AUTO: 5.8 K/UL (ref 1.8–7.7)
NEUTROPHILS NFR BLD: 63.8 % (ref 38–73)
NRBC BLD-RTO: 0 /100 WBC
PLATELET # BLD AUTO: 248 K/UL (ref 150–450)
PMV BLD AUTO: 10.7 FL (ref 9.2–12.9)
POTASSIUM SERPL-SCNC: 3.9 MMOL/L (ref 3.5–5.1)
PROT SERPL-MCNC: 7.4 G/DL (ref 6–8.4)
RBC # BLD AUTO: 5.42 M/UL (ref 4.6–6.2)
SODIUM SERPL-SCNC: 140 MMOL/L (ref 136–145)
WBC # BLD AUTO: 9.02 K/UL (ref 3.9–12.7)

## 2024-10-21 PROCEDURE — 36415 COLL VENOUS BLD VENIPUNCTURE: CPT | Mod: PO | Performed by: FAMILY MEDICINE

## 2024-10-21 PROCEDURE — 85025 COMPLETE CBC W/AUTO DIFF WBC: CPT | Performed by: FAMILY MEDICINE

## 2024-10-21 PROCEDURE — 80053 COMPREHEN METABOLIC PANEL: CPT | Performed by: FAMILY MEDICINE

## 2024-10-21 PROCEDURE — 83036 HEMOGLOBIN GLYCOSYLATED A1C: CPT | Performed by: FAMILY MEDICINE

## 2024-10-28 ENCOUNTER — OFFICE VISIT (OUTPATIENT)
Dept: FAMILY MEDICINE | Facility: CLINIC | Age: 64
End: 2024-10-28
Payer: COMMERCIAL

## 2024-10-28 VITALS
HEART RATE: 58 BPM | DIASTOLIC BLOOD PRESSURE: 70 MMHG | WEIGHT: 213.63 LBS | TEMPERATURE: 97 F | HEIGHT: 69 IN | BODY MASS INDEX: 31.64 KG/M2 | SYSTOLIC BLOOD PRESSURE: 118 MMHG | OXYGEN SATURATION: 98 %

## 2024-10-28 DIAGNOSIS — F51.01 PRIMARY INSOMNIA: ICD-10-CM

## 2024-10-28 DIAGNOSIS — G25.81 RESTLESS LEGS: ICD-10-CM

## 2024-10-28 DIAGNOSIS — Z00.00 VISIT FOR WELL MAN HEALTH CHECK: ICD-10-CM

## 2024-10-28 DIAGNOSIS — F41.1 GAD (GENERALIZED ANXIETY DISORDER): ICD-10-CM

## 2024-10-28 DIAGNOSIS — K21.9 GERD WITHOUT ESOPHAGITIS: ICD-10-CM

## 2024-10-28 DIAGNOSIS — Z23 NEED FOR VACCINATION AGAINST STREPTOCOCCUS PNEUMONIAE: ICD-10-CM

## 2024-10-28 DIAGNOSIS — E11.9 TYPE 2 DIABETES MELLITUS WITHOUT COMPLICATION, WITHOUT LONG-TERM CURRENT USE OF INSULIN: Primary | ICD-10-CM

## 2024-10-28 DIAGNOSIS — E78.2 MIXED HYPERLIPIDEMIA: ICD-10-CM

## 2024-10-28 PROCEDURE — 3078F DIAST BP <80 MM HG: CPT | Mod: CPTII,S$GLB,, | Performed by: FAMILY MEDICINE

## 2024-10-28 PROCEDURE — 3051F HG A1C>EQUAL 7.0%<8.0%: CPT | Mod: CPTII,S$GLB,, | Performed by: FAMILY MEDICINE

## 2024-10-28 PROCEDURE — 3074F SYST BP LT 130 MM HG: CPT | Mod: CPTII,S$GLB,, | Performed by: FAMILY MEDICINE

## 2024-10-28 PROCEDURE — 3008F BODY MASS INDEX DOCD: CPT | Mod: CPTII,S$GLB,, | Performed by: FAMILY MEDICINE

## 2024-10-28 PROCEDURE — 99214 OFFICE O/P EST MOD 30 MIN: CPT | Mod: 25,S$GLB,, | Performed by: FAMILY MEDICINE

## 2024-10-28 PROCEDURE — 1159F MED LIST DOCD IN RCRD: CPT | Mod: CPTII,S$GLB,, | Performed by: FAMILY MEDICINE

## 2024-10-28 PROCEDURE — 90677 PCV20 VACCINE IM: CPT | Mod: S$GLB,,, | Performed by: FAMILY MEDICINE

## 2024-10-28 PROCEDURE — 3072F LOW RISK FOR RETINOPATHY: CPT | Mod: CPTII,S$GLB,, | Performed by: FAMILY MEDICINE

## 2024-10-28 PROCEDURE — 90471 IMMUNIZATION ADMIN: CPT | Mod: S$GLB,,, | Performed by: FAMILY MEDICINE

## 2024-10-28 PROCEDURE — 99999 PR PBB SHADOW E&M-EST. PATIENT-LVL IV: CPT | Mod: PBBFAC,,, | Performed by: FAMILY MEDICINE

## 2024-10-28 RX ORDER — GABAPENTIN 300 MG/1
900 CAPSULE ORAL NIGHTLY
Qty: 270 CAPSULE | Refills: 3 | Status: SHIPPED | OUTPATIENT
Start: 2024-10-28 | End: 2025-10-28

## 2024-10-28 RX ORDER — METFORMIN HYDROCHLORIDE 500 MG/1
1000 TABLET, EXTENDED RELEASE ORAL 2 TIMES DAILY WITH MEALS
Qty: 360 TABLET | Refills: 3 | Status: SHIPPED | OUTPATIENT
Start: 2024-10-28 | End: 2025-10-28

## 2024-10-28 RX ORDER — TRAZODONE HYDROCHLORIDE 150 MG/1
150 TABLET ORAL NIGHTLY PRN
Qty: 90 TABLET | Refills: 3 | Status: SHIPPED | OUTPATIENT
Start: 2024-10-28

## 2024-10-28 RX ORDER — ESCITALOPRAM OXALATE 20 MG/1
20 TABLET ORAL NIGHTLY
Qty: 90 TABLET | Refills: 1 | Status: SHIPPED | OUTPATIENT
Start: 2024-10-28

## 2024-10-28 RX ORDER — ROPINIROLE 4 MG/1
4 TABLET, FILM COATED ORAL NIGHTLY
Qty: 90 TABLET | Refills: 3 | Status: SHIPPED | OUTPATIENT
Start: 2024-10-28

## 2024-10-28 RX ORDER — OMEPRAZOLE 40 MG/1
40 CAPSULE, DELAYED RELEASE ORAL DAILY
Qty: 90 CAPSULE | Refills: 3 | Status: SHIPPED | OUTPATIENT
Start: 2024-10-28 | End: 2025-10-28

## 2024-10-28 RX ORDER — GLIMEPIRIDE 2 MG/1
2 TABLET ORAL
Qty: 90 TABLET | Refills: 3 | Status: SHIPPED | OUTPATIENT
Start: 2024-10-28 | End: 2025-10-28

## 2024-10-28 RX ORDER — ATORVASTATIN CALCIUM 40 MG/1
40 TABLET, FILM COATED ORAL DAILY
Qty: 90 TABLET | Refills: 2 | Status: SHIPPED | OUTPATIENT
Start: 2024-10-28 | End: 2025-07-25

## 2024-11-01 ENCOUNTER — TELEPHONE (OUTPATIENT)
Dept: OPTOMETRY | Facility: CLINIC | Age: 64
End: 2024-11-01
Payer: COMMERCIAL

## 2024-11-04 ENCOUNTER — OFFICE VISIT (OUTPATIENT)
Dept: OPTOMETRY | Facility: CLINIC | Age: 64
End: 2024-11-04
Payer: COMMERCIAL

## 2024-11-04 DIAGNOSIS — H43.811 PVD (POSTERIOR VITREOUS DETACHMENT), RIGHT EYE: ICD-10-CM

## 2024-11-04 DIAGNOSIS — H04.123 DRY EYE SYNDROME, BILATERAL: ICD-10-CM

## 2024-11-04 DIAGNOSIS — H52.203 MYOPIA OF BOTH EYES WITH ASTIGMATISM AND PRESBYOPIA: ICD-10-CM

## 2024-11-04 DIAGNOSIS — E11.9 TYPE 2 DIABETES MELLITUS WITHOUT COMPLICATION, WITHOUT LONG-TERM CURRENT USE OF INSULIN: Primary | ICD-10-CM

## 2024-11-04 DIAGNOSIS — E11.9 TYPE 2 DIABETES MELLITUS WITHOUT RETINOPATHY: ICD-10-CM

## 2024-11-04 DIAGNOSIS — H52.13 MYOPIA OF BOTH EYES WITH ASTIGMATISM AND PRESBYOPIA: ICD-10-CM

## 2024-11-04 DIAGNOSIS — H52.4 MYOPIA OF BOTH EYES WITH ASTIGMATISM AND PRESBYOPIA: ICD-10-CM

## 2024-11-04 DIAGNOSIS — H25.013 CORTICAL AGE-RELATED CATARACT, BILATERAL: ICD-10-CM

## 2024-11-04 DIAGNOSIS — G47.33 OSA (OBSTRUCTIVE SLEEP APNEA): ICD-10-CM

## 2024-11-04 PROCEDURE — 92015 DETERMINE REFRACTIVE STATE: CPT | Mod: S$GLB,,, | Performed by: OPTOMETRIST

## 2024-11-04 PROCEDURE — 99999 PR PBB SHADOW E&M-EST. PATIENT-LVL III: CPT | Mod: PBBFAC,,, | Performed by: OPTOMETRIST

## 2024-11-04 PROCEDURE — 92014 COMPRE OPH EXAM EST PT 1/>: CPT | Mod: S$GLB,,, | Performed by: OPTOMETRIST

## 2024-11-04 PROCEDURE — 3051F HG A1C>EQUAL 7.0%<8.0%: CPT | Mod: CPTII,S$GLB,, | Performed by: OPTOMETRIST

## 2024-11-04 PROCEDURE — 2023F DILAT RTA XM W/O RTNOPTHY: CPT | Mod: CPTII,S$GLB,, | Performed by: OPTOMETRIST

## 2024-11-04 PROCEDURE — 1159F MED LIST DOCD IN RCRD: CPT | Mod: CPTII,S$GLB,, | Performed by: OPTOMETRIST

## 2024-11-04 NOTE — PROGRESS NOTES
CRUZITO    MICHAEL: 7/7/2023 - Dr. Chatman     CC: Pt is here today for a routine eye exam. Pt states that his vision has   been stable since his last exam.      (-)Dryness  (-)Burning  (-)Itchiness  (-)Tearing  (-)Flashes  (-)Floaters   (+)Photophobia  (-)Eye Pain      Diabetic: yes  A1C: 7.0    Contact Lens: no    Eye Meds: none    PD: 63.5    Last edited by Jamilah Oliveira MA on 11/4/2024  8:03 AM.            Assessment /Plan     For exam results, see Encounter Report.    Type 2 diabetes mellitus without complication, without long-term current use of insulin  Type 2 diabetes mellitus without retinopathy   No retinopathy, monitor yearly    KELLY (obstructive sleep apnea)  Dry eye syndrome, bilateral   Start IVIZIA BID OU    PVD (posterior vitreous detachment), right eye   Stable, monitor    Cortical age-related cataract, bilateral   OD>>OS   Monitor    Myopia of both eyes with astigmatism and presbyopia   Rx specs     RTC  1 year

## 2024-12-27 ENCOUNTER — PATIENT MESSAGE (OUTPATIENT)
Dept: OPTOMETRY | Facility: CLINIC | Age: 64
End: 2024-12-27
Payer: COMMERCIAL

## 2025-01-01 ENCOUNTER — RESULTS FOLLOW-UP (OUTPATIENT)
Dept: FAMILY MEDICINE | Facility: CLINIC | Age: 65
End: 2025-01-01

## 2025-01-01 ENCOUNTER — HOSPITAL ENCOUNTER (INPATIENT)
Facility: HOSPITAL | Age: 65
LOS: 3 days | DRG: 435 | End: 2025-06-14
Attending: EMERGENCY MEDICINE | Admitting: FAMILY MEDICINE
Payer: COMMERCIAL

## 2025-01-01 ENCOUNTER — PATIENT MESSAGE (OUTPATIENT)
Dept: FAMILY MEDICINE | Facility: CLINIC | Age: 65
End: 2025-01-01
Payer: COMMERCIAL

## 2025-01-01 ENCOUNTER — TELEPHONE (OUTPATIENT)
Dept: HEMATOLOGY/ONCOLOGY | Facility: CLINIC | Age: 65
End: 2025-01-01
Payer: COMMERCIAL

## 2025-01-01 ENCOUNTER — PATIENT MESSAGE (OUTPATIENT)
Dept: HEMATOLOGY/ONCOLOGY | Facility: CLINIC | Age: 65
End: 2025-01-01

## 2025-01-01 VITALS
DIASTOLIC BLOOD PRESSURE: 62 MMHG | SYSTOLIC BLOOD PRESSURE: 96 MMHG | TEMPERATURE: 99 F | HEART RATE: 116 BPM | RESPIRATION RATE: 16 BRPM | WEIGHT: 191.38 LBS | BODY MASS INDEX: 29.01 KG/M2 | OXYGEN SATURATION: 93 % | HEIGHT: 68 IN

## 2025-01-01 DIAGNOSIS — R60.0 BILATERAL LOWER EXTREMITY EDEMA: ICD-10-CM

## 2025-01-01 DIAGNOSIS — K76.82 HEPATIC ENCEPHALOPATHY: ICD-10-CM

## 2025-01-01 DIAGNOSIS — C22.9 ADENOCARCINOMA OF LIVER: Primary | ICD-10-CM

## 2025-01-01 DIAGNOSIS — Z71.89 COUNSELING REGARDING END OF LIFE DECISION MAKING: ICD-10-CM

## 2025-01-01 DIAGNOSIS — M79.89 LEG SWELLING: ICD-10-CM

## 2025-01-01 DIAGNOSIS — R18.8 OTHER ASCITES: ICD-10-CM

## 2025-01-01 DIAGNOSIS — R53.1 WEAKNESS: ICD-10-CM

## 2025-01-01 DIAGNOSIS — K86.89 PANCREATIC MASS: ICD-10-CM

## 2025-01-01 DIAGNOSIS — C80.1 CARCINOMA METASTATIC TO LIVER WITH UNKNOWN PRIMARY SITE: ICD-10-CM

## 2025-01-01 DIAGNOSIS — R18.8 ASCITES: ICD-10-CM

## 2025-01-01 DIAGNOSIS — C78.7 CARCINOMA METASTATIC TO LIVER WITH UNKNOWN PRIMARY SITE: ICD-10-CM

## 2025-01-01 DIAGNOSIS — Z71.89 OTHER SPECIFIED COUNSELING: ICD-10-CM

## 2025-01-01 DIAGNOSIS — R14.0 ABDOMINAL DISTENSION: ICD-10-CM

## 2025-01-01 DIAGNOSIS — N17.9 AKI (ACUTE KIDNEY INJURY): ICD-10-CM

## 2025-01-01 LAB
ABSOLUTE EOSINOPHIL (OHS): 0.04 K/UL
ABSOLUTE EOSINOPHIL (OHS): 0.06 K/UL
ABSOLUTE MONOCYTE (OHS): 2.04 K/UL (ref 0.3–1)
ABSOLUTE MONOCYTE (OHS): 2.43 K/UL (ref 0.3–1)
ABSOLUTE NEUTROPHIL COUNT (OHS): 21.45 K/UL (ref 1.8–7.7)
ABSOLUTE NEUTROPHIL COUNT (OHS): 23.55 K/UL (ref 1.8–7.7)
ABSOLUTE NEUTROPHIL MANUAL (OHS): 22.3 K/UL
ALBUMIN FLD-MCNC: 1.3 G/DL
ALBUMIN SERPL BCP-MCNC: 2.1 G/DL (ref 3.5–5.2)
ALBUMIN SERPL BCP-MCNC: 2.4 G/DL (ref 3.5–5.2)
ALBUMIN SERPL BCP-MCNC: 2.5 G/DL (ref 3.5–5.2)
ALP SERPL-CCNC: 593 UNIT/L (ref 40–150)
ALP SERPL-CCNC: 640 UNIT/L (ref 40–150)
ALP SERPL-CCNC: 657 UNIT/L (ref 40–150)
ALT SERPL W/O P-5'-P-CCNC: 104 UNIT/L (ref 10–44)
ALT SERPL W/O P-5'-P-CCNC: 106 UNIT/L (ref 10–44)
ALT SERPL W/O P-5'-P-CCNC: 110 UNIT/L (ref 10–44)
AMMONIA PLAS-SCNC: 89 UMOL/L (ref 10–50)
ANION GAP (OHS): 13 MMOL/L (ref 8–16)
ANION GAP (OHS): 15 MMOL/L (ref 8–16)
ANION GAP (OHS): 15 MMOL/L (ref 8–16)
ANISOCYTOSIS BLD QL SMEAR: SLIGHT
AORTIC SIZE INDEX (SOV): 1.8 CM/M2
AORTIC SIZE INDEX: 1.8 CM/M2
APAP SERPL-MCNC: <3 UG/ML (ref 10–20)
APICAL FOUR CHAMBER EJECTION FRACTION: 59 %
APICAL TWO CHAMBER EJECTION FRACTION: 60 %
APPEARANCE FLD: NORMAL
APTT PPP: 28.1 SECONDS (ref 21–32)
ASCENDING AORTA: 3.7 CM
AST SERPL-CCNC: 177 UNIT/L (ref 11–45)
AST SERPL-CCNC: 184 UNIT/L (ref 11–45)
AST SERPL-CCNC: 185 UNIT/L (ref 11–45)
AV INDEX (PROSTH): 0.64
AV MEAN GRADIENT: 4 MMHG
AV PEAK GRADIENT: 6 MMHG
AV VALVE AREA BY VELOCITY RATIO: 2.6 CM²
AV VALVE AREA: 2.9 CM²
AV VELOCITY RATIO: 0.58
BACTERIA SPEC AEROBE CULT: NO GROWTH
BASOPHILS # BLD AUTO: 0.06 K/UL
BASOPHILS # BLD AUTO: 0.06 K/UL
BASOPHILS NFR BLD AUTO: 0.2 %
BASOPHILS NFR BLD AUTO: 0.2 %
BILIRUB DIRECT SERPL-MCNC: 3 MG/DL (ref 0.1–0.3)
BILIRUB SERPL-MCNC: 4.1 MG/DL (ref 0.1–1)
BILIRUB SERPL-MCNC: 5 MG/DL (ref 0.1–1)
BILIRUB SERPL-MCNC: 5 MG/DL (ref 0.1–1)
BILIRUB UR QL STRIP.AUTO: NEGATIVE
BNP SERPL-MCNC: 29 PG/ML (ref 0–99)
BSA FOR ECHO PROCEDURE: 2.07 M2
BUN SERPL-MCNC: 83 MG/DL (ref 8–23)
BUN SERPL-MCNC: 89 MG/DL (ref 8–23)
BUN SERPL-MCNC: 98 MG/DL (ref 8–23)
CALCIUM SERPL-MCNC: 11.3 MG/DL (ref 8.7–10.5)
CHLORIDE SERPL-SCNC: 93 MMOL/L (ref 95–110)
CHLORIDE SERPL-SCNC: 94 MMOL/L (ref 95–110)
CHLORIDE SERPL-SCNC: 94 MMOL/L (ref 95–110)
CLARITY UR: CLEAR
CO2 SERPL-SCNC: 22 MMOL/L (ref 23–29)
CO2 SERPL-SCNC: 22 MMOL/L (ref 23–29)
CO2 SERPL-SCNC: 23 MMOL/L (ref 23–29)
COLOR FLD: NORMAL
COLOR UR AUTO: YELLOW
CREAT SERPL-MCNC: 2 MG/DL (ref 0.5–1.4)
CREAT UR-MCNC: 65.7 MG/DL (ref 23–375)
CV ECHO LV RWT: 0.62 CM
DOP CALC AO PEAK VEL: 1.2 M/S
DOP CALC AO VTI: 17.6 CM
DOP CALC LVOT AREA: 4.5 CM2
DOP CALC LVOT DIAMETER: 2.4 CM
DOP CALC LVOT PEAK VEL: 0.7 M/S
DOP CALC LVOT STROKE VOLUME: 51.1 CM3
DOP CALC MV VTI: 18.1 CM
DOP CALCLVOT PEAK VEL VTI: 11.3 CM
E WAVE DECELERATION TIME: 145 MSEC
E/A RATIO: 0.68
E/E' RATIO: 8 M/S
ECHO LV POSTERIOR WALL: 1.2 CM (ref 0.6–1.1)
ERYTHROCYTE [DISTWIDTH] IN BLOOD BY AUTOMATED COUNT: 17.9 % (ref 11.5–14.5)
ERYTHROCYTE [DISTWIDTH] IN BLOOD BY AUTOMATED COUNT: 18.5 % (ref 11.5–14.5)
ERYTHROCYTE [DISTWIDTH] IN BLOOD BY AUTOMATED COUNT: 18.6 % (ref 11.5–14.5)
ETHANOL SERPL-MCNC: <10 MG/DL
FRACTIONAL SHORTENING: 30.8 % (ref 28–44)
GFR SERPLBLD CREATININE-BSD FMLA CKD-EPI: 37 ML/MIN/1.73/M2
GLUCOSE SERPL-MCNC: 127 MG/DL (ref 70–110)
GLUCOSE SERPL-MCNC: 145 MG/DL (ref 70–110)
GLUCOSE SERPL-MCNC: 152 MG/DL (ref 70–110)
GLUCOSE UR QL STRIP: NEGATIVE
GRAM STN SPEC: NORMAL
GRAM STN SPEC: NORMAL
HAV IGM SERPL QL IA: NORMAL
HBV CORE IGM SERPL QL IA: NORMAL
HBV SURFACE AG SERPL QL IA: NORMAL
HCT VFR BLD AUTO: 41.2 % (ref 40–54)
HCT VFR BLD AUTO: 42.2 % (ref 40–54)
HCT VFR BLD AUTO: 42.7 % (ref 40–54)
HCV AB SERPL QL IA: NORMAL
HGB BLD-MCNC: 13.8 GM/DL (ref 14–18)
HGB BLD-MCNC: 14.3 GM/DL (ref 14–18)
HGB BLD-MCNC: 14.5 GM/DL (ref 14–18)
HGB UR QL STRIP: NEGATIVE
HOLD SPECIMEN: NORMAL
HR MV ECHO: 98 BPM
IMM GRANULOCYTES # BLD AUTO: 0.28 K/UL (ref 0–0.04)
IMM GRANULOCYTES # BLD AUTO: 0.41 K/UL (ref 0–0.04)
IMM GRANULOCYTES NFR BLD AUTO: 1.1 % (ref 0–0.5)
IMM GRANULOCYTES NFR BLD AUTO: 1.5 % (ref 0–0.5)
INR PPP: 1.3 (ref 0.8–1.2)
INTERVENTRICULAR SEPTUM: 1 CM (ref 0.6–1.1)
IVC DIAMETER: 1.07 CM
KETONES UR QL STRIP: NEGATIVE
LACTATE SERPL-SCNC: 2.7 MMOL/L (ref 0.5–2.2)
LACTATE SERPL-SCNC: 3.5 MMOL/L (ref 0.5–2.2)
LDH FLD L TO P-CCNC: 881 U/L
LDH SERPL-CCNC: 753 U/L (ref 110–260)
LEFT ATRIUM AREA SYSTOLIC (APICAL 2 CHAMBER): 6.37 CM2
LEFT ATRIUM AREA SYSTOLIC (APICAL 4 CHAMBER): 11.12 CM2
LEFT ATRIUM VOLUME INDEX MOD: 11 ML/M2
LEFT ATRIUM VOLUME MOD: 23 ML
LEFT INTERNAL DIMENSION IN SYSTOLE: 2.7 CM (ref 2.1–4)
LEFT VENTRICLE DIASTOLIC VOLUME INDEX: 32.02 ML/M2
LEFT VENTRICLE DIASTOLIC VOLUME: 65 ML
LEFT VENTRICLE END DIASTOLIC VOLUME APICAL 2 CHAMBER: 68.11 ML
LEFT VENTRICLE END DIASTOLIC VOLUME APICAL 4 CHAMBER: 89.64 ML
LEFT VENTRICLE END SYSTOLIC VOLUME APICAL 2 CHAMBER: 12.97 ML
LEFT VENTRICLE END SYSTOLIC VOLUME APICAL 4 CHAMBER: 25.42 ML
LEFT VENTRICLE MASS INDEX: 69 G/M2
LEFT VENTRICLE SYSTOLIC VOLUME INDEX: 12.8 ML/M2
LEFT VENTRICLE SYSTOLIC VOLUME: 26 ML
LEFT VENTRICULAR INTERNAL DIMENSION IN DIASTOLE: 3.9 CM (ref 3.5–6)
LEFT VENTRICULAR MASS: 140.1 G
LEUKOCYTE ESTERASE UR QL STRIP: NEGATIVE
LV LATERAL E/E' RATIO: 5.5 M/S
LV SEPTAL E/E' RATIO: 12.2 M/S
LVED V (TEICH): 64.83 ML
LVES V (TEICH): 26.13 ML
LVOT MG: 1.08 MMHG
LVOT MV: 0.51 CM/S
LYMPHOCYTES # BLD AUTO: 1.08 K/UL (ref 1–4.8)
LYMPHOCYTES # BLD AUTO: 1.11 K/UL (ref 1–4.8)
LYMPHOCYTES NFR BLD MANUAL: 6.1 % (ref 18–48)
LYMPHOCYTES NFR FLD MANUAL: 51 %
MAGNESIUM SERPL-MCNC: 3.1 MG/DL (ref 1.6–2.6)
MAGNESIUM SERPL-MCNC: 3.1 MG/DL (ref 1.6–2.6)
MAGNESIUM SERPL-MCNC: 3.2 MG/DL (ref 1.6–2.6)
MCH RBC QN AUTO: 26.3 PG (ref 27–31)
MCH RBC QN AUTO: 26.4 PG (ref 27–31)
MCH RBC QN AUTO: 26.4 PG (ref 27–31)
MCHC RBC AUTO-ENTMCNC: 33.5 G/DL (ref 32–36)
MCHC RBC AUTO-ENTMCNC: 33.9 G/DL (ref 32–36)
MCHC RBC AUTO-ENTMCNC: 34 G/DL (ref 32–36)
MCV RBC AUTO: 78 FL (ref 82–98)
MCV RBC AUTO: 78 FL (ref 82–98)
MCV RBC AUTO: 79 FL (ref 82–98)
MESOTHL CELL NFR FLD MANUAL: 2 %
MONOCYTES NFR BLD MANUAL: 6.1 % (ref 4–15)
MONOS+MACROS NFR FLD MANUAL: 5 %
MV A" WAVE DURATION": 111.32 MSEC
MV MEAN GRADIENT: 1 MMHG
MV PEAK A VEL: 0.9 M/S
MV PEAK E VEL: 0.61 M/S
MV PEAK GRADIENT: 4 MMHG
MV STENOSIS PRESSURE HALF TIME: 42.16 MS
MV VALVE AREA BY CONTINUITY EQUATION: 2.82 CM2
MV VALVE AREA P 1/2 METHOD: 5.22 CM2
NEUTROPHILS NFR BLD MANUAL: 87.8 % (ref 38–73)
NEUTROPHILS NFR FLD MANUAL: 42 %
NITRITE UR QL STRIP: NEGATIVE
NUCLEATED RBC (/100WBC) (OHS): 0 /100 WBC
OHS CV RV/LV RATIO: 0.87 CM
OHS LV EJECTION FRACTION SIMPSONS BIPLANE MOD: 58 %
OHS QRS DURATION: 104 MS
OHS QTC CALCULATION: 479 MS
PH UR STRIP: 5 [PH]
PHOSPHATE SERPL-MCNC: 4.9 MG/DL (ref 2.7–4.5)
PHOSPHATE SERPL-MCNC: 5.2 MG/DL (ref 2.7–4.5)
PISA TR MAX VEL: 1.9 M/S
PLATELET # BLD AUTO: 420 K/UL (ref 150–450)
PLATELET # BLD AUTO: 431 K/UL (ref 150–450)
PLATELET # BLD AUTO: 476 K/UL (ref 150–450)
PLATELET BLD QL SMEAR: ABNORMAL
PMV BLD AUTO: 10.2 FL (ref 9.2–12.9)
PMV BLD AUTO: 10.2 FL (ref 9.2–12.9)
PMV BLD AUTO: 10.4 FL (ref 9.2–12.9)
POCT GLUCOSE: 116 MG/DL (ref 70–110)
POCT GLUCOSE: 121 MG/DL (ref 70–110)
POCT GLUCOSE: 127 MG/DL (ref 70–110)
POCT GLUCOSE: 128 MG/DL (ref 70–110)
POCT GLUCOSE: 145 MG/DL (ref 70–110)
POCT GLUCOSE: 147 MG/DL (ref 70–110)
POCT GLUCOSE: 148 MG/DL (ref 70–110)
POCT GLUCOSE: 151 MG/DL (ref 70–110)
POCT GLUCOSE: 67 MG/DL (ref 70–110)
POCT GLUCOSE: 85 MG/DL (ref 70–110)
POCT GLUCOSE: 85 MG/DL (ref 70–110)
POLYCHROMASIA BLD QL SMEAR: ABNORMAL
POTASSIUM SERPL-SCNC: 4.7 MMOL/L (ref 3.5–5.1)
POTASSIUM SERPL-SCNC: 5 MMOL/L (ref 3.5–5.1)
POTASSIUM SERPL-SCNC: 5.1 MMOL/L (ref 3.5–5.1)
PROT FLD-MCNC: 3.1 G/DL
PROT SERPL-MCNC: 7.3 GM/DL (ref 6–8.4)
PROT SERPL-MCNC: 7.4 GM/DL (ref 6–8.4)
PROT SERPL-MCNC: 7.7 GM/DL (ref 6–8.4)
PROT UR QL STRIP: NEGATIVE
PROTHROMBIN TIME: 14.4 SECONDS (ref 9–12.5)
PV MV: 0.78 M/S
PV PEAK GRADIENT: 4 MMHG
PV PEAK VELOCITY: 1.03 M/S
RA PRESSURE ESTIMATED: 3 MMHG
RBC # BLD AUTO: 5.24 M/UL (ref 4.6–6.2)
RBC # BLD AUTO: 5.41 M/UL (ref 4.6–6.2)
RBC # BLD AUTO: 5.49 M/UL (ref 4.6–6.2)
RELATIVE EOSINOPHIL (OHS): 0.1 %
RELATIVE EOSINOPHIL (OHS): 0.2 %
RELATIVE LYMPHOCYTE (OHS): 3.9 % (ref 18–48)
RELATIVE LYMPHOCYTE (OHS): 4.4 % (ref 18–48)
RELATIVE MONOCYTE (OHS): 8.2 % (ref 4–15)
RELATIVE MONOCYTE (OHS): 8.8 % (ref 4–15)
RELATIVE NEUTROPHIL (OHS): 85.5 % (ref 38–73)
RELATIVE NEUTROPHIL (OHS): 85.9 % (ref 38–73)
RIGHT VENTRICLE DIASTOLIC BASEL DIMENSION: 3.4 CM
RV TB RVSP: 5 MMHG
RV TISSUE DOPPLER FREE WALL SYSTOLIC VELOCITY 1 (APICAL 4 CHAMBER VIEW): 19.22 CM/S
SALICYLATES SERPL-MCNC: <5 MG/DL (ref 15–30)
SARS-COV-2 RDRP RESP QL NAA+PROBE: NEGATIVE
SINUS: 3.64 CM
SODIUM SERPL-SCNC: 129 MMOL/L (ref 136–145)
SODIUM SERPL-SCNC: 131 MMOL/L (ref 136–145)
SODIUM SERPL-SCNC: 131 MMOL/L (ref 136–145)
SODIUM UR-SCNC: 41 MMOL/L (ref 20–250)
SP GR UR STRIP: 1.01
STJ: 3.3 CM
TDI LATERAL: 0.11 M/S
TDI SEPTAL: 0.05 M/S
TDI: 0.08 M/S
TR MAX PG: 15 MMHG
TRICUSPID ANNULAR PLANE SYSTOLIC EXCURSION: 1.3 CM
TROPONIN I SERPL DL<=0.01 NG/ML-MCNC: <0.006 NG/ML
TV REST PULMONARY ARTERY PRESSURE: 17 MMHG
UROBILINOGEN UR STRIP-ACNC: ABNORMAL EU/DL
UUN UR-MCNC: 467 MG/DL (ref 140–1050)
WBC # BLD AUTO: 25 K/UL (ref 3.9–12.7)
WBC # BLD AUTO: 25.41 K/UL (ref 3.9–12.7)
WBC # BLD AUTO: 27.57 K/UL (ref 3.9–12.7)
WBC # FLD: 1769 /CU MM
Z-SCORE OF LEFT VENTRICULAR DIMENSION IN END DIASTOLE: -4.33
Z-SCORE OF LEFT VENTRICULAR DIMENSION IN END SYSTOLE: -2.46

## 2025-01-01 PROCEDURE — 82140 ASSAY OF AMMONIA: CPT

## 2025-01-01 PROCEDURE — 99497 ADVNCD CARE PLAN 30 MIN: CPT | Mod: 25,,, | Performed by: STUDENT IN AN ORGANIZED HEALTH CARE EDUCATION/TRAINING PROGRAM

## 2025-01-01 PROCEDURE — 96375 TX/PRO/DX INJ NEW DRUG ADDON: CPT

## 2025-01-01 PROCEDURE — 83880 ASSAY OF NATRIURETIC PEPTIDE: CPT

## 2025-01-01 PROCEDURE — 97530 THERAPEUTIC ACTIVITIES: CPT

## 2025-01-01 PROCEDURE — 84300 ASSAY OF URINE SODIUM: CPT

## 2025-01-01 PROCEDURE — 0W9G3ZZ DRAINAGE OF PERITONEAL CAVITY, PERCUTANEOUS APPROACH: ICD-10-PCS | Performed by: RADIOLOGY

## 2025-01-01 PROCEDURE — 99205 OFFICE O/P NEW HI 60 MIN: CPT | Mod: 25,,,

## 2025-01-01 PROCEDURE — 93005 ELECTROCARDIOGRAM TRACING: CPT

## 2025-01-01 PROCEDURE — 25000003 PHARM REV CODE 250

## 2025-01-01 PROCEDURE — 96365 THER/PROPH/DIAG IV INF INIT: CPT | Mod: 59

## 2025-01-01 PROCEDURE — 87040 BLOOD CULTURE FOR BACTERIA: CPT | Mod: 91

## 2025-01-01 PROCEDURE — 82248 BILIRUBIN DIRECT: CPT

## 2025-01-01 PROCEDURE — 84157 ASSAY OF PROTEIN OTHER: CPT

## 2025-01-01 PROCEDURE — 63600175 PHARM REV CODE 636 W HCPCS

## 2025-01-01 PROCEDURE — 85730 THROMBOPLASTIN TIME PARTIAL: CPT

## 2025-01-01 PROCEDURE — 63600175 PHARM REV CODE 636 W HCPCS: Performed by: STUDENT IN AN ORGANIZED HEALTH CARE EDUCATION/TRAINING PROGRAM

## 2025-01-01 PROCEDURE — 82077 ASSAY SPEC XCP UR&BREATH IA: CPT

## 2025-01-01 PROCEDURE — G0378 HOSPITAL OBSERVATION PER HR: HCPCS

## 2025-01-01 PROCEDURE — 63600175 PHARM REV CODE 636 W HCPCS: Performed by: RADIOLOGY

## 2025-01-01 PROCEDURE — 80053 COMPREHEN METABOLIC PANEL: CPT

## 2025-01-01 PROCEDURE — 96365 THER/PROPH/DIAG IV INF INIT: CPT

## 2025-01-01 PROCEDURE — 83735 ASSAY OF MAGNESIUM: CPT

## 2025-01-01 PROCEDURE — 85025 COMPLETE CBC W/AUTO DIFF WBC: CPT

## 2025-01-01 PROCEDURE — 97165 OT EVAL LOW COMPLEX 30 MIN: CPT

## 2025-01-01 PROCEDURE — 83605 ASSAY OF LACTIC ACID: CPT

## 2025-01-01 PROCEDURE — 99498 ADVNCD CARE PLAN ADDL 30 MIN: CPT | Mod: ,,, | Performed by: STUDENT IN AN ORGANIZED HEALTH CARE EDUCATION/TRAINING PROGRAM

## 2025-01-01 PROCEDURE — 99214 OFFICE O/P EST MOD 30 MIN: CPT | Mod: 25,NSCH,, | Performed by: PHYSICIAN ASSISTANT

## 2025-01-01 PROCEDURE — 85007 BL SMEAR W/DIFF WBC COUNT: CPT

## 2025-01-01 PROCEDURE — 85610 PROTHROMBIN TIME: CPT

## 2025-01-01 PROCEDURE — 87070 CULTURE OTHR SPECIMN AEROBIC: CPT

## 2025-01-01 PROCEDURE — 25000003 PHARM REV CODE 250: Performed by: STUDENT IN AN ORGANIZED HEALTH CARE EDUCATION/TRAINING PROGRAM

## 2025-01-01 PROCEDURE — 84484 ASSAY OF TROPONIN QUANT: CPT | Performed by: PHYSICIAN ASSISTANT

## 2025-01-01 PROCEDURE — 80074 ACUTE HEPATITIS PANEL: CPT

## 2025-01-01 PROCEDURE — P9047 ALBUMIN (HUMAN), 25%, 50ML: HCPCS

## 2025-01-01 PROCEDURE — 99285 EMERGENCY DEPT VISIT HI MDM: CPT | Mod: 25

## 2025-01-01 PROCEDURE — 99233 SBSQ HOSP IP/OBS HIGH 50: CPT | Mod: 25,,, | Performed by: STUDENT IN AN ORGANIZED HEALTH CARE EDUCATION/TRAINING PROGRAM

## 2025-01-01 PROCEDURE — 36415 COLL VENOUS BLD VENIPUNCTURE: CPT | Performed by: FAMILY MEDICINE

## 2025-01-01 PROCEDURE — 80179 DRUG ASSAY SALICYLATE: CPT

## 2025-01-01 PROCEDURE — 36415 COLL VENOUS BLD VENIPUNCTURE: CPT

## 2025-01-01 PROCEDURE — 89051 BODY FLUID CELL COUNT: CPT

## 2025-01-01 PROCEDURE — 81003 URINALYSIS AUTO W/O SCOPE: CPT

## 2025-01-01 PROCEDURE — 96376 TX/PRO/DX INJ SAME DRUG ADON: CPT

## 2025-01-01 PROCEDURE — 84540 ASSAY OF URINE/UREA-N: CPT

## 2025-01-01 PROCEDURE — 11000001 HC ACUTE MED/SURG PRIVATE ROOM

## 2025-01-01 PROCEDURE — 87205 SMEAR GRAM STAIN: CPT

## 2025-01-01 PROCEDURE — 87075 CULTR BACTERIA EXCEPT BLOOD: CPT

## 2025-01-01 PROCEDURE — 83735 ASSAY OF MAGNESIUM: CPT | Performed by: PHYSICIAN ASSISTANT

## 2025-01-01 PROCEDURE — 83615 LACTATE (LD) (LDH) ENZYME: CPT

## 2025-01-01 PROCEDURE — 63600175 PHARM REV CODE 636 W HCPCS: Performed by: PHYSICIAN ASSISTANT

## 2025-01-01 PROCEDURE — 82570 ASSAY OF URINE CREATININE: CPT

## 2025-01-01 PROCEDURE — 99232 SBSQ HOSP IP/OBS MODERATE 35: CPT | Mod: 25,,, | Performed by: STUDENT IN AN ORGANIZED HEALTH CARE EDUCATION/TRAINING PROGRAM

## 2025-01-01 PROCEDURE — U0002 COVID-19 LAB TEST NON-CDC: HCPCS | Performed by: FAMILY MEDICINE

## 2025-01-01 PROCEDURE — 0W9G30Z DRAINAGE OF PERITONEAL CAVITY WITH DRAINAGE DEVICE, PERCUTANEOUS APPROACH: ICD-10-PCS | Performed by: RADIOLOGY

## 2025-01-01 PROCEDURE — 84100 ASSAY OF PHOSPHORUS: CPT

## 2025-01-01 PROCEDURE — 82962 GLUCOSE BLOOD TEST: CPT

## 2025-01-01 PROCEDURE — 85025 COMPLETE CBC W/AUTO DIFF WBC: CPT | Performed by: PHYSICIAN ASSISTANT

## 2025-01-01 PROCEDURE — 83615 LACTATE (LD) (LDH) ENZYME: CPT | Mod: 59

## 2025-01-01 PROCEDURE — 1152F DOC ADVNCD DIS COMFORT 1ST: CPT | Mod: CPTII,,, | Performed by: STUDENT IN AN ORGANIZED HEALTH CARE EDUCATION/TRAINING PROGRAM

## 2025-01-01 PROCEDURE — 1158F ADVNC CARE PLAN TLK DOCD: CPT | Mod: CPTII,,, | Performed by: STUDENT IN AN ORGANIZED HEALTH CARE EDUCATION/TRAINING PROGRAM

## 2025-01-01 PROCEDURE — 93010 ELECTROCARDIOGRAM REPORT: CPT | Mod: ,,, | Performed by: INTERNAL MEDICINE

## 2025-01-01 PROCEDURE — 97162 PT EVAL MOD COMPLEX 30 MIN: CPT

## 2025-01-01 PROCEDURE — 80053 COMPREHEN METABOLIC PANEL: CPT | Performed by: PHYSICIAN ASSISTANT

## 2025-01-01 PROCEDURE — 99233 SBSQ HOSP IP/OBS HIGH 50: CPT | Mod: NSCH,,, | Performed by: PHYSICIAN ASSISTANT

## 2025-01-01 PROCEDURE — 82042 OTHER SOURCE ALBUMIN QUAN EA: CPT

## 2025-01-01 PROCEDURE — 82040 ASSAY OF SERUM ALBUMIN: CPT

## 2025-01-01 PROCEDURE — 99497 ADVNCD CARE PLAN 30 MIN: CPT | Mod: 25,,,

## 2025-01-01 PROCEDURE — 80143 DRUG ASSAY ACETAMINOPHEN: CPT

## 2025-01-01 RX ORDER — SPIRONOLACTONE 25 MG/1
100 TABLET ORAL DAILY
Status: DISCONTINUED | OUTPATIENT
Start: 2025-01-01 | End: 2025-01-01

## 2025-01-01 RX ORDER — HYDROMORPHONE HYDROCHLORIDE 1 MG/ML
1 INJECTION, SOLUTION INTRAMUSCULAR; INTRAVENOUS; SUBCUTANEOUS ONCE
Status: COMPLETED | OUTPATIENT
Start: 2025-01-01 | End: 2025-01-01

## 2025-01-01 RX ORDER — SODIUM CHLORIDE 0.9 % (FLUSH) 0.9 %
5 SYRINGE (ML) INJECTION
Status: DISCONTINUED | OUTPATIENT
Start: 2025-01-01 | End: 2025-01-01 | Stop reason: HOSPADM

## 2025-01-01 RX ORDER — MORPHINE SULFATE 2 MG/ML
2 INJECTION, SOLUTION INTRAMUSCULAR; INTRAVENOUS EVERY 4 HOURS PRN
Status: DISCONTINUED | OUTPATIENT
Start: 2025-01-01 | End: 2025-01-01

## 2025-01-01 RX ORDER — MORPHINE SULFATE 2 MG/ML
2 INJECTION, SOLUTION INTRAMUSCULAR; INTRAVENOUS
Status: DISCONTINUED | OUTPATIENT
Start: 2025-01-01 | End: 2025-01-01 | Stop reason: HOSPADM

## 2025-01-01 RX ORDER — ALBUMIN HUMAN 250 G/1000ML
25 SOLUTION INTRAVENOUS ONCE
Status: COMPLETED | OUTPATIENT
Start: 2025-01-01 | End: 2025-01-01

## 2025-01-01 RX ORDER — ONDANSETRON HYDROCHLORIDE 2 MG/ML
8 INJECTION, SOLUTION INTRAVENOUS EVERY 8 HOURS PRN
Status: DISCONTINUED | OUTPATIENT
Start: 2025-01-01 | End: 2025-01-01 | Stop reason: HOSPADM

## 2025-01-01 RX ORDER — ENOXAPARIN SODIUM 100 MG/ML
40 INJECTION SUBCUTANEOUS EVERY 24 HOURS
Status: DISCONTINUED | OUTPATIENT
Start: 2025-01-01 | End: 2025-01-01

## 2025-01-01 RX ORDER — FUROSEMIDE 10 MG/ML
80 INJECTION INTRAMUSCULAR; INTRAVENOUS DAILY
Status: DISCONTINUED | OUTPATIENT
Start: 2025-01-01 | End: 2025-01-01

## 2025-01-01 RX ORDER — NALOXONE HCL 0.4 MG/ML
0.02 VIAL (ML) INJECTION
Status: DISCONTINUED | OUTPATIENT
Start: 2025-01-01 | End: 2025-01-01

## 2025-01-01 RX ORDER — FENTANYL CITRATE 50 UG/ML
INJECTION, SOLUTION INTRAMUSCULAR; INTRAVENOUS
Status: COMPLETED | OUTPATIENT
Start: 2025-01-01 | End: 2025-01-01

## 2025-01-01 RX ORDER — MORPHINE SULFATE 2 MG/ML
2 INJECTION, SOLUTION INTRAMUSCULAR; INTRAVENOUS EVERY 4 HOURS PRN
Status: COMPLETED | OUTPATIENT
Start: 2025-01-01 | End: 2025-01-01

## 2025-01-01 RX ORDER — HYOSCYAMINE SULFATE 0.12 MG/1
0.12 TABLET SUBLINGUAL EVERY 4 HOURS PRN
Status: DISCONTINUED | OUTPATIENT
Start: 2025-01-01 | End: 2025-01-01

## 2025-01-01 RX ORDER — IBUPROFEN 200 MG
16 TABLET ORAL
Status: DISCONTINUED | OUTPATIENT
Start: 2025-01-01 | End: 2025-01-01

## 2025-01-01 RX ORDER — ACETAMINOPHEN 325 MG/1
650 TABLET ORAL EVERY 4 HOURS PRN
Status: DISCONTINUED | OUTPATIENT
Start: 2025-01-01 | End: 2025-01-01

## 2025-01-01 RX ORDER — MORPHINE SULFATE 2 MG/ML
1 INJECTION, SOLUTION INTRAMUSCULAR; INTRAVENOUS ONCE
Status: COMPLETED | OUTPATIENT
Start: 2025-01-01 | End: 2025-01-01

## 2025-01-01 RX ORDER — INSULIN ASPART 100 [IU]/ML
0-5 INJECTION, SOLUTION INTRAVENOUS; SUBCUTANEOUS
Status: DISCONTINUED | OUTPATIENT
Start: 2025-01-01 | End: 2025-01-01

## 2025-01-01 RX ORDER — ONDANSETRON HYDROCHLORIDE 2 MG/ML
4 INJECTION, SOLUTION INTRAVENOUS EVERY 8 HOURS PRN
Status: DISCONTINUED | OUTPATIENT
Start: 2025-01-01 | End: 2025-01-01

## 2025-01-01 RX ORDER — LIDOCAINE HYDROCHLORIDE 10 MG/ML
INJECTION, SOLUTION INFILTRATION; PERINEURAL
Status: COMPLETED | OUTPATIENT
Start: 2025-01-01 | End: 2025-01-01

## 2025-01-01 RX ORDER — AMOXICILLIN 250 MG
1 CAPSULE ORAL 2 TIMES DAILY
Status: DISCONTINUED | OUTPATIENT
Start: 2025-01-01 | End: 2025-01-01

## 2025-01-01 RX ORDER — AMOXICILLIN 250 MG
1 CAPSULE ORAL 2 TIMES DAILY PRN
Status: DISCONTINUED | OUTPATIENT
Start: 2025-01-01 | End: 2025-01-01

## 2025-01-01 RX ORDER — TALC
6 POWDER (GRAM) TOPICAL NIGHTLY PRN
Status: DISCONTINUED | OUTPATIENT
Start: 2025-01-01 | End: 2025-01-01

## 2025-01-01 RX ORDER — OXYCODONE HYDROCHLORIDE 5 MG/1
5 TABLET ORAL EVERY 6 HOURS PRN
Refills: 0 | Status: DISCONTINUED | OUTPATIENT
Start: 2025-01-01 | End: 2025-01-01

## 2025-01-01 RX ORDER — MORPHINE SULFATE 2 MG/ML
1 INJECTION, SOLUTION INTRAMUSCULAR; INTRAVENOUS ONCE
Status: DISCONTINUED | OUTPATIENT
Start: 2025-01-01 | End: 2025-01-01

## 2025-01-01 RX ORDER — POLYETHYLENE GLYCOL 3350 17 G/17G
17 POWDER, FOR SOLUTION ORAL DAILY PRN
Status: DISCONTINUED | OUTPATIENT
Start: 2025-01-01 | End: 2025-01-01

## 2025-01-01 RX ORDER — MIDAZOLAM HYDROCHLORIDE 1 MG/ML
INJECTION, SOLUTION INTRAMUSCULAR; INTRAVENOUS
Status: COMPLETED | OUTPATIENT
Start: 2025-01-01 | End: 2025-01-01

## 2025-01-01 RX ORDER — SODIUM CHLORIDE 9 MG/ML
1000 INJECTION, SOLUTION INTRAVENOUS
Status: DISCONTINUED | OUTPATIENT
Start: 2025-01-01 | End: 2025-01-01

## 2025-01-01 RX ORDER — GABAPENTIN 300 MG/1
900 CAPSULE ORAL NIGHTLY
Status: DISCONTINUED | OUTPATIENT
Start: 2025-01-01 | End: 2025-01-01

## 2025-01-01 RX ORDER — IBUPROFEN 200 MG
24 TABLET ORAL
Status: DISCONTINUED | OUTPATIENT
Start: 2025-01-01 | End: 2025-01-01

## 2025-01-01 RX ORDER — LIDOCAINE 50 MG/G
2 PATCH TOPICAL
Status: DISCONTINUED | OUTPATIENT
Start: 2025-01-01 | End: 2025-01-01

## 2025-01-01 RX ORDER — PANTOPRAZOLE SODIUM 40 MG/1
40 TABLET, DELAYED RELEASE ORAL DAILY
Status: DISCONTINUED | OUTPATIENT
Start: 2025-01-01 | End: 2025-01-01

## 2025-01-01 RX ORDER — ESCITALOPRAM OXALATE 10 MG/1
20 TABLET ORAL NIGHTLY
Status: DISCONTINUED | OUTPATIENT
Start: 2025-01-01 | End: 2025-01-01

## 2025-01-01 RX ORDER — GLUCAGON 1 MG
1 KIT INJECTION
Status: DISCONTINUED | OUTPATIENT
Start: 2025-01-01 | End: 2025-01-01

## 2025-01-01 RX ORDER — LORAZEPAM 2 MG/ML
1 INJECTION INTRAMUSCULAR
Status: DISCONTINUED | OUTPATIENT
Start: 2025-01-01 | End: 2025-01-01 | Stop reason: HOSPADM

## 2025-01-01 RX ORDER — ROPINIROLE 1 MG/1
4 TABLET, FILM COATED ORAL NIGHTLY
Status: DISCONTINUED | OUTPATIENT
Start: 2025-01-01 | End: 2025-01-01

## 2025-01-01 RX ORDER — DRONABINOL 2.5 MG/1
2.5 CAPSULE ORAL 2 TIMES DAILY
Status: DISCONTINUED | OUTPATIENT
Start: 2025-01-01 | End: 2025-01-01

## 2025-01-01 RX ORDER — MORPHINE SULFATE 2 MG/ML
1 INJECTION, SOLUTION INTRAMUSCULAR; INTRAVENOUS EVERY 4 HOURS PRN
Status: DISCONTINUED | OUTPATIENT
Start: 2025-01-01 | End: 2025-01-01

## 2025-01-01 RX ORDER — CEFTRIAXONE 1 G/1
1 INJECTION, POWDER, FOR SOLUTION INTRAMUSCULAR; INTRAVENOUS
Status: DISCONTINUED | OUTPATIENT
Start: 2025-01-01 | End: 2025-01-01

## 2025-01-01 RX ORDER — GLYCOPYRROLATE 0.2 MG/ML
0.1 INJECTION INTRAMUSCULAR; INTRAVENOUS 3 TIMES DAILY PRN
Status: DISCONTINUED | OUTPATIENT
Start: 2025-01-01 | End: 2025-01-01 | Stop reason: HOSPADM

## 2025-01-01 RX ORDER — PANTOPRAZOLE SODIUM 40 MG/10ML
40 INJECTION, POWDER, LYOPHILIZED, FOR SOLUTION INTRAVENOUS DAILY
Status: DISCONTINUED | OUTPATIENT
Start: 2025-01-01 | End: 2025-01-01

## 2025-01-01 RX ORDER — ATORVASTATIN CALCIUM 40 MG/1
40 TABLET, FILM COATED ORAL DAILY
Status: DISCONTINUED | OUTPATIENT
Start: 2025-01-01 | End: 2025-01-01

## 2025-01-01 RX ORDER — LORAZEPAM 2 MG/ML
1 INJECTION INTRAMUSCULAR EVERY 4 HOURS PRN
Status: DISCONTINUED | OUTPATIENT
Start: 2025-01-01 | End: 2025-01-01

## 2025-01-01 RX ADMIN — SENNOSIDES AND DOCUSATE SODIUM 1 TABLET: 50; 8.6 TABLET ORAL at 12:06

## 2025-01-01 RX ADMIN — ESCITALOPRAM OXALATE 20 MG: 10 TABLET ORAL at 09:06

## 2025-01-01 RX ADMIN — HYOSCYAMINE SULFATE 0.12 MG: 0.12 TABLET SUBLINGUAL at 05:06

## 2025-01-01 RX ADMIN — SPIRONOLACTONE 100 MG: 25 TABLET ORAL at 09:06

## 2025-01-01 RX ADMIN — ROPINIROLE HYDROCHLORIDE 4 MG: 1 TABLET, FILM COATED ORAL at 08:06

## 2025-01-01 RX ADMIN — ALBUMIN (HUMAN) 25 G: 12.5 SOLUTION INTRAVENOUS at 09:06

## 2025-01-01 RX ADMIN — FUROSEMIDE 80 MG: 10 INJECTION, SOLUTION INTRAVENOUS at 12:06

## 2025-01-01 RX ADMIN — THERA TABS 1 TABLET: TAB at 08:06

## 2025-01-01 RX ADMIN — LIDOCAINE HYDROCHLORIDE 5 ML: 10 INJECTION, SOLUTION INFILTRATION; PERINEURAL at 10:06

## 2025-01-01 RX ADMIN — MORPHINE SULFATE 1 MG: 2 INJECTION, SOLUTION INTRAMUSCULAR; INTRAVENOUS at 12:06

## 2025-01-01 RX ADMIN — LACTULOSE 30 G: 20 SOLUTION ORAL at 03:06

## 2025-01-01 RX ADMIN — MORPHINE SULFATE 2 MG: 2 INJECTION, SOLUTION INTRAMUSCULAR; INTRAVENOUS at 01:06

## 2025-01-01 RX ADMIN — FUROSEMIDE 80 MG: 10 INJECTION, SOLUTION INTRAVENOUS at 10:06

## 2025-01-01 RX ADMIN — FUROSEMIDE 80 MG: 10 INJECTION, SOLUTION INTRAVENOUS at 11:06

## 2025-01-01 RX ADMIN — LACTULOSE 20 G: 20 SOLUTION ORAL at 09:06

## 2025-01-01 RX ADMIN — DRONABINOL 2.5 MG: 2.5 CAPSULE ORAL at 09:06

## 2025-01-01 RX ADMIN — PANTOPRAZOLE SODIUM 40 MG: 40 INJECTION, POWDER, FOR SOLUTION INTRAVENOUS at 11:06

## 2025-01-01 RX ADMIN — GABAPENTIN 900 MG: 300 CAPSULE ORAL at 08:06

## 2025-01-01 RX ADMIN — ATORVASTATIN CALCIUM 40 MG: 20 TABLET, FILM COATED ORAL at 12:06

## 2025-01-01 RX ADMIN — MORPHINE SULFATE 2 MG: 2 INJECTION, SOLUTION INTRAMUSCULAR; INTRAVENOUS at 06:06

## 2025-01-01 RX ADMIN — ALUMINUM HYDROXIDE, MAGNESIUM HYDROXIDE, AND DIMETHICONE 10 ML: 400; 400; 40 SUSPENSION ORAL at 01:06

## 2025-01-01 RX ADMIN — CEFTRIAXONE SODIUM 1 G: 1 INJECTION, POWDER, FOR SOLUTION INTRAMUSCULAR; INTRAVENOUS at 08:06

## 2025-01-01 RX ADMIN — PANTOPRAZOLE SODIUM 40 MG: 40 TABLET, DELAYED RELEASE ORAL at 08:06

## 2025-01-01 RX ADMIN — LACTULOSE 20 G: 20 SOLUTION ORAL at 08:06

## 2025-01-01 RX ADMIN — PANTOPRAZOLE SODIUM 40 MG: 40 TABLET, DELAYED RELEASE ORAL at 12:06

## 2025-01-01 RX ADMIN — CEFTRIAXONE SODIUM 1 G: 1 INJECTION, POWDER, FOR SOLUTION INTRAMUSCULAR; INTRAVENOUS at 10:06

## 2025-01-01 RX ADMIN — GABAPENTIN 900 MG: 300 CAPSULE ORAL at 09:06

## 2025-01-01 RX ADMIN — LACTULOSE 20 G: 20 SOLUTION ORAL at 02:06

## 2025-01-01 RX ADMIN — MORPHINE SULFATE 1 MG: 2 INJECTION, SOLUTION INTRAMUSCULAR; INTRAVENOUS at 11:06

## 2025-01-01 RX ADMIN — OXYCODONE 5 MG: 5 TABLET ORAL at 01:06

## 2025-01-01 RX ADMIN — LORAZEPAM 1 MG: 2 INJECTION INTRAMUSCULAR; INTRAVENOUS at 03:06

## 2025-01-01 RX ADMIN — Medication 16 G: at 01:06

## 2025-01-01 RX ADMIN — MORPHINE SULFATE 2 MG: 2 INJECTION, SOLUTION INTRAMUSCULAR; INTRAVENOUS at 10:06

## 2025-01-01 RX ADMIN — OXYCODONE 5 MG: 5 TABLET ORAL at 06:06

## 2025-01-01 RX ADMIN — ROPINIROLE HYDROCHLORIDE 4 MG: 1 TABLET, FILM COATED ORAL at 09:06

## 2025-01-01 RX ADMIN — LIDOCAINE HYDROCHLORIDE 10 ML: 10 INJECTION, SOLUTION INFILTRATION; PERINEURAL at 12:06

## 2025-01-01 RX ADMIN — DRONABINOL 2.5 MG: 2.5 CAPSULE ORAL at 08:06

## 2025-01-01 RX ADMIN — PANTOPRAZOLE SODIUM 40 MG: 40 TABLET, DELAYED RELEASE ORAL at 10:06

## 2025-01-01 RX ADMIN — LIDOCAINE 2 PATCH: 50 PATCH CUTANEOUS at 11:06

## 2025-01-01 RX ADMIN — MIDAZOLAM 2 MG: 1 INJECTION INTRAMUSCULAR; INTRAVENOUS at 10:06

## 2025-01-01 RX ADMIN — ENOXAPARIN SODIUM 40 MG: 40 INJECTION SUBCUTANEOUS at 04:06

## 2025-01-01 RX ADMIN — FENTANYL CITRATE 50 MCG: 50 INJECTION INTRAMUSCULAR; INTRAVENOUS at 10:06

## 2025-01-01 RX ADMIN — LORAZEPAM 1 MG: 2 INJECTION INTRAMUSCULAR; INTRAVENOUS at 08:06

## 2025-01-01 RX ADMIN — ATORVASTATIN CALCIUM 40 MG: 20 TABLET, FILM COATED ORAL at 08:06

## 2025-01-01 RX ADMIN — MORPHINE SULFATE 2 MG: 2 INJECTION, SOLUTION INTRAMUSCULAR; INTRAVENOUS at 11:06

## 2025-01-01 RX ADMIN — OXYCODONE 5 MG: 5 TABLET ORAL at 12:06

## 2025-01-01 RX ADMIN — HYDROMORPHONE HYDROCHLORIDE 1 MG: 1 INJECTION, SOLUTION INTRAMUSCULAR; INTRAVENOUS; SUBCUTANEOUS at 03:06

## 2025-01-01 RX ADMIN — LACTULOSE 30 G: 20 SOLUTION ORAL at 10:06

## 2025-01-01 RX ADMIN — HYDROMORPHONE HYDROCHLORIDE 1 MG: 1 INJECTION, SOLUTION INTRAMUSCULAR; INTRAVENOUS; SUBCUTANEOUS at 04:06

## 2025-01-01 RX ADMIN — LIDOCAINE 2 PATCH: 50 PATCH CUTANEOUS at 10:06

## 2025-01-01 RX ADMIN — LORAZEPAM 1 MG: 2 INJECTION INTRAMUSCULAR; INTRAVENOUS at 01:06

## 2025-01-01 RX ADMIN — THERA TABS 1 TABLET: TAB at 12:06

## 2025-01-01 RX ADMIN — FUROSEMIDE 80 MG: 10 INJECTION, SOLUTION INTRAVENOUS at 08:06

## 2025-01-01 RX ADMIN — SENNOSIDES AND DOCUSATE SODIUM 1 TABLET: 50; 8.6 TABLET ORAL at 08:06

## 2025-01-01 RX ADMIN — ESCITALOPRAM OXALATE 20 MG: 10 TABLET ORAL at 08:06

## 2025-01-15 ENCOUNTER — OFFICE VISIT (OUTPATIENT)
Dept: FAMILY MEDICINE | Facility: CLINIC | Age: 65
End: 2025-01-15
Payer: COMMERCIAL

## 2025-01-15 VITALS
HEIGHT: 69 IN | TEMPERATURE: 99 F | OXYGEN SATURATION: 96 % | SYSTOLIC BLOOD PRESSURE: 132 MMHG | HEART RATE: 82 BPM | DIASTOLIC BLOOD PRESSURE: 80 MMHG | BODY MASS INDEX: 32.95 KG/M2 | WEIGHT: 222.44 LBS

## 2025-01-15 DIAGNOSIS — J32.9 VIRAL SINUSITIS: Primary | ICD-10-CM

## 2025-01-15 DIAGNOSIS — B97.89 VIRAL SINUSITIS: Primary | ICD-10-CM

## 2025-01-15 PROCEDURE — 3008F BODY MASS INDEX DOCD: CPT | Mod: CPTII,S$GLB,,

## 2025-01-15 PROCEDURE — 3075F SYST BP GE 130 - 139MM HG: CPT | Mod: CPTII,S$GLB,,

## 2025-01-15 PROCEDURE — 99214 OFFICE O/P EST MOD 30 MIN: CPT | Mod: S$GLB,,,

## 2025-01-15 PROCEDURE — 3072F LOW RISK FOR RETINOPATHY: CPT | Mod: CPTII,S$GLB,,

## 2025-01-15 PROCEDURE — 1160F RVW MEDS BY RX/DR IN RCRD: CPT | Mod: CPTII,S$GLB,,

## 2025-01-15 PROCEDURE — 99999 PR PBB SHADOW E&M-EST. PATIENT-LVL V: CPT | Mod: PBBFAC,,,

## 2025-01-15 PROCEDURE — 1159F MED LIST DOCD IN RCRD: CPT | Mod: CPTII,S$GLB,,

## 2025-01-15 PROCEDURE — 3079F DIAST BP 80-89 MM HG: CPT | Mod: CPTII,S$GLB,,

## 2025-01-15 RX ORDER — LORATADINE 10 MG/1
10 TABLET ORAL DAILY
Qty: 30 TABLET | Refills: 0 | Status: SHIPPED | OUTPATIENT
Start: 2025-01-15 | End: 2025-02-14

## 2025-01-15 RX ORDER — IPRATROPIUM BROMIDE 21 UG/1
2 SPRAY, METERED NASAL 2 TIMES DAILY
Qty: 30 ML | Refills: 1 | Status: SHIPPED | OUTPATIENT
Start: 2025-01-15

## 2025-01-15 RX ORDER — FLUTICASONE PROPIONATE 50 MCG
1 SPRAY, SUSPENSION (ML) NASAL DAILY
Qty: 16 G | Refills: 1 | Status: SHIPPED | OUTPATIENT
Start: 2025-01-15

## 2025-01-15 RX ORDER — BENZONATATE 100 MG/1
100 CAPSULE ORAL 3 TIMES DAILY PRN
Qty: 30 CAPSULE | Refills: 0 | Status: SHIPPED | OUTPATIENT
Start: 2025-01-15 | End: 2025-01-25

## 2025-01-15 NOTE — PATIENT INSTRUCTIONS
Continue Mucinex  Saline nasal spray  Fluticasone nasal spray  Ipratropium nasal spray  Tessalon for cough  Antihistamine    Stay hydrated  Follow up with PCP as needed

## 2025-01-15 NOTE — PROGRESS NOTES
"Subjective     Patient ID: Dylan Sawant is a 64 y.o. male.    Chief Complaint: Cough and Nasal Congestion    Started with aches, cough, and sinus congestion for 5 days.   Wife is also sick, no formal diagnosis  He has been taking Mucinex day and night. Feels like this is starting to help  Uses netti pot 2 x day with distilled water    Denies fever, N/V/D, SOB, CP          Cough  This is a new problem. The current episode started in the past 7 days. The problem has been gradually worsening. The problem occurs hourly. The cough is Productive of brown sputum. Associated symptoms include chills, ear congestion, a fever, headaches, myalgias, nasal congestion, postnasal drip, rhinorrhea and sweats. Pertinent negatives include no chest pain, ear pain, heartburn, hemoptysis, rash, sore throat, shortness of breath, weight loss or wheezing. The symptoms are aggravated by lying down. He has tried OTC cough suppressant, body position changes and rest for the symptoms. The treatment provided mild relief.     Review of Systems   Constitutional:  Positive for chills and fever. Negative for weight loss.   HENT:  Positive for postnasal drip and rhinorrhea. Negative for ear pain and sore throat.    Respiratory:  Positive for cough. Negative for hemoptysis, shortness of breath and wheezing.    Cardiovascular:  Negative for chest pain.   Gastrointestinal:  Negative for heartburn.   Musculoskeletal:  Positive for myalgias.   Integumentary:  Negative for rash.   Neurological:  Positive for headaches.          Objective     Vitals:    01/15/25 0927   BP: 132/80   Pulse: 82   Temp: 98.8 °F (37.1 °C)   TempSrc: Oral   SpO2: 96%   Weight: 100.9 kg (222 lb 7.1 oz)   Height: 5' 9" (1.753 m)   PainSc:   8   PainLoc: Chest      Physical Exam  Constitutional:       General: He is not in acute distress.  HENT:      Head: Normocephalic and atraumatic.      Right Ear: Tympanic membrane, ear canal and external ear normal. There is no impacted " cerumen.      Left Ear: Tympanic membrane, ear canal and external ear normal. There is no impacted cerumen.      Nose: Congestion and rhinorrhea present.      Mouth/Throat:      Pharynx: No oropharyngeal exudate or posterior oropharyngeal erythema.   Eyes:      General:         Right eye: No discharge.         Left eye: No discharge.      Conjunctiva/sclera: Conjunctivae normal.   Cardiovascular:      Rate and Rhythm: Normal rate and regular rhythm.   Pulmonary:      Effort: Pulmonary effort is normal. No respiratory distress.      Breath sounds: Normal breath sounds. No wheezing.   Musculoskeletal:      Right lower leg: No edema.      Left lower leg: No edema.   Lymphadenopathy:      Cervical: No cervical adenopathy.   Neurological:      Mental Status: He is alert and oriented to person, place, and time.              Assessment and Plan     1. Viral sinusitis  -     fluticasone propionate (FLONASE) 50 mcg/actuation nasal spray; 1 spray (50 mcg total) by Each Nostril route once daily.  Dispense: 16 g; Refill: 1  -     ipratropium (ATROVENT) 21 mcg (0.03 %) nasal spray; 2 sprays by Each Nostril route 2 (two) times daily.  Dispense: 30 mL; Refill: 1  -     loratadine (CLARITIN) 10 mg tablet; Take 1 tablet (10 mg total) by mouth once daily.  Dispense: 30 tablet; Refill: 0  -     benzonatate (TESSALON) 100 MG capsule; Take 1 capsule (100 mg total) by mouth 3 (three) times daily as needed for Cough.  Dispense: 30 capsule; Refill: 0      Continue Mucinex  Saline nasal spray  Fluticasone nasal spray  Ipratropium nasal spray  Tessalon for cough  Antihistamine    Stay hydrated  Follow up with PCP as needed       30 minutes of total time spent on the encounter, which includes face to face time and non-face to face time preparing to see the patient (eg, review of tests), Obtaining and/or reviewing separately obtained history, Documenting clinical information in the electronic or other health record, Independently interpreting  results (not separately reported) and communicating results to the patient/family/caregiver, or Care coordination (not separately reported).      Hemalatha Brooks PA-C  Family Practice/Internal Medicine   Office Phone: [phone]

## 2025-02-19 ENCOUNTER — PATIENT MESSAGE (OUTPATIENT)
Dept: FAMILY MEDICINE | Facility: CLINIC | Age: 65
End: 2025-02-19
Payer: COMMERCIAL

## 2025-02-25 ENCOUNTER — LAB VISIT (OUTPATIENT)
Dept: LAB | Facility: HOSPITAL | Age: 65
End: 2025-02-25
Attending: FAMILY MEDICINE
Payer: COMMERCIAL

## 2025-02-25 ENCOUNTER — RESULTS FOLLOW-UP (OUTPATIENT)
Dept: FAMILY MEDICINE | Facility: CLINIC | Age: 65
End: 2025-02-25

## 2025-02-25 DIAGNOSIS — K21.9 GERD WITHOUT ESOPHAGITIS: ICD-10-CM

## 2025-02-25 DIAGNOSIS — Z00.00 VISIT FOR WELL MAN HEALTH CHECK: ICD-10-CM

## 2025-02-25 DIAGNOSIS — G25.81 RESTLESS LEGS: ICD-10-CM

## 2025-02-25 LAB
25(OH)D3+25(OH)D2 SERPL-MCNC: 23 NG/ML (ref 30–96)
ALBUMIN SERPL BCP-MCNC: 4 G/DL (ref 3.5–5.2)
ALP SERPL-CCNC: 92 U/L (ref 40–150)
ALT SERPL W/O P-5'-P-CCNC: 25 U/L (ref 10–44)
ANION GAP SERPL CALC-SCNC: 9 MMOL/L (ref 8–16)
AST SERPL-CCNC: 22 U/L (ref 10–40)
BASOPHILS # BLD AUTO: 0.07 K/UL (ref 0–0.2)
BASOPHILS NFR BLD: 0.7 % (ref 0–1.9)
BILIRUB SERPL-MCNC: 1.2 MG/DL (ref 0.1–1)
BUN SERPL-MCNC: 9 MG/DL (ref 8–23)
CALCIUM SERPL-MCNC: 9.2 MG/DL (ref 8.7–10.5)
CHLORIDE SERPL-SCNC: 104 MMOL/L (ref 95–110)
CHOLEST SERPL-MCNC: 120 MG/DL (ref 120–199)
CHOLEST/HDLC SERPL: 3.3 {RATIO} (ref 2–5)
CO2 SERPL-SCNC: 26 MMOL/L (ref 23–29)
COMPLEXED PSA SERPL-MCNC: 0.53 NG/ML (ref 0–4)
CREAT SERPL-MCNC: 0.8 MG/DL (ref 0.5–1.4)
DIFFERENTIAL METHOD BLD: ABNORMAL
EOSINOPHIL # BLD AUTO: 0.2 K/UL (ref 0–0.5)
EOSINOPHIL NFR BLD: 1.9 % (ref 0–8)
ERYTHROCYTE [DISTWIDTH] IN BLOOD BY AUTOMATED COUNT: 15.9 % (ref 11.5–14.5)
EST. GFR  (NO RACE VARIABLE): >60 ML/MIN/1.73 M^2
ESTIMATED AVG GLUCOSE: 174 MG/DL (ref 68–131)
FERRITIN SERPL-MCNC: 24 NG/ML (ref 20–300)
GLUCOSE SERPL-MCNC: 119 MG/DL (ref 70–110)
HBA1C MFR BLD: 7.7 % (ref 4–5.6)
HCT VFR BLD AUTO: 43.5 % (ref 40–54)
HDLC SERPL-MCNC: 36 MG/DL (ref 40–75)
HDLC SERPL: 30 % (ref 20–50)
HGB BLD-MCNC: 13.7 G/DL (ref 14–18)
IMM GRANULOCYTES # BLD AUTO: 0.05 K/UL (ref 0–0.04)
IMM GRANULOCYTES NFR BLD AUTO: 0.5 % (ref 0–0.5)
IRON SERPL-MCNC: 53 UG/DL (ref 45–160)
LDLC SERPL CALC-MCNC: 61.4 MG/DL (ref 63–159)
LYMPHOCYTES # BLD AUTO: 1.7 K/UL (ref 1–4.8)
LYMPHOCYTES NFR BLD: 17.8 % (ref 18–48)
MCH RBC QN AUTO: 26.3 PG (ref 27–31)
MCHC RBC AUTO-ENTMCNC: 31.5 G/DL (ref 32–36)
MCV RBC AUTO: 84 FL (ref 82–98)
MONOCYTES # BLD AUTO: 0.8 K/UL (ref 0.3–1)
MONOCYTES NFR BLD: 8.1 % (ref 4–15)
NEUTROPHILS # BLD AUTO: 6.7 K/UL (ref 1.8–7.7)
NEUTROPHILS NFR BLD: 71 % (ref 38–73)
NONHDLC SERPL-MCNC: 84 MG/DL
NRBC BLD-RTO: 0 /100 WBC
PLATELET # BLD AUTO: 202 K/UL (ref 150–450)
PMV BLD AUTO: 10.7 FL (ref 9.2–12.9)
POTASSIUM SERPL-SCNC: 3.8 MMOL/L (ref 3.5–5.1)
PROT SERPL-MCNC: 7.4 G/DL (ref 6–8.4)
RBC # BLD AUTO: 5.21 M/UL (ref 4.6–6.2)
SATURATED IRON: 12 % (ref 20–50)
SODIUM SERPL-SCNC: 139 MMOL/L (ref 136–145)
TOTAL IRON BINDING CAPACITY: 454 UG/DL (ref 250–450)
TRANSFERRIN SERPL-MCNC: 307 MG/DL (ref 200–375)
TRIGL SERPL-MCNC: 113 MG/DL (ref 30–150)
TSH SERPL DL<=0.005 MIU/L-ACNC: 0.84 UIU/ML (ref 0.4–4)
VIT B12 SERPL-MCNC: 469 PG/ML (ref 210–950)
WBC # BLD AUTO: 9.43 K/UL (ref 3.9–12.7)

## 2025-02-25 PROCEDURE — 36415 COLL VENOUS BLD VENIPUNCTURE: CPT | Mod: PO | Performed by: FAMILY MEDICINE

## 2025-02-25 PROCEDURE — 82728 ASSAY OF FERRITIN: CPT | Performed by: FAMILY MEDICINE

## 2025-02-25 PROCEDURE — 84443 ASSAY THYROID STIM HORMONE: CPT | Performed by: FAMILY MEDICINE

## 2025-02-25 PROCEDURE — 82306 VITAMIN D 25 HYDROXY: CPT | Performed by: FAMILY MEDICINE

## 2025-02-25 PROCEDURE — 83036 HEMOGLOBIN GLYCOSYLATED A1C: CPT | Performed by: FAMILY MEDICINE

## 2025-02-25 PROCEDURE — 85025 COMPLETE CBC W/AUTO DIFF WBC: CPT | Performed by: FAMILY MEDICINE

## 2025-02-25 PROCEDURE — 82607 VITAMIN B-12: CPT | Performed by: FAMILY MEDICINE

## 2025-02-25 PROCEDURE — 84153 ASSAY OF PSA TOTAL: CPT | Performed by: FAMILY MEDICINE

## 2025-02-25 PROCEDURE — 83540 ASSAY OF IRON: CPT | Performed by: FAMILY MEDICINE

## 2025-02-25 PROCEDURE — 80053 COMPREHEN METABOLIC PANEL: CPT | Performed by: FAMILY MEDICINE

## 2025-02-25 PROCEDURE — 80061 LIPID PANEL: CPT | Performed by: FAMILY MEDICINE

## 2025-02-28 ENCOUNTER — OFFICE VISIT (OUTPATIENT)
Dept: FAMILY MEDICINE | Facility: CLINIC | Age: 65
End: 2025-02-28
Payer: COMMERCIAL

## 2025-02-28 VITALS
OXYGEN SATURATION: 98 % | SYSTOLIC BLOOD PRESSURE: 138 MMHG | DIASTOLIC BLOOD PRESSURE: 82 MMHG | BODY MASS INDEX: 32.98 KG/M2 | HEART RATE: 58 BPM | HEIGHT: 69 IN | WEIGHT: 222.69 LBS

## 2025-02-28 DIAGNOSIS — F41.9 ANXIETY: ICD-10-CM

## 2025-02-28 DIAGNOSIS — E11.9 TYPE 2 DIABETES MELLITUS WITHOUT COMPLICATION, WITHOUT LONG-TERM CURRENT USE OF INSULIN: ICD-10-CM

## 2025-02-28 DIAGNOSIS — Z00.00 VISIT FOR WELL MAN HEALTH CHECK: Primary | ICD-10-CM

## 2025-02-28 DIAGNOSIS — K21.9 GERD WITHOUT ESOPHAGITIS: ICD-10-CM

## 2025-02-28 DIAGNOSIS — E78.2 MIXED HYPERLIPIDEMIA: ICD-10-CM

## 2025-02-28 DIAGNOSIS — E55.9 VITAMIN D DEFICIENCY: ICD-10-CM

## 2025-02-28 DIAGNOSIS — R03.0 ELEVATED BP WITHOUT DIAGNOSIS OF HYPERTENSION: ICD-10-CM

## 2025-02-28 DIAGNOSIS — G25.81 RESTLESS LEGS: ICD-10-CM

## 2025-02-28 DIAGNOSIS — E61.1 LOW IRON: ICD-10-CM

## 2025-02-28 PROCEDURE — 99999 PR PBB SHADOW E&M-EST. PATIENT-LVL V: CPT | Mod: PBBFAC,,,

## 2025-02-28 RX ORDER — ERGOCALCIFEROL 1.25 MG/1
50000 CAPSULE ORAL
Qty: 12 CAPSULE | Refills: 3 | Status: SHIPPED | OUTPATIENT
Start: 2025-02-28

## 2025-02-28 NOTE — PROGRESS NOTES
Annual Wellness Visit  [facility]      SUBJECTIVE:     History of Present Illness:  Patient is a 64 y.o. male with HTN, HLD, DM, GERD, and KELLY presents to clinic for an annual wellness visit.     DM: taking 2 metformin/day. And amaryl 2 mg daily. A1C up 7.7. He was previously taking 2,000 mg metformin daily. He is now on 1,000.     Anxiety: on lexapro 20 mg. Taking trazodone 150 mg nightly. Also takes gabapentin He is able to fall asleep, but cannot stay asleep. RLS worse     DLD: on atorvastatin.     GERD: on prilosec.     Vit D: taking multivit. not at goal   B-12 at goal     Iron: low   Denies hamtochezia, melena, BRBPR, hematuria, abnormal bleeding     RLS: taking gabapentin and requip. Symptoms relieved till morning and stay till he takes gabapentin at 4 pm     He last donated blood 2 months ago. Has donated 2 times in the last 6 months     Foot exam     Labs reviewed    Immunizations UTD  Colonoscopy- due 2027  Last HgbA1C-   Hemoglobin A1C   Date Value Ref Range Status   02/25/2025 7.7 (H) 4.0 - 5.6 % Final     Comment:     ADA Screening Guidelines:  5.7-6.4%  Consistent with prediabetes  >or=6.5%  Consistent with diabetes    High levels of fetal hemoglobin interfere with the HbA1C  assay. Heterozygous hemoglobin variants (HbS, HgC, etc)do  not significantly interfere with this assay.   However, presence of multiple variants may affect accuracy.     10/21/2024 7.0 (H) 4.0 - 5.6 % Final     Comment:     ADA Screening Guidelines:  5.7-6.4%  Consistent with prediabetes  >or=6.5%  Consistent with diabetes    High levels of fetal hemoglobin interfere with the HbA1C  assay. Heterozygous hemoglobin variants (HbS, HgC, etc)do  not significantly interfere with this assay.   However, presence of multiple variants may affect accuracy.     06/19/2024 12.8 (H) 4.0 - 5.6 % Final     Comment:     ADA Screening Guidelines:  5.7-6.4%  Consistent with prediabetes  >or=6.5%  Consistent with  diabetes    High levels of fetal hemoglobin interfere with the HbA1C  assay. Heterozygous hemoglobin variants (HbS, HgC, etc)do  not significantly interfere with this assay.   However, presence of multiple variants may affect accuracy.        Last lipid panel-   Lab Results   Component Value Date    CHOL 120 02/25/2025    CHOL 169 06/19/2024    CHOL 138 12/06/2023     Lab Results   Component Value Date    HDL 36 (L) 02/25/2025    HDL 40 06/19/2024    HDL 37 (L) 12/06/2023     Lab Results   Component Value Date    LDLCALC 61.4 (L) 02/25/2025    LDLCALC 68.4 06/19/2024    LDLCALC 76.2 12/06/2023     Lab Results   Component Value Date    TRIG 113 02/25/2025    TRIG 303 (H) 06/19/2024    TRIG 124 12/06/2023       Lab Results   Component Value Date    CHOLHDL 30.0 02/25/2025    CHOLHDL 23.7 06/19/2024    CHOLHDL 26.8 12/06/2023          Review of Systems   Constitutional:  Negative for fever and malaise/fatigue.   Respiratory:  Negative for cough, shortness of breath and wheezing.    Cardiovascular:  Negative for chest pain, palpitations and leg swelling.   Gastrointestinal:  Negative for blood in stool, melena, nausea and vomiting.   Genitourinary:  Negative for dysuria and hematuria.   Neurological:  Negative for dizziness and headaches.            Review of patient's allergies indicates:  No Known Allergies    Past Medical History:   Diagnosis Date    Allergy     Anxiety     Diabetes mellitus     Elevated BP     GERD (gastroesophageal reflux disease)     Hyperlipidemia     KELLY (obstructive sleep apnea)     wears cpap    Restless leg syndrome     Type 2 diabetes mellitus without complication, without long-term current use of insulin 6/15/2023     Past Surgical History:   Procedure Laterality Date    ADENOIDECTOMY      ARTHROPLASTY OF HIP BY ANTERIOR APPROACH Left 1/27/2023    Procedure: ARTHROPLASTY, HIP TOTAL,;  Surgeon: Karlo Springer III, MD;  Location: [facility];  Service: Orthopedics;  Laterality: Left;   ANTERIOR  APPROACH: LEFT: DEPUY - ACTIS + PINNACLE    ARTHROPLASTY OF HIP BY ANTERIOR APPROACH Right 11/15/2023    Procedure: ARTHROPLASTY, HIP, TOTAL, ANTERIOR APPROACH: RIGHT: DEPUY - ACTIS + PINNACLE;  Surgeon: Karlo Springer III, MD;  Location: [facility] OR;  Service: Orthopedics;  Laterality: Right;    COLONOSCOPY N/A 4/8/2017    Procedure: COLONOSCOPY;  Surgeon: Kyle Moreno MD;  Location: [facility] ENDO (4TH FLR);  Service: Endoscopy;  Laterality: N/A;    NASAL SEPTUM SURGERY      RECONSTRUCTION OF NOSE N/A 6/22/2018    Procedure: RECONSTRUCTION, NOSE;  Surgeon: Tulio Echols III, MD;  Location: [facility] OR 2ND FLR;  Service: ENT;  Laterality: N/A;  1 HOUR TOTAL / LATERA  NASAL VALVE IMPLANTS    TONSILLECTOMY       Family History   Problem Relation Name Age of Onset    Diabetes Mother Tabitha Jese     Depression Mother Tbaitha Jese     Arthritis Mother Tabitha Jese     COPD Mother Tabitha Jese     Heart disease Father Seng Jese     Cancer Father Seng Jese         pancreatic    Diabetes Father Seng Jese     Diverticulitis Father Seng Jese     Restless legs syndrome Father Seng Jese     No Known Problems Sister      Heart disease Paternal Uncle Jag Sawant      Social History[1]       OBJECTIVE:     Vital Signs (Most Recent)  Vitals:    02/28/25 0746   BP: 138/82   Pulse: (!) 58      Body mass index is 32.88 kg/m².       Physical Exam  Constitutional:       General: He is not in acute distress.     Appearance: He is not toxic-appearing.   HENT:      Head: Normocephalic and atraumatic.      Right Ear: Tympanic membrane, ear canal and external ear normal.      Left Ear: Tympanic membrane, ear canal and external ear normal.   Eyes:      Conjunctiva/sclera: Conjunctivae normal.   Cardiovascular:      Rate and Rhythm: Normal rate and regular rhythm.   Pulmonary:      Effort: Pulmonary effort is normal. No respiratory distress.      Breath sounds: Normal breath sounds. No wheezing.   Abdominal:      General: Bowel  sounds are normal. There is no distension.      Tenderness: There is no abdominal tenderness.   Musculoskeletal:      Right lower leg: No edema.      Left lower leg: No edema.   Feet:      Right foot:      Protective Sensation: 10 sites tested.  10 sites sensed.      Left foot:      Protective Sensation: 10 sites tested.  10 sites sensed.   Lymphadenopathy:      Cervical: No cervical adenopathy.   Neurological:      Mental Status: He is alert and oriented to person, place, and time.            ASSESSMENT/PLAN:   64 y.o.male presents to clinic for annual wellness visit.     Visit for well man health check  -     CBC Auto Differential; Future; Expected date: 02/28/2025  -     Comprehensive Metabolic Panel; Future; Expected date: 02/28/2025  -     Hemoglobin A1C; Future; Expected date: 02/28/2025  -     ergocalciferol (ERGOCALCIFEROL) 50,000 unit Cap; Take 1 capsule (50,000 Units total) by mouth every 7 days. Then start daily OTC replacement after this Rx is complete  Dispense: 12 capsule; Refill: 3  -     IRON AND TIBC; Future; Expected date: 02/28/2025  -     FERRITIN; Future; Expected date: 02/28/2025    Mixed hyperlipidemia  -     CBC Auto Differential; Future; Expected date: 02/28/2025  -     Comprehensive Metabolic Panel; Future; Expected date: 02/28/2025    Type 2 diabetes mellitus without complication, without long-term current use of insulin  -     Hemoglobin A1C; Future; Expected date: 02/28/2025    Low iron  -     IRON AND TIBC; Future; Expected date: 02/28/2025  -     FERRITIN; Future; Expected date: 02/28/2025    Anxiety    Restless legs  -     IRON AND TIBC; Future; Expected date: 02/28/2025  -     FERRITIN; Future; Expected date: 02/28/2025    GERD without esophagitis  -     CBC Auto Differential; Future; Expected date: 02/28/2025  -     Comprehensive Metabolic Panel; Future; Expected date: 02/28/2025    Vitamin D deficiency  -     ergocalciferol (ERGOCALCIFEROL) 50,000 unit Cap; Take 1 capsule (50,000  Units total) by mouth every 7 days. Then start daily OTC replacement after this Rx is complete  Dispense: 12 capsule; Refill: 3    Elevated BP without diagnosis of hypertension  -     CBC Auto Differential; Future; Expected date: 02/28/2025  -     Comprehensive Metabolic Panel; Future; Expected date: 02/28/2025    BMI 33.0-33.9,adult    Anxiety: Doing well on current regimen. Will continue     Vit D def: labs show low Vit D. Will start weekly Rx Vitamin D    DM: A1C up trending to 7.7. Will increase Metformin to 1,000 mg BID    Iron Def: Pt is donating blood. Will start OTC iron. Instructed him to stop donating.     RLS: He feels that this is worsening. Continue gabapentin and requip.Pt iron levels low. Start OTC iron.     GERD: Continue omeprazol.  Doing well on current regimen. Will continue       Stop donating blood   Start OTC iron  Up Metformin to 2 pills twice a day for a total of 2,000 mg daily   Start weekly Vit D     Continue lexapro 20 mg  Continue trazodone 150 mg nightly  Continue requip  Continue omeprazole  Continue vitamin D      Appt with Hemalatha in May, labs prior   Contact office if you need to be seen sooner          I spent a total of 30 minutes on the day of the visit.This includes face to face time and non-face to face time preparing to see the patient (eg, review of tests), obtaining and/or reviewing separately obtained history, documenting clinical information in the electronic or other health record, independently interpreting results and communicating results to the patient/family/caregiver, or care coordinator.      Hemalatha Brooks PA-C             [1]   Social History  Tobacco Use    Smoking status: Never    Smokeless tobacco: Never   Substance Use Topics    Alcohol use: Not Currently    Drug use: No

## 2025-02-28 NOTE — PATIENT INSTRUCTIONS
Stop donating blood   Start OTC iron  Up Metformin to 2 pills twice a day for a total of 2,000 mg daily   Start weekly Vit D     Continue lexapro 20 mg  Continue trazodone 150 mg nightly  Continue requip  Continue omeprazole  Continue vitamin D      Appt with Hemalatha in May, labs prior   Contact office if you need to be seen sooner

## 2025-03-31 NOTE — ASSESSMENT & PLAN NOTE
The patient is presently asymptomatic.  I will continue allopurinol at 300 mg p.o. daily.  His last hemoglobin A1c was 9.7 mg/dL.  He is now compliant with his medication.   Treated with Requip; stable.  Followed per Neurology; last seen 11/9/22.

## 2025-05-06 ENCOUNTER — OFFICE VISIT (OUTPATIENT)
Dept: FAMILY MEDICINE | Facility: CLINIC | Age: 65
End: 2025-05-06
Payer: COMMERCIAL

## 2025-05-06 ENCOUNTER — HOSPITAL ENCOUNTER (OUTPATIENT)
Dept: RADIOLOGY | Facility: HOSPITAL | Age: 65
Discharge: HOME | End: 2025-05-06
Payer: COMMERCIAL

## 2025-05-06 VITALS
BODY MASS INDEX: 31.21 KG/M2 | HEIGHT: 69 IN | HEART RATE: 86 BPM | DIASTOLIC BLOOD PRESSURE: 78 MMHG | OXYGEN SATURATION: 99 % | SYSTOLIC BLOOD PRESSURE: 126 MMHG | WEIGHT: 210.75 LBS

## 2025-05-06 DIAGNOSIS — M54.6 ACUTE RIGHT-SIDED THORACIC BACK PAIN: Primary | ICD-10-CM

## 2025-05-06 DIAGNOSIS — M54.6 ACUTE RIGHT-SIDED THORACIC BACK PAIN: ICD-10-CM

## 2025-05-06 DIAGNOSIS — E11.9 TYPE 2 DIABETES MELLITUS WITHOUT COMPLICATION, WITHOUT LONG-TERM CURRENT USE OF INSULIN: ICD-10-CM

## 2025-05-06 DIAGNOSIS — E78.2 MIXED HYPERLIPIDEMIA: ICD-10-CM

## 2025-05-06 DIAGNOSIS — F41.9 ANXIETY: ICD-10-CM

## 2025-05-06 DIAGNOSIS — K21.9 GERD WITHOUT ESOPHAGITIS: ICD-10-CM

## 2025-05-06 PROCEDURE — 3008F BODY MASS INDEX DOCD: CPT | Mod: CPTII,S$GLB,,

## 2025-05-06 PROCEDURE — 3072F LOW RISK FOR RETINOPATHY: CPT | Mod: CPTII,S$GLB,,

## 2025-05-06 PROCEDURE — 1160F RVW MEDS BY RX/DR IN RCRD: CPT | Mod: CPTII,S$GLB,,

## 2025-05-06 PROCEDURE — 72070 X-RAY EXAM THORAC SPINE 2VWS: CPT | Mod: 26,,, | Performed by: RADIOLOGY

## 2025-05-06 PROCEDURE — 72070 X-RAY EXAM THORAC SPINE 2VWS: CPT | Mod: TC,FY

## 2025-05-06 PROCEDURE — 3074F SYST BP LT 130 MM HG: CPT | Mod: CPTII,S$GLB,,

## 2025-05-06 PROCEDURE — 3066F NEPHROPATHY DOC TX: CPT | Mod: CPTII,S$GLB,,

## 2025-05-06 PROCEDURE — 3078F DIAST BP <80 MM HG: CPT | Mod: CPTII,S$GLB,,

## 2025-05-06 PROCEDURE — 99214 OFFICE O/P EST MOD 30 MIN: CPT | Mod: S$GLB,,,

## 2025-05-06 PROCEDURE — 3061F NEG MICROALBUMINURIA REV: CPT | Mod: CPTII,S$GLB,,

## 2025-05-06 PROCEDURE — 3051F HG A1C>EQUAL 7.0%<8.0%: CPT | Mod: CPTII,S$GLB,,

## 2025-05-06 PROCEDURE — 1159F MED LIST DOCD IN RCRD: CPT | Mod: CPTII,S$GLB,,

## 2025-05-06 PROCEDURE — 99999 PR PBB SHADOW E&M-EST. PATIENT-LVL V: CPT | Mod: PBBFAC,,,

## 2025-05-06 RX ORDER — LIDOCAINE 50 MG/G
1 PATCH TOPICAL DAILY
Qty: 14 PATCH | Refills: 0 | Status: SHIPPED | OUTPATIENT
Start: 2025-05-06 | End: 2025-05-20

## 2025-05-06 RX ORDER — DICLOFENAC SODIUM 10 MG/G
2 GEL TOPICAL 4 TIMES DAILY
Qty: 100 G | Refills: 0 | Status: SHIPPED | OUTPATIENT
Start: 2025-05-06 | End: 2025-05-20

## 2025-05-06 RX ORDER — CYCLOBENZAPRINE HCL 5 MG
5 TABLET ORAL 3 TIMES DAILY PRN
Qty: 30 TABLET | Refills: 0 | Status: SHIPPED | OUTPATIENT
Start: 2025-05-06 | End: 2025-05-16

## 2025-05-06 NOTE — PATIENT INSTRUCTIONS
X ray today  Voltaren gel  Lidocaine patches    Flexeril. Do not take this with gabapentin and trazodone. This can be sedating. Don't drive on this medication    Continue Amaryl and metformin  Continue statin  Continue omeprazole  Continue lexapro  Continue Vit D  Try iron EOD     Follow up with PCP as needed

## 2025-05-06 NOTE — PROGRESS NOTES
"Subjective     Patient ID: Dylan Sawant is a 64 y.o. male.    Chief Complaint: Abdominal Pain and Anorexia      HPI  Patient is a 64 y.o. male with HTN, HLD, DM, GERD, and KELLY presents to clinic for abdominal pain.     Describes this as an aggravating pain. Radiates to his back. Pain is lower right abdomen and lower right back  Denies N/V, pain not effected by meals  States that this feels like a pulled muscle   Sitting makes the pain worse. Movement makes this better  Denies any injury or trauma. 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 He is down 12 lbs. Diet consist of 2 meals a day and no soda. More water      DM: taking 2 metformin/day. And amaryl 2 mg daily.        Anxiety: on lexapro 20 mg. Taking trazodone 150 mg nightly. Also takes gabapentin. Insomnia better. RLS still bad. Taking iron did not affect this      DLD: on atorvastatin.      GERD: on prilosec.      Vit D: taking multivit.  B-12 at goal      Iron: low. States that this was hard on his stomach so he stopped. Had taken for several months     Review of Systems   Constitutional:  Negative for fatigue and fever.   Respiratory:  Negative for cough, shortness of breath and wheezing.    Cardiovascular:  Negative for chest pain, palpitations and leg swelling.   Gastrointestinal:  Positive for abdominal pain. Negative for constipation, diarrhea, nausea and vomiting.   Genitourinary:  Negative for difficulty urinating, flank pain, frequency and hematuria.   Musculoskeletal:  Positive for back pain. Negative for leg pain.   Integumentary:  Negative for rash.   Neurological:  Negative for dizziness, light-headedness and headaches.          Objective     Vitals:    05/06/25 0826   BP: 126/78   Pulse: 86   SpO2: 99%   Weight: 95.6 kg (210 lb 12.2 oz)   Height: 5' 9" (1.753 m)   PainSc: 0-No pain      Physical Exam  Constitutional:       General: He is not in acute distress.     " Appearance: He is not toxic-appearing.   HENT:      Head: Normocephalic and atraumatic.   Eyes:      General:         Right eye: No discharge.         Left eye: No discharge.      Conjunctiva/sclera: Conjunctivae normal.   Cardiovascular:      Rate and Rhythm: Normal rate and regular rhythm.   Pulmonary:      Effort: Pulmonary effort is normal. No respiratory distress.      Breath sounds: Normal breath sounds. No wheezing.   Abdominal:      General: Bowel sounds are normal. There is no distension.      Palpations: Abdomen is soft. There is no mass.      Tenderness: There is no abdominal tenderness. There is no right CVA tenderness, left CVA tenderness or guarding.      Hernia: No hernia is present.   Musculoskeletal:         General: Tenderness present.      Right lower leg: No edema.      Left lower leg: No edema.      Comments: TTP on right back  Back spasm   Lymphadenopathy:      Cervical: No cervical adenopathy.   Skin:     Findings: No rash.   Neurological:      Mental Status: He is alert and oriented to person, place, and time.              Assessment and Plan     1. Acute right-sided thoracic back pain  -     X-Ray Thoracic Spine AP Lateral; Future; Expected date: 05/06/2025  -     LIDOcaine (LIDODERM) 5 %; Place 1 patch onto the skin once daily. Remove & Discard patch within 12 hours or as directed by MD for 14 days  Dispense: 14 patch; Refill: 0  -     diclofenac sodium (VOLTAREN ARTHRITIS PAIN) 1 % Gel; Apply 2 g topically 4 (four) times daily. for 14 days  Dispense: 100 g; Refill: 0  -     cyclobenzaprine (FLEXERIL) 5 MG tablet; Take 1 tablet (5 mg total) by mouth 3 (three) times daily as needed for Muscle spasms.  Dispense: 30 tablet; Refill: 0    2. Type 2 diabetes mellitus without complication, without long-term current use of insulin    3. Mixed hyperlipidemia    4. GERD without esophagitis    5. 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   Rx for flexeril for muscle spasm       Continue Amaryl and metformin  Continue statin  Continue omeprazole  Continue lexapro  Continue Vit D  Try iron EOD       X ray today  Voltaren gel  Lidocaine patches    Flexeril. Do not take this with gabapentin and trazodone. This can be sedating. Don't drive on this medication      Follow up with PCP as needed   30 minutes of total time spent on the encounter, which includes face to face time and non-face to face time preparing to see the patient (eg, review of tests), Obtaining and/or reviewing separately obtained history, Documenting clinical information in the electronic or other health record, Independently interpreting results (not separately reported) and communicating results to the patient/family/caregiver, or Care coordination (not separately reported).      Hemalatha Brooks PA-C  Family Practice/Internal Medicine   Office Phone: [phone]

## 2025-05-20 ENCOUNTER — TELEPHONE (OUTPATIENT)
Dept: FAMILY MEDICINE | Facility: CLINIC | Age: 65
End: 2025-05-20

## 2025-05-20 ENCOUNTER — LAB VISIT (OUTPATIENT)
Dept: LAB | Facility: HOSPITAL | Age: 65
End: 2025-05-20
Attending: FAMILY MEDICINE
Payer: COMMERCIAL

## 2025-05-20 ENCOUNTER — OFFICE VISIT (OUTPATIENT)
Dept: FAMILY MEDICINE | Facility: CLINIC | Age: 65
End: 2025-05-20
Payer: COMMERCIAL

## 2025-05-20 VITALS
HEART RATE: 94 BPM | DIASTOLIC BLOOD PRESSURE: 72 MMHG | OXYGEN SATURATION: 96 % | SYSTOLIC BLOOD PRESSURE: 128 MMHG | HEIGHT: 69 IN | WEIGHT: 202.81 LBS | BODY MASS INDEX: 30.04 KG/M2

## 2025-05-20 DIAGNOSIS — R10.9 ABDOMINAL PAIN, UNSPECIFIED ABDOMINAL LOCATION: Primary | ICD-10-CM

## 2025-05-20 DIAGNOSIS — R10.9 ABDOMINAL PAIN, UNSPECIFIED ABDOMINAL LOCATION: ICD-10-CM

## 2025-05-20 DIAGNOSIS — R10.32 LEFT LOWER QUADRANT PAIN: ICD-10-CM

## 2025-05-20 LAB
ABSOLUTE EOSINOPHIL (OHS): 0.08 K/UL
ABSOLUTE MONOCYTE (OHS): 1.34 K/UL (ref 0.3–1)
ABSOLUTE NEUTROPHIL COUNT (OHS): 11.47 K/UL (ref 1.8–7.7)
ALBUMIN SERPL BCP-MCNC: 2.9 G/DL (ref 3.5–5.2)
ALP SERPL-CCNC: 292 UNIT/L (ref 40–150)
ALT SERPL W/O P-5'-P-CCNC: 112 UNIT/L (ref 10–44)
AMYLASE SERPL-CCNC: 31 UNIT/L (ref 20–110)
ANION GAP (OHS): 15 MMOL/L (ref 8–16)
AST SERPL-CCNC: 87 UNIT/L (ref 11–45)
BASOPHILS # BLD AUTO: 0.06 K/UL
BASOPHILS NFR BLD AUTO: 0.4 %
BILIRUB SERPL-MCNC: 1.7 MG/DL (ref 0.1–1)
BUN SERPL-MCNC: 10 MG/DL (ref 8–23)
CALCIUM SERPL-MCNC: 9.6 MG/DL (ref 8.7–10.5)
CHLORIDE SERPL-SCNC: 97 MMOL/L (ref 95–110)
CO2 SERPL-SCNC: 26 MMOL/L (ref 23–29)
CREAT SERPL-MCNC: 0.9 MG/DL (ref 0.5–1.4)
ERYTHROCYTE [DISTWIDTH] IN BLOOD BY AUTOMATED COUNT: 15.6 % (ref 11.5–14.5)
GFR SERPLBLD CREATININE-BSD FMLA CKD-EPI: >60 ML/MIN/1.73/M2
GLUCOSE SERPL-MCNC: 127 MG/DL (ref 70–110)
HCT VFR BLD AUTO: 45.2 % (ref 40–54)
HGB BLD-MCNC: 13.9 GM/DL (ref 14–18)
IMM GRANULOCYTES # BLD AUTO: 0.06 K/UL (ref 0–0.04)
IMM GRANULOCYTES NFR BLD AUTO: 0.4 % (ref 0–0.5)
LIPASE SERPL-CCNC: 20 U/L (ref 4–60)
LYMPHOCYTES # BLD AUTO: 1.45 K/UL (ref 1–4.8)
MCH RBC QN AUTO: 26.6 PG (ref 27–31)
MCHC RBC AUTO-ENTMCNC: 30.8 G/DL (ref 32–36)
MCV RBC AUTO: 86 FL (ref 82–98)
NUCLEATED RBC (/100WBC) (OHS): 0 /100 WBC
PLATELET # BLD AUTO: 288 K/UL (ref 150–450)
PMV BLD AUTO: 10.8 FL (ref 9.2–12.9)
POTASSIUM SERPL-SCNC: 3.9 MMOL/L (ref 3.5–5.1)
PROT SERPL-MCNC: 7.3 GM/DL (ref 6–8.4)
RBC # BLD AUTO: 5.23 M/UL (ref 4.6–6.2)
RELATIVE EOSINOPHIL (OHS): 0.6 %
RELATIVE LYMPHOCYTE (OHS): 10 % (ref 18–48)
RELATIVE MONOCYTE (OHS): 9.3 % (ref 4–15)
RELATIVE NEUTROPHIL (OHS): 79.3 % (ref 38–73)
SODIUM SERPL-SCNC: 138 MMOL/L (ref 136–145)
WBC # BLD AUTO: 14.46 K/UL (ref 3.9–12.7)

## 2025-05-20 PROCEDURE — 3061F NEG MICROALBUMINURIA REV: CPT | Mod: CPTII,S$GLB,,

## 2025-05-20 PROCEDURE — 85025 COMPLETE CBC W/AUTO DIFF WBC: CPT

## 2025-05-20 PROCEDURE — 1159F MED LIST DOCD IN RCRD: CPT | Mod: CPTII,S$GLB,,

## 2025-05-20 PROCEDURE — 99214 OFFICE O/P EST MOD 30 MIN: CPT | Mod: S$GLB,,,

## 2025-05-20 PROCEDURE — 1160F RVW MEDS BY RX/DR IN RCRD: CPT | Mod: CPTII,S$GLB,,

## 2025-05-20 PROCEDURE — 3074F SYST BP LT 130 MM HG: CPT | Mod: CPTII,S$GLB,,

## 2025-05-20 PROCEDURE — 83690 ASSAY OF LIPASE: CPT

## 2025-05-20 PROCEDURE — 99999 PR PBB SHADOW E&M-EST. PATIENT-LVL V: CPT | Mod: PBBFAC,,,

## 2025-05-20 PROCEDURE — 3008F BODY MASS INDEX DOCD: CPT | Mod: CPTII,S$GLB,,

## 2025-05-20 PROCEDURE — 36415 COLL VENOUS BLD VENIPUNCTURE: CPT | Mod: PO

## 2025-05-20 PROCEDURE — 82040 ASSAY OF SERUM ALBUMIN: CPT

## 2025-05-20 PROCEDURE — 3078F DIAST BP <80 MM HG: CPT | Mod: CPTII,S$GLB,,

## 2025-05-20 PROCEDURE — 3072F LOW RISK FOR RETINOPATHY: CPT | Mod: CPTII,S$GLB,,

## 2025-05-20 PROCEDURE — 82150 ASSAY OF AMYLASE: CPT

## 2025-05-20 PROCEDURE — 3066F NEPHROPATHY DOC TX: CPT | Mod: CPTII,S$GLB,,

## 2025-05-20 PROCEDURE — 3051F HG A1C>EQUAL 7.0%<8.0%: CPT | Mod: CPTII,S$GLB,,

## 2025-05-20 NOTE — TELEPHONE ENCOUNTER
States his insurance company is telling him that [facility] has become too expensive and he needs to have his CT done somewhere else. CT scheduled tomorrow at [facility] canceled per patient request. Order printed for patient to take to the facility where he obtains his CT.

## 2025-05-20 NOTE — PROGRESS NOTES
"Subjective     Patient ID: Dylan Sawant is a 64 y.o. male.    Chief Complaint: Constipation, Abdominal Pain, and Anorexia      HPI  Patient is a 64 y.o. male with HTN, HLD, DM, GERD, and KELLY presents to clinic for abdominal pain.     Was seen 2 weeks ago. States that he felt like he had a pulled muscle. X ray unremarkable.He described this as a back pain that radiates to the front and is worse with sitting. He was given Voltaren, lidocaine patches, and flexeril  This back pain better, but still having stomach pain     Since last visit states that this is more abdomen and less back now  He has been having constipation with liquid stool  Did an enema at home yesterday. This helped some.     Denies hematochezia, N/V, fever, BRBPR      Review of Systems   Constitutional:  Negative for fatigue and fever.   Respiratory:  Negative for cough, shortness of breath and wheezing.    Cardiovascular:  Negative for chest pain, palpitations and leg swelling.   Gastrointestinal:  Positive for abdominal pain, constipation and diarrhea. Negative for anal bleeding, blood in stool, nausea and vomiting.   Integumentary:  Negative for rash.   Neurological:  Negative for dizziness, light-headedness and headaches.          Objective     Vitals:    05/20/25 0810   BP: 128/72   Pulse: 94   SpO2: 96%   Weight: 92 kg (202 lb 13.2 oz)   Height: 5' 9" (1.753 m)   PainSc:   4   PainLoc: Abdomen      Physical Exam  Constitutional:       General: He is not in acute distress.     Appearance: He is not toxic-appearing.   HENT:      Head: Normocephalic and atraumatic.   Eyes:      General:         Right eye: No discharge.         Left eye: No discharge.      Conjunctiva/sclera: Conjunctivae normal.   Cardiovascular:      Rate and Rhythm: Normal rate and regular rhythm.   Pulmonary:      Effort: Pulmonary effort is normal. No respiratory distress.      Breath sounds: Normal breath sounds. No wheezing.   Abdominal:      General: Bowel sounds are " normal.      Tenderness: There is abdominal tenderness.      Comments: TTP LLQ    Musculoskeletal:      Right lower leg: No edema.      Left lower leg: No edema.   Lymphadenopathy:      Cervical: No cervical adenopathy.   Skin:     Findings: No rash.   Neurological:      Mental Status: He is alert and oriented to person, place, and time.              Assessment and Plan     1. Abdominal pain, unspecified abdominal location  -     X-Ray Abdomen Flat And Erect; Future; Expected date: 05/20/2025  -     CBC Auto Differential; Future; Expected date: 05/20/2025  -     Comprehensive Metabolic Panel; Future; Expected date: 05/20/2025  -     Amylase; Future; Expected date: 05/20/2025  -     Lipase; Future; Expected date: 05/20/2025    2. Left lower quadrant pain  -     CT Abdomen Pelvis With IV Contrast Routine Oral Contrast; Future; Expected date: 05/20/2025      Pt last colonoscopy in 2017 shows diverticulosis   Still having pain that is now more abdominal and less back  States that this is more LLQ now  TTP LLQ on PE  Will get labs today and CT scan to check for diverticulitis     Labs today  CT scan tomorrow  No more enemas   Will contact you with results           30 minutes of total time spent on the encounter, which includes face to face time and non-face to face time preparing to see the patient (eg, review of tests), Obtaining and/or reviewing separately obtained history, Documenting clinical information in the electronic or other health record, Independently interpreting results (not separately reported) and communicating results to the patient/family/caregiver, or Care coordination (not separately reported).      Hemalatha Brooks PA-C  Family Practice/Internal Medicine   Office Phone: [phone]

## 2025-05-20 NOTE — TELEPHONE ENCOUNTER
----- Message from Lucia sent at 5/20/2025  1:55 PM CDT -----  Regarding: self  Who called: selfWhat is the request in detail: pt is asking if CT tomorrow can be ordered some place else? His insurance is stating he is paying too much for order at [facility] and would like it to go some place elseCan the clinic reply by [facility]? NoWould the patient rather a call back or a response via [facility]? Call [facility] call back number: [phone] or [phone] cellAdditional Information:Thank you.

## 2025-05-21 ENCOUNTER — TELEPHONE (OUTPATIENT)
Dept: FAMILY MEDICINE | Facility: CLINIC | Age: 65
End: 2025-05-21
Payer: COMMERCIAL

## 2025-05-21 ENCOUNTER — RESULTS FOLLOW-UP (OUTPATIENT)
Dept: FAMILY MEDICINE | Facility: CLINIC | Age: 65
End: 2025-05-21
Payer: COMMERCIAL

## 2025-05-21 ENCOUNTER — HOSPITAL ENCOUNTER (OUTPATIENT)
Dept: RADIOLOGY | Facility: HOSPITAL | Age: 65
Discharge: HOME | End: 2025-05-21
Payer: COMMERCIAL

## 2025-05-21 DIAGNOSIS — R10.9 ABDOMINAL PAIN, UNSPECIFIED ABDOMINAL LOCATION: ICD-10-CM

## 2025-05-21 DIAGNOSIS — R10.9 ABDOMINAL PAIN, UNSPECIFIED ABDOMINAL LOCATION: Primary | ICD-10-CM

## 2025-05-21 DIAGNOSIS — K76.9 LIVER LESION: Primary | ICD-10-CM

## 2025-05-21 PROCEDURE — 76705 ECHO EXAM OF ABDOMEN: CPT | Mod: 26,,, | Performed by: RADIOLOGY

## 2025-05-21 PROCEDURE — 74019 RADEX ABDOMEN 2 VIEWS: CPT | Mod: TC,FY,PO

## 2025-05-21 PROCEDURE — 76705 ECHO EXAM OF ABDOMEN: CPT | Mod: TC

## 2025-05-21 PROCEDURE — 74019 RADEX ABDOMEN 2 VIEWS: CPT | Mod: 26,,, | Performed by: RADIOLOGY

## 2025-05-22 ENCOUNTER — TELEPHONE (OUTPATIENT)
Dept: INTERVENTIONAL RADIOLOGY/VASCULAR | Facility: HOSPITAL | Age: 65
End: 2025-05-22
Payer: COMMERCIAL

## 2025-05-22 ENCOUNTER — TELEPHONE (OUTPATIENT)
Dept: FAMILY MEDICINE | Facility: CLINIC | Age: 65
End: 2025-05-22
Payer: COMMERCIAL

## 2025-05-22 ENCOUNTER — HOSPITAL ENCOUNTER (OUTPATIENT)
Dept: RADIOLOGY | Facility: HOSPITAL | Age: 65
Discharge: HOME | End: 2025-05-22
Attending: FAMILY MEDICINE
Payer: COMMERCIAL

## 2025-05-22 ENCOUNTER — TELEPHONE (OUTPATIENT)
Dept: HEMATOLOGY/ONCOLOGY | Facility: CLINIC | Age: 65
End: 2025-05-22
Payer: COMMERCIAL

## 2025-05-22 DIAGNOSIS — K76.9 LIVER LESION: ICD-10-CM

## 2025-05-22 DIAGNOSIS — R16.0 LIVER MASS: Primary | ICD-10-CM

## 2025-05-22 PROCEDURE — 25500020 PHARM REV CODE 255: Performed by: FAMILY MEDICINE

## 2025-05-22 PROCEDURE — 71260 CT THORAX DX C+: CPT | Mod: 26,,, | Performed by: RADIOLOGY

## 2025-05-22 PROCEDURE — 74177 CT ABD & PELVIS W/CONTRAST: CPT | Mod: TC

## 2025-05-22 PROCEDURE — 74177 CT ABD & PELVIS W/CONTRAST: CPT | Mod: 26,,, | Performed by: RADIOLOGY

## 2025-05-22 RX ORDER — DOCUSATE SODIUM 100 MG/1
100 CAPSULE, LIQUID FILLED ORAL 2 TIMES DAILY
Qty: 180 CAPSULE | Refills: 2 | Status: SHIPPED | OUTPATIENT
Start: 2025-05-22

## 2025-05-22 RX ADMIN — IOHEXOL 30 ML: 350 INJECTION, SOLUTION INTRAVENOUS at 02:05

## 2025-05-22 RX ADMIN — IOHEXOL 100 ML: 350 INJECTION, SOLUTION INTRAVENOUS at 03:05

## 2025-05-22 NOTE — NURSING
Called to schedule IR clinic consult.  Pt scheduled for IR clinic consult on  @  .  Patient aware appt is for consult only.  Provided patient with IR clinic # for any questions are scheduling needs.

## 2025-05-23 ENCOUNTER — PATIENT MESSAGE (OUTPATIENT)
Dept: FAMILY MEDICINE | Facility: CLINIC | Age: 65
End: 2025-05-23
Payer: COMMERCIAL

## 2025-05-23 ENCOUNTER — TELEPHONE (OUTPATIENT)
Dept: FAMILY MEDICINE | Facility: CLINIC | Age: 65
End: 2025-05-23
Payer: COMMERCIAL

## 2025-05-23 DIAGNOSIS — C80.1 CANCER: Primary | ICD-10-CM

## 2025-05-23 RX ORDER — TRAMADOL HYDROCHLORIDE 50 MG/1
50 TABLET, FILM COATED ORAL EVERY 6 HOURS PRN
Qty: 20 TABLET | Refills: 0 | Status: SHIPPED | OUTPATIENT
Start: 2025-05-23 | End: 2025-05-28

## 2025-05-26 NOTE — PROGRESS NOTES
"PATIENT: Dylan Sawant  MRN: [id number]  DATE: 5/27/2025    Scribe Attestation: I, Bre Mora, am scribing under the direction and in the presence of Barbara Bang MD. I attest that this documentation is true, accurate and complete to the best of my knowledge.     Diagnosis:   1. Liver lesion    2. Liver mass    3. Hepatomegaly    4. Anemia of chronic disease    5. Iron deficiency    6. Other specified counseling    7. Fatigue, unspecified type    8. Decreased appetite    9. Abnormal weight loss    10. Constipation, unspecified constipation type    11. Exercise counseling    12. Dietary counseling    13. Back pain, unspecified back location, unspecified back pain laterality, unspecified chronicity    14. Abdominal pain, unspecified abdominal location    15. GERD without esophagitis    16. Pancreatic mass    17. Spasm of bowel      Chief Complaint: Liver lesion on CT    Oncologic History:      Oncologic History     Oncologic Treatment     Pathology       Subjective:    History of Present Illness: Mr. Sawant is a 64 y.o. male who presents for evaluation and management of liver lesions concerning for metastasis. Patient has an appointment scheduled with IR for consult for a liver biopsy on 06/05/2025.     Today, patient reports that his symptoms began with back pain. He was noted to have swelling and firmness to the right side of his back. He was initially given medication for back spasms. When his symptoms did not improve, his PCP ordered imaging that showed liver lesions and masses. He also has 2 lesions in his pancreas. He endorses fatigue, weight loss of 20 lbs over the past few months, decreased appetite, and constipation. He states that hitting a bump in the car causes mild pain. He states that he feels like a "lava lamp".    Patient reports that his right sided back pain and abdominal pain is uncontrolled on Tramadol 50 mg q6 hours prn. He also wears pain patches with no improvement of symptoms. He has tried " Tylenol Arthritis with no improvement of pain. He is requesting a stronger pain medication.    FMHx of father having pancreatic cancer in his 70s. Father also had heart disease and a quadruple bypass surgery.    Patient reports that he was on oral iron supplements in 02/2025 due to restless leg syndrome and iron deficiency. However, he stopped taking it due to abdominal issues.    Patient endorses acid reflux and epigastric abdominal pain. He takes omeprazole prn.    He endorses difficulty sleeping secondary to pain and discomfort.    Denies rash or itching. Denies history of blood clot or taking blood thinners. Pt has no fever, chills, rigors, or night sweats. Pt denies headache, confusion, or Lhermitte symptomatology. No C/T/L spine pain. Denies blurry vision. No changes in urinary habits. Pt has no cough or labored breathing. Denies palpitation, chest pain, anginal or pleuritic, or leg swelling.     Of note, patient's wife is present with him in clinic today.    Past medical, surgical, family, and social histories have been reviewed and updated below.    Past Medical History:   Past Medical History:   Diagnosis Date    Allergy     Anxiety     Diabetes mellitus     Elevated BP     GERD (gastroesophageal reflux disease)     Hyperlipidemia     KELLY (obstructive sleep apnea)     wears cpap    Restless leg syndrome     Type 2 diabetes mellitus without complication, without long-term current use of insulin 6/15/2023     Past Surgical History:   Past Surgical History:   Procedure Laterality Date    ADENOIDECTOMY      ARTHROPLASTY OF HIP BY ANTERIOR APPROACH Left 1/27/2023    Procedure: ARTHROPLASTY, HIP TOTAL,;  Surgeon: Karlo Springer III, MD;  Location: [facility];  Service: Orthopedics;  Laterality: Left;   ANTERIOR APPROACH: LEFT: DEPUY - ACTIS + PINNACLE    ARTHROPLASTY OF HIP BY ANTERIOR APPROACH Right 11/15/2023    Procedure: ARTHROPLASTY, HIP, TOTAL, ANTERIOR APPROACH: RIGHT: DEPUY - ACTIS + PINNACLE;  Surgeon:  Karlo Springer III, MD;  Location: [facility] OR;  Service: Orthopedics;  Laterality: Right;    COLONOSCOPY N/A 4/8/2017    Procedure: COLONOSCOPY;  Surgeon: Kyle Moreno MD;  Location: [facility] (4TH FLR);  Service: Endoscopy;  Laterality: N/A;    NASAL SEPTUM SURGERY      RECONSTRUCTION OF NOSE N/A 6/22/2018    Procedure: RECONSTRUCTION, NOSE;  Surgeon: Tulio Echols III, MD;  Location: [facility] OR 2ND FLR;  Service: ENT;  Laterality: N/A;  1 HOUR TOTAL / LATERA  NASAL VALVE IMPLANTS    TONSILLECTOMY       Family History:   Family History   Problem Relation Name Age of Onset    Diabetes Mother Tabitha Jese     Depression Mother Tabitha Jese     Arthritis Mother Tabitha Jese     COPD Mother Tabitha Jese     Heart disease Father Seng Jese     Cancer Father Seng Jese         pancreatic    Diabetes Father Seng Jese     Diverticulitis Father Seng Jese     Restless legs syndrome Father Seng Jese     Pancreatic cancer Father Seng Jese     No Known Problems Sister      Heart disease Paternal Uncle Jag Sawant      Social History:  reports that he has never smoked. He has never used smokeless tobacco. He reports that he does not currently use alcohol. He reports that he does not use drugs.    Allergies:  Review of patient's allergies indicates:  No Known Allergies    Medications:  Current Medications[1]    Review of Systems  Comprehensive ROS is negative except for what was mentioned in HPI.     ECOG Performance Status:   ECOG SCORE           Objective:      Vitals:   Vitals:    05/27/25 0827   BP: 131/83   Pulse: 102   Temp: 98.7 °F (37.1 °C)   SpO2: 97%   Weight: 93.6 kg (206 lb 5.6 oz)     BMI: Body mass index is 30.47 kg/m².    Physical Exam  Vitals and nursing note reviewed.   Constitutional:       General: He is not in acute distress.     Appearance: He is obese. He is not toxic-appearing or diaphoretic.      Comments: Mild somnolence   HENT:      Head: Normocephalic and atraumatic.      Right Ear:  External ear normal.      Left Ear: External ear normal.      Nose: Nose normal. No congestion or rhinorrhea.      Mouth/Throat:      Mouth: Mucous membranes are moist.      Pharynx: No oropharyngeal exudate or posterior oropharyngeal erythema.      Comments: NO HALITOSIS  Eyes:      General: Lids are normal. Vision grossly intact.      Extraocular Movements: Extraocular movements intact.      Conjunctiva/sclera: Conjunctivae normal.      Right eye: Right conjunctiva is not injected. No hemorrhage.     Left eye: Left conjunctiva is not injected. No hemorrhage.     Pupils: Pupils are equal, round, and reactive to light.   Cardiovascular:      Rate and Rhythm: Normal rate and regular rhythm.      Pulses: Normal pulses.      Heart sounds: Normal heart sounds.      Comments: NO LOUD MURMER  Pulmonary:      Effort: Pulmonary effort is normal. No tachypnea, bradypnea, accessory muscle usage, prolonged expiration or respiratory distress.      Breath sounds: Normal breath sounds. No stridor. No wheezing, rhonchi or rales.      Comments: NO PLEURITIC CHEST PAIN    NO COUGH OR LABORED BREATHING  Chest:      Chest wall: No tenderness.      Comments: RIB CAGE INTACT AND NT  Abdominal:      General: Abdomen is protuberant. Bowel sounds are normal. There is distension.      Palpations: Abdomen is soft. There is fluid wave. There is no hepatomegaly, splenomegaly, mass or pulsatile mass.      Tenderness: There is generalized abdominal tenderness. There is rebound. There is no right CVA tenderness, left CVA tenderness or guarding.      Comments: Moderately positive fluid wave, diffuse tenderness to abdomen mild to moderate with very mild rebound diffusely  Mild Caput Medusae    Has subxiphoid tenderness without rebound   Musculoskeletal:         General: No swelling. Normal range of motion.      Cervical back: Normal range of motion and neck supple. No rigidity, tenderness or bony tenderness. Normal range of motion.      Thoracic  back: No spasms, tenderness or bony tenderness. Normal range of motion.      Lumbar back: Spasms and tenderness present. No swelling, edema, deformity, signs of trauma or bony tenderness. Normal range of motion.      Right lower leg: No edema.      Left lower leg: No edema.      Comments: NO CORDS PALPABLE, NEGATIVE HOMANS  Tenderness is paraspinous--mild spasm right greater than left--no radiation to the anterior abdomen    Skull intact and NT   Lymphadenopathy:      Head:      Right side of head: No submental, submandibular, tonsillar, preauricular, posterior auricular or occipital adenopathy.      Left side of head: No submental, submandibular, tonsillar, preauricular, posterior auricular or occipital adenopathy.      Cervical: No cervical adenopathy.      Right cervical: No superficial, deep or posterior cervical adenopathy.     Left cervical: No superficial, deep or posterior cervical adenopathy.      Upper Body:      Right upper body: No supraclavicular, axillary or epitrochlear adenopathy.      Left upper body: No supraclavicular, axillary or epitrochlear adenopathy.      Lower Body: No right inguinal adenopathy. No left inguinal adenopathy.   Skin:     General: Skin is warm and dry.      Coloration: Skin is not ashen, cyanotic, jaundiced, mottled or pale.      Findings: No bruising, erythema or rash.      Comments: -NO PETECHIAE  -NO OBVIOUS LESIONS BUT THOROUGH EXAM NOT CARRIED OUT   Neurological:      General: No focal deficit present.      Mental Status: He is oriented to person, place, and time.      Motor: No weakness.      Gait: Gait normal.      Deep Tendon Reflexes: Reflexes are normal and symmetric. Reflexes normal.      Reflex Scores:       Patellar reflexes are 2+ on the right side and 2+ on the left side.     Comments: NO CLONUS  ABSENT LHERMITTE'S    PERRLA EOMI, NO NYSTAGMUS    VISION AND HEARING GROSSLY NORMAL TO PERHAPS SLIGHT DEC IN HEARING NOT EFFECTING COMMUNICATION   Psychiatric:          Attention and Perception: Attention normal.         Mood and Affect: Affect normal. Mood is anxious. Affect is not labile, flat or angry.         Speech: Speech normal.         Behavior: Behavior normal. Behavior is cooperative.         Thought Content: Thought content normal.         Cognition and Memory: Cognition normal.         Judgment: Judgment normal.      Comments: SLIGHTLY SOMNOLENT BY THE END OF OUR VISIT, EASILY AROUSABLE, GAIT STABLE, SYMMETRIC    SITUATIONAL ANXIETY NORMAL--EXCELLENT SUPPORT IN HIS WIFE WHO IS W HIM       Laboratory Data:  Labs have been reviewed.    Lab Results   Component Value Date    WBC 12.31 05/21/2025    RBC 5.12 05/21/2025    HGB 13.3 (L) 05/21/2025    HCT 42.7 05/21/2025    MCV 83 05/21/2025    MCH 26.0 (L) 05/21/2025    MCHC 31.1 (L) 05/21/2025    RDW 15.2 (H) 05/21/2025     05/21/2025    MPV 11.4 05/21/2025    GRAN 6.7 02/25/2025    GRAN 71.0 02/25/2025    LYMPH 11.3 (L) 05/21/2025    LYMPH 1.39 05/21/2025    MONO 11.0 05/21/2025    MONO 1.35 (H) 05/21/2025    EOS 1.4 05/21/2025    EOS 0.17 05/21/2025    BASO 0.07 02/25/2025    EOSINOPHIL 1.9 02/25/2025    BASOPHIL 0.6 05/21/2025    BASOPHIL 0.07 05/21/2025     Lab Results   Component Value Date    IRON 21 (L) 05/21/2025    TRANSFERRIN 194 (L) 05/21/2025    TIBC 287 05/21/2025    LABIRON 7 (L) 05/21/2025    FESATURATED 12 (L) 02/25/2025      Lab Results   Component Value Date    FERRITIN 824.0 (H) 05/21/2025     CMP  Sodium   Date Value Ref Range Status   05/21/2025 137 136 - 145 mmol/L Final   02/25/2025 139 136 - 145 mmol/L Final     Potassium   Date Value Ref Range Status   05/21/2025 3.8 3.5 - 5.1 mmol/L Final   02/25/2025 3.8 3.5 - 5.1 mmol/L Final     Chloride   Date Value Ref Range Status   05/21/2025 99 95 - 110 mmol/L Final   02/25/2025 104 95 - 110 mmol/L Final     CO2   Date Value Ref Range Status   05/21/2025 25 23 - 29 mmol/L Final   02/25/2025 26 23 - 29 mmol/L Final     Glucose   Date Value Ref Range  Status   05/21/2025 128 (H) 70 - 110 mg/dL Final   02/25/2025 119 (H) 70 - 110 mg/dL Final     BUN   Date Value Ref Range Status   05/21/2025 7 (L) 8 - 23 mg/dL Final     Creatinine   Date Value Ref Range Status   05/21/2025 0.8 0.5 - 1.4 mg/dL Final     Calcium   Date Value Ref Range Status   05/21/2025 9.4 8.7 - 10.5 mg/dL Final   02/25/2025 9.2 8.7 - 10.5 mg/dL Final     Protein Total   Date Value Ref Range Status   05/21/2025 7.3 6.0 - 8.4 gm/dL Final     Total Protein   Date Value Ref Range Status   02/25/2025 7.4 6.0 - 8.4 g/dL Final     Albumin   Date Value Ref Range Status   05/21/2025 2.8 (L) 3.5 - 5.2 g/dL Final   02/25/2025 4.0 3.5 - 5.2 g/dL Final     Total Bilirubin   Date Value Ref Range Status   02/25/2025 1.2 (H) 0.1 - 1.0 mg/dL Final     Comment:     For infants and newborns, interpretation of results should be based  on gestational age, weight and in agreement with clinical  observations.    Premature Infant recommended reference ranges:  Up to 24 hours.............<8.0 mg/dL  Up to 48 hours............<12.0 mg/dL  3-5 days..................<15.0 mg/dL  6-29 days.................<15.0 mg/dL       Bilirubin Total   Date Value Ref Range Status   05/21/2025 1.7 (H) 0.1 - 1.0 mg/dL Final     Comment:     For infants and newborns, interpretation of results should be based   on gestational age, weight and in agreement with clinical   observations.    Premature Infant recommended reference ranges:   0-24 hours:  <8.0 mg/dL   24-48 hours: <12.0 mg/dL   3-5 days:    <15.0 mg/dL   6-29 days:   <15.0 mg/dL     Alkaline Phosphatase   Date Value Ref Range Status   02/25/2025 92 40 - 150 U/L Final     ALP   Date Value Ref Range Status   05/21/2025 297 (H) 40 - 150 unit/L Final     AST   Date Value Ref Range Status   05/21/2025 89 (H) 11 - 45 unit/L Final   02/25/2025 22 10 - 40 U/L Final     ALT   Date Value Ref Range Status   05/21/2025 114 (H) 10 - 44 unit/L Final   02/25/2025 25 10 - 44 U/L Final     Anion  Gap   Date Value Ref Range Status   05/21/2025 13 8 - 16 mmol/L Final     eGFR   Date Value Ref Range Status   05/21/2025 >60 >60 mL/min/1.73/m2 Final     Comment:     Estimated GFR calculated using the CKD-EPI creatinine (2021) equation.   02/25/2025 >60.0 >60 mL/min/1.73 m^2 Final     Lab Results   Component Value Date    LIPASE 20 05/20/2025     Lab Results   Component Value Date    AMYLASE 31 05/20/2025     Lab Results   Component Value Date    [id number] 469 02/25/2025     Lab Results   Component Value Date    PSA 0.53 02/25/2025    PSA 0.75 12/06/2023    PSA 0.37 11/19/2022     Pathology:   None today    Imaging:    US Abdomen Limited on 05/21/2025  FINDINGS:  Limited scan of the patient's abdomen was obtained.  Special attention was directed to the liver.  The liver is enlarged measures 17.7 cm.  Assessment of the liver demonstrates multiple solid mass densities route the right and left lobes of the liver.  In the left lobe of the liver there are multiple masses ranging in size between 1.8 and 2 cm.  There are least 6 rate of these masses present.  In the right lobe multiple solid masses are noted as well.  There were at least more than 10 of these masses present.  These range between in size between 2 and 3.5 cm.  Differential diagnosis would certainly include the possibility of metastatic disease.  These would be act assessable for FNA.  Further characterization of these mass lesions could also be obtained with a dedicated ultrasound contrast study or MR or CT with contrast.  This was a limited abdomen abdominal study. Further assessment with a CT or MR would also help to assess other organs for a primary lesion.  A barium enema or colonoscopy should also be considered.    Impression:  Multiple solid mass lesions throughout the liver suggesting the possibility of metastatic disease.    CT Chest Abdomen Pelvis With IV Contrast on 05/22/2025  Impression:  Ill define heterogeneous mass at the junction of body  and tail of the pancreas with an enhancing nodule within concerning for a malignancy with mild dilation of the more proximal pancreatic duct.  The mass gently abut the stomach.  The splenic vein is not seen and thought to be very small or occluded.  Significant stranding within the omentum especially in the left upper abdominal region concerning for regional spread.     Numerous metastatic lesions to the liver.     Small amount of scattered ascites within the abdomen and pelvis.     Small ground-glass nodular opacity right upper lobe, 6 mm, attention at follow-up.     Small fluid collection just inferior to the pancreatic tail 2.9 x 1.6 cm.    Assessment:       1. Liver lesion    2. Liver mass    3. Hepatomegaly    4. Anemia of chronic disease    5. Iron deficiency    6. Other specified counseling    7. Fatigue, unspecified type    8. Decreased appetite    9. Abnormal weight loss    10. Constipation, unspecified constipation type    11. Exercise counseling    12. Dietary counseling    13. Back pain, unspecified back location, unspecified back pain laterality, unspecified chronicity    14. Abdominal pain, unspecified abdominal location    15. GERD without esophagitis    16. Pancreatic mass    17. Spasm of bowel      Liver lesions/Masses, MULTIPLE IN MULTIPLE LOBES, PANCREATIC MASS BODY/TAIL:  - Discussed how a liver biopsy is performed. Explained that IR will thoroughly discuss the procedure with them during their consult visit on 06/05/2025.  - Counseled patient on course of treatment if cancer is confirmed. Discussed chemotherapy treatment. Explained how we are more aggressive with pancreatic cancer due to how aggressive the cancer is. If pancreatic cancer is confirmed, we will proceed with treatment while waiting for NGS to result. If needed, we will modify treatment pending results of NGS. I explained that surgery is unlikely at this time, but we will not rule it out for future treatment. I explained that trial  treatments may be an option pending patient's results. Patient and wife acknowledged understanding. All questions were answered at this time. Patient has strong support system with wife.  - We will plan on getting staging PET Scans ONCE WE HAVE A BIOPSY/DIAGNOSIS.    WHEN CLINIC WAS NOTIFIED OF REFERRAL, INTERVENTIONAL RADIOLOGY CONSULT/REQUEST FOR BIOPSY WAS INITIATED AND THIS IS ON SCHEDULE FOR JUNE 6.  WIFE AND PATIENT GREATLY DESIRE THAT BIOPSY BE MOVED UP TO ASAP.  I HAVE SENT A MESSAGE TO  NOTING THIS.  I HAVE RECOMMENDED TO THE PATIENT TO AVOID OVER-THE-COUNTER PAIN MEDICATIONS OTHER THAN TYLENOL INCIDENTALLY LADDER WHICH IS NOT SUCCESSFUL FOR HIS LIKELY CANCER/RELATED PAIN-IN ORDER TO ALLOW BIOPSY TO PERCEIVED WITHOUT UNDUE RISK OF BLEEDING ETC..  I HAVE LET DR. DE LEÓN KNOW THIS ALSO    Abnormal weight loss, decreased appetite likely related to pancreatic cancer spread to liver-again biopsy pending.  Again patient is referred to palliative Medicine and they may consider nutritional consult see counseling below    Anemia of chronic disease:  - Likely related to DM or possible neoplastic disease  - Will consider starting iron infusion treatments.    -IRON-DEFICIENCY ANEMIA diagnosed february of 2025 and he was on iron tablets and sounds like about 2 months and with his constipation symptoms he quit taking it.  i will consider iv iron upon further laboratory [id number] has no unique melena or bright red blood per rectum or bleeding at any orifice.    Back/Abdominal pain, THE PAIN IS POSITIONAL AND ALMOST LIKE MILD BORBORYGMI ALTHOUGH OBSTIPATED, HAS NO NAUSEA VOMITING, PERHAPS SPASM MORE THAN SHARP SHOOTING PAIN.  IT IS UNRESPONSIVE TO TRAMADOL AND TYLENOL.:  - Patient was prescribed Tramadol 50 mg q6 hrs prn. Pain is uncontrolled. Will order pain medication today. Discusses risks of worsening constipation with stronger pain medication.  - Start hyoscyamine 0.125 mg sublingual q4 hours prn  - Start  Norco 5-325 mg q6 hours prn  - Referral sent to palliative care  -PATIENT HAS TRIED MUSCLE RELAXANTS for what may be mild evidence of muscle spasm on exam-seems improved the patient's history and he notes muscle relaxants did not help    Constipation:  - Patient continues on colace and metamucil fiber gummies.  - Recommended having 1-2 stools per day, NOTING NARCOTIC PAIN MEDICATIONS WILL EXACERBATE HIS CONSTIPATION WHICH HAS BEEN SEVERE NOW IMPROVED ON THE ABOVE REGIMEN..    Diet/Exercise Counseling:  - Counseled on cardio exercise 30 min/6 days a week as an anti-cancer, anti-diabetes, anti-stroke, mood lifting, etc. May start with a few minutes per day and build up. Also counseled on weight-bearing and muscle-strengthening exercises to maintain muscle mass. Both types of exercise are life-prolonging and improve quality of life.   - I explained that cardio exercise is a HR around 130 but that he and his wife should get online and research his target heart rate.  - I recommended avoiding concentrated sweets, preservatives, and high carb foods. Recommended grass-fed red meat and butter when eating protein/in general to avoid foods with preservatives know better what type of food it is etc..  Anticancer benefits of healthy diet and cardio emphasize including other benefits of antianxiety anti compression anti blood clot heart disease, energy is benefitted etc..     Counseling was carried out in detail as to likely general treatment plans including chemotherapy plus or minus other treatments with different mechanism of action, the possibility of trials was reviewed and how they would learn about this and now [facility], I reviewed my work history/training/history of being in academics, publishing, clinical care etc..  I emphasize they are always welcome for a 2nd opinion or to choose any physician that they prefer in his healthcare and certainly he is the boss.  I reviewed living will and what it means and asked  them to consider this and likely will discuss this with palliative care personnel likewise.  He and his wife are very intelligent with good personal insight and exhibit good understanding and a course of understandable situational anxiety are coping very well, mutually quite supportive.       Plan:     Today I counseled pt about the following:  DIET AND EXERCISE and TODAYS PLANS: FOLLOW UP, REFERRALS, TREATMENT DETAILS  LIVING WILL AND ADVANCED DIRECTIVES  Patient exhibits understanding of all counseling today.  Along the been    RTC 3-4 work days after completing biopsy    I spent a total of 76minutes in a combination of focused physical exam and history, reviewing outside records, reviewing any available radiographs, counseling, communicating with other health care professionals regarding this patients medical condition, independently interpreting results, developing diagnostic and treatment plan and documenting in the EMR.     Participating Clinicians:  PCP-see story board    Barbara Bang M.D.  Hematology/Oncology  [facility] [address], [address]  [place] [address]  Phone: [phone]  Fax: [phone]         [1]   Current Outpatient Medications   Medication Sig Dispense Refill    atorvastatin (LIPITOR) 40 MG tablet Take 1 tablet (40 mg total) by mouth once daily. 90 tablet 2    blood sugar diagnostic Strp To check BG 1-3 times daily, to use with insurance preferred meter 100 each 11    blood-glucose meter kit To check BG 1-3 times daily, to use with insurance preferred meter 1 each 0    docusate sodium (COLACE) 100 MG capsule Take 1 capsule (100 mg total) by mouth 2 (two) times daily. 180 capsule 2    ergocalciferol (ERGOCALCIFEROL) 50,000 unit Cap Take 1 capsule (50,000 Units total) by mouth every 7 days. Then start daily OTC replacement after this Rx is complete 12 capsule 3    EScitalopram oxalate (LEXAPRO) 20 MG tablet Take 1 tablet (20 mg total) by mouth every  evening. 90 tablet 1    fluticasone propionate (FLONASE) 50 mcg/actuation nasal spray 1 spray (50 mcg total) by Each Nostril route once daily. 16 g 1    gabapentin (NEURONTIN) 300 MG capsule Take 3 capsules (900 mg total) by mouth every evening. 270 capsule 3    glimepiride (AMARYL) 2 MG tablet Take 1 tablet (2 mg total) by mouth before breakfast. 90 tablet 3    ipratropium (ATROVENT) 21 mcg (0.03 %) nasal spray 2 sprays by Each Nostril route 2 (two) times daily. 30 mL 1    lancets Misc To check BG 1-3 times daily, to use with insurance preferred meter 100 each 11    metFORMIN (GLUCOPHAGE-XR) 500 MG ER 24hr tablet Take 2 tablets (1,000 mg total) by mouth 2 (two) times daily with meals. 360 tablet 3    multivitamin (THERAGRAN) per tablet Take 1 tablet by mouth once daily.      omeprazole (PRILOSEC) 40 MG capsule Take 1 capsule (40 mg total) by mouth once daily. 90 capsule 3    rOPINIRole (REQUIP) 4 MG tablet Take 1 tablet (4 mg total) by mouth every evening. 90 tablet 3    traZODone (DESYREL) 150 MG tablet Take 1 tablet (150 mg total) by mouth nightly as needed for Insomnia. 90 tablet 3    diclofenac sodium (VOLTAREN ARTHRITIS PAIN) 1 % Gel Apply 2 g topically 4 (four) times daily. for 14 days 100 g 0    HYDROcodone-acetaminophen (NORCO) 5-325 mg per tablet Take 1 tablet by mouth every 6 (six) hours as needed for Pain (pain refractory to hycosamine). 20 tablet 0    hyoscyamine 0.125 mg Subl Place 1 tablet (0.125 mg total) under the tongue every 4 (four) hours as needed (abdominal pain). 40 tablet 1    loratadine (CLARITIN) 10 mg tablet Take 1 tablet (10 mg total) by mouth once daily. 30 tablet 0     No current facility-administered medications for this visit.

## 2025-05-27 ENCOUNTER — OFFICE VISIT (OUTPATIENT)
Dept: INTERVENTIONAL RADIOLOGY/VASCULAR | Facility: CLINIC | Age: 65
End: 2025-05-27
Payer: COMMERCIAL

## 2025-05-27 ENCOUNTER — LAB VISIT (OUTPATIENT)
Dept: LAB | Facility: HOSPITAL | Age: 65
End: 2025-05-27
Attending: INTERNAL MEDICINE
Payer: COMMERCIAL

## 2025-05-27 ENCOUNTER — OFFICE VISIT (OUTPATIENT)
Dept: HEMATOLOGY/ONCOLOGY | Facility: CLINIC | Age: 65
End: 2025-05-27
Payer: COMMERCIAL

## 2025-05-27 VITALS
BODY MASS INDEX: 30.7 KG/M2 | HEART RATE: 108 BPM | SYSTOLIC BLOOD PRESSURE: 138 MMHG | WEIGHT: 207.31 LBS | HEIGHT: 69 IN | DIASTOLIC BLOOD PRESSURE: 87 MMHG

## 2025-05-27 VITALS
HEART RATE: 102 BPM | DIASTOLIC BLOOD PRESSURE: 83 MMHG | WEIGHT: 206.38 LBS | BODY MASS INDEX: 30.47 KG/M2 | SYSTOLIC BLOOD PRESSURE: 131 MMHG | OXYGEN SATURATION: 97 % | TEMPERATURE: 99 F

## 2025-05-27 DIAGNOSIS — R16.0 LIVER MASS: ICD-10-CM

## 2025-05-27 DIAGNOSIS — Z71.82 EXERCISE COUNSELING: ICD-10-CM

## 2025-05-27 DIAGNOSIS — R16.0 HEPATOMEGALY: ICD-10-CM

## 2025-05-27 DIAGNOSIS — R63.0 DECREASED APPETITE: ICD-10-CM

## 2025-05-27 DIAGNOSIS — K21.9 GERD WITHOUT ESOPHAGITIS: ICD-10-CM

## 2025-05-27 DIAGNOSIS — E61.1 IRON DEFICIENCY: ICD-10-CM

## 2025-05-27 DIAGNOSIS — D63.8 ANEMIA OF CHRONIC DISEASE: ICD-10-CM

## 2025-05-27 DIAGNOSIS — K59.00 CONSTIPATION, UNSPECIFIED CONSTIPATION TYPE: ICD-10-CM

## 2025-05-27 DIAGNOSIS — Z71.89 OTHER SPECIFIED COUNSELING: ICD-10-CM

## 2025-05-27 DIAGNOSIS — Z71.3 DIETARY COUNSELING: ICD-10-CM

## 2025-05-27 DIAGNOSIS — R53.83 FATIGUE, UNSPECIFIED TYPE: ICD-10-CM

## 2025-05-27 DIAGNOSIS — K58.9 SPASM OF BOWEL: ICD-10-CM

## 2025-05-27 DIAGNOSIS — R10.9 ABDOMINAL PAIN, UNSPECIFIED ABDOMINAL LOCATION: ICD-10-CM

## 2025-05-27 DIAGNOSIS — K76.9 LIVER LESION: Primary | ICD-10-CM

## 2025-05-27 DIAGNOSIS — R63.4 ABNORMAL WEIGHT LOSS: ICD-10-CM

## 2025-05-27 DIAGNOSIS — K86.89 PANCREATIC MASS: ICD-10-CM

## 2025-05-27 DIAGNOSIS — K76.9 LIVER LESION: ICD-10-CM

## 2025-05-27 DIAGNOSIS — R16.0 LIVER MASS: Primary | ICD-10-CM

## 2025-05-27 DIAGNOSIS — M54.9 BACK PAIN, UNSPECIFIED BACK LOCATION, UNSPECIFIED BACK PAIN LATERALITY, UNSPECIFIED CHRONICITY: ICD-10-CM

## 2025-05-27 LAB
FERRITIN SERPL-MCNC: 1030.6 NG/ML (ref 20–300)
IRON SATN MFR SERPL: 7 % (ref 20–50)
IRON SERPL-MCNC: 21 UG/DL (ref 45–160)
TIBC SERPL-MCNC: 293 UG/DL (ref 250–450)
TRANSFERRIN SERPL-MCNC: 198 MG/DL (ref 200–375)

## 2025-05-27 PROCEDURE — 82728 ASSAY OF FERRITIN: CPT

## 2025-05-27 PROCEDURE — 36415 COLL VENOUS BLD VENIPUNCTURE: CPT

## 2025-05-27 PROCEDURE — 3051F HG A1C>EQUAL 7.0%<8.0%: CPT | Mod: CPTII,S$GLB,, | Performed by: PHYSICIAN ASSISTANT

## 2025-05-27 PROCEDURE — 99999 PR PBB SHADOW E&M-EST. PATIENT-LVL IV: CPT | Mod: PBBFAC,,, | Performed by: PHYSICIAN ASSISTANT

## 2025-05-27 PROCEDURE — 3072F LOW RISK FOR RETINOPATHY: CPT | Mod: CPTII,S$GLB,, | Performed by: PHYSICIAN ASSISTANT

## 2025-05-27 PROCEDURE — 3061F NEG MICROALBUMINURIA REV: CPT | Mod: CPTII,S$GLB,, | Performed by: PHYSICIAN ASSISTANT

## 2025-05-27 PROCEDURE — 99999 PR PBB SHADOW E&M-EST. PATIENT-LVL V: CPT | Mod: PBBFAC,,, | Performed by: INTERNAL MEDICINE

## 2025-05-27 PROCEDURE — 1159F MED LIST DOCD IN RCRD: CPT | Mod: CPTII,S$GLB,, | Performed by: PHYSICIAN ASSISTANT

## 2025-05-27 PROCEDURE — 99204 OFFICE O/P NEW MOD 45 MIN: CPT | Mod: S$GLB,,, | Performed by: PHYSICIAN ASSISTANT

## 2025-05-27 PROCEDURE — 3075F SYST BP GE 130 - 139MM HG: CPT | Mod: CPTII,S$GLB,, | Performed by: PHYSICIAN ASSISTANT

## 2025-05-27 PROCEDURE — 3066F NEPHROPATHY DOC TX: CPT | Mod: CPTII,S$GLB,, | Performed by: PHYSICIAN ASSISTANT

## 2025-05-27 PROCEDURE — 3079F DIAST BP 80-89 MM HG: CPT | Mod: CPTII,S$GLB,, | Performed by: PHYSICIAN ASSISTANT

## 2025-05-27 PROCEDURE — 3008F BODY MASS INDEX DOCD: CPT | Mod: CPTII,S$GLB,, | Performed by: PHYSICIAN ASSISTANT

## 2025-05-27 PROCEDURE — 83540 ASSAY OF IRON: CPT

## 2025-05-27 RX ORDER — LIDOCAINE HYDROCHLORIDE 10 MG/ML
1 INJECTION, SOLUTION EPIDURAL; INFILTRATION; INTRACAUDAL; PERINEURAL ONCE
OUTPATIENT
Start: 2025-05-27 | End: 2025-05-27

## 2025-05-27 RX ORDER — HYDROCODONE BITARTRATE AND ACETAMINOPHEN 5; 325 MG/1; MG/1
1 TABLET ORAL EVERY 6 HOURS PRN
Qty: 20 TABLET | Refills: 0 | Status: SHIPPED | OUTPATIENT
Start: 2025-05-27 | End: 2025-05-27

## 2025-05-27 RX ORDER — HYOSCYAMINE SULFATE 0.12 MG/1
0.12 TABLET SUBLINGUAL EVERY 4 HOURS PRN
Qty: 40 TABLET | Refills: 1 | Status: SHIPPED | OUTPATIENT
Start: 2025-05-27

## 2025-05-27 RX ORDER — HYDROCODONE BITARTRATE AND ACETAMINOPHEN 5; 325 MG/1; MG/1
1 TABLET ORAL EVERY 6 HOURS PRN
Qty: 20 TABLET | Refills: 0 | Status: SHIPPED | OUTPATIENT
Start: 2025-05-27

## 2025-05-27 NOTE — PROGRESS NOTES
"Subjective     Patient ID: Dylan Sawant is a 64 y.o. male.    Chief Complaint: No chief complaint on file.    Patient referred to Interventional Radiology by Dr Bang/Dr Perez for liver mass biopsy.  He has a history of T2DM, GERD, KELLY with CPAP use.  Imaging on 5/23 "Ill define heterogeneous mass at the junction of body and tail of the pancreas with an enhancing nodule within concerning for a malignancy with mild dilation of the more proximal pancreatic duct.  The mass gently abut the stomach.  The splenic vein is not seen and thought to be very small or occluded.  Significant stranding within the omentum especially in the left upper abdominal region concerning for regional spread.  Numerous metastatic lesions to the liver."  Patient reports at baseline.  Chronic right sided abdominal pain and R sided back pain.  He denies any fever, chills, unexpected wt change, decreased appetite, fatigue, CP, SOB, cough, abdominal distention, N/V/D, leg pain or swelling, jaundice, confusion, sleep disturbance.     Patient denies AC, antiplatelet use, or other medications containing asa.  Pt denies issues laying flat. Pt confirms CPAP use or O2 use at home.  Allergies reviewed, pt denies allergy to contrast.  Oncology notes reviewed.           Review of Systems   Constitutional:  Positive for fatigue and unexpected weight change. Negative for activity change, appetite change, chills and fever.   Respiratory:  Negative for cough and shortness of breath.    Cardiovascular:  Negative for chest pain and leg swelling.   Gastrointestinal:  Negative for abdominal distention, abdominal pain, constipation, diarrhea, nausea and vomiting.   Genitourinary:  Negative for difficulty urinating and flank pain.   Musculoskeletal:  Positive for back pain. Negative for gait problem.   Neurological:  Negative for weakness and memory loss.   Psychiatric/Behavioral:  Negative for confusion and sleep disturbance.           Objective "     Physical Exam  Constitutional:       General: He is not in acute distress.     Appearance: He is well-developed. He is not diaphoretic.   HENT:      Head: Normocephalic and atraumatic.   Pulmonary:      Effort: Pulmonary effort is normal. No respiratory distress.   Neurological:      Mental Status: He is alert and oriented to person, place, and time.   Psychiatric:         Behavior: Behavior normal.         Thought Content: Thought content normal.         Judgment: Judgment normal.       Imaging reviewed independently  EXAMINATION:  CT CHEST ABDOMEN PELVIS WITH IV CONTRAST (XPD)     CLINICAL HISTORY:  Multiple solid mass lesions throughout the liver suggesting the possibility of metastatic disease.; Liver disease, unspecified     TECHNIQUE:  Low dose axial images, sagittal and coronal reformations were obtained from the thoracic inlet to the pubic symphysis following the IV administration of 100 mL of Omnipaque 350 and the oral administration of 30 ml of Omnipaque 350.     COMPARISON:  None     FINDINGS:  Evaluation of the chest demonstrates patent central airways.  The thyroid gland is homogeneous, normal in size.  There is no mediastinal or hilar lymphadenopathy.  The thoracic aorta is of normal caliber and demonstrates no significant atherosclerotic plaque.  The cardiac silhouette is normal in size.  There is no significant coronary artery calcifications.  No pericardial effusion.     Right middle lobe 6 mm ground-glass nodule.  Linear band of atelectasis at the lung basis.  No acute focal area of airspace consolidation, no pleural effusion.  No pneumothorax.     The axillary regions appear normal.  The thoracic wall is intact.     The liver is mildly enlarged 19.3 cm craniocaudad.  Innumerable liver lesions.  An index lesion within the left hepatic lobe 4.7 cm (05:34), an index lesion within the right hepatic lobe 4.0 cm (05:39).     The biliary ducts do not appear dilated.  The hepatic vasculature is  intact.  The gallbladder is nondistended.  Trace of fluid in the gallbladder fossa.  Trace of fluid surrounding the liver.     The distal esophagus and the stomach appear within normal limits.     The spleen is mildly enlarged 15.7 x 3.5 x 12.1 cm.     Abnormal ill define hypoattenuating area at the junction of body and tail of the pancreas 4.5 x 2.9 cm with a 1.6 cm enhancing nodule within (4:120) with proximal dilation of the pancreatic duct at the pancreatic tail.  Minimal abutment of the splenic artery.  Abutment of the pancreatic mass to the stomach without definite invasion of the stomach.  The pancreas neoplasm  demonstrate no significant abutment to the SMV, SMA, and portal vein.  The splenic vein is not well seen, likely too small or occluded.  Few collateral vessels noted in the upper abdomen.  Upper size peripancreatic node 1.6 cm (4:120).  Small, low attenuating fluid collection just inferior to the tail of the pancreas 2.9 x 1.5 cm (4:128).  Numerous areas of fat stranding in the left upper abdominal quadrant concerning for omental spread.  Trace of fluid surrounding the spleen.  Trace of fluid in the bilateral colic gutter and within the pelvis.     Mild stool retention, no inflammatory changes of the bowel seen, no bowel dilation.  The appendix is normal.  No intra-abdominal free air.     The abdominal aorta tapers normally, there is mild atherosclerotic plaque, no para-aortic lymphadenopathy.     There is a small fat containing umbilical hernia, the inguinal regions appear normal.     The osseous structures demonstrate postoperative changes of the bilateral hip joints, no destructive osseous lesion seen.  Bilateral L5 pars defect.     Impression:     Ill define heterogeneous mass at the junction of body and tail of the pancreas with an enhancing nodule within concerning for a malignancy with mild dilation of the more proximal pancreatic duct.  The mass gently abut the stomach.  The splenic vein is  not seen and thought to be very small or occluded.  Significant stranding within the omentum especially in the left upper abdominal region concerning for regional spread.     Numerous metastatic lesions to the liver.     Small amount of scattered ascites within the abdomen and pelvis.     Small ground-glass nodular opacity right upper lobe, 6 mm, attention at follow-up.     Small fluid collection just inferior to the pancreatic tail 2.9 x 1.6 cm.     Please see other details above.     This report was flagged in Epic as abnormal.        Electronically signed by:Sharon White MD  Date:                                            05/23/2025  Time:                                           12:29     Assessment and Plan     1. Liver mass  -     CT Biopsy Liver (xpd); Future; Expected date: 05/27/2025  -     Protime-INR; Future; Expected date: 05/27/2025    Discussed how the procedure liver mass biopsy (CT) will be performed, risks (including, but not limited to, pain, bleeding, infection, damage to nearby structures, and the need for additional procedures), benefits, possible complications, pre-post procedure expectations, and alternatives. The patient voices understanding and all questions have been answered.  The patient agrees to proceed as planned.     Patient scheduled for liver mass biopsy at [facility] under moderate sedation.  Procedure approved by IR staff Iglesias based on images from CT on 5/22.  Results to referring provider Barbara Bang and Keyur Perez (cc'd on procedure order).  2.  Pre-procedure labs scheduled for 5/27  3.  Pt denies taking AC, antiplatelet medication, or other medications containing asa.  Pt denies taking GLP-1 agonist.    4.  Allergies reviewed.  Pt denies allergy to contrast, lidocaine, betadine and chlorhexidine.  5.  Clinic phone number provided.  Pt advised to call if any further questions or need to reschedule.  Discussed with patient our nursing team will call the day  before the procedure with reminder for instructions.  Discussed pt will need a ride to and from the procedure, no driving for 24 hrs after the procedure.          No follow-ups on file.

## 2025-05-27 NOTE — ADDENDUM NOTE
Addended by: BONNIE DE LEÓN on: 5/27/2025 05:00 PM     Modules accepted: Orders     HPI:  The patient is a 41-year-old male who is here today for follow-up of left arm surgical site infection with group G strep, potentially complicated by retained anchors.  He is on oral cefadroxil. His incisions are well healed now. No dehiscence or drainage. No allergic rash or stomach upset or unusual diarrhea. He has pain in his arm and pinky finger still which bothers him. He says it also feels like his hand is clammy often.    ALLERGIES:   Allergen Reactions    Oxycodone ANXIETY       Current Outpatient Medications   Medication Sig    meloxicam (MOBIC) 15 MG tablet Take 1 tablet by mouth daily.    hydrOXYzine (ATARAX) 50 MG tablet Take 1 tablet by mouth every 6 hours as needed for Itching.    cefadroxil (DURICEF) 500 MG capsule Take 1 capsule by mouth in the morning and 1 capsule in the evening. Begin taking on August 9, 2024.    HYDROcodone-acetaminophen (NORCO) 5-325 MG per tablet Take 1-2 tablets by mouth every 6 hours as needed for Pain.    ibuprofen (MOTRIN) 800 MG tablet Take 800 mg by mouth every 6 hours as needed.    escitalopram (LEXAPRO) 10 MG tablet Take 1 tablet by mouth daily.    albuterol 108 (90 Base) MCG/ACT inhaler Inhale 2 puffs into the lungs every 4 hours as needed for Wheezing.    hydroCORTisone (ANUSOL-HC) 2.5 % rectal cream APPLY RECTALLY TO THE AFFECTED AREA TWICE DAILY    topiramate (TOPAMAX) 25 MG tablet topiramate 25 mg tablet   TAKE 2 TABLETS BY MOUTH EVERY DAY AT BEDTIME    famotidine (PEPCID) 20 MG tablet Take 1 tablet by mouth in the morning and 1 tablet in the evening.    VITAMIN D, CHOLECALCIFEROL, PO     levocetirizine (XYZAL) 5 MG tablet Take 5 mg by mouth every evening.    Multiple Vitamins-Minerals (CENTRUM SILVER ULTRA MENS PO) Take by mouth daily.     No current facility-administered medications for this visit.     Physical Exam:  Visit Vitals  /86 (BP Location: LUE - Left upper extremity, Patient Position: Sitting, Cuff Size: Regular)   Pulse 70   Temp 98.7 °F  (37.1 °C)   SpO2 97%     General: Alert, oriented, appears comfortable, normal affect.  Lungs: Normal respiratory effort.  Ext: The left arm incision sites are well-healed, no cellulitis or incisional dehiscence is seen.  Skin: Warm and dry, no allergic rashes over visualized areas.    Microbiology:  7/26: Operative cultures grew group G strep in all 3 samples.  Some small amounts of coagulase-negative staph were also detected, these are likely not pathogens in his case     Assessment and Plan:  1.  Postoperative left elbow hematoma with associated group G strep infection, status postsurgical debridement on 7/26/2024.  2.  History of left elbow cubital tunnel syndrome with surgical debridement on 6/26/2024 with ulnar nerve transposition (2 anchors placed).    The patient was seen today for follow-up.  His infection appears well-controlled.  He continues to have some pain which sounds like nerve pain.  He has antibiotic supply through to 10/8.  If Dr. Bowers feels the surgical site is well-healed at the orthopedic follow-up next week, the patient can discontinue the antibiotic when it runs out.  We discussed that he would then need to watch for any increasing redness or swelling or drainage from the surgical site. If that occurs, it could be a sign that the anchors were seeded/infected.  If his arm remains well-healed, this would support that the infection has fully resolved.  I scheduled him for follow-up the third week of October, if he is doing well then and has no concerns he could cancel this if he would like.     Daisy Davis D.O.  Infectious Diseases

## 2025-05-28 ENCOUNTER — PATIENT MESSAGE (OUTPATIENT)
Dept: INTERVENTIONAL RADIOLOGY/VASCULAR | Facility: CLINIC | Age: 65
End: 2025-05-28
Payer: COMMERCIAL

## 2025-05-28 DIAGNOSIS — R16.0 LIVER MASS: Primary | ICD-10-CM

## 2025-05-31 ENCOUNTER — PATIENT MESSAGE (OUTPATIENT)
Dept: FAMILY MEDICINE | Facility: CLINIC | Age: 65
End: 2025-05-31
Payer: COMMERCIAL

## 2025-05-31 DIAGNOSIS — G89.3 CANCER ASSOCIATED PAIN: Primary | ICD-10-CM

## 2025-05-31 DIAGNOSIS — R10.9 ABDOMINAL PAIN, UNSPECIFIED ABDOMINAL LOCATION: ICD-10-CM

## 2025-05-31 DIAGNOSIS — K86.89 PANCREATIC MASS: ICD-10-CM

## 2025-05-31 DIAGNOSIS — R16.0 LIVER MASS: ICD-10-CM

## 2025-06-02 ENCOUNTER — TELEPHONE (OUTPATIENT)
Dept: INTERVENTIONAL RADIOLOGY/VASCULAR | Facility: HOSPITAL | Age: 65
End: 2025-06-02
Payer: COMMERCIAL

## 2025-06-02 RX ORDER — HYDROCODONE BITARTRATE AND ACETAMINOPHEN 5; 325 MG/1; MG/1
1 TABLET ORAL EVERY 6 HOURS PRN
Qty: 90 TABLET | Refills: 0 | Status: SHIPPED | OUTPATIENT
Start: 2025-06-02

## 2025-06-03 ENCOUNTER — HOSPITAL ENCOUNTER (OUTPATIENT)
Dept: INTERVENTIONAL RADIOLOGY/VASCULAR | Facility: HOSPITAL | Age: 65
Discharge: HOME | End: 2025-06-03
Attending: PHYSICIAN ASSISTANT
Payer: COMMERCIAL

## 2025-06-03 VITALS
HEART RATE: 101 BPM | TEMPERATURE: 98 F | BODY MASS INDEX: 31.07 KG/M2 | RESPIRATION RATE: 20 BRPM | HEIGHT: 68 IN | WEIGHT: 205 LBS | OXYGEN SATURATION: 95 % | DIASTOLIC BLOOD PRESSURE: 93 MMHG | SYSTOLIC BLOOD PRESSURE: 137 MMHG

## 2025-06-03 DIAGNOSIS — R16.0 LIVER MASS: ICD-10-CM

## 2025-06-03 LAB
ALBUMIN FLD-MCNC: 1.6 G/DL
APPEARANCE FLD: NORMAL
COLOR FLD: NORMAL
CREAT FLD-MCNC: 0.7 MG/DL
LYMPHOCYTES NFR FLD MANUAL: 38 %
MONOS+MACROS NFR FLD MANUAL: 21 %
NEUTROPHILS NFR FLD MANUAL: 41 %
POCT GLUCOSE: 112 MG/DL (ref 70–110)
PROT FLD-MCNC: 3.2 G/DL
WBC # FLD: 880 /CU MM

## 2025-06-03 PROCEDURE — 76942 ECHO GUIDE FOR BIOPSY: CPT | Mod: TC | Performed by: STUDENT IN AN ORGANIZED HEALTH CARE EDUCATION/TRAINING PROGRAM

## 2025-06-03 PROCEDURE — 99152 MOD SED SAME PHYS/QHP 5/>YRS: CPT

## 2025-06-03 PROCEDURE — 88341 IMHCHEM/IMCYTCHM EA ADD ANTB: CPT | Mod: TC,91 | Performed by: INTERNAL MEDICINE

## 2025-06-03 PROCEDURE — 82570 ASSAY OF URINE CREATININE: CPT | Performed by: INTERNAL MEDICINE

## 2025-06-03 PROCEDURE — 84157 ASSAY OF PROTEIN OTHER: CPT | Performed by: INTERNAL MEDICINE

## 2025-06-03 PROCEDURE — 99153 MOD SED SAME PHYS/QHP EA: CPT

## 2025-06-03 PROCEDURE — 89051 BODY FLUID CELL COUNT: CPT | Performed by: INTERNAL MEDICINE

## 2025-06-03 PROCEDURE — 99153 MOD SED SAME PHYS/QHP EA: CPT | Performed by: STUDENT IN AN ORGANIZED HEALTH CARE EDUCATION/TRAINING PROGRAM

## 2025-06-03 PROCEDURE — 49083 ABD PARACENTESIS W/IMAGING: CPT | Performed by: STUDENT IN AN ORGANIZED HEALTH CARE EDUCATION/TRAINING PROGRAM

## 2025-06-03 PROCEDURE — 82042 OTHER SOURCE ALBUMIN QUAN EA: CPT | Performed by: INTERNAL MEDICINE

## 2025-06-03 PROCEDURE — 47000 NEEDLE BIOPSY OF LIVER PERQ: CPT | Mod: 51,,, | Performed by: STUDENT IN AN ORGANIZED HEALTH CARE EDUCATION/TRAINING PROGRAM

## 2025-06-03 PROCEDURE — 25000003 PHARM REV CODE 250: Performed by: STUDENT IN AN ORGANIZED HEALTH CARE EDUCATION/TRAINING PROGRAM

## 2025-06-03 PROCEDURE — 99152 MOD SED SAME PHYS/QHP 5/>YRS: CPT | Mod: ,,, | Performed by: STUDENT IN AN ORGANIZED HEALTH CARE EDUCATION/TRAINING PROGRAM

## 2025-06-03 PROCEDURE — 99152 MOD SED SAME PHYS/QHP 5/>YRS: CPT | Performed by: STUDENT IN AN ORGANIZED HEALTH CARE EDUCATION/TRAINING PROGRAM

## 2025-06-03 PROCEDURE — 63600175 PHARM REV CODE 636 W HCPCS: Performed by: STUDENT IN AN ORGANIZED HEALTH CARE EDUCATION/TRAINING PROGRAM

## 2025-06-03 PROCEDURE — 76942 ECHO GUIDE FOR BIOPSY: CPT | Mod: 26,59,, | Performed by: STUDENT IN AN ORGANIZED HEALTH CARE EDUCATION/TRAINING PROGRAM

## 2025-06-03 RX ORDER — FENTANYL CITRATE 50 UG/ML
INJECTION, SOLUTION INTRAMUSCULAR; INTRAVENOUS
Status: COMPLETED | OUTPATIENT
Start: 2025-06-03 | End: 2025-06-03

## 2025-06-03 RX ORDER — LIDOCAINE HYDROCHLORIDE 10 MG/ML
INJECTION, SOLUTION INFILTRATION; PERINEURAL
Status: COMPLETED | OUTPATIENT
Start: 2025-06-03 | End: 2025-06-03

## 2025-06-03 RX ORDER — MIDAZOLAM HYDROCHLORIDE 1 MG/ML
INJECTION, SOLUTION INTRAMUSCULAR; INTRAVENOUS
Status: COMPLETED | OUTPATIENT
Start: 2025-06-03 | End: 2025-06-03

## 2025-06-03 RX ADMIN — FENTANYL CITRATE 25 MCG: 50 INJECTION INTRAMUSCULAR; INTRAVENOUS at 10:06

## 2025-06-03 RX ADMIN — FENTANYL CITRATE 75 MCG: 50 INJECTION INTRAMUSCULAR; INTRAVENOUS at 09:06

## 2025-06-03 RX ADMIN — MIDAZOLAM 2 MG: 1 INJECTION INTRAMUSCULAR; INTRAVENOUS at 09:06

## 2025-06-03 RX ADMIN — Medication 1 G: at 09:06

## 2025-06-03 RX ADMIN — LIDOCAINE HYDROCHLORIDE 5 ML: 10 INJECTION, SOLUTION INFILTRATION; PERINEURAL at 09:06

## 2025-06-05 LAB
DHEA SERPL-MCNC: NORMAL
ESTROGEN SERPL-MCNC: NORMAL PG/ML
INSULIN SERPL-ACNC: NORMAL U[IU]/ML
LAB AP CLINICAL INFORMATION: NORMAL
LAB AP GROSS DESCRIPTION: NORMAL
LAB AP INTRA OP: NORMAL
LAB AP PERFORMING LOCATION(S): NORMAL
LAB AP REPORT FOOTNOTES: NORMAL

## 2025-06-06 LAB
ESTROGEN SERPL-MCNC: ABNORMAL PG/ML
INSULIN SERPL-ACNC: ABNORMAL U[IU]/ML
LAB AP CLINICAL INFORMATION: ABNORMAL
LAB AP COMMENTS: ABNORMAL
LAB AP GROSS DESCRIPTION: ABNORMAL
LAB AP NON-GYN INTERPRETATION SPECIMEN 1: ABNORMAL
LAB AP PERFORMING LOCATION(S): ABNORMAL
LAB AP REPORT FOOTNOTES: ABNORMAL

## 2025-06-10 PROBLEM — R74.01 TRANSAMINITIS: Status: ACTIVE | Noted: 2025-01-01

## 2025-06-10 PROBLEM — K59.09 OTHER CONSTIPATION: Status: ACTIVE | Noted: 2025-01-01

## 2025-06-10 PROBLEM — R14.0 ABDOMINAL DISTENSION: Status: ACTIVE | Noted: 2025-01-01

## 2025-06-10 PROBLEM — M79.89 LEG SWELLING: Status: ACTIVE | Noted: 2025-01-01

## 2025-06-10 PROBLEM — Z71.89 GOALS OF CARE, COUNSELING/DISCUSSION: Status: ACTIVE | Noted: 2025-01-01

## 2025-06-10 PROBLEM — R18.8 OTHER ASCITES: Status: ACTIVE | Noted: 2025-01-01

## 2025-06-10 NOTE — PHARMACY MED REC
"    [facility]           Pharmacy  Admission Medication History     The home medication history was taken by Amalia Moss.      Medication history obtained from Medications listed below were obtained from: [employer] software- amaysim and Medical records.    Based on information gathered for medication list, you may go to "Admission" then "Reconcile Home Medications" tabs to review and/or act upon those items.     The home medication list has been updated by the Pharmacy department.   Please read ALL comments highlighted in yellow.   Please address this information as you see fit.    Feel free to contact us if you have any questions or require assistance.          No current facility-administered medications on file prior to encounter.     Current Outpatient Medications on File Prior to Encounter   Medication Sig Dispense Refill    atorvastatin (LIPITOR) 40 MG tablet Take 1 tablet (40 mg total) by mouth once daily. 90 tablet 2    ergocalciferol (ERGOCALCIFEROL) 50,000 unit Cap Take 1 capsule (50,000 Units total) by mouth every 7 days. Then start daily OTC replacement after this Rx is complete 12 capsule 3    EScitalopram oxalate (LEXAPRO) 20 MG tablet Take 1 tablet (20 mg total) by mouth every evening. 90 tablet 1    gabapentin (NEURONTIN) 300 MG capsule Take 3 capsules (900 mg total) by mouth every evening. 270 capsule 3    glimepiride (AMARYL) 2 MG tablet Take 1 tablet (2 mg total) by mouth before breakfast. 90 tablet 3    HYDROcodone-acetaminophen (NORCO) 5-325 mg per tablet Take 1 tablet by mouth every 6 (six) hours as needed for Pain (pain refractory to hycosamine). 90 tablet 0    hyoscyamine 0.125 mg Subl Place 1 tablet (0.125 mg total) under the tongue every 4 (four) hours as needed (abdominal pain). 40 tablet 1    metFORMIN (GLUCOPHAGE-XR) 500 MG ER 24hr tablet Take 2 tablets (1,000 mg total) by mouth 2 (two) times daily with meals. 360 tablet 3    omeprazole (PRILOSEC) 40 MG capsule Take " 1 capsule (40 mg total) by mouth once daily. 90 capsule 3    rOPINIRole (REQUIP) 4 MG tablet Take 1 tablet (4 mg total) by mouth every evening. 90 tablet 3    traZODone (DESYREL) 150 MG tablet Take 1 tablet (150 mg total) by mouth nightly as needed for Insomnia. 90 tablet 3       Please address this information as you see fit.  Feel free to contact us if you have any questions or require assistance.    Amalia Moss  [phone]              .

## 2025-06-10 NOTE — PLAN OF CARE
Pt VSS and in NAD. Pt verbalized understanding, questions and concerns addressed. Report given to SUKHDEV Townsend. No further needs at this time. Pt discharged from recovery area at 1245. IR care complete.

## 2025-06-10 NOTE — ASSESSMENT & PLAN NOTE
Swelling in bilateral lower extremities worsening over the last month.  Albumin 2.1    Plan:  - Likely 2/2 low albumin in setting of adenocarcinoma.  - Echo ordered to complete work-up.  - IV diuresis.  - Monitor Strict I's/O's.

## 2025-06-10 NOTE — ED NOTES
Patient returned from paracentesis, removed 3750 ml from the patient, patient tolerated the procedure, no complications reported, vitals stable.

## 2025-06-10 NOTE — ASSESSMENT & PLAN NOTE
AST//110 on admission.    Plan:  - Likely 2/2 adenocarcinoma.  - Avoid hepatotoxic meds.  - Continue to monitor for symptoms.  - Acute Hep Panel, Ethanol, Ammonia, Salicylate, Acetaminophen levels pending for work-up.  - Remaining plan as in abdominal distension.

## 2025-06-10 NOTE — H&P
[facility] Emergency [facility] Medicine  History & Physical    Patient Name: Dylan Sawant  MRN: [id number]  Patient Class: OP- Observation  Admission Date: 6/10/2025  Attending Physician: Serafin Baldwin III, MD   Primary Care Provider: Roland Swenson DO         Patient information was obtained from patient, spouse/SO, past medical records, and ER records.     Subjective:     Principal Problem:Other ascites    Chief Complaint:   Chief Complaint   Patient presents with    Fluid Retention      Pt reports increased fluid retention to lower extremities and ABD x1 month. Pt reports had recent paracentesis last week. Denies taking anything other than daily prescriptions. Denies other sx or complaints.         HPI: Patient is a 63 yo M w/ PMHx of T2DM, GERD, Anxiety, KELLY (CPAP nightly), Restless Leg Syndrome, recently diagnosed with likely liver adenocarcinoma who presents to [facility] with complaints of increasing swelling in his bilateral lower extremities and his abdomen for about 1 month now. He reports recent paracentesis pulling approx 1.5L last week on 6/3 with fluid studies notable suspicious for adenocarinoma. He also endorses generalized abdominal pain, constipation, mild confusion recently. Denies fever, chills, nausea, vomiting. He reports not currently on diuretics or lactulose. Reports compliance with all prescribed medications. Denies current smoking, alcohol, illicit drug use.    In the ED, initial vital signs /83, , Temp 97.9F, SpO2 96% on room air. Labs include CBC with stable H/H 14.3/42.2 and WBC elevated 25.00. CMP with Na 131, K 5.1, Cl 94, and CO2 22 and BUN/Cr 83/2.0 (baseline Cr 1.0). CXR with no acute process. EKG sinus tachycardia. Troponin < 0.006. Initiated on IVF in the ER. [facility] consulted for evaluation for admission for abdominal distension and volume overload.      Past Medical History:   Diagnosis Date    Allergy     Anxiety     Diabetes mellitus     Elevated  BP     GERD (gastroesophageal reflux disease)     Hyperlipidemia     KELLY (obstructive sleep apnea)     wears cpap    Restless leg syndrome     Type 2 diabetes mellitus without complication, without long-term current use of insulin 6/15/2023       Past Surgical History:   Procedure Laterality Date    ADENOIDECTOMY      ARTHROPLASTY OF HIP BY ANTERIOR APPROACH Left 1/27/2023    Procedure: ARTHROPLASTY, HIP TOTAL,;  Surgeon: Karlo Springer III, MD;  Location: [facility] OR;  Service: Orthopedics;  Laterality: Left;   ANTERIOR APPROACH: LEFT: DEPUY - ACTIS + PINNACLE    ARTHROPLASTY OF HIP BY ANTERIOR APPROACH Right 11/15/2023    Procedure: ARTHROPLASTY, HIP, TOTAL, ANTERIOR APPROACH: RIGHT: DEPUY - ACTIS + PINNACLE;  Surgeon: Karlo Springer III, MD;  Location: [facility] OR;  Service: Orthopedics;  Laterality: Right;    COLONOSCOPY N/A 4/8/2017    Procedure: COLONOSCOPY;  Surgeon: Kyle Moreno MD;  Location: [facility] (4TH FLR);  Service: Endoscopy;  Laterality: N/A;    NASAL SEPTUM SURGERY      RECONSTRUCTION OF NOSE N/A 6/22/2018    Procedure: RECONSTRUCTION, NOSE;  Surgeon: Tulio Echols III, MD;  Location: [facility] OR 2ND FLR;  Service: ENT;  Laterality: N/A;  1 HOUR TOTAL / LATERA  NASAL VALVE IMPLANTS    TONSILLECTOMY         Review of patient's allergies indicates:  No Known Allergies    No current facility-administered medications on file prior to encounter.     Current Outpatient Medications on File Prior to Encounter   Medication Sig    atorvastatin (LIPITOR) 40 MG tablet Take 1 tablet (40 mg total) by mouth once daily.    blood sugar diagnostic Strp To check BG 1-3 times daily, to use with insurance preferred meter    blood-glucose meter kit To check BG 1-3 times daily, to use with insurance preferred meter    diclofenac sodium (VOLTAREN ARTHRITIS PAIN) 1 % Gel Apply 2 g topically 4 (four) times daily. for 14 days    docusate sodium (COLACE) 100 MG capsule Take 1 capsule (100 mg total) by mouth 2 (two) times  daily.    ergocalciferol (ERGOCALCIFEROL) 50,000 unit Cap Take 1 capsule (50,000 Units total) by mouth every 7 days. Then start daily OTC replacement after this Rx is complete    EScitalopram oxalate (LEXAPRO) 20 MG tablet Take 1 tablet (20 mg total) by mouth every evening.    fluticasone propionate (FLONASE) 50 mcg/actuation nasal spray 1 spray (50 mcg total) by Each Nostril route once daily.    gabapentin (NEURONTIN) 300 MG capsule Take 3 capsules (900 mg total) by mouth every evening.    glimepiride (AMARYL) 2 MG tablet Take 1 tablet (2 mg total) by mouth before breakfast.    HYDROcodone-acetaminophen (NORCO) 5-325 mg per tablet Take 1 tablet by mouth every 6 (six) hours as needed for Pain (pain refractory to hycosamine).    hyoscyamine 0.125 mg Subl Place 1 tablet (0.125 mg total) under the tongue every 4 (four) hours as needed (abdominal pain).    ipratropium (ATROVENT) 21 mcg (0.03 %) nasal spray 2 sprays by Each Nostril route 2 (two) times daily.    lancets Misc To check BG 1-3 times daily, to use with insurance preferred meter    loratadine (CLARITIN) 10 mg tablet Take 1 tablet (10 mg total) by mouth once daily.    metFORMIN (GLUCOPHAGE-XR) 500 MG ER 24hr tablet Take 2 tablets (1,000 mg total) by mouth 2 (two) times daily with meals.    multivitamin (THERAGRAN) per tablet Take 1 tablet by mouth once daily.    omeprazole (PRILOSEC) 40 MG capsule Take 1 capsule (40 mg total) by mouth once daily.    rOPINIRole (REQUIP) 4 MG tablet Take 1 tablet (4 mg total) by mouth every evening.    traZODone (DESYREL) 150 MG tablet Take 1 tablet (150 mg total) by mouth nightly as needed for Insomnia.     Family History       Problem Relation (Age of Onset)    Arthritis Mother    COPD Mother    Cancer Father    Depression Mother    Diabetes Mother, Father    Diverticulitis Father    Heart disease Father, Paternal Uncle    No Known Problems Sister    Pancreatic cancer Father    Restless legs syndrome Father          Tobacco Use     Smoking status: Never    Smokeless tobacco: Never   Vaping Use    Vaping status: Never Used   Substance and Sexual Activity    Alcohol use: Not Currently    Drug use: No    Sexual activity: Not Currently     Partners: Female     Birth control/protection: Abstinence     Review of Systems   Constitutional:  Negative for chills and fever.   HENT:  Positive for ear pain.    Respiratory:  Negative for cough and shortness of breath.    Cardiovascular:  Positive for leg swelling. Negative for chest pain and palpitations.   Gastrointestinal:  Positive for abdominal distention, abdominal pain and constipation. Negative for blood in stool.   Genitourinary:  Negative for dysuria and frequency.   Neurological:  Negative for dizziness and numbness.   Psychiatric/Behavioral:  Negative for agitation and confusion.      Objective:     Vital Signs (Most Recent):  Temp: 97.9 °F (36.6 °C) (06/10/25 0908)  Pulse: 103 (06/10/25 0908)  Resp: 20 (06/10/25 0908)  BP: 130/83 (06/10/25 0908)  SpO2: 96 % (06/10/25 0908) Vital Signs (24h Range):  Temp:  [97.9 °F (36.6 °C)] 97.9 °F (36.6 °C)  Pulse:  [103] 103  Resp:  [20] 20  SpO2:  [96 %] 96 %  BP: (130)/(83) 130/83     Weight: 89.4 kg (197 lb)  Body mass index is 29.95 kg/m².     Physical Exam  Vitals and nursing note reviewed.   Constitutional:       General: He is not in acute distress.     Appearance: Normal appearance. He is obese. He is not ill-appearing, toxic-appearing or diaphoretic.   HENT:      Head: Normocephalic and atraumatic.      Right Ear: External ear normal.      Left Ear: External ear normal.      Nose: Nose normal.      Mouth/Throat:      Pharynx: Oropharynx is clear.   Eyes:      General: Scleral icterus present.      Extraocular Movements: Extraocular movements intact.   Cardiovascular:      Rate and Rhythm: Normal rate and regular rhythm.      Pulses: Normal pulses.      Heart sounds: Normal heart sounds.   Pulmonary:      Effort: Pulmonary effort is normal. No  respiratory distress.   Abdominal:      General: There is distension.      Tenderness: There is abdominal tenderness. There is no guarding or rebound.   Musculoskeletal:         General: Normal range of motion.      Cervical back: Normal range of motion.      Right lower leg: Edema present.      Left lower leg: Edema present.      Comments: 3+ pitting edema bilateral LE up to knees   Skin:     General: Skin is warm.   Neurological:      Mental Status: He is alert.   Psychiatric:         Mood and Affect: Mood normal.         Behavior: Behavior normal.         Thought Content: Thought content normal.                Significant Labs: All pertinent labs within the past 24 hours have been reviewed.  CBC:   Recent Labs   Lab 06/10/25  0926   WBC 25.00*   HGB 14.3   HCT 42.2   *     CMP:   Recent Labs   Lab 06/10/25  0926   *   K 5.1   CL 94*   CO2 22*   *   BUN 83*   CREATININE 2.0*   CALCIUM 11.3*   PROT 7.7   ALBUMIN 2.1*   BILITOT 4.1*   ALKPHOS 640*   *   *   ANIONGAP 15       Significant Imaging: I have reviewed all pertinent imaging results/findings within the past 24 hours.  Assessment/Plan:     Assessment & Plan  Other ascites  Plan as in abdominal distention.  Lumbago  Plan:  - Continue home medications (gabapentin).  Mixed hyperlipidemia  Plan:  - Continue home medications (atorvastatin).    Anxiety  Plan:  - Continue home medications (lexapro).  Restless legs  Plan:  - Continue home medication (ropinirole).  GERD without esophagitis  Plan:  - Continue home medications (omeprazole 40mg).  KELLY (obstructive sleep apnea)  Plan:  - CPAP QHS  Primary insomnia  Plan:  - Continue home medication (trazodone).  Type 2 diabetes mellitus without complication, without long-term current use of insulin  Last Hgb A1c 7.5 (approx 2 weeks ago).  Home medications: glimepiride 2mg QD, metformin ER 1000mg BID    Plan:  - POCT BG.  - Hold any oral anti-glycemic medications while inpatient.  - LA +  SSI PRN while inpatient.  - BG Goal 140-180.  - Will discuss outpatient regimen prior to discharge.     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  - Likely paracentesis. Studies ordered.  - Started on IV ceftriaxone for SBP ppx (low likelihood)  - Blood cultures obtained.  - Treat other etiologies such as constipation.  - Continue to monitor symptoms.  Leg swelling  Swelling in bilateral lower extremities worsening over the last month.  Albumin 2.1    Plan:  - Likely 2/2 low albumin in setting of adenocarcinoma.  - Echo ordered to complete work-up.  - IV diuresis.  - Monitor Strict I's/O's.  Other constipation  Plan:  - Bowel regimen.  - Monitor symptoms.  Goals of care, counseling/discussion  Patient interested in establishing with Palliative medicine to further discuss GOC, living will, etc.  Full Code at this time.    Plan:  - Palliative Medicine consulted, appreciate recommendations.  Transaminitis  AST//110 on admission.    Plan:  - Likely 2/2 adenocarcinoma.  - Avoid hepatotoxic meds.  - Continue to monitor for symptoms.  - Acute Hep Panel, Ethanol, Ammonia, Salicylate, Acetaminophen levels pending for work-up.  - Remaining plan as in abdominal distension.    VTE Risk Mitigation (From admission, onward)           Ordered     IP VTE LOW RISK PATIENT  Once         06/10/25 1100     Place sequential compression device  Until discontinued         06/10/25 1100                           On 06/10/2025, patient should be placed in hospital observation services under my care in collaboration with [facility].       ________________________  Michael Thomas MD  [facility] PGY-3

## 2025-06-10 NOTE — HPI
Patient is a 63 yo M w/ PMHx of T2DM, GERD, Anxiety, KELLY (CPAP nightly), Restless Leg Syndrome, recently diagnosed with likely liver adenocarcinoma who presents to [facility] with complaints of increasing swelling in his bilateral lower extremities and his abdomen for about 1 month now. He reports recent paracentesis pulling approx 1.5L last week on 6/3 with fluid studies notable suspicious for adenocarinoma. He also endorses generalized abdominal pain, constipation, mild confusion recently. Denies fever, chills, nausea, vomiting. He reports not currently on diuretics or lactulose. Reports compliance with all prescribed medications. Denies current smoking, alcohol, illicit drug use.    In the ED, initial vital signs /83, , Temp 97.9F, SpO2 96% on room air. Labs include CBC with stable H/H 14.3/42.2 and WBC elevated 25.00. CMP with Na 131, K 5.1, Cl 94, and CO2 22 and BUN/Cr 83/2.0 (baseline Cr 1.0). CXR with no acute process. EKG sinus tachycardia. Troponin < 0.006. Initiated on IVF in the ER. [facility] consulted for evaluation for admission for abdominal distension and volume overload.     Private car

## 2025-06-10 NOTE — ED NOTES
Patient requested urinal, request fulfilled, also requested some orange juice, request fulfilled.

## 2025-06-10 NOTE — CONSULTS
Palliative Medicine  Consult Note       Patient Name: Dylan Sawant   MRN: [id number]   Admission Date: 6/10/2025   Hospital Length of Stay: 0   Attending Provider: Serafin Baldwin III, MD   Consulting Provider: Sayra Oneill NP  Primary Care Physician: Roland Swenson DO   Principal Problem: Abdominal distension     Patient information was obtained from patient, relative(s), past medical records, ER records, and primary team.        Inpatient consult to Palliative Care  Consult performed by: Sayra Oneill NP  Consult ordered by: Hemalatha Bonner MD  Reason for consult: goals of care/ symptom managment        Assessment/Plan:      Palliative Care Encounter:  Impression:    Mr Sawant is a 65 yo M w/ PMHx of T2DM, GERD, Anxiety, KELLY (CPAP nightly), Restless Leg Syndrome, recently diagnosed with likely liver adenocarcinoma who presents to [facility] with complaints of increasing swelling in his bilateral lower extremities and his abdomen for about 1 month now. He reports recent paracentesis pulling approx 1.5L last week on 6/3 with fluid studies notable suspicious for adenocarinoma.  3L removed today per IR.      Palliative care consulted for goals of care/ symptom management.    Met with pt and wife at the bedside.  Pt with recently (less than 1 month ago) diagnosed with cancer. Oncology follow up scheduled for later this week.  Treatment plan/ options have not been discussed yet.  Pt is eager to speak with oncologist.     Pt expresses significant pain which was somewhat relieved by paracentesis, but still rates pain at a 7/10. Discussed with primary team who ordered 1X dose of dilaudid as well as Oxy IR 5mg Q6H PRN pain.  Pt reports that his home percocet 5mg Q4H provides decent relief. Could also consider increasing gabapentin 900 mg nightly to gabapentin 900mg BID.      Wife reports a decrease in oral intake/ appetite over the past few months, pt reports decrease in appetite and change is taste.  They are concerned  about weight loss and nutrition status.  Discussed addition of Marinol to daily medications to increase appetite.  Pt interested in trying.  Recommend Marinol 2.5mg BID.     Pt may require standing scheduled paracentesis given short time frame with large amount of fluid build up.     Wife familiar with palliative medicine and pt agreeable to follow up in the outpatient clinic for symptom management and anticipatory guidance.   Palliative medicine will follow up with pt tomorrow.     Advance Care Planning   Advance Directives:   Living Will: No    LaPOST: No    Do Not Resuscitate Status: No    Medical Power of : Yes    Agent's Name:  Robyn klein   Agent's Contact Number:  [phone]    Decision Making:  Patient answered questions and Family answered questions  Goals of Care: The patient and family endorses that what is most important right now is to focus on remaining as independent as possible, symptom/pain control, quality of life, even if it means sacrificing a little time, curative/life-prolongation (regardless of treatment burdens), and improvement in condition but with limits to invasive therapies    Accordingly, we have decided that the best plan to meet the patient's goals includes continuing with treatment       - Prior experience with serious illness: no  -The patient has previously engaged in advance care planning or GOC discussions  - Insight/Understanding of illness: still accepting and understanding       -Functional status: fair.  Pt was not able to manage ADLsL   -Dementia diagnosis no      Symptom Management:  -Pain: yes  1 x 1mg IV dilaudid  Oxy IR 5mg Q^H prn pain   Consider up titration of current gabapentin     -Dyspnea yes  -Anxiety/Depression yes  Continue home Lexapro    -Constipation: yes  Daily bowel regimen as ordered    -Anorexia:yes  Consider addition of Marinol 2.5mg BID     Summary of recommendations and follow up plan:  -Most important goals at this time: restorative   -Code  status: Full Code   -Disposition: return home with palliative care follow up    The above recommendations communicated directly to primary team on 6/10/2025    Thank you for your consult.        Subjective:     Chief Complaint:   Chief Complaint   Patient presents with    Fluid Retention      Pt reports increased fluid retention to lower extremities and ABD x1 month. Pt reports had recent paracentesis last week. Denies taking anything other than daily prescriptions. Denies other sx or complaints.          HPI:   Patient is a 65 yo M w/ PMHx of T2DM, GERD, Anxiety, KELLY (CPAP nightly), Restless Leg Syndrome, recently diagnosed with likely liver adenocarcinoma who presents to [facility] with complaints of increasing swelling in his bilateral lower extremities and his abdomen for about 1 month now. He reports recent paracentesis pulling approx 1.5L last week on 6/3 with fluid studies notable suspicious for adenocarinoma. He also endorses generalized abdominal pain, constipation, mild confusion recently. Denies fever, chills, nausea, vomiting. He reports not currently on diuretics or lactulose. Reports compliance with all prescribed medications. Denies current smoking, alcohol, illicit drug use.     In the ED, initial vital signs /83, , Temp 97.9F, SpO2 96% on room air. Labs include CBC with stable H/H 14.3/42.2 and WBC elevated 25.00. CMP with Na 131, K 5.1, Cl 94, and CO2 22 and BUN/Cr 83/2.0 (baseline Cr 1.0). CXR with no acute process. EKG sinus tachycardia. Troponin < 0.006. Initiated on IVF in the ER. [facility] consulted for evaluation for admission for abdominal distension and volume overload.      Hospital Course:  Per IR, 3750 ml of dark, tere ascites drained. Lab samples collected.     Review of Symptoms      Symptom Assessment (ESAS 0-10 Scale)  Pain:  7  Dyspnea:  1  Anxiety:  1  Nausea:  0  Depression:  0  Anorexia:  4  Fatigue:  6  Insomnia:  0  Restlessness:  0  Agitation:  0         Pain  Assessment:    Location(s): abdomen    Abdomen       Location: generalized        Quality: Pressure-like        Quantity: 7/10 in intensity        Chronicity: Onset 1 month(s) ago, gradually worsening        Aggravating Factors: None        Alleviating Factors: None and opiates        Associated Symptoms: Anorexia    Performance Status:  60    Living Arrangements:  Lives with spouse and Lives in home    Psychosocial/Cultural:   See Palliative Psychosocial Note: No  Social Issues Identified: New Diagnosis/Trauma  Bereavement Risk: No  Caregiver Needs Discussed. Caregiver Distress: Yes: Issues of guilt, Intensity of family caregiving, and Caregiver Burnout Risk  Cultural: none identified at this time   **Primary  to Follow**  Palliative Care  Consult: No    Spiritual:  F - Eunice and Belief:  Presybeterian      Time-Based Charting:  Yes  Chart Review: 20 minutes  Face to Face: 24 minutes  Symptom Assessment: 12 minutes  Coordination of Care: 15 minutes  Discharge Plannin minutes  Advance Care Planning: 10 minutes  Goals of Care: 15 minutes    Total Time Spent: 103 minutes      ROS:  Review of Systems   Constitutional:  Positive for activity change, appetite change and fatigue. Negative for fever.   HENT: Negative.     Respiratory:  Positive for shortness of breath.    Cardiovascular:  Positive for leg swelling.   Gastrointestinal:  Positive for abdominal pain.   Genitourinary: Negative.    Neurological:  Positive for weakness.     Past Medical History:   Diagnosis Date    Allergy     Anxiety     Diabetes mellitus     Elevated BP     GERD (gastroesophageal reflux disease)     Hyperlipidemia     KELLY (obstructive sleep apnea)     wears cpap    Restless leg syndrome     Type 2 diabetes mellitus without complication, without long-term current use of insulin 6/15/2023     Past Surgical History:   Procedure Laterality Date    ADENOIDECTOMY      ARTHROPLASTY OF HIP BY ANTERIOR APPROACH Left 2023     Procedure: ARTHROPLASTY, HIP TOTAL,;  Surgeon: Karlo Springer III, MD;  Location: [facility] OR;  Service: Orthopedics;  Laterality: Left;   ANTERIOR APPROACH: LEFT: DEPUY - ACTIS + PINNACLE    ARTHROPLASTY OF HIP BY ANTERIOR APPROACH Right 11/15/2023    Procedure: ARTHROPLASTY, HIP, TOTAL, ANTERIOR APPROACH: RIGHT: DEPUY - ACTIS + PINNACLE;  Surgeon: Karlo Springer III, MD;  Location: [facility] OR;  Service: Orthopedics;  Laterality: Right;    COLONOSCOPY N/A 4/8/2017    Procedure: COLONOSCOPY;  Surgeon: Kyle Moreno MD;  Location: [facility] ENDO (4TH FLR);  Service: Endoscopy;  Laterality: N/A;    NASAL SEPTUM SURGERY      RECONSTRUCTION OF NOSE N/A 6/22/2018    Procedure: RECONSTRUCTION, NOSE;  Surgeon: Tulio Echols III, MD;  Location: [facility] OR 2ND FLR;  Service: ENT;  Laterality: N/A;  1 HOUR TOTAL / LATERA  NASAL VALVE IMPLANTS    TONSILLECTOMY       Family History   Problem Relation Name Age of Onset    Diabetes Mother Tabitha Jese     Depression Mother Tabitha Jese     Arthritis Mother Tabitha Jese     COPD Mother Tabitha Jese     Heart disease Father Seng Jese     Cancer Father Seng Jese         pancreatic    Diabetes Father Seng Jese     Diverticulitis Father Seng Jese     Restless legs syndrome Father Seng Jese     Pancreatic cancer Father Seng Jese     No Known Problems Sister      Heart disease Paternal Uncle Jag Sawant      Review of patient's allergies indicates:  No Known Allergies    Medications:  Current Medications[1]    Objective:      Physical Exam:  Vitals: Temp: 97.9 °F (36.6 °C) (06/10/25 0908)  Pulse: 97 (06/10/25 1255)  Resp: 17 (06/10/25 1255)  BP: 134/89 (06/10/25 1255)  SpO2: 96 % (06/10/25 1255)    Physical Exam  Vitals and nursing note reviewed.   Constitutional:       General: He is not in acute distress.     Appearance: He is overweight. He is ill-appearing.   HENT:      Head: Normocephalic.   Eyes:      General: Scleral icterus present.   Cardiovascular:      Rate  and Rhythm: Normal rate.   Pulmonary:      Effort: Pulmonary effort is normal.   Abdominal:      General: There is distension.      Tenderness: There is abdominal tenderness.   Musculoskeletal:      Right lower leg: Edema present.      Left lower leg: Edema present.   Skin:     General: Skin is warm and dry.      Coloration: Skin is sallow.   Neurological:      General: No focal deficit present.      Mental Status: He is alert.      Motor: Weakness present.   Psychiatric:         Mood and Affect: Mood normal.         Behavior: Behavior normal. Behavior is cooperative.         Thought Content: Thought content normal.         Judgment: Judgment normal.         Labs:   Creatinine   Date Value Ref Range Status   06/10/2025 2.0 (H) 0.5 - 1.4 mg/dL Final      Hemoglobin   Date Value Ref Range Status   02/25/2025 13.7 (L) 14.0 - 18.0 g/dL Final     HGB   Date Value Ref Range Status   06/10/2025 14.3 14.0 - 18.0 gm/dL Final      Albumin   Date Value Ref Range Status   06/10/2025 2.1 (L) 3.5 - 5.2 g/dL Final   05/21/2025 2.8 (L) 3.5 - 5.2 g/dL Final   05/20/2025 2.9 (L) 3.5 - 5.2 g/dL Final   02/25/2025 4.0 3.5 - 5.2 g/dL Final   10/21/2024 4.1 3.5 - 5.2 g/dL Final   06/19/2024 4.0 3.5 - 5.2 g/dL Final      Imaging: reviewed    Thank you for the opportunity to care for this patient and family.       Sayra Oneill NP         [1]   Current Facility-Administered Medications:     atorvastatin tablet 40 mg, 40 mg, Oral, Daily, Hemalatha Bonner MD, 40 mg at 06/10/25 1202    dextrose 50% injection 12.5 g, 12.5 g, Intravenous, PRN, Hemalatha Bonner MD    dextrose 50% injection 25 g, 25 g, Intravenous, PRN, Hemalatha Bonner MD    EScitalopram oxalate tablet 20 mg, 20 mg, Oral, QHS, Hemalatha Bonner MD    furosemide injection 80 mg, 80 mg, Intravenous, Daily, Hemalatha Bonner MD, 80 mg at 06/10/25 1203    gabapentin capsule 900 mg, 900 mg, Oral, QHS, Hemalatha Bonner MD    glucagon (human recombinant) injection 1 mg, 1 mg, Intramuscular, PRN, Kailey,  MD Hemalatha    glucose chewable tablet 16 g, 16 g, Oral, PRN, Hemalatha Bonner MD    glucose chewable tablet 24 g, 24 g, Oral, PRN, Hemalatha Bonner MD    HYDROmorphone injection 1 mg, 1 mg, Intravenous, Once, Michael Thomas MD    hyoscyamine SL tablet 0.125 mg, 0.125 mg, Sublingual, Q4H PRN, Hemalatha Bonner MD    insulin aspart U-100 pen 0-5 Units, 0-5 Units, Subcutaneous, QID (AC + HS) PRN, Hemalatha Bonner MD    melatonin tablet 6 mg, 6 mg, Oral, Nightly PRN, Hemalatha Bonner MD    multivitamin tablet, 1 tablet, Oral, Daily, Hemalatha Bonner MD, 1 tablet at 06/10/25 1202    naloxone 0.4 mg/mL injection 0.02 mg, 0.02 mg, Intravenous, PRN, Hemalatha Bonner MD    ondansetron injection 4 mg, 4 mg, Intravenous, Q8H PRN, Hemalatha Bonner MD    oxyCODONE immediate release tablet 5 mg, 5 mg, Oral, Q6H PRN, Michael Thomas MD    pantoprazole EC tablet 40 mg, 40 mg, Oral, Daily, Hemalatha Bonner MD, 40 mg at 06/10/25 1202    polyethylene glycol packet 17 g, 17 g, Oral, Daily PRN, Hemalatha Bonner MD    rOPINIRole tablet 4 mg, 4 mg, Oral, QHS, Hemalatha Bonner MD    senna-docusate 8.6-50 mg per tablet 1 tablet, 1 tablet, Oral, BID, Hemalatha Bonner MD, 1 tablet at 06/10/25 1202    sodium chloride 0.9% flush 5 mL, 5 mL, Intravenous, PRN, Hemalatha Bonner MD    traZODone tablet 150 mg, 150 mg, Oral, Nightly PRN, Hemalatha Bonner MD    Current Outpatient Medications:     atorvastatin (LIPITOR) 40 MG tablet, Take 1 tablet (40 mg total) by mouth once daily., Disp: 90 tablet, Rfl: 2    ergocalciferol (ERGOCALCIFEROL) 50,000 unit Cap, Take 1 capsule (50,000 Units total) by mouth every 7 days. Then start daily OTC replacement after this Rx is complete, Disp: 12 capsule, Rfl: 3    EScitalopram oxalate (LEXAPRO) 20 MG tablet, Take 1 tablet (20 mg total) by mouth every evening., Disp: 90 tablet, Rfl: 1    gabapentin (NEURONTIN) 300 MG capsule, Take 3 capsules (900 mg total) by mouth every evening., Disp: 270 capsule, Rfl: 3    glimepiride (AMARYL) 2 MG tablet, Take 1  tablet (2 mg total) by mouth before breakfast., Disp: 90 tablet, Rfl: 3    HYDROcodone-acetaminophen (NORCO) 5-325 mg per tablet, Take 1 tablet by mouth every 6 (six) hours as needed for Pain (pain refractory to hycosamine)., Disp: 90 tablet, Rfl: 0    hyoscyamine 0.125 mg Subl, Place 1 tablet (0.125 mg total) under the tongue every 4 (four) hours as needed (abdominal pain)., Disp: 40 tablet, Rfl: 1    metFORMIN (GLUCOPHAGE-XR) 500 MG ER 24hr tablet, Take 2 tablets (1,000 mg total) by mouth 2 (two) times daily with meals., Disp: 360 tablet, Rfl: 3    omeprazole (PRILOSEC) 40 MG capsule, Take 1 capsule (40 mg total) by mouth once daily., Disp: 90 capsule, Rfl: 3    rOPINIRole (REQUIP) 4 MG tablet, Take 1 tablet (4 mg total) by mouth every evening., Disp: 90 tablet, Rfl: 3    traZODone (DESYREL) 150 MG tablet, Take 1 tablet (150 mg total) by mouth nightly as needed for Insomnia., Disp: 90 tablet, Rfl: 3    blood sugar diagnostic Strp, To check BG 1-3 times daily, to use with insurance preferred meter, Disp: 100 each, Rfl: 11    blood-glucose meter kit, To check BG 1-3 times daily, to use with insurance preferred meter, Disp: 1 each, Rfl: 0    diclofenac sodium (VOLTAREN ARTHRITIS PAIN) 1 % Gel, Apply 2 g topically 4 (four) times daily. for 14 days, Disp: 100 g, Rfl: 0    docusate sodium (COLACE) 100 MG capsule, Take 1 capsule (100 mg total) by mouth 2 (two) times daily., Disp: 180 capsule, Rfl: 2    fluticasone propionate (FLONASE) 50 mcg/actuation nasal spray, 1 spray (50 mcg total) by Each Nostril route once daily., Disp: 16 g, Rfl: 1    ipratropium (ATROVENT) 21 mcg (0.03 %) nasal spray, 2 sprays by Each Nostril route 2 (two) times daily., Disp: 30 mL, Rfl: 1    lancets Misc, To check BG 1-3 times daily, to use with insurance preferred meter, Disp: 100 each, Rfl: 11    loratadine (CLARITIN) 10 mg tablet, Take 1 tablet (10 mg total) by mouth once daily., Disp: 30 tablet, Rfl: 0    multivitamin (THERAGRAN) per  tablet, Take 1 tablet by mouth once daily., Disp: , Rfl:

## 2025-06-10 NOTE — ASSESSMENT & PLAN NOTE
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  - Likely paracentesis. Studies ordered.  - Started on IV ceftriaxone for SBP ppx (low likelihood)  - Blood cultures obtained.  - Treat other etiologies such as constipation.  - Continue to monitor symptoms.

## 2025-06-10 NOTE — ASSESSMENT & PLAN NOTE
Last Hgb A1c 7.5 (approx 2 weeks ago).  Home medications: glimepiride 2mg QD, metformin ER 1000mg BID    Plan:  - POCT BG.  - Hold any oral anti-glycemic medications while inpatient.  - LA + SSI PRN while inpatient.  - BG Goal 140-180.  - Will discuss outpatient regimen prior to discharge.

## 2025-06-10 NOTE — SUBJECTIVE & OBJECTIVE
Past Medical History:   Diagnosis Date    Allergy     Anxiety     Diabetes mellitus     Elevated BP     GERD (gastroesophageal reflux disease)     Hyperlipidemia     KELLY (obstructive sleep apnea)     wears cpap    Restless leg syndrome     Type 2 diabetes mellitus without complication, without long-term current use of insulin 6/15/2023       Past Surgical History:   Procedure Laterality Date    ADENOIDECTOMY      ARTHROPLASTY OF HIP BY ANTERIOR APPROACH Left 1/27/2023    Procedure: ARTHROPLASTY, HIP TOTAL,;  Surgeon: Karlo Springer III, MD;  Location: [facility] OR;  Service: Orthopedics;  Laterality: Left;   ANTERIOR APPROACH: LEFT: DEPUY - ACTIS + PINNACLE    ARTHROPLASTY OF HIP BY ANTERIOR APPROACH Right 11/15/2023    Procedure: ARTHROPLASTY, HIP, TOTAL, ANTERIOR APPROACH: RIGHT: DEPUY - ACTIS + PINNACLE;  Surgeon: Karlo Springer III, MD;  Location: [facility] OR;  Service: Orthopedics;  Laterality: Right;    COLONOSCOPY N/A 4/8/2017    Procedure: COLONOSCOPY;  Surgeon: Kyle Moreno MD;  Location: [facility] (4TH FLR);  Service: Endoscopy;  Laterality: N/A;    NASAL SEPTUM SURGERY      RECONSTRUCTION OF NOSE N/A 6/22/2018    Procedure: RECONSTRUCTION, NOSE;  Surgeon: Tulio Echols III, MD;  Location: [facility] OR 2ND FLR;  Service: ENT;  Laterality: N/A;  1 HOUR TOTAL / LATERA  NASAL VALVE IMPLANTS    TONSILLECTOMY         Review of patient's allergies indicates:  No Known Allergies    No current facility-administered medications on file prior to encounter.     Current Outpatient Medications on File Prior to Encounter   Medication Sig    atorvastatin (LIPITOR) 40 MG tablet Take 1 tablet (40 mg total) by mouth once daily.    blood sugar diagnostic Strp To check BG 1-3 times daily, to use with insurance preferred meter    blood-glucose meter kit To check BG 1-3 times daily, to use with insurance preferred meter    diclofenac sodium (VOLTAREN ARTHRITIS PAIN) 1 % Gel Apply 2 g topically 4 (four) times daily. 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 MRN: [id number]  Unit/Bed#: [facility] 03 Encounter: [id number]      Daily Screen     No data found  Physical Exam:   Assessment Type: Assess only  General Appearance: Sedated  Respiratory Pattern: Assisted  Chest Assessment: Chest expansion symmetrical  Bilateral Breath Sounds: Diminished, Clear  R Breath Sounds: Diminished  L Breath Sounds: Diminished  Cough: None  Suction: ET Tube  O2 Device: ventilator  Subjective Data: Pt is intubated      Resp Comments: Pt continues on ACPC - pt appears comfortable on current settings  FIO2 found at 50%  Pt awaiting bronch  Will continue to monitor  docusate sodium (COLACE) 100 MG capsule Take 1 capsule (100 mg total) by mouth 2 (two) times daily.    ergocalciferol (ERGOCALCIFEROL) 50,000 unit Cap Take 1 capsule (50,000 Units total) by mouth every 7 days. Then start daily OTC replacement after this Rx is complete    EScitalopram oxalate (LEXAPRO) 20 MG tablet Take 1 tablet (20 mg total) by mouth every evening.    fluticasone propionate (FLONASE) 50 mcg/actuation nasal spray 1 spray (50 mcg total) by Each Nostril route once daily.    gabapentin (NEURONTIN) 300 MG capsule Take 3 capsules (900 mg total) by mouth every evening.    glimepiride (AMARYL) 2 MG tablet Take 1 tablet (2 mg total) by mouth before breakfast.    HYDROcodone-acetaminophen (NORCO) 5-325 mg per tablet Take 1 tablet by mouth every 6 (six) hours as needed for Pain (pain refractory to hycosamine).    hyoscyamine 0.125 mg Subl Place 1 tablet (0.125 mg total) under the tongue every 4 (four) hours as needed (abdominal pain).    ipratropium (ATROVENT) 21 mcg (0.03 %) nasal spray 2 sprays by Each Nostril route 2 (two) times daily.    lancets Misc To check BG 1-3 times daily, to use with insurance preferred meter    loratadine (CLARITIN) 10 mg tablet Take 1 tablet (10 mg total) by mouth once daily.    metFORMIN (GLUCOPHAGE-XR) 500 MG ER 24hr tablet Take 2 tablets (1,000 mg total) by mouth 2 (two) times daily with meals.    multivitamin (THERAGRAN) per tablet Take 1 tablet by mouth once daily.    omeprazole (PRILOSEC) 40 MG capsule Take 1 capsule (40 mg total) by mouth once daily.    rOPINIRole (REQUIP) 4 MG tablet Take 1 tablet (4 mg total) by mouth every evening.    traZODone (DESYREL) 150 MG tablet Take 1 tablet (150 mg total) by mouth nightly as needed for Insomnia.     Family History       Problem Relation (Age of Onset)    Arthritis Mother    COPD Mother    Cancer Father    Depression Mother    Diabetes Mother, Father    Diverticulitis Father    Heart disease Father, Paternal Uncle    No Known  Problems Sister    Pancreatic cancer Father    Restless legs syndrome Father          Tobacco Use    Smoking status: Never    Smokeless tobacco: Never   Vaping Use    Vaping status: Never Used   Substance and Sexual Activity    Alcohol use: Not Currently    Drug use: No    Sexual activity: Not Currently     Partners: Female     Birth control/protection: Abstinence     Review of Systems   Constitutional:  Negative for chills and fever.   HENT:  Positive for ear pain.    Respiratory:  Negative for cough and shortness of breath.    Cardiovascular:  Positive for leg swelling. Negative for chest pain and palpitations.   Gastrointestinal:  Positive for abdominal distention, abdominal pain and constipation. Negative for blood in stool.   Genitourinary:  Negative for dysuria and frequency.   Neurological:  Negative for dizziness and numbness.   Psychiatric/Behavioral:  Negative for agitation and confusion.      Objective:     Vital Signs (Most Recent):  Temp: 97.9 °F (36.6 °C) (06/10/25 0908)  Pulse: 103 (06/10/25 0908)  Resp: 20 (06/10/25 0908)  BP: 130/83 (06/10/25 0908)  SpO2: 96 % (06/10/25 0908) Vital Signs (24h Range):  Temp:  [97.9 °F (36.6 °C)] 97.9 °F (36.6 °C)  Pulse:  [103] 103  Resp:  [20] 20  SpO2:  [96 %] 96 %  BP: (130)/(83) 130/83     Weight: 89.4 kg (197 lb)  Body mass index is 29.95 kg/m².     Physical Exam  Vitals and nursing note reviewed.   Constitutional:       General: He is not in acute distress.     Appearance: Normal appearance. He is obese. He is not ill-appearing, toxic-appearing or diaphoretic.   HENT:      Head: Normocephalic and atraumatic.      Right Ear: External ear normal.      Left Ear: External ear normal.      Nose: Nose normal.      Mouth/Throat:      Pharynx: Oropharynx is clear.   Eyes:      General: Scleral icterus present.      Extraocular Movements: Extraocular movements intact.   Cardiovascular:      Rate and Rhythm: Normal rate and regular rhythm.      Pulses: Normal pulses.       Heart sounds: Normal heart sounds.   Pulmonary:      Effort: Pulmonary effort is normal. No respiratory distress.   Abdominal:      General: There is distension.      Tenderness: There is abdominal tenderness. There is no guarding or rebound.   Musculoskeletal:         General: Normal range of motion.      Cervical back: Normal range of motion.      Right lower leg: Edema present.      Left lower leg: Edema present.      Comments: 3+ pitting edema bilateral LE up to knees   Skin:     General: Skin is warm.   Neurological:      Mental Status: He is alert.   Psychiatric:         Mood and Affect: Mood normal.         Behavior: Behavior normal.         Thought Content: Thought content normal.                Significant Labs: All pertinent labs within the past 24 hours have been reviewed.  CBC:   Recent Labs   Lab 06/10/25  0926   WBC 25.00*   HGB 14.3   HCT 42.2   *     CMP:   Recent Labs   Lab 06/10/25  0926   *   K 5.1   CL 94*   CO2 22*   *   BUN 83*   CREATININE 2.0*   CALCIUM 11.3*   PROT 7.7   ALBUMIN 2.1*   BILITOT 4.1*   ALKPHOS 640*   *   *   ANIONGAP 15       Significant Imaging: I have reviewed all pertinent imaging results/findings within the past 24 hours.

## 2025-06-10 NOTE — SEDATION DOCUMENTATION
Procedure completed. Patient tolerated well; VSS. Site CDI. 3750 ml of dark, tere ascites drained. Lab samples collected. Patient to be transported back to ED for  recovery; report to be given at the bedside.

## 2025-06-10 NOTE — ASSESSMENT & PLAN NOTE
Patient interested in establishing with Palliative medicine to further discuss GOC, living will, etc.  Full Code at this time.    Plan:  - Palliative Medicine consulted, appreciate recommendations.

## 2025-06-10 NOTE — ED PROVIDER NOTES
Encounter Date: 6/10/2025       History     Chief Complaint   Patient presents with    Fluid Retention      Pt reports increased fluid retention to lower extremities and ABD x1 month. Pt reports had recent paracentesis last week. Denies taking anything other than daily prescriptions. Denies other sx or complaints.      64-year-old male, PMH DM, liver adenocarcinoma, presents ED with concern of generalized abdominal and lower extremity swelling.  He reports he was seen by IR roughly 1 week ago for paracentesis, removing 1.5 L of fluid.  He does have mild generalized abdominal discomfort and pain radiating towards his back that he associates with a abdominal distention.  No significant pain to lower extremities.  Denying any associated chest pain, cough or shortness of breath.  No fevers, chills or recent sicknesses.  No other acute complaints reported at this time        Review of patient's allergies indicates:  No Known Allergies  Past Medical History:   Diagnosis Date    Allergy     Anxiety     Diabetes mellitus     Elevated BP     GERD (gastroesophageal reflux disease)     Hyperlipidemia     KELLY (obstructive sleep apnea)     wears cpap    Restless leg syndrome     Type 2 diabetes mellitus without complication, without long-term current use of insulin 6/15/2023     Past Surgical History:   Procedure Laterality Date    ADENOIDECTOMY      ARTHROPLASTY OF HIP BY ANTERIOR APPROACH Left 1/27/2023    Procedure: ARTHROPLASTY, HIP TOTAL,;  Surgeon: Karlo Springer III, MD;  Location: [facility] OR;  Service: Orthopedics;  Laterality: Left;   ANTERIOR APPROACH: LEFT: DEPUY - ACTIS + PINNACLE    ARTHROPLASTY OF HIP BY ANTERIOR APPROACH Right 11/15/2023    Procedure: ARTHROPLASTY, HIP, TOTAL, ANTERIOR APPROACH: RIGHT: DEPUY - ACTIS + PINNACLE;  Surgeon: Karlo Springer III, MD;  Location: [facility] OR;  Service: Orthopedics;  Laterality: Right;    COLONOSCOPY N/A 4/8/2017    Procedure: COLONOSCOPY;  Surgeon: Kyle Moreno MD;   Location: [facility] (4TH FLR);  Service: Endoscopy;  Laterality: N/A;    NASAL SEPTUM SURGERY      RECONSTRUCTION OF NOSE N/A 6/22/2018    Procedure: RECONSTRUCTION, NOSE;  Surgeon: Tulio Echols III, MD;  Location: [facility] OR 2ND FLR;  Service: ENT;  Laterality: N/A;  1 HOUR TOTAL / LATERA  NASAL VALVE IMPLANTS    TONSILLECTOMY       Family History   Problem Relation Name Age of Onset    Diabetes Mother Tabitha Jese     Depression Mother Tabitha Jese     Arthritis Mother Tabitha Jese     COPD Mother Tabitha Jese     Heart disease Father Seng Jese     Cancer Father Seng Jese         pancreatic    Diabetes Father Seng Jsee     Diverticulitis Father Seng Jese     Restless legs syndrome Father Seng Jese     Pancreatic cancer Father Seng Jese     No Known Problems Sister      Heart disease Paternal Uncle Jag Sawant      Social History[1]  Review of Systems   Constitutional:  Positive for fatigue. Negative for chills and fever.   Respiratory:  Negative for cough and shortness of breath.    Cardiovascular:  Positive for leg swelling. Negative for chest pain.   Gastrointestinal:  Positive for abdominal distention and abdominal pain. Negative for diarrhea, nausea and vomiting.   Genitourinary:  Negative for dysuria.   Musculoskeletal:  Negative for neck pain and neck stiffness.       Physical Exam     Initial Vitals [06/10/25 0908]   BP Pulse Resp Temp SpO2   130/83 103 20 97.9 °F (36.6 °C) 96 %      MAP       --         Physical Exam    Vitals reviewed.  Constitutional: He appears well-developed and well-nourished. He is cooperative. He does not have a sickly appearance. He does not appear ill. No distress.   HENT:   Head: Normocephalic and atraumatic.   Eyes: EOM are normal.   Neck:   Normal range of motion.  Cardiovascular:  Normal rate and regular rhythm.           Pulmonary/Chest: Effort normal and breath sounds normal.   Abdominal:   Distended abdomen with mild generalized abdominal tenderness.    Musculoskeletal:      Cervical back: Normal range of motion.      Comments: BLE pitting edema.  No skin changes, erythema or warmth     Neurological: He is alert and oriented to person, place, and time. GCS eye subscore is 4. GCS verbal subscore is 5. GCS motor subscore is 6.   Psychiatric: He has a normal mood and affect. His speech is normal and behavior is normal.         ED Course   Procedures  Labs Reviewed   COMPREHENSIVE METABOLIC PANEL - Abnormal       Result Value    Sodium 131 (*)     Potassium 5.1      Chloride 94 (*)     CO2 22 (*)     Glucose 152 (*)     BUN 83 (*)     Creatinine 2.0 (*)     Calcium 11.3 (*)     Protein Total 7.7      Albumin 2.1 (*)     Bilirubin Total 4.1 (*)      (*)      (*)      (*)     Anion Gap 15      eGFR 37 (*)    CBC WITH DIFFERENTIAL - Abnormal    WBC 25.00 (*)     RBC 5.41      HGB 14.3      HCT 42.2      MCV 78 (*)     MCH 26.4 (*)     MCHC 33.9      RDW 17.9 (*)     Platelet Count 476 (*)     MPV 10.2      Nucleated RBC 0      Neut % 85.9 (*)     Lymph % 4.4 (*)     Mono % 8.2      Eos % 0.2      Basophil % 0.2      Imm Grans % 1.1 (*)     Neut # 21.45 (*)     Lymph # 1.11      Mono # 2.04 (*)     Eos # 0.06      Baso # 0.06      Imm Grans # 0.28 (*)    MAGNESIUM - Abnormal    Magnesium  3.1 (*)    PROTIME-INR - Abnormal    PT 14.4 (*)     INR 1.3 (*)    LACTIC ACID, PLASMA - Abnormal    Lactic Acid Level 3.5 (*)     Narrative:     Falsely low lactic acid results can be found in samples containing >=13.0 mg/dL total bilirubin and/or >=3.5 mg/dL direct bilirubin.    BILIRUBIN, DIRECT - Abnormal    Bilirubin Direct 3.0 (*)    URINALYSIS, REFLEX TO URINE CULTURE - Abnormal    Color, UA Yellow      Appearance, UA Clear      pH, UA 5.0      Spec Grav UA 1.015      Protein, UA Negative      Glucose, UA Negative      Ketones, UA Negative      Bilirubin, UA Negative      Blood, UA Negative      Nitrites, UA Negative      Urobilinogen, UA 2.0-3.0 (*)      Leukocyte Esterase, UA Negative     LACTATE DEHYDROGENASE - Abnormal    Lactate Dehydrogenase 753 (*)     Narrative:     Results are increased in hemolyzed samples.    SALICYLATE LEVEL - Abnormal    Salicylate Level <5.0 (*)    POCT GLUCOSE - Abnormal    POCT Glucose 121 (*)    TROPONIN I - Normal    Troponin-I <0.006     B-TYPE NATRIURETIC PEPTIDE - Normal    BNP 29     APTT - Normal    PTT 28.1     SODIUM, URINE, RANDOM - Normal    Urine Sodium 41      Narrative:     The random urine reference ranges provided were established   for 24 hour urine collections.  No reference ranges exist for  random urine specimens.  Correlate clinically.   CREATININE, URINE, RANDOM - Normal    Urine Creatinine 65.7     UREA NITROGEN, URINE, RANDOM - Normal    Urine Urea Nitrogen 467     CULTURE, BLOOD   CULTURE, BLOOD   CULTURE, AEROBIC  (SPECIFY SOURCE)   CULTURE, ANAEROBIC   GRAM STAIN   CBC W/ AUTO DIFFERENTIAL    Narrative:     The following orders were created for panel order CBC auto differential.  Procedure                               Abnormality         Status                     ---------                               -----------         ------                     CBC with Differential[[id number]]       Abnormal            Final result                 Please view results for these tests on the individual orders.   WBC & DIFF, BODY FLUID    Appearance, BF Hazy      Color, BF Red      WBC, BF 1,769      Neutrophils/Bands, Man Diff % BF 42      Lymphocyte % 51      Monocytes/Macrophages, Man Diff % BF 5      Mesothelial Cells BF % 2      Narrative:     Reference ranges for body fluids not established. Correlate clinically.    GREY TOP URINE HOLD    Extra Tube Hold for add-ons.     AMMONIA   ALBUMIN, PERITONEAL, PLEURAL FLUID OR NANCY DRAINAGE, IN-HOUSE   PROTEIN, PERITONEAL, PLEURAL FLUID OR NANCY DRAINAGE, IN-HOUSE   LDH, PERITONEAL, PLEURAL FLUID OR NANCY DRAINAGE, IN-HOUSE   POCT GLUCOSE, HAND-HELD DEVICE   POCT GLUCOSE, HAND-HELD  DEVICE        ECG Results              EKG 12-lead (Weakness) Age > 50 (In process)        Collection Time Result Time QRS Duration OHS QTC Calculation    06/10/25 09:10:48 06/10/25 12:30:31 104 479                     In process by Interface, Lab In [facility] (06/10/25 12:30:37)                   Narrative:    Test Reason : R53.1,    Vent. Rate : 102 BPM     Atrial Rate : 102 BPM     P-R Int : 140 ms          QRS Dur : 104 ms      QT Int : 368 ms       P-R-T Axes :  63 -73  31 degrees    QTcB Int : 479 ms    Sinus tachycardia  Left axis deviation  Anterolateral infarct ,age undetermined  Abnormal ECG  When compared with ECG of 31-Oct-2023 12:01,  Vent. rate has increased by  36 bpm  Anterior infarct is now Present  Anterolateral infarct is now Present    Referred By: AAAREFERRAL SELF           Confirmed By:                                   Imaging Results              IR Paracentesis with Imaging (In process)                      US Lower Extremity Veins Bilateral (Final result)  Result time 06/10/25 12:11:15      Final result by Andres Mendoza III, MD (06/10/25 12:11:15)                   Impression:      No lower extremity DVT seen bilaterally.      Electronically signed by: Andres Mendoza MD  Date:    06/10/2025  Time:    12:11               Narrative:    EXAMINATION:  US LOWER EXTREMITY VEINS BILATERAL    CLINICAL HISTORY:  Other specified soft tissue disorders    FINDINGS:  Ultrasound lower extremity veins bilateral.    All lower extremity veins demonstrate normal flow, compressibility, and waveform.  No popliteal fossa mass, cyst, or aneurysm seen.                                       US Retroperitoneal Complete (Final result)  Result time 06/10/25 12:13:53      Final result by Andres Mendoza III, MD (06/10/25 12:13:53)                   Impression:      Normal appearance of the kidneys.    Multiple liver metastases.    Right left upper quadrant ascites.      Electronically signed by: Andres Mendoza  MD  Date:    06/10/2025  Time:    12:13               Narrative:    EXAMINATION:  US RETROPERITONEAL COMPLETE    CLINICAL HISTORY:  ABHINAV;    FINDINGS:  Right kidney measures 12.2 cm, RI 0.69.    Left kidney measures 12.4 cm, RI 0.74.    There is free fluid seen within the right upper quadrant and the left upper quadrant.    There are diffuse hypoechoic liver lesions consistent with metastatic disease.    The kidneys demonstrate no mass, scar, stone, or hydronephrosis.                                       X-Ray Chest 1 View (Final result)  Result time 06/10/25 11:12:52      Final result by Andres Mendoza III, MD (06/10/25 11:12:52)                   Impression:      No acute process seen.      Electronically signed by: Andres Mendoza MD  Date:    06/10/2025  Time:    11:12               Narrative:    EXAMINATION:  XR CHEST 1 VIEW    CLINICAL HISTORY:  Other specified soft tissue disorders    FINDINGS:  Chest one view:    Heart size is normal.  Lungs are clear.  The bones demonstrate DJD.                                       Medications   sodium chloride 0.9% flush 5 mL (has no administration in time range)   insulin aspart U-100 pen 0-5 Units (has no administration in time range)   melatonin tablet 6 mg (has no administration in time range)   ondansetron injection 4 mg (has no administration in time range)   polyethylene glycol packet 17 g (has no administration in time range)   senna-docusate 8.6-50 mg per tablet 1 tablet (1 tablet Oral Given 6/10/25 1202)   naloxone 0.4 mg/mL injection 0.02 mg (has no administration in time range)   glucose chewable tablet 16 g (has no administration in time range)   glucose chewable tablet 24 g (has no administration in time range)   dextrose 50% injection 12.5 g (has no administration in time range)   dextrose 50% injection 25 g (has no administration in time range)   glucagon (human recombinant) injection 1 mg (has no administration in time range)   atorvastatin tablet 40 mg  (40 mg Oral Given 6/10/25 1202)   EScitalopram oxalate tablet 20 mg (has no administration in time range)   hyoscyamine SL tablet 0.125 mg (has no administration in time range)   multivitamin tablet (1 tablet Oral Given 6/10/25 1202)   pantoprazole EC tablet 40 mg (40 mg Oral Given 6/10/25 1202)   rOPINIRole tablet 4 mg (has no administration in time range)   traZODone tablet 150 mg (has no administration in time range)   gabapentin capsule 900 mg (has no administration in time range)   furosemide injection 80 mg (80 mg Intravenous Given 6/10/25 1203)   oxyCODONE immediate release tablet 5 mg (has no administration in time range)   perflutren protein-A microsphr 0.22 mg/mL IV susp (has no administration in time range)   droNABinol capsule 2.5 mg (has no administration in time range)   LIDOcaine HCL 10 mg/ml (1%) injection (10 mLs Other Given 6/10/25 1218)   HYDROmorphone injection 1 mg (1 mg Intravenous Given 6/10/25 1528)     Medical Decision Making  Patient presents with concern of abdominal distention and leg swelling.  History of adenocarcinoma to liver.  He did have paracentesis performed roughly 1 week ago.  Afebrile with vitals grossly unremarkable.  Patient in no distress on exam    DDx:  Including but not limited to adenocarcinoma, ascites, volume overload    Amount and/or Complexity of Data Reviewed  Labs: ordered. Decision-making details documented in ED Course.  Radiology: ordered.               ED Course as of 06/10/25 1616   Tue Aly 10, 2025   1009 CBC auto differential(!)  Elevation WBC 25 [KS]   1035 Comprehensive metabolic panel(!)  Hyponatremia 131.  Creatinine 2.0, baseline 0.8.  Elevation LFTs, , ,  [KS]   1057 Findings are for concerning for ascites, elevated LFTs and bilirubin, ABHINAV.  Remaining workup is pending at this time but patient is going to require hospital admission.  I did discuss patient with [facility] family medicine team, who has accepted patient for observation.  We  will continue to monitor. [KS]   1058 Patient discussed with attending, Dr. Rivero, who agrees with ED course and dispo. [KS]      ED Course User Index  [KS] Fer Jaramillo PA-C                           Clinical Impression:  Final diagnoses:  [R53.1] Weakness  [M79.89] Leg swelling  [C22.9] Adenocarcinoma of liver (Primary)  [N17.9] ABHINAV (acute kidney injury)  [R18.8] Other ascites  [R18.8] Ascites          ED Disposition Condition    Observation                       [1]   Social History  Tobacco Use    Smoking status: Never    Smokeless tobacco: Never   Vaping Use    Vaping status: Never Used   Substance Use Topics    Alcohol use: Not Currently    Drug use: No        Fer Jaramillo PA-C  06/10/25 [id number]

## 2025-06-10 NOTE — FIRST PROVIDER EVALUATION
Emergency Department TeleTriage Encounter Note      CHIEF COMPLAINT    Chief Complaint   Patient presents with    Fluid Retention      Pt reports increased fluid retention to lower extremities and ABD x1 month. Pt reports had recent paracentesis last week. Denies taking anything other than daily prescriptions. Denies other sx or complaints.        VITAL SIGNS   Initial Vitals [06/10/25 0908]   BP Pulse Resp Temp SpO2   130/83 103 20 97.9 °F (36.6 °C) 96 %      MAP       --            ALLERGIES    Review of patient's allergies indicates:  No Known Allergies    PROVIDER TRIAGE NOTE  Patient with adenocarcinoma of the liver presents with complaint of increased fluid retention and generalized weakness. Advised to come to the ED  by PCP      ORDERS  Labs Reviewed - No data to display    ED Orders (720h ago, onward)      Start Ordered     Status Ordering Provider    06/10/25 0910 06/10/25 0909  EKG 12-lead (Weakness) Age > 50  Once        Comments: If patient is > 50 yrs.    Completed by CHRISTAL WILSON on 6/10/2025 at  9:11 AM KI MUÑOZ              Virtual Visit Note: The provider triage portion of this emergency department evaluation and documentation was performed via [employer], a HIPAA-compliant telemedicine application, in concert with a tele-presenter in the room. A face to face patient evaluation with one of my colleagues will occur once the patient is placed in an emergency department room.      DISCLAIMER: This note was prepared with [email or web address] voice recognition transcription software. Garbled syntax, mangled pronouns, and other bizarre constructions may be attributed to that software system.

## 2025-06-10 NOTE — CONSULTS
Paracentesis Consult Note  Interventional Radiology      Reason for Consult: paracentesis    SUBJECTIVE:     Chief Complaint:  ascites    History of Present Illness:  Dylan Sawant is a 64 y.o. male with a PMHx of T2DM, GERD, KELLY with recent liver mass biopsy and paracentesis 7 days prior to admission.  Interventional Radiology has been consulted for image guided Diagnostic paracentesis.  Pt has had paracentesis in the past. He is currently afebrile. The pt is hemodynamically stable.     Review of Systems   Constitutional:  Negative for chills, fever, malaise/fatigue and weight loss.   Respiratory:  Negative for cough and shortness of breath.    Cardiovascular:  Negative for chest pain, palpitations and leg swelling.   Gastrointestinal:  Positive for abdominal pain. Negative for diarrhea, nausea and vomiting.   Genitourinary:  Negative for dysuria and flank pain.   Musculoskeletal:  Negative for back pain and myalgias.   Neurological:  Negative for weakness and headaches.       Scheduled Meds:   atorvastatin  40 mg Oral Daily    EScitalopram oxalate  20 mg Oral QHS    furosemide (LASIX) injection  80 mg Intravenous Daily    gabapentin  900 mg Oral QHS    multivitamin  1 tablet Oral Daily    pantoprazole  40 mg Oral Daily    rOPINIRole  4 mg Oral QHS    senna-docusate  1 tablet Oral BID     Continuous Infusions:  PRN Meds:  Current Facility-Administered Medications:     acetaminophen, 650 mg, Oral, Q4H PRN    dextrose 50%, 12.5 g, Intravenous, PRN    dextrose 50%, 25 g, Intravenous, PRN    glucagon (human recombinant), 1 mg, Intramuscular, PRN    glucose, 16 g, Oral, PRN    glucose, 24 g, Oral, PRN    hyoscyamine, 0.125 mg, Sublingual, Q4H PRN    insulin aspart U-100, 0-5 Units, Subcutaneous, QID (AC + HS) PRN    melatonin, 6 mg, Oral, Nightly PRN    naloxone, 0.02 mg, Intravenous, PRN    ondansetron, 4 mg, Intravenous, Q8H PRN    polyethylene glycol, 17 g, Oral, Daily PRN    sodium chloride 0.9%, 5 mL,  Intravenous, PRN    traZODone, 150 mg, Oral, Nightly PRN    Review of patient's allergies indicates:  No Known Allergies    Past Medical History:   Diagnosis Date    Allergy     Anxiety     Diabetes mellitus     Elevated BP     GERD (gastroesophageal reflux disease)     Hyperlipidemia     KELLY (obstructive sleep apnea)     wears cpap    Restless leg syndrome     Type 2 diabetes mellitus without complication, without long-term current use of insulin 6/15/2023     Past Surgical History:   Procedure Laterality Date    ADENOIDECTOMY      ARTHROPLASTY OF HIP BY ANTERIOR APPROACH Left 1/27/2023    Procedure: ARTHROPLASTY, HIP TOTAL,;  Surgeon: Karlo Springer III, MD;  Location: [facility];  Service: Orthopedics;  Laterality: Left;   ANTERIOR APPROACH: LEFT: DEPUY - ACTIS + PINNACLE    ARTHROPLASTY OF HIP BY ANTERIOR APPROACH Right 11/15/2023    Procedure: ARTHROPLASTY, HIP, TOTAL, ANTERIOR APPROACH: RIGHT: DEPUY - ACTIS + PINNACLE;  Surgeon: Karlo Springer III, MD;  Location: [facility] OR;  Service: Orthopedics;  Laterality: Right;    COLONOSCOPY N/A 4/8/2017    Procedure: COLONOSCOPY;  Surgeon: Kyle Moreno MD;  Location: [facility] (4TH FLR);  Service: Endoscopy;  Laterality: N/A;    NASAL SEPTUM SURGERY      RECONSTRUCTION OF NOSE N/A 6/22/2018    Procedure: RECONSTRUCTION, NOSE;  Surgeon: Tulio Echols III, MD;  Location: [facility] OR 2ND FLR;  Service: ENT;  Laterality: N/A;  1 HOUR TOTAL / LATERA  NASAL VALVE IMPLANTS    TONSILLECTOMY       Family History   Problem Relation Name Age of Onset    Diabetes Mother Tabitha Jese     Depression Mother Tabitha Jese     Arthritis Mother Tabitha Jese     COPD Mother Tabitha Jese     Heart disease Father Seng Jese     Cancer Father Seng Jese         pancreatic    Diabetes Father Seng Jese     Diverticulitis Father Seng Jese     Restless legs syndrome Father Seng Jese     Pancreatic cancer Father Seng Jese     No Known Problems Sister      Heart disease Paternal  "Uncle Jag Sawant      Social History[1]    OBJECTIVE:     Vital Signs (Most Recent)  Temp: 97.9 °F (36.6 °C) (06/10/25 0908)  Pulse: 103 (06/10/25 0908)  Resp: 20 (06/10/25 0908)  BP: 130/83 (06/10/25 0908)  SpO2: 96 % (06/10/25 0908)    Physical Exam:  Physical Exam  Vitals and nursing note reviewed.   Constitutional:       Appearance: Normal appearance.   HENT:      Head: Normocephalic and atraumatic.   Eyes:      General: No scleral icterus.     Extraocular Movements: Extraocular movements intact.   Cardiovascular:      Rate and Rhythm: Normal rate.   Pulmonary:      Effort: Pulmonary effort is normal.   Abdominal:      General: There is distension.      Tenderness: There is abdominal tenderness.   Musculoskeletal:         General: Normal range of motion.      Cervical back: Normal range of motion.   Skin:     General: Skin is warm and dry.      Coloration: Skin is not jaundiced.   Neurological:      Mental Status: He is alert and oriented to person, place, and time.   Psychiatric:         Mood and Affect: Mood normal.         Behavior: Behavior normal.         Thought Content: Thought content normal.         Judgment: Judgment normal.         Laboratory  I have reviewed all pertinent lab results within the past 24 hours.  CBC:   Recent Labs   Lab 06/10/25  0926   WBC 25.00*   RBC 5.41   HGB 14.3   HCT 42.2   *   MCV 78*   MCH 26.4*   MCHC 33.9     CMP:   Recent Labs   Lab 06/10/25  0926   *   CALCIUM 11.3*   ALBUMIN 2.1*   PROT 7.7   *   K 5.1   CO2 22*   CL 94*   BUN 83*   CREATININE 2.0*   ALKPHOS 640*   *   *   BILITOT 4.1*     Coagulation: No results for input(s): "LABPROT", "INR", "APTT" in the last 168 hours.  Microbiology Results (last 7 days)       Procedure Component Value Units Date/Time    Blood culture #1 **CANNOT BE ORDERED STAT** [[id number]] Collected: 06/10/25 1122    Order Status: Sent Specimen: Blood from Peripheral, Forearm, Right Updated: 06/10/25 1128    " Blood culture #2 **CANNOT BE ORDERED STAT** [[id number]] Collected: 06/10/25 1124    Order Status: Sent Specimen: Blood from Peripheral, Hand, Left Updated: 06/10/25 1128    (rule out SBP) Aerobic culture [[id number]]     Order Status: Sent Specimen: Ascites     (rule out SBP) Culture, Anaerobic [[id number]]     Order Status: Sent Specimen: Ascites     (rule out SBP) Gram stain [[id number]]     Order Status: Sent Specimen: Ascites             ASA/Mallampati  ASA: 3  Mallampati: n/a      ASSESSMENT/PLAN:     Assessment:  64 y.o. male with a PMHx of T2DM, GERD, KELLY recent paracentesis and liver mass biopsy 7 days ago  who has been referred to IR for image guided Diagnostic paracentesis for SBP rule out. Pt has had paracentesis in the past.     The procedure was discussed in great detail with the patient including thorough explanations of the potential risks and benefits of paracentesis. Risks include sepsis, severe infection, hemorrhage, damage to surrounding structures, catheter malfunction, inability to remove catheter.  The patient is a candidate for US guided paracentesis with local anesthesia.  Plan discussed with ordering physician.The pt verbalized understanding of the plan and would like to proceed.    Plan:  Will proceed with US guided paracentesis under local anesthesia on 6/10.   Pt DOES NOT need to be NPO.  Anticoagulation history reviewed. Coagulation labs reviewed.  Allergies reviewed.      Thank you for the consult. Please contact with questions via [employer] secure chat.    Mayela Jose PA-C  Interventional Radiology        [1]   Social History  Tobacco Use    Smoking status: Never    Smokeless tobacco: Never   Vaping Use    Vaping status: Never Used   Substance Use Topics    Alcohol use: Not Currently    Drug use: No

## 2025-06-10 NOTE — ED NOTES
Food tray provided to the patient, patient laying down in bed eyes closed, chest rising and falling, no acute distress noticed, per family bed side patient had a rough day, he is resting right now. They will provide food as soon as patient gets up, food tray at bedside.

## 2025-06-11 NOTE — ED NOTES
Patient requesting to use bathroom, bedpan provided, assisted patient to get on the bedpan with the help of tech.

## 2025-06-11 NOTE — ED NOTES
No reply (email ) from NP about pain medication for pt. Called [facility] team for pain medication. They will look at chart.

## 2025-06-11 NOTE — SUBJECTIVE & OBJECTIVE
Interval History: Fluid removed yesterday. No adverse events overnight. Patient reports pain relatively controlled. Remains hemodynamically and clinically stable at time of the exam.     Review of Systems   Constitutional:  Negative for chills and fever.   Respiratory:  Negative for cough and shortness of breath.    Cardiovascular:  Positive for leg swelling. Negative for chest pain and palpitations.   Gastrointestinal:  Positive for abdominal distention, abdominal pain and constipation. Negative for blood in stool.   Genitourinary:  Negative for dysuria and frequency.   Neurological:  Negative for dizziness and numbness.   Psychiatric/Behavioral:  Negative for agitation and confusion.      Objective:     Vital Signs (Most Recent):  Temp: 98 °F (36.7 °C) (06/11/25 0629)  Pulse: 90 (06/11/25 0629)  Resp: 16 (06/11/25 0629)  BP: 135/82 (06/11/25 0629)  SpO2: 96 % (06/11/25 0629) Vital Signs (24h Range):  Temp:  [97.9 °F (36.6 °C)-98.2 °F (36.8 °C)] 98 °F (36.7 °C)  Pulse:  [] 90  Resp:  [14-20] 16  SpO2:  [92 %-96 %] 96 %  BP: (118-156)/(73-96) 135/82     Weight: 89.4 kg (197 lb)  Body mass index is 29.95 kg/m².    Intake/Output Summary (Last 24 hours) at 6/11/2025 0904  Last data filed at 6/11/2025 0303  Gross per 24 hour   Intake 88.77 ml   Output 4675 ml   Net -4586.23 ml         Physical Exam  Vitals and nursing note reviewed.   Constitutional:       General: He is not in acute distress.     Appearance: Normal appearance. He is obese. He is not ill-appearing, toxic-appearing or diaphoretic.   HENT:      Head: Normocephalic and atraumatic.      Right Ear: External ear normal.      Left Ear: External ear normal.      Nose: Nose normal.      Mouth/Throat:      Pharynx: Oropharynx is clear.   Eyes:      General: Scleral icterus present.      Extraocular Movements: Extraocular movements intact.   Cardiovascular:      Rate and Rhythm: Normal rate and regular rhythm.      Pulses: Normal pulses.      Heart sounds:  Normal heart sounds.   Pulmonary:      Effort: Pulmonary effort is normal. No respiratory distress.   Abdominal:      General: There is distension.      Tenderness: There is abdominal tenderness. There is no guarding or rebound.   Musculoskeletal:         General: Normal range of motion.      Cervical back: Normal range of motion.      Right lower leg: Edema present.      Left lower leg: Edema present.      Comments: 1/2+ pitting edema bilateral LE up to knees   Skin:     General: Skin is warm.   Neurological:      Mental Status: He is alert.   Psychiatric:         Mood and Affect: Mood normal.         Behavior: Behavior normal.         Thought Content: Thought content normal.               Significant Labs: All pertinent labs within the past 24 hours have been reviewed.  CBC:   Recent Labs   Lab 06/10/25  0926 06/11/25  0608   WBC 25.00* 25.41*   HGB 14.3 13.8*   HCT 42.2 41.2   * 420     CMP:   Recent Labs   Lab 06/10/25  0926 06/11/25  0608   * 129*   K 5.1 5.0   CL 94* 93*   CO2 22* 23   * 145*   BUN 83* 89*   CREATININE 2.0* 2.0*   CALCIUM 11.3* 11.3*   PROT 7.7 7.3   ALBUMIN 2.1* 2.4*   BILITOT 4.1* 5.0*   ALKPHOS 640* 657*   * 185*   * 104*   ANIONGAP 15 13       Significant Imaging: I have reviewed all pertinent imaging results/findings within the past 24 hours.

## 2025-06-11 NOTE — ASSESSMENT & PLAN NOTE
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  - Paracentesis with 3.75L removed 6/10. Studies with no indication of SBP.  - Started on IV ceftriaxone for SBP ppx (low likelihood)  - Blood cultures obtained.  - Treat other etiologies such as constipation.  - Continue to monitor symptoms.  - Initiated on spironolactone, furosemide.

## 2025-06-11 NOTE — PT/OT/SLP EVAL
Physical Therapy Evaluation    Patient Name:  Dylan Sawant   MRN:  [id number]    Recommendations:     Discharge Recommendations: Moderate Intensity Therapy   Discharge Equipment Recommendations: to be determined by next level of care   Barriers to discharge: Inaccessible home, Decreased caregiver support, and requires increased assist with functional mobility    Assessment:     Dylan Sawant is a 64 y.o. male admitted with a medical diagnosis of Abdominal distension, Ascites, s/p Paracentesis. He presents with the following impairments/functional limitations: weakness, impaired endurance, impaired self care skills, impaired functional mobility, gait instability, impaired balance, impaired cognition, decreased coordination, decreased upper extremity function, decreased lower extremity function, decreased safety awareness, pain, edema     Patient seen for physical therapy evaluation on this date, co-evaluation with occupational therapy.  Patient's sister present for session.  Patient very lethargic at times throughout session, requiring repetition and cueing at times.  Patient with significant B LE weakness, decreased endurance/lethargy, and myoclonic jerking in all 4 extremities with MMT testing.  Patient performed supine to sit with max assist x 2 and was able to perform 1 standing trial with max assist x 2, unable to perform mini squat due to weakness, unable to weight shift and thalia with quick descent back to stretcher, decreased eccentric control.  Patient is able to perform lateral scoots in sitting x ~ 3' with min to mod assist and cues.  Patient returns to supine with max assist x 2.  Recommending patient to discharge to Moderate intensity therapy for continued rehabilitation.  Full report to follow..    Rehab Prognosis: Fair; patient would benefit from acute skilled PT services to address these deficits and reach maximum level of function.    Recent Surgery: * No surgery found *      Plan:     During  this hospitalization, patient to be seen 5 x/week to address the identified rehab impairments via gait training, therapeutic activities, therapeutic exercises, neuromuscular re-education, wheelchair management/training and progress toward the following goals:    Plan of Care Expires:  07/11/25    Subjective     Chief Complaint: Patient with difficulty maintaining wakefulness throughout session  Patient/Family Comments/goals: to return to PLOF  Pain/Comfort:  Pain Rating 1: 7/10  Location - Orientation 1: generalized  Location 1:  (stomach and back)  Pain Addressed 1: Reposition, Distraction, Cessation of Activity, Nurse notified  Pain Rating Post-Intervention 1:  (does not rate)    Patients cultural, spiritual, Jain conflicts given the current situation: no    Living Environment:  Patient lives with spouse in [facility], [address], T/S with shower chair in bathroom.    Prior to admission, patients level of function was Independent with gait and ADLS, assists with taking care of spouse due to her mobility limitations.  Equipment used at home: none.  DME owned (not currently used): none.  Upon discharge, patient will have assistance from family.    Objective:     Communicated with nurse Catarino prior to session.  Patient found supine with peripheral IV  upon PT entry to room.    General Precautions: Standard, fall  Orthopedic Precautions:N/A   Braces: N/A  Respiratory Status: Room air    Exams:  Cognitive Exam:  Patient is oriented to Person, Place, Time, Situation, and lethargic throughout session  Gross Motor Coordination:  decreased throughout B LE - noted to have B myoclonic jerking, difficulty sustaining contractions  Postural Exam:  Patient presented with the following abnormalities:    -       Rounded shoulders  -       Forward head  Sensation:    -       Intact  Skin Integrity/Edema:      -       Skin integrity: Visible skin intact  -       Edema: Pitting B LE thighs to feet B  RLE ROM: Deficits: Mildly decreased  due to edema  RLE Strength: Deficits: Hip:  2/5  Knee: 3-/5  Ankle 3-/5  LLE ROM: Deficits: Mildly decreased due to edema  LLE Strength: Deficits: Hip 2/5,  Knee:  3-/5  Ankle 3-/5    Functional Mobility:  Bed Mobility:     Rolling Right: maximal assistance and of 2 persons  Scooting: While seated EOB, scoots with min to mod assist x ~3' at edge of stretcher  Supine to Sit: maximal assistance and of 2 persons  Sit to Supine: maximal assistance and of 2 persons  Transfers:     Sit to Stand:  maximal assistance and of 2 persons with rolling walker, tolerates standing ~ 30 seconds, unable to weight shift or perform mini squat.  Thalia and descends quickly to stretcher, poor eccentric control    Balance: Seated:  tolerates sitting Edge of stretcher x ~ 6-7 minutes with CGA, maintains midline  Standing:  requires RW, Max assist, Poor control, Fall risk      AM-PAC 6 CLICK MOBILITY  Total Score:9       Treatment & Education:  Patient seen for physical therapy evaluation on this date, co-evaluation with occupational therapy.  Patient's sister present for session.  Patient very lethargic at times throughout session, requiring repetition and cueing at times.  Patient with significant B LE weakness, decreased endurance/lethargy, and myoclonic jerking in all 4 extremities with MMT testing.  Patient performed supine to sit with max assist x 2 and was able to perform 1 standing trial with max assist x 2, unable to perform mini squat due to weakness, unable to weight shift and thalia with quick descent back to stretcher, decreased eccentric control.  Patient is able to perform lateral scoots in sitting x ~ 3' with min to mod assist and cues.  Patient returns to supine with max assist x 2.  Recommending patient to discharge to Moderate intensity therapy for continued rehabilitation.  Full report to follow.    Patient left supine with all lines intact, call button in reach, and nurse notified.    GOALS:   Multidisciplinary  Problems       Physical Therapy Goals          Problem: Physical Therapy    Goal Priority Disciplines Outcome Interventions   Physical Therapy Goal     PT, PT/OT Progressing    Description: Goals to be met by: 2025     Patient will increase functional independence with mobility by performin. Supine to sit with MInimal Assistance  2. Sit to supine with MInimal Assistance  3. Rolling to Left and Right with Minimal Assistance.  4. Sit to stand transfer with Minimal Assistance  5. Bed to chair transfer with Minimal Assistance using Rolling Walker  6. Gait  x 10 feet with Moderate Assistance using Rolling Walker.                          DME Justifications:  No DME recommended requiring DME justifications    History:     Past Medical History:   Diagnosis Date    Allergy     Anxiety     Diabetes mellitus     Elevated BP     GERD (gastroesophageal reflux disease)     Hyperlipidemia     KELLY (obstructive sleep apnea)     wears cpap    Restless leg syndrome     Type 2 diabetes mellitus without complication, without long-term current use of insulin 6/15/2023       Past Surgical History:   Procedure Laterality Date    ADENOIDECTOMY      ARTHROPLASTY OF HIP BY ANTERIOR APPROACH Left 2023    Procedure: ARTHROPLASTY, HIP TOTAL,;  Surgeon: Karlo Springer III, MD;  [facility];  Service: Orthopedics;  Laterality: Left;   ANTERIOR APPROACH: LEFT: DEPUY - ACTIS + PINNACLE    ARTHROPLASTY OF HIP BY ANTERIOR APPROACH Right 11/15/2023    Procedure: ARTHROPLASTY, HIP, TOTAL, ANTERIOR APPROACH: RIGHT: DEPUY - ACTIS + PINNACLE;  Surgeon: Karlo Springer III, MD;  Location: [facility];  Service: Orthopedics;  Laterality: Right;    COLONOSCOPY N/A 2017    Procedure: COLONOSCOPY;  Surgeon: Kyle Moreno MD;  Location: [address]);  Service: Endoscopy;  Laterality: N/A;    NASAL SEPTUM SURGERY      RECONSTRUCTION OF NOSE N/A 2018    Procedure: RECONSTRUCTION, NOSE;  Surgeon: Tulio Echols III, MD;   Location: [facility] OR [address] [place] [address];  Service: ENT;  Laterality: N/A;  1 HOUR TOTAL / LATERA  NASAL VALVE IMPLANTS    TONSILLECTOMY         Time Tracking:     PT Received On: 06/11/25  PT Start Time: 0952     PT Stop Time: 1013  PT Total Time (min): 21 min     Billable Minutes: Evaluation 10 and Therapeutic Activity 11      06/11/2025

## 2025-06-11 NOTE — NURSING
Patient up to floor from ED, VS taken and recorded. Pt oriented to room, call light and personal items within reach. Spouse at bedside.

## 2025-06-11 NOTE — PROCEDURES
Radiology Post-Procedure Note    Pre Op Diagnosis: Ascites  Post Op Diagnosis: Same    Procedure: Ultrasound Guided Paracentesis    Procedure performed by: Mayela Jose PA-C    Written Informed Consent Obtained: Yes  Specimen Removed: YES   Estimated Blood Loss: Minimal    Findings:   Successful paracentesis.  3750 ml removed RLQ.  Albumin was not administered PRN per protocol.    Patient tolerated procedure well.    Mayela Jose PA-C

## 2025-06-11 NOTE — PLAN OF CARE
Pt would benefit from cont OT services in order to maximize functional independence. Recommending moderate intensity therapy upon d/c. Pt is significantly weaker than his baseline at this time. PLOF: Independent with no AD. Pt unable to take steps this date 2/2 LE weakness. Pt noted with myoclonic jerking in all 4 extremities. Pt reports feeling drowsy during session; MD at bedside during session. Will progress as able.    Problem: Occupational Therapy  Goal: Occupational Therapy Goal  Description: Goals to be met by: 07/11/2025     Patient will increase functional independence with ADLs by performing:    UE Dressing with Set-up Assistance.  Grooming while seated with Set-up Assistance.  Toileting from bedside commode with Stand-by Assistance for hygiene and clothing management.   Rolling to Bilateral with Modified Saline.   Supine to sit with Modified Saline.  Step transfer with Contact Guard Assistance  Toilet transfer to bedside commode with Contact Guard Assistance.    Outcome: Progressing

## 2025-06-11 NOTE — ED NOTES
Patient soiled his sheets and gown, with the help of tech all linen changed, new linen and gown applied, patient all dry and cleaned.

## 2025-06-11 NOTE — PLAN OF CARE
06/11/25 1555   Discharge Planning   Assessment Type Discharge Planning Brief Assessment   Resource/Environmental Concerns none   Support Systems Spouse/significant other   Equipment Currently Used at Home none   Current Living Arrangements home   Patient/Family Anticipates Transition to home with family   Patient/Family Anticipated Services at Transition none   DME Needed Upon Discharge  other (see comments)  (TBD)   Discharge Plan A Home with family     CM met with pt and his wife at the bedside. Pt lives with wife. Pt was independent of ADL's, no DME up to 5 days ago where declined. Pt worked with PT today and recommend moderate intensity therapy. CM discussed possible placement and wife would like to think about it. Palliative consulted.    Future Appointments   Date Time Provider Department Center   6/17/2025 11:40 AM Barbara Bang MD [facility] HEM ONC [facility]   9/3/2025  9:20 AM Roland Swenson,  [facility]     Abby Martínez RN, [facility]  Case Management- [facility]  [phone]

## 2025-06-11 NOTE — HOSPITAL COURSE
Patient admitted to [facility] for evaluation and management of abdominal distension and volume overload. Of note, recent diagnosis of adenocarcinoma based on pathology results. IR consulted and removed approx 3.8L of ascites on 6/10 with fluid studies showing no signs of SBP. Ammonia elevated. Initiated on lactulose, rifaximin, spironolactone, and furosemide.  with increasing abdominal pain and distention including XR abd with concern for ileus. Initiated on NGT and NPO. Heme/Onc consulted and after discussion with family, decision made to pursue hospice. Palliative medicine consulted to assist in goals of care and medication management. IR recontacted, drain placed  with removal of approx 1.5L. Patient's mentation continued to decline, no longer was responding or alert. Transitioned to comfort care with Ativan and Morphine. Patient  on  prior to transport to inpatient hospice. Family at bedside and condolences expressed.    Presented to bedside and noted patient to have the following exam findings:  No spontaneous respirations  No palpable carotid pulse  No heart beat or breath sounds on ausculation  Pupils fixed and dilated bilaterally  Asystole in 3 leads     Time of death: 1:48 PM  Patient's family was at bedside and informed of his death     yes

## 2025-06-11 NOTE — PT/OT/SLP EVAL
Occupational Therapy   Evaluation    Name: Dylan Sawant  MRN: [id number]  Admitting Diagnosis: Abdominal distension  Recent Surgery: * No surgery found *      Recommendations:     Discharge Recommendations: Moderate Intensity Therapy  Discharge Equipment Recommendations:  to be determined by next level of care  Barriers to discharge:  Other (Comment), Decreased caregiver support (pt requires increased level of assist)    Assessment:     Dylan Sawant is a 64 y.o. male with a medical diagnosis of Abdominal distension.  He presents with The primary encounter diagnosis was Adenocarcinoma of liver. Diagnoses of Weakness, Leg swelling, ABHINAV (acute kidney injury), Other ascites, Ascites, and Bilateral lower extremity edema were also pertinent to this visit. Performance deficits affecting function: weakness, impaired functional mobility, impaired endurance, gait instability, impaired balance, decreased upper extremity function, decreased lower extremity function, impaired self care skills, decreased ROM, edema, decreased coordination, decreased safety awareness, pain.      Rehab Prognosis: Good; patient would benefit from acute skilled OT services to address these deficits and reach maximum level of function.       Plan:     Patient to be seen 3 x/week to address the above listed problems via self-care/home management, therapeutic activities, therapeutic exercises  Plan of Care Expires: 07/11/25  Plan of Care Reviewed with: patient, sibling    Subjective     Chief Complaint: drowsiness/weakness  Patient/Family Comments/goals: regain mobility    Occupational Profile:  Patient lives with spouse in [facility], 1 YOUSIF, T/S with shower chair in bathroom.    Prior to admission, patients level of function was Independent with gait and ADLS, assists with taking care of spouse due to her mobility limitations.    Equipment used at home: none.  DME owned (not currently used): none.    Upon discharge, patient will have assistance from  family.    Pain/Comfort:  Pain Rating 1: 7/10  Location 1: abdomen (& back)  Pain Addressed 1: Reposition, Distraction, Cessation of Activity, Nurse notified    Patients cultural, spiritual, Anabaptist conflicts given the current situation: no    Objective:     Communicated with: nshari prior to session.  Patient found HOB elevated with peripheral IV upon OT entry to room.    General Precautions: Standard, fall  Orthopedic Precautions: N/A  Braces: N/A  Respiratory Status: Room air    Occupational Performance:    Bed Mobility:    Patient completed Rolling/Turning to Right with maximal assistance and 2 persons  Patient completed Scooting/Bridging with minimum assistance and moderate assistance  laterally along EOB  Patient completed Supine to Sit with maximal assistance and 2 persons  Patient completed Sit to Supine with maximal assistance and 2 persons    Functional Mobility/Transfers:  Patient completed Sit <> Stand Transfer with maximal assistance and of 2 persons  with  rolling walker   Functional Mobility: Pt unable to weight shift this date for steps; upon attempting mini squat pt knees buckling & returned to sitting for safety    Activities of Daily Living:  Lower Body Dressing: maximal assistance to mason/doff B shoes seated EOB    Cognitive/Visual Perceptual:  Cognitive/Psychosocial Skills:     -       Oriented to: Person, Place, Time, and Situation   -       Mood/Affect/Coping skills/emotional control: Lethargic    Physical Exam:  Upper Extremity Range of Motion:  BUE grossly 160 degrees shoulder flex  Upper Extremity Strength:    -       Right Upper Extremity: 3+ prox to 4/5 dist  -       Left Upper Extremity: 3+ prox to 4/5 dist   Strength:    -       Right Upper Extremity: Fair +  -       Left Upper Extremity: Fair+  Gross motor coordination: noted with myoclonic jerks BUE/BLE    AMPAC 6 Click ADL:  AMPAC Total Score: 16    Treatment & Education:  Pt would benefit from cont OT services in order to  maximize functional independence.   Sister present throughout session.  Pt is significantly weaker than his baseline at this time.   PLOF: Independent with no AD.   Pt unable to take steps this date 2/2 LE weakness.   Pt noted with myoclonic jerking in all 4 extremities.   Pt reports feeling drowsy during session; MD at bedside during session.   Will progress as able.    Patient left HOB elevated with all lines intact, call button in reach, nsg notified, and sister present    GOALS:   Multidisciplinary Problems       Occupational Therapy Goals          Problem: Occupational Therapy    Goal Priority Disciplines Outcome Interventions   Occupational Therapy Goal     OT, PT/OT Progressing    Description: Goals to be met by: 07/11/2025     Patient will increase functional independence with ADLs by performing:    UE Dressing with Set-up Assistance.  Grooming while seated with Set-up Assistance.  Toileting from bedside commode with Stand-by Assistance for hygiene and clothing management.   Rolling to Bilateral with Modified [place].   Supine to sit with Modified [place].  Step transfer with Contact Guard Assistance  Toilet transfer to bedside commode with Contact Guard Assistance.                           History:     Past Medical History:   Diagnosis Date    Allergy     Anxiety     Diabetes mellitus     Elevated BP     GERD (gastroesophageal reflux disease)     Hyperlipidemia     KELLY (obstructive sleep apnea)     wears cpap    Restless leg syndrome     Type 2 diabetes mellitus without complication, without long-term current use of insulin 6/15/2023         Past Surgical History:   Procedure Laterality Date    ADENOIDECTOMY      ARTHROPLASTY OF HIP BY ANTERIOR APPROACH Left 1/27/2023    Procedure: ARTHROPLASTY, HIP TOTAL,;  Surgeon: Karlo Springer III, MD;  Location: [facility];  Service: Orthopedics;  Laterality: Left;   ANTERIOR APPROACH: LEFT: DEPUY - ACTIS + PINNACLE    ARTHROPLASTY OF HIP BY ANTERIOR  APPROACH Right 11/15/2023    Procedure: ARTHROPLASTY, HIP, TOTAL, ANTERIOR APPROACH: RIGHT: DEPUY - ACTIS + PINNACLE;  Surgeon: Karlo Springer III, MD;  Location: [facility];  Service: Orthopedics;  Laterality: Right;    COLONOSCOPY N/A 4/8/2017    Procedure: COLONOSCOPY;  Surgeon: Kyle Moreno MD;  Location: [facility] (4TH FLR);  Service: Endoscopy;  Laterality: N/A;    NASAL SEPTUM SURGERY      RECONSTRUCTION OF NOSE N/A 6/22/2018    Procedure: RECONSTRUCTION, NOSE;  Surgeon: Tulio Echols III, MD;  Location: [facility] 2ND FLR;  Service: ENT;  Laterality: N/A;  1 HOUR TOTAL / LATERA  NASAL VALVE IMPLANTS    TONSILLECTOMY         Time Tracking:     OT Date of Treatment: 06/11/25  OT Start Time: 0952  OT Stop Time: 1013  OT Total Time (min): 21 min    Billable Minutes:Evaluation 10  Therapeutic Activity 11    6/11/2025

## 2025-06-11 NOTE — TELEPHONE ENCOUNTER
----- Message from Jose sent at 6/11/2025 12:10 PM CDT -----  Contact: PT WIFE  Type: Requesting to speak with nurseJose Raulo Called: PT WIFE Regarding:  would like discuss 6/13 appointment. Pt currently in hospital Would the patient rather a call back or a response via [facility]? Call [facility] Call Back Number:   Additional Information:

## 2025-06-11 NOTE — PLAN OF CARE
Patient seen for physical therapy evaluation on this date, co-evaluation with occupational therapy.  Patient's sister present for session.  Patient very lethargic at times throughout session, requiring repetition and cueing at times.  Patient with significant B LE weakness, decreased endurance/lethargy, and myoclonic jerking in all 4 extremities with MMT testing.  Patient performed supine to sit with max assist x 2 and was able to perform 1 standing trial with max assist x 2, unable to perform mini squat due to weakness, unable to weight shift and thalia with quick descent back to stretcher, decreased eccentric control.  Patient is able to perform lateral scoots in sitting x ~ 3' with min to mod assist and cues.  Patient returns to supine with max assist x 2.  Recommending patient to discharge to Moderate intensity therapy for continued rehabilitation.  Full report to follow.        Problem: Physical Therapy  Goal: Physical Therapy Goal  Description: Goals to be met by: 2025     Patient will increase functional independence with mobility by performin. Supine to sit with MInimal Assistance  2. Sit to supine with MInimal Assistance  3. Rolling to Left and Right with Minimal Assistance.  4. Sit to stand transfer with Minimal Assistance  5. Bed to chair transfer with Minimal Assistance using Rolling Walker  6. Gait  x 10 feet with Moderate Assistance using Rolling Walker.     Outcome: Progressing

## 2025-06-11 NOTE — ED NOTES
Per PT patient is not able to bear weight on his feet, please transfer in and out of the bed with cautions.

## 2025-06-11 NOTE — ED NOTES
Meal tray provided to the patient, patient sleeping chest rising and falling, per family at bedside, patient has not been eating good lately, tray left at the bedside.

## 2025-06-11 NOTE — ED NOTES
Breakfast tray provided to the patient, patient sitting in bed eating without any help, no distress noticed.

## 2025-06-11 NOTE — ED NOTES
Assisted patient on bedpan, requesting to use the bathroom, patient able to urinate, no stool, repositioned patient for comfort, extra pillows provided as per family and patient's request.

## 2025-06-12 PROBLEM — K56.7 ILEUS: Status: ACTIVE | Noted: 2025-01-01

## 2025-06-12 PROBLEM — C22.9 ADENOCARCINOMA OF LIVER: Status: ACTIVE | Noted: 2025-01-01

## 2025-06-12 PROBLEM — K86.89 PANCREATIC MASS: Status: ACTIVE | Noted: 2025-01-01

## 2025-06-12 PROBLEM — K76.82 HEPATIC ENCEPHALOPATHY: Status: ACTIVE | Noted: 2025-01-01

## 2025-06-12 PROBLEM — I50.32 CHRONIC HEART FAILURE WITH PRESERVED EJECTION FRACTION: Status: ACTIVE | Noted: 2025-01-01

## 2025-06-12 NOTE — CONSULTS
"CONSULTNote  Hematology/Oncology      Admit Date: 6/10/2025   LOS: 1 day       SUBJECTIVE:   PT WITH ADENOCARCINOMA MET TO LIVER LIKELY PANCREATIC PRIMARY IN IN DEATH PROCESS--HEPATIC ENCEPHALOPATHY--SX THAT ARE BOTHERING PT ARE THOSE OF ABD DISTENTION/ASCITES--MINIMAL IMPORVEMENT IN SENSE OF WELL BEING WITH DRAINAGE.    "I WANT TO GO"  WIFE NOTES MEANS HE WANTS TO TO DIE"TODAY"    NO AGONAL BREATHING OR C/O OF UNCOMFORTABLE BREATHING, NO CHEST PAIN, "I CAN'T SLEEP" WHEN IS IS ACTIVELY SOMNOLENT--HAS TO AROUSED FOR CONVERSATION    NO ITCHING OR SCRATCHING, NO VOMITING OR GAGGING    Review of Systems:  SEE HPI    OBJECTIVE:     Vital Signs (Most Recent)  Temp: 97.9 °F (36.6 °C) (06/12/25 0758)  Pulse: 96 (06/12/25 0758)  Resp: 16 (06/12/25 1103)  BP: (!) 142/91 (06/12/25 0758)  SpO2: (!) 92 % (06/12/25 0758)    Vital Signs Range (Last 24H):  Temp:  [97.8 °F (36.6 °C)-98 °F (36.7 °C)]   Pulse:  []   Resp:  [16-20]   BP: (138-149)/(81-93)   SpO2:  [92 %-95 %]     I&O (Last 24H):  06/11 0701 - 06/12 0700  In: -   Out: 150 [Urine:150]    Physical Exam:  PATIENT HAS HIS WIFE, ANOTHER WOMAN RELATIVE AND A TEENAGER PRESENT AT HIS BEDSIDE.  HE HAS NO PLETHORA DIAPHORESIS COUGH LABORED BREATHING AND EXHIBITS NO PLEURITIC CHEST PAIN.  HE KEEPS HIS HEAD BACK WITH HIS NECK COCKED UP A LITTLE BIT IN HIS MOUTH IS OPEN MOST OF THE TIME.  HE CAN AROUSE TO TRY TO ANSWER QUESTIONS BUT SEE HPI.  ORAL MUCOSA IS DRY, NO OBVIOUS BLEEDING, NO RHINORRHEA.  HE HAS A DISTENDED ENLARGED APPEARING ABDOMEN.  HE IS NOT HOLDING HIS ABDOMEN AS IF HE IS IN PAIN AND DOES NOT APPEAR TO BE IN CARDIO RESPIRATORY DISCOMFORT, PAIN OR HAVING GRIMACE TO SUGGEST PAIN AND IS SOMNOLENT IN NO ACUTE PSYCHOLOGICAL DISTRESS.  HE HAS NO VISIBLE BLEEDING.    Laboratory:  CBC with Differential:  Recent Labs   Lab 06/12/25  0322   WBC 27.57*   LYMPH 1.08  3.9*   BASOPHIL 0.2  0.06   RBC 5.49   HCT 42.7   HGB 14.5   MCV 78*   MCH 26.4*   RDW 18.6*    "   MPV 10.4     CMP:  Recent Labs   Lab 06/12/25  0322   *   CALCIUM 11.3*   ALBUMIN 2.5*   PROT 7.4   *   K 4.7   CO2 22*   CL 94*   BUN 98*   CREATININE 2.0*   ALKPHOS 593*   *   *   BILITOT 5.0*       ASSESSMENT/PLAN:     Active Hospital Problems    Diagnosis  POA    *Abdominal distension [R14.0]  Yes    Ileus [K56.7]  Yes    Chronic heart failure with preserved ejection fraction [I50.32]  Yes    Other ascites [R18.8]  Yes    Leg swelling [M79.89]  Yes    Other constipation [K59.09]  Yes    Goals of care, counseling/discussion [Z71.89]  Not Applicable    Transaminitis [R74.01]  Yes    Type 2 diabetes mellitus without complication, without long-term current use of insulin [E11.9]  Yes    Primary insomnia [F51.01]  Yes    KELLY (obstructive sleep apnea) [G47.33]  Yes    Anxiety [F41.9]  Yes    GERD without esophagitis [K21.9]  Yes    Lumbago [M54.50]  Yes    Mixed hyperlipidemia [E78.2]  Yes    Restless legs [G25.81]  Yes      Resolved Hospital Problems   No resolved problems to display.     ADENOCARCINOMA METASTATIC TO THE LIVER LIKELY FROM PANCREAS, PATIENT IS ACTIVELY TERMINAL.  WIFE MAKES IT CLEAR HE DOES NOT WANT TO UNDERGO VENTILATION, CPR AND DESIRES THAT HE BE DNR.    -WE DISCUSSED INPATIENT HOSPICE VERSUS TRYING TO GO HOME.  IN THE END I RECOMMEND INPATIENT HOSPICE, COMFORT CARE.  I HAVE DISCUSSED WHAT THE LADDER MEANS IN DETAIL AND I DISCUSSED WHAT HAPPENS AS PEOPLE DIE IN TERMS OF AGONAL BREATHING AND THAT COMFORT IS SOMETHING THAT CAN BE DEFINED ONLY BY A PATIENT HIMSELF AND/OR FAMILY PERCEPTION AND THAT THERE ARE THINGS TO DO DEPENDING ON ANY PERCEPTION OF DISCOMFORT IN ORDER TO ALLOW COMFORT IN THE DEATH AND DYING PROCESS.      BACK AND FORTH CONVERSATION WAS HAD AND WIFE PREFERS TO TALK TO HIS SISTER TO BE SURE SHE IS ON THE SAME PAGE OF HOSPICE COMFORT CARE AND IS TO LET THE INPATIENT NURSE KNOW WHAT THEIR CHOICES AND THEN I COULD ORDER WHATEVER IT IS THAT THEY WANT.  AGAIN I  HAVE ORDERED DO NOT RESUSCITATE.      MY FEELING IS PATIENT'S HEPATIC ENCEPHALOPATHY IS COMFORTING HIM RATHER WELL AND THE ROLE OF PLACING AN INDWELLING DRAIN IN HIS ABDOMEN TO ALLOW MORE COMFORT IS QUESTIONABLE.  THE PROCESS OF PLACING THE DRAIN WILL CAUSE SOME DISCOMFORT.  I SUSPECT HE HAS DAYS IF NOT HOURS TO LIVE ALTHOUGH OF COURSE I CAN NOT BE CERTAIN ABOUT THIS.  HE HAS DECLINED RATHER RAPIDLY FROM THE LAST TIME I SAW HIM OUTPATIENT.    I DID EDUCATE THE PATIENT'S WIFE AND PATIENT DID ACKNOWLEDGE THAT HE HEARD THE CONVERSATION I HAD WITH HIS WIFE AND HE SEEM TO EXHIBIT UNDERSTANDING THAT NATURAL-LIKE THIS ARE ONCE WHERE OUR BODY FAILS IN A WAY THAT MAKES US PLEASANTLY COMFORTABLE AND WE MAY OR NAME NOT REALLY NEED MEDICATIONS AND SUCH TO HELP US BE COMFORTABLE.  I MADE IT VERY CLEAR THAT CHOICE AS TO HIS END OF LIFE PROCESSES OF TO HIS IN HIS WIFE'S/FAMILY'S DECISION MAKING.      AGAIN I AWAIT NOTIFICATION FROM HOSPITAL NURSE AS TO CHOICE FOR OR AGAINST HOSPICE IN HER OUTPATIENT.      PLEASE CALL IF YOU HAVE OTHER QUESTIONS TODAY AND AGAIN I WILL FOLLOW THIS PATIENT.    I APPRECIATE THE OPPORTUNITY TO RENDER THIS OPINION IN THIS WRITTEN REPORT REQUESTED BY DR. CASEY.    32 MIN--REV PATH FROM OUTPT, COUNSELING,EXAM, DOCUMENTATION, ORDERS    Barbara Bang M.D  Hematology/Oncology  [facility] - [address], [address]  [place] [address]  Phone: [phone]  Fax: [phone]

## 2025-06-12 NOTE — NURSING
Received report from Valentin, zaira patient lying supine in bed reports general discomfort, spouse at bedside, awaiting MD visit and asks that meds be held until after patient has a chance to speak with MD.

## 2025-06-12 NOTE — PLAN OF CARE
MADDI met with pt and wife Robyn ;  c#  [phone]  --   Ms. Carlson met with Mayela with [facility] - wife has not yet decided on hospice services for - advised her that CM is available to assist with whatever disposition she chooses.    Wife given pt info to submit for a cancer insurance policy - release of information for signed and placed in chart.    dx:  Liver Ca

## 2025-06-12 NOTE — PROGRESS NOTES
Palliative Care Daily Progress Note     S: Medical record reviewed, followed up with patient and family regarding patient's condition. Pt is in steady functional decline with moderate metabolic encephalopathy, somnolent, rouses to voice but speech is soft and delayed. He denies recent n/v but reports diffuse abdominal tenderness and has prominent ascites on exam, rapidly reforming >1L/day per recent clinical information.    Family has been advised by oncology that pt's clinical dx is aggressively metastatic pancreatic cancer and pt's functional status is too poor to contemplate palliative chemotherapy. Pt is in visible terminal decline with parched mucous membranes and slight drooling, can only tolerate a few spoonfuls of ice cream, reports food is tasteless. Recent episode of hypoglycemia noted and pt has developed oral thrush.    Ordered Duke's mouthwash and advised family that pt's large ascites is implicated in his pain and anorexia. His oral intake will remain poor but pleasure feeds should be more viable if his abdomen is flat.    Recommend PleurX placement by IR and holding off NGT placement as any partial bowel obstruction 2/2 ascites will likely respond better to fluid drainage rather than wall suction.     Pt's spouse, Robyn, accepts that pt is at end of life and wishes to transition to hospice care. Pt is borderline eligible for inpt hospice today, has tolerated some PO meds recently though he will be IV dependent if his lethargy worsens. Spouse has concerns about whether she is capable of turning and cleaning pt at home.    Referral sent to [facility] to discuss signing pt up for their home service while pursuing an immediate respite stay at a hospice house. If pt remains in steady decline he can be converted to full inpt hospice status; alternatively if pt rallies and symptoms can be managed via a home-going regimen, returning home under Robyn's care may be feasible.    B: Code Status: DNR   Advanced  Directives:   Spouse holds default legal HCPOA  Family/Support: Spouse at bedside   Physician's Plan of Care Goal is home hospice meeting with [facility]; pt has intermediate care needs that may be best served by a preparatory respite stay prior to tentative home hospice care..   Labs: noncontributory   Diagnostics: noncontributory    Physical Exam  Constitutional:       General: He is not in acute distress.     Appearance: He is ill-appearing and toxic-appearing.      Comments: Sleeping with eyes partly open   HENT:      Head: Normocephalic and atraumatic.      Mouth/Throat:      Mouth: Mucous membranes are dry.      Pharynx: Oropharyngeal exudate (thrush) present.   Eyes:      General: Scleral icterus present.   Cardiovascular:      Rate and Rhythm: Normal rate and regular rhythm.      Pulses: Normal pulses.   Pulmonary:      Effort: Pulmonary effort is normal.      Breath sounds: Normal breath sounds. No wheezing or rales.   Abdominal:      General: Abdomen is protuberant. Bowel sounds are decreased. There is distension.      Palpations: Abdomen is soft. There is shifting dullness, fluid wave, hepatomegaly and splenomegaly. There is no mass or pulsatile mass.      Tenderness: There is abdominal tenderness. There is no guarding.      Comments: Bowel sounds are sporadic and accentuated but without metallic/obstructive character   Musculoskeletal:         General: Normal range of motion.      Right lower leg: Edema (1+) present.      Left lower leg: Edema (1+) present.   Skin:     General: Skin is warm and dry.      Coloration: Skin is pale and sallow.      Findings: Bruising (to abdomen at para sites) present.   Neurological:      Mental Status: He is lethargic.      GCS: GCS eye subscore is 4. GCS verbal subscore is 3. GCS motor subscore is 6.      Cranial Nerves: Dysarthria present. No facial asymmetry.      Motor: Weakness present. No tremor or abnormal muscle tone.   Psychiatric:         Attention and  Perception: He is inattentive.         Mood and Affect: Mood is depressed. Affect is flat.         Speech: Speech is delayed and slurred. Speech is not tangential.         Behavior: Behavior is withdrawn. Behavior is cooperative.         Cognition and Memory: Cognition is impaired. Memory is impaired.       A: Patient's current condition poor.   Patient Symptom Assessment Flowsheet has been completed.   Family is accepting pt's poor prognosis and preparing for end of life.  Discharge Planning: family agreeable to sign home consents and initiate respite care at an inpt hospice facility; high likelihood pt will convert to GIP status within 5 days; if pt rallies he may be viable for subsequent discharge to home; TBD    # Malignant ascites  - s/p multiple josé miguel with rapid reaccumulation  - recommend PleurX placement for daily drainage  - do not recommend NGT placement today as any partial SBO is overwhelmingly likely due to ascites    R: Support offered at this time.   Palliative Care Team will continue to follow patient.     Denny Musa MD  Hospice and Palliative Medicine  Palliative Care Pager: [phone]    Total time spent: 105 minutes  > 50% of 55 minute visit spent in chart review, face to face discussion of symptoms assessment, coordination of care with other specialties, and contingency based discharge planning.    50 min ACP time spent: goals of care, emotional support, formulating and communicating prognosis and exploring burden/benefit of various approaches of treatment and various avenues to receiving hospice care.

## 2025-06-12 NOTE — ASSESSMENT & PLAN NOTE
Patient interested in establishing with Palliative medicine to further discuss GOC, living will, etc.  Full Code at this time.    Plan:  - Palliative Medicine consulted, appreciate recommendations.  - Heme/Onc consulted, appreciate recommendations. Able to provide biopsy results and treatment guidance.  - Plan to pursue hospice.

## 2025-06-12 NOTE — ASSESSMENT & PLAN NOTE
Swelling in bilateral lower extremities worsening over the last month.  Albumin 2.1    Plan:  - Likely 2/2 low albumin in setting of adenocarcinoma.  - Echo 6/10: EF 58%. Grade 1 diastolic dysfunction.  - IV diuresis.  - Monitor Strict I's/O's.

## 2025-06-12 NOTE — PROGRESS NOTES
06/11/25 1923   Admission   Initial VN Admission Questions Complete   Shift   Virtual Nurse - Patient Verbalized Approval Of Camera Use;VN Rounding   Safety/Activity   Patient Rounds bed in low position;call light in patient/parent reach;clutter free environment maintained;visualized patient;placement of personal items at bedside   Safety Promotion/Fall Prevention assistive device/personal item within reach;commode/urinal/bedpan at bedside;Fall Risk reviewed with patient/family;side rails raised x 2   Positioning   Body Position supine   Head of Bed (HOB) Positioning HOB at 30-45 degrees     VN cued in to pt's room with permission. Pt's wife at bedside. Admission questions completed with pt and wife and plan of care reviewed. All questions answered at this time. Bedside nurse notified that admission questions are complete and pt is ready for pad to be changed. Call bell w/in reach. Instructed to call for needs/assist.     Care plan initiated

## 2025-06-12 NOTE — PROGRESS NOTES
[facility] Medicine  Progress Note    Patient Name: Dylan Sawant  MRN: [id number]  Patient Class: IP- Inpatient   Admission Date: 6/10/2025  Length of Stay: 1 days  Attending Physician: Serafin Baldwin III, MD  Primary Care Provider: Roland Swenson DO        Subjective     Principal Problem:Abdominal distension        HPI:  Patient is a 65 yo M w/ PMHx of T2DM, GERD, Anxiety, KELLY (CPAP nightly), Restless Leg Syndrome, recently diagnosed with likely liver adenocarcinoma who presents to [facility] with complaints of increasing swelling in his bilateral lower extremities and his abdomen for about 1 month now. He reports recent paracentesis pulling approx 1.5L last week on 6/3 with fluid studies notable suspicious for adenocarinoma. He also endorses generalized abdominal pain, constipation, mild confusion recently. Denies fever, chills, nausea, vomiting. He reports not currently on diuretics or lactulose. Reports compliance with all prescribed medications. Denies current smoking, alcohol, illicit drug use.    In the ED, initial vital signs /83, , Temp 97.9F, SpO2 96% on room air. Labs include CBC with stable H/H 14.3/42.2 and WBC elevated 25.00. CMP with Na 131, K 5.1, Cl 94, and CO2 22 and BUN/Cr 83/2.0 (baseline Cr 1.0). CXR with no acute process. EKG sinus tachycardia. Troponin < 0.006. Initiated on IVF in the ER. [facility] consulted for evaluation for admission for abdominal distension and volume overload.      Overview/Hospital Course:  Patient admitted to [facility] for evaluation and management of abdominal distension and volume overload. IR consulted and removed approx 3.8L of ascites on 6/10 with fluid studies showing no signs of SBP. Ammonia elevated. Initiated on lactulose, spironolactone, and furosemide. Palliative medicine consulted, recommend initiating dronabinol capsule 2.5 mg for appetite.     Interval History: Fluid removed yesterday. No adverse events  overnight. Patient reports pain relatively controlled. Remains hemodynamically and clinically stable at time of the exam.     Review of Systems   Constitutional:  Negative for chills and fever.   Respiratory:  Negative for cough and shortness of breath.    Cardiovascular:  Positive for leg swelling. Negative for chest pain and palpitations.   Gastrointestinal:  Positive for abdominal distention, abdominal pain and constipation. Negative for blood in stool.   Genitourinary:  Negative for dysuria and frequency.   Neurological:  Negative for dizziness and numbness.   Psychiatric/Behavioral:  Negative for agitation and confusion.      Objective:     Vital Signs (Most Recent):  Temp: 98 °F (36.7 °C) (06/11/25 0629)  Pulse: 90 (06/11/25 0629)  Resp: 16 (06/11/25 0629)  BP: 135/82 (06/11/25 0629)  SpO2: 96 % (06/11/25 0629) Vital Signs (24h Range):  Temp:  [97.9 °F (36.6 °C)-98.2 °F (36.8 °C)] 98 °F (36.7 °C)  Pulse:  [] 90  Resp:  [14-20] 16  SpO2:  [92 %-96 %] 96 %  BP: (118-156)/(73-96) 135/82     Weight: 89.4 kg (197 lb)  Body mass index is 29.95 kg/m².    Intake/Output Summary (Last 24 hours) at 6/11/2025 0904  Last data filed at 6/11/2025 0303  Gross per 24 hour   Intake 88.77 ml   Output 4675 ml   Net -4586.23 ml         Physical Exam  Vitals and nursing note reviewed.   Constitutional:       General: He is not in acute distress.     Appearance: Normal appearance. He is obese. He is not ill-appearing, toxic-appearing or diaphoretic.   HENT:      Head: Normocephalic and atraumatic.      Right Ear: External ear normal.      Left Ear: External ear normal.      Nose: Nose normal.      Mouth/Throat:      Pharynx: Oropharynx is clear.   Eyes:      General: Scleral icterus present.      Extraocular Movements: Extraocular movements intact.   Cardiovascular:      Rate and Rhythm: Normal rate and regular rhythm.      Pulses: Normal pulses.      Heart sounds: Normal heart sounds.   Pulmonary:      Effort: Pulmonary  effort is normal. No respiratory distress.   Abdominal:      General: There is distension.      Tenderness: There is abdominal tenderness. There is no guarding or rebound.   Musculoskeletal:         General: Normal range of motion.      Cervical back: Normal range of motion.      Right lower leg: Edema present.      Left lower leg: Edema present.      Comments: 1/2+ pitting edema bilateral LE up to knees   Skin:     General: Skin is warm.   Neurological:      Mental Status: He is alert.   Psychiatric:         Mood and Affect: Mood normal.         Behavior: Behavior normal.         Thought Content: Thought content normal.               Significant Labs: All pertinent labs within the past 24 hours have been reviewed.  CBC:   Recent Labs   Lab 06/10/25  0926 06/11/25  0608   WBC 25.00* 25.41*   HGB 14.3 13.8*   HCT 42.2 41.2   * 420     CMP:   Recent Labs   Lab 06/10/25  0926 06/11/25  0608   * 129*   K 5.1 5.0   CL 94* 93*   CO2 22* 23   * 145*   BUN 83* 89*   CREATININE 2.0* 2.0*   CALCIUM 11.3* 11.3*   PROT 7.7 7.3   ALBUMIN 2.1* 2.4*   BILITOT 4.1* 5.0*   ALKPHOS 640* 657*   * 185*   * 104*   ANIONGAP 15 13       Significant Imaging: I have reviewed all pertinent imaging results/findings within the past 24 hours.      Assessment & Plan  Other ascites  Plan as in abdominal distention.  Lumbago  Plan:  - Continue home medications (gabapentin).  Mixed hyperlipidemia  Plan:  - Continue home medications (atorvastatin).    Anxiety  Plan:  - Continue home medications (lexapro).  Restless legs  Plan:  - Continue home medication (ropinirole).  GERD without esophagitis  Plan:  - Continue home medications (omeprazole 40mg).  KELLY (obstructive sleep apnea)  Plan:  - CPAP QHS  Primary insomnia  Plan:  - Continue home medication (trazodone).  Type 2 diabetes mellitus without complication, without long-term current use of insulin  Last Hgb A1c 7.5 (approx 2 weeks ago).  Home medications:  glimepiride 2mg QD, metformin ER 1000mg BID    Plan:  - POCT BG.  - Hold any oral anti-glycemic medications while inpatient.  - LA + SSI PRN while inpatient.  - BG Goal 140-180.  - Will discuss outpatient regimen prior to discharge.     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  - Paracentesis with 3.75L removed 6/10. Studies with no indication of SBP.  - Started on IV ceftriaxone for SBP ppx (low likelihood)  - Blood cultures obtained.  - Treat other etiologies such as constipation.  - Continue to monitor symptoms.  - Initiated on spironolactone, furosemide.  Leg swelling  Swelling in bilateral lower extremities worsening over the last month.  Albumin 2.1    Plan:  - Likely 2/2 low albumin in setting of adenocarcinoma.  - Echo ordered to complete work-up.  - IV diuresis.  - Monitor Strict I's/O's.  Other constipation  Plan:  - Bowel regimen.  - Monitor symptoms.  Goals of care, counseling/discussion  Patient interested in establishing with Palliative medicine to further discuss GOC, living will, etc.  Full Code at this time.    Plan:  - Palliative Medicine consulted, appreciate recommendations.  Transaminitis  AST//110 on admission.    Plan:  - Likely 2/2 adenocarcinoma.  - Avoid hepatotoxic meds.  - Continue to monitor for symptoms.  - Acute Hep Panel, Ethanol, Ammonia, Salicylate, Acetaminophen levels pending for work-up.  - Remaining plan as in abdominal distension.    VTE Risk Mitigation (From admission, onward)           Ordered     enoxaparin injection 40 mg  Daily         06/11/25 0834     IP VTE HIGH RISK PATIENT  Once         06/11/25 0834     Place sequential compression device  Until discontinued         06/10/25 1100                    Discharge Planning   SALENA: 6/13/2025     Code Status: Full Code   Medical Readiness for Discharge Date:    Discharge Plan A: Home with family          ________________________  Michael Thomas MD  [facility] PGY-3

## 2025-06-12 NOTE — NURSING
Patient having difficulty swallowing and declining lactulose, patient currently on comfort care, spouse at bedside, morphine offered for comfort but patient declined, reported to Dr. Thomas.

## 2025-06-12 NOTE — ASSESSMENT & PLAN NOTE
XR abd 6/12: Dilated gas-filled loops of small bowel measuring up to 40 mm. Air does appear to be present within the distal colon rectum. Differential considerations include ileus versus bowel obstruction.     Plan:  - NPO  - NG Tube placement and set to low intermittent suction.  - Monitor symptoms.

## 2025-06-12 NOTE — PROGRESS NOTES
Interventional Radiology Progress Note      SUBJECTIVE:     History of Present Illness:  Dylan Sawant is a 64 y.o. male with a PMHx of T2DM, GERD, KELLY with recent liver mass biopsy and paracentesis recently diagnosed with likely liver adenocarcinoma.  Hospital course notable for paracentesis with Interventional Radiology performed on 6/10. Pt tolerated the procedure well.   Primary team notes patient discharging on hospice and requesting pleurx.  Pt lethargic at this time after receiving morphine.    Review of Systems   Unable to perform ROS: Other       Scheduled Meds:   cefTRIAXone (Rocephin) IV (PEDS and ADULTS)  1 g Intravenous Q24H    duke's soln (benadryl 30 mL, mylanta 30 mL, LIDOcaine 30 mL, nystatin 30 mL) 120 mL  10 mL Oral QID    enoxparin  40 mg Subcutaneous Daily    EScitalopram oxalate  20 mg Oral QHS    furosemide (LASIX) injection  80 mg Intravenous Daily    gabapentin  900 mg Oral QHS    lactulose  30 g Oral TID    LIDOcaine  2 patch Transdermal Q24H    pantoprazole  40 mg Intravenous Daily    perflutren protein-a microsphr  0.5 mL Intravenous Once    rifAXImin  550 mg Oral BID    rOPINIRole  4 mg Oral QHS    spironolactone  100 mg Oral Daily     Continuous Infusions:  PRN Meds:  Current Facility-Administered Medications:     glycopyrrolate, 0.1 mg, Intravenous, TID PRN    lorazepam, 1 mg, Intravenous, Q4H PRN    morphine, 1 mg, Intravenous, Q4H PRN    ondansetron, 8 mg, Intravenous, Q8H PRN    sodium chloride 0.9%, 5 mL, Intravenous, PRN    Review of patient's allergies indicates:  No Known Allergies    Past Medical History:   Diagnosis Date    Allergy     Anxiety     Diabetes mellitus     Elevated BP     GERD (gastroesophageal reflux disease)     Hyperlipidemia     KELLY (obstructive sleep apnea)     wears cpap    Restless leg syndrome     Type 2 diabetes mellitus without complication, without long-term current use of insulin 6/15/2023     Past Surgical History:   Procedure Laterality Date     ADENOIDECTOMY      ARTHROPLASTY OF HIP BY ANTERIOR APPROACH Left 1/27/2023    Procedure: ARTHROPLASTY, HIP TOTAL,;  Surgeon: Karlo Springer III, MD;  Location: [facility] OR;  Service: Orthopedics;  Laterality: Left;   ANTERIOR APPROACH: LEFT: DEPUY - ACTIS + PINNACLE    ARTHROPLASTY OF HIP BY ANTERIOR APPROACH Right 11/15/2023    Procedure: ARTHROPLASTY, HIP, TOTAL, ANTERIOR APPROACH: RIGHT: DEPUY - ACTIS + PINNACLE;  Surgeon: Karlo Springer III, MD;  Location: [facility] OR;  Service: Orthopedics;  Laterality: Right;    COLONOSCOPY N/A 4/8/2017    Procedure: COLONOSCOPY;  Surgeon: Kyle Moreno MD;  Location: [facility] ENDO (4TH FLR);  Service: Endoscopy;  Laterality: N/A;    NASAL SEPTUM SURGERY      RECONSTRUCTION OF NOSE N/A 6/22/2018    Procedure: RECONSTRUCTION, NOSE;  Surgeon: Tulio Echols III, MD;  Location: [facility] OR 2ND FLR;  Service: ENT;  Laterality: N/A;  1 HOUR TOTAL / LATERA  NASAL VALVE IMPLANTS    TONSILLECTOMY       Family History   Problem Relation Name Age of Onset    Diabetes Mother Tabitha Jese     Depression Mother Tabitha Jese     Arthritis Mother Tabitha Jese     COPD Mother Tabitha Jese     Heart disease Father Seng Jese     Cancer Father Seng Jese         pancreatic    Diabetes Father Seng Jese     Diverticulitis Father Seng Jese     Restless legs syndrome Father Seng Jese     Pancreatic cancer Father Seng Jese     No Known Problems Sister      Heart disease Paternal Uncle Jag Sawant      Social History[1]    OBJECTIVE:     Vital Signs (Most Recent)  Temp: 97.9 °F (36.6 °C) (06/12/25 0758)  Pulse: 96 (06/12/25 0758)  Resp: 16 (06/12/25 1103)  BP: (!) 142/91 (06/12/25 0758)  SpO2: (!) 92 % (06/12/25 0758)    Physical Exam:  Physical Exam  Vitals and nursing note reviewed.   Constitutional:       Appearance: He is ill-appearing.   HENT:      Head: Normocephalic and atraumatic.   Eyes:      General: No scleral icterus.     Extraocular Movements: Extraocular movements intact.    Cardiovascular:      Rate and Rhythm: Tachycardia present.   Pulmonary:      Effort: Pulmonary effort is normal.   Abdominal:      General: There is distension.      Tenderness: There is abdominal tenderness.   Musculoskeletal:         General: Normal range of motion.      Cervical back: Normal range of motion.   Skin:     General: Skin is warm and dry.      Coloration: Skin is not jaundiced.   Neurological:      Mental Status: He is disoriented.   Psychiatric:         Mood and Affect: Mood normal.         Behavior: Behavior normal.         Thought Content: Thought content normal.         Judgment: Judgment normal.         Laboratory  I have reviewed all pertinent lab results within the past 24 hours.  CBC:   Recent Labs   Lab 06/12/25  0322   WBC 27.57*   RBC 5.49   HGB 14.5   HCT 42.7      MCV 78*   MCH 26.4*   MCHC 34.0     CMP:   Recent Labs   Lab 06/12/25  0322   *   CALCIUM 11.3*   ALBUMIN 2.5*   PROT 7.4   *   K 4.7   CO2 22*   CL 94*   BUN 98*   CREATININE 2.0*   ALKPHOS 593*   *   *   BILITOT 5.0*     Coagulation:   Recent Labs   Lab 06/10/25  1123   INR 1.3*   APTT 28.1       ASSESSMENT/PLAN:     Assessment:  64 y.o. male recently diagnosed with likely liver adenocarcinoma.      Discussed how the procedure peritoneal pleurx will be performed, risks (including, but not limited to, pain, bleeding, infection, damage to nearby structures, and the need for additional procedures), benefits, possible complications, pre-post procedure expectations, and alternatives. The patient's wife voices understanding and all questions have been answered.  The patient's wife agrees to proceed as planned.     Plan:  Pleurx placement 6/13/25.  NPO after midnight.  IR will continue to follow. Please contact with questions via [employer] secure chat.    Mayela Jose PA-C  Interventional Radiology         [1]   Social History  Tobacco Use    Smoking status: Never    Smokeless tobacco: Never   Vaping  Use    Vaping status: Never Used   Substance Use Topics    Alcohol use: Not Currently    Drug use: No

## 2025-06-12 NOTE — SUBJECTIVE & OBJECTIVE
Interval History: Abdominal pain continues. XR with concern for ileus. NGT placed. Family discussions include pursing comfort care in hospice. Remains hemodynamically and clinically stable at time of the exam.     Review of Systems   Constitutional:  Negative for chills and fever.   Respiratory:  Negative for cough and shortness of breath.    Cardiovascular:  Negative for chest pain, palpitations and leg swelling.   Gastrointestinal:  Positive for abdominal distention, abdominal pain and constipation. Negative for blood in stool.   Genitourinary:  Negative for dysuria and frequency.   Neurological:  Negative for dizziness and numbness.   Psychiatric/Behavioral:  Negative for agitation and confusion.      Objective:     Vital Signs (Most Recent):  Temp: 97.9 °F (36.6 °C) (06/12/25 0758)  Pulse: 96 (06/12/25 0758)  Resp: 20 (06/12/25 0610)  BP: (!) 142/91 (06/12/25 0758)  SpO2: (!) 92 % (06/12/25 0758) Vital Signs (24h Range):  Temp:  [97.6 °F (36.4 °C)-98 °F (36.7 °C)] 97.9 °F (36.6 °C)  Pulse:  [] 96  Resp:  [16-20] 20  SpO2:  [92 %-97 %] 92 %  BP: (135-149)/(81-93) 142/91     Weight: 86.8 kg (191 lb 5.8 oz)  Body mass index is 29.1 kg/m².    Intake/Output Summary (Last 24 hours) at 6/12/2025 1100  Last data filed at 6/12/2025 0931  Gross per 24 hour   Intake --   Output 450 ml   Net -450 ml         Physical Exam  Vitals and nursing note reviewed.   Constitutional:       General: He is not in acute distress.     Appearance: Normal appearance. He is obese. He is ill-appearing. He is not toxic-appearing or diaphoretic.   HENT:      Head: Normocephalic and atraumatic.      Right Ear: External ear normal.      Left Ear: External ear normal.      Nose: Nose normal.      Mouth/Throat:      Pharynx: Oropharynx is clear.   Eyes:      General: Scleral icterus present.      Extraocular Movements: Extraocular movements intact.   Cardiovascular:      Rate and Rhythm: Normal rate and regular rhythm.      Pulses: Normal  pulses.      Heart sounds: Normal heart sounds.   Pulmonary:      Effort: Pulmonary effort is normal. No respiratory distress.   Abdominal:      General: There is distension.      Tenderness: There is abdominal tenderness. There is no guarding or rebound.   Musculoskeletal:         General: Normal range of motion.      Cervical back: Normal range of motion.      Right lower leg: Edema present.      Left lower leg: Edema present.      Comments: Trace pitting edema bilateral LE   Skin:     General: Skin is warm.   Neurological:      Mental Status: He is alert.   Psychiatric:         Mood and Affect: Mood normal.         Behavior: Behavior normal.         Thought Content: Thought content normal.               Significant Labs: All pertinent labs within the past 24 hours have been reviewed.  CBC:   Recent Labs   Lab 06/11/25  0608 06/12/25  0322   WBC 25.41* 27.57*   HGB 13.8* 14.5   HCT 41.2 42.7    431     CMP:   Recent Labs   Lab 06/11/25  0608 06/12/25  0322   * 131*   K 5.0 4.7   CL 93* 94*   CO2 23 22*   * 127*   BUN 89* 98*   CREATININE 2.0* 2.0*   CALCIUM 11.3* 11.3*   PROT 7.3 7.4   ALBUMIN 2.4* 2.5*   BILITOT 5.0* 5.0*   ALKPHOS 657* 593*   * 184*   * 106*   ANIONGAP 13 15       Significant Imaging: I have reviewed all pertinent imaging results/findings within the past 24 hours.   Principal Discharge DX:	Parental concern about child Principal Discharge DX:	Reactive airway disease

## 2025-06-12 NOTE — PLAN OF CARE
Nutrition Plan of Care:    Recommendations     1. Continue on consistent carbohydrate modified diet     2. Recommend commercial beverages if po intake <50%   3. Monitor labs and weights     Goals: Patient to consume >75% of EEN prior to RD follow up  Nutrition Goal Status: new  Communication of RD Recs: other (comment) (POC)     Nutrition Discharge Planning     Nutrition Discharge Planning: Therapeutic diet (comments)  Therapeutic diet (comments): Consistent carbohydrate     Assessment and Plan     Nutrition Problem  Altered GI Function     Related to (etiology):   Abdominal pain      Signs and Symptoms (as evidenced by):   Estimated intake of food less than estimated needs      Interventions (treatment strategy):  Collaboration by nutrition professional with other providers  Consistent carbohydrate modified diet   Commercial beverage medical food supplement therapy      Nutrition Diagnosis Status:   Jovan Molina MS, RDN, LDN

## 2025-06-12 NOTE — PLAN OF CARE
Problem: Adult Inpatient Plan of Care  Goal: Plan of Care Review  Outcome: Progressing  Goal: Optimal Comfort and Wellbeing  Outcome: Progressing  Goal: Readiness for Transition of Care  Outcome: Progressing     Problem: Coping Ineffective  Goal: Effective Coping  Outcome: Progressing     Problem: Diabetes Comorbidity  Goal: Blood Glucose Level Within Targeted Range  Outcome: Progressing     Problem: Wound  Goal: Optimal Pain Control and Function  Outcome: Progressing

## 2025-06-12 NOTE — PROGRESS NOTES
[facility] - Med Surg  Adult Nutrition  Progress Note    SUMMARY       Recommendations    1. Continue on consistent carbohydrate modified diet     2. Recommend commercial beverages if po intake <50%   3. Monitor labs and weights    Goals: Patient to consume >75% of EEN prior to RD follow up  Nutrition Goal Status: new  Communication of RD Recs: other (comment) (POC)    Nutrition Discharge Planning    Nutrition Discharge Planning: Therapeutic diet (comments)  Therapeutic diet (comments): Consistent carbohydrate    Assessment and Plan    Nutrition Problem  Altered GI Function    Related to (etiology):   Abdominal pain     Signs and Symptoms (as evidenced by):   Estimated intake of food less than estimated needs     Interventions (treatment strategy):  Collaboration by nutrition professional with other providers  Consistent carbohydrate modified diet   Commercial beverage medical food supplement therapy     Nutrition Diagnosis Status:   New         Malnutrition Assessment     Musculoskeletal/Lower Extremities: edema                                 Reason for Assessment    Reason For Assessment: identified at risk by screening criteria  Diagnosis:  (abdominal distention)  Patient Active Problem List   Diagnosis    Lumbago    Mixed hyperlipidemia    Anxiety    Restless legs    GERD without esophagitis    KELLY (obstructive sleep apnea)    Nasal obstruction    Right leg pain    Vitamin D deficiency    BMI 31.0-31.9,adult    Primary osteoarthritis of left hip    Abnormal iron saturation    Primary insomnia    Elevated BP without diagnosis of hypertension    Type 2 diabetes mellitus without complication, without long-term current use of insulin    Primary osteoarthritis of right hip    Other ascites    Abdominal distension    Leg swelling    Other constipation    Goals of care, counseling/discussion    Transaminitis     Past Medical History:   Diagnosis Date    Allergy     Anxiety     Diabetes mellitus     Elevated BP     GERD  "(gastroesophageal reflux disease)     Hyperlipidemia     KELLY (obstructive sleep apnea)     wears cpap    Restless leg syndrome     Type 2 diabetes mellitus without complication, without long-term current use of insulin 6/15/2023       General Information Comments:   6/12/25: Patient is on a consistent carbohydrate modified diet with decreased po intake due to abdominal pain with PMHx of T2DM, GERD, Anxiety, KELLY (CPAP nightly), Restless Leg Syndrome, recently diagnosed with likely liver adenocarcinoma who presents to [facility] with complaints of increasing swelling in his bilateral lower extremities and his abdomen for about 1 month now. He reports recent paracentesis pulling approx 1.5L last week on 6/3 with fluid studies notable suspicious for adenocarinoma. He also endorses generalized abdominal pain, constipation, mild confusion recently.  Patient received another parcentesis on 6/10/25 removing 3750mls.  On site RD to complete full NFPE to determine nutrition  malnutrition risks at follow up visit.  Labs reviewed.  NKFA.  LBM: 6/10/25.  RD to continue to monitor po intake and tolerance.    Nutrition/Diet History    Spiritual, Cultural Beliefs, Roman Catholic Practices, Values that Affect Care: no  Food Allergies: NKFA  Factors Affecting Nutritional Intake: abdominal distension, abdominal pain, constipation, decreased appetite  Nutrition Related Social Determinants of Health: SDOH: Adequate food in home environment and None Identified    Anthropometrics    Height: 5' 8" (172.7 cm)  Height (inches): 68 in  Height Method: Stated  Weight: 86.8 kg (191 lb 5.8 oz)  Weight (lb): 191.36 lb  Weight Method: Bed Scale  Ideal Body Weight (IBW), Male: 154 lb  % Ideal Body Weight, Male (lb): 124.26 %  BMI (Calculated): 29.1  BMI Grade: 25 - 29.9 - overweight     Wt Readings from Last 10 Encounters:   06/12/25 86.8 kg (191 lb 5.8 oz)   06/03/25 93 kg (205 lb)   05/27/25 94 kg (207 lb 5.5 oz)   05/27/25 93.6 kg (206 lb 5.6 oz) "   05/20/25 92 kg (202 lb 13.2 oz)   05/06/25 95.6 kg (210 lb 12.2 oz)   02/28/25 101 kg (222 lb 10.6 oz)   01/15/25 100.9 kg (222 lb 7.1 oz)   10/28/24 96.9 kg (213 lb 10 oz)   09/09/24 98 kg (216 lb 0.8 oz)       Lab/Procedures/Meds    Pertinent Labs Reviewed: reviewed  BMP  Lab Results   Component Value Date     (L) 06/12/2025    K 4.7 06/12/2025    CL 94 (L) 06/12/2025    CO2 22 (L) 06/12/2025    BUN 98 (H) 06/12/2025    CREATININE 2.0 (H) 06/12/2025    CALCIUM 11.3 (H) 06/12/2025    ANIONGAP 15 06/12/2025    EGFRNORACEVR 37 (L) 06/12/2025     Lab Results   Component Value Date    HGBA1C 7.5 (H) 05/21/2025     Lab Results   Component Value Date    CALCIUM 11.3 (H) 06/12/2025    PHOS 4.9 (H) 06/12/2025     Glucose   Date Value Ref Range Status   06/12/2025 127 (H) 70 - 110 mg/dL Final   02/25/2025 119 (H) 70 - 110 mg/dL Final       Pertinent Medications Reviewed: reviewed  Scheduled Meds:   atorvastatin  40 mg Oral Daily    cefTRIAXone (Rocephin) IV (PEDS and ADULTS)  1 g Intravenous Q24H    droNABinol  2.5 mg Oral BID    enoxparin  40 mg Subcutaneous Daily    EScitalopram oxalate  20 mg Oral QHS    furosemide (LASIX) injection  80 mg Intravenous Daily    gabapentin  900 mg Oral QHS    lactulose  30 g Oral TID    LIDOcaine  2 patch Transdermal Q24H    morphine  1 mg Intravenous Once    multivitamin  1 tablet Oral Daily    pantoprazole  40 mg Oral Daily    perflutren protein-a microsphr  0.5 mL Intravenous Once    rifAXImin  550 mg Oral BID    rOPINIRole  4 mg Oral QHS    spironolactone  100 mg Oral Daily     Continuous Infusions:  PRN Meds:.  Current Facility-Administered Medications:     dextrose 50%, 12.5 g, Intravenous, PRN    dextrose 50%, 25 g, Intravenous, PRN    glucagon (human recombinant), 1 mg, Intramuscular, PRN    glucose, 16 g, Oral, PRN    glucose, 24 g, Oral, PRN    hyoscyamine, 0.125 mg, Sublingual, Q4H PRN    insulin aspart U-100, 0-5 Units, Subcutaneous, QID (AC + HS) PRN    melatonin, 6  mg, Oral, Nightly PRN    naloxone, 0.02 mg, Intravenous, PRN    ondansetron, 4 mg, Intravenous, Q8H PRN    oxyCODONE, 5 mg, Oral, Q6H PRN    polyethylene glycol, 17 g, Oral, Daily PRN    senna-docusate, 1 tablet, Oral, BID PRN    sodium chloride 0.9%, 5 mL, Intravenous, PRN    traZODone, 150 mg, Oral, Nightly PRN    Estimated/Assessed Needs    Weight Used For Calorie Calculations: 86.8 kg (191 lb 5.8 oz)  Energy Calorie Requirements (kcal): 25-30kcals/kg (2170-2604kcals/day)  Energy Need Method: Kcal/kg  Protein Requirements: 1.2g/kg (104g/day)  Weight Used For Protein Calculations: 86.8 kg (191 lb 5.8 oz)  Fluid Requirements (mL): 1ml/kcal or per md order  Estimated Fluid Requirement Method: RDA Method  RDA Method (mL): 25  CHO Requirement: 250      Nutrition Prescription Ordered    Current Diet Order: Consistent carbohydrate  Oral Nutrition Supplement: Boost glucose control    Evaluation of Received Nutrient/Fluid Intake    I/O: 6/12/25: -5036ml since admit  Energy Calories Required: not meeting needs  Protein Required: not meeting needs  Fluid Required: not meeting needs  Comments: LBM: 6/11/25  Tolerance:  (monitoring tolerance)  % Intake of Estimated Energy Needs: 25 - 50 %  % Meal Intake: 25 - 50 %    PES Statement  Inadequate energy intake related to Altered GI function as evidenced by Intake <50% estimated needs, Other (comment) (abdominal pain)  Status: New    Nutrition Risk    Level of Risk/Frequency of Follow-up: moderate (Follow up: 1-2x per week)     Monitor and Evaluation    Monitor and Evaluation: Energy intake, Protein intake, Diet order, Weight, Beliefs and attitudes, Electrolyte and renal panel, Gastrointestinal profile, Glucose/endocrine profile, Inflammatory profile, Lipid profile, Nutrition focused physical findings, Skin     Nutrition Follow-Up    RD Follow-up?: Yes  Apryl Molina, MS, RDN, LDN

## 2025-06-12 NOTE — PT/OT/SLP DISCHARGE
Physical Therapy Discharge Summary    Name: Dylan Sawant  MRN: [id number]   Principal Problem: Abdominal distension     Patient Discharged from acute Physical Therapy on 2025.  Please refer to prior PT notes for functional status.     Assessment:     Patient appropriate for care in another setting.    Objective:     GOALS:   Multidisciplinary Problems       Physical Therapy Goals          Problem: Physical Therapy    Goal Priority Disciplines Outcome Interventions   Physical Therapy Goal     PT, PT/OT Progressing    Description: Goals to be met by: 2025     Patient will increase functional independence with mobility by performin. Supine to sit with MInimal Assistance  2. Sit to supine with MInimal Assistance  3. Rolling to Left and Right with Minimal Assistance.  4. Sit to stand transfer with Minimal Assistance  5. Bed to chair transfer with Minimal Assistance using Rolling Walker  6. Gait  x 10 feet with Moderate Assistance using Rolling Walker.                          Reasons for Discontinuation of Therapy Services  Transfer to alternate level of care.      Plan:     Patient Discharged to: Palliative Care/Hospice.      2025

## 2025-06-12 NOTE — PT/OT/SLP DISCHARGE
Occupational Therapy Discharge Summary    Dylan Sawant  MRN: [id number]   Principal Problem: Abdominal distension      Patient Discharged from acute Occupational Therapy on 06/12/2025.  Please refer to prior OT notes for functional status.    Assessment:      Patient appropriate for care in another setting.    Objective:     GOALS:   Multidisciplinary Problems       Occupational Therapy Goals          Problem: Occupational Therapy    Goal Priority Disciplines Outcome Interventions   Occupational Therapy Goal     OT, PT/OT Progressing    Description: Goals to be met by: 07/11/2025     Patient will increase functional independence with ADLs by performing:    UE Dressing with Set-up Assistance.  Grooming while seated with Set-up Assistance.  Toileting from bedside commode with Stand-by Assistance for hygiene and clothing management.   Rolling to Bilateral with Modified [place].   Supine to sit with Modified [place].  Step transfer with Contact Guard Assistance  Toilet transfer to bedside commode with Contact Guard Assistance.                         Reasons for Discontinuation of Therapy Services  Transfer to alternate level of care.      Plan:     Patient Discharged to: Palliative Care/Hospice    6/12/2025

## 2025-06-12 NOTE — PROGRESS NOTES
[facility] Medicine  Progress Note    Patient Name: Dylan Sawant  MRN: [id number]  Patient Class: IP- Inpatient   Admission Date: 6/10/2025  Length of Stay: 1 days  Attending Physician: Serafin Baldwin III, MD  Primary Care Provider: Roland Swenson DO        Subjective     Principal Problem:Abdominal distension        HPI:  Patient is a 63 yo M w/ PMHx of T2DM, GERD, Anxiety, KELLY (CPAP nightly), Restless Leg Syndrome, recently diagnosed with likely liver adenocarcinoma who presents to [facility] with complaints of increasing swelling in his bilateral lower extremities and his abdomen for about 1 month now. He reports recent paracentesis pulling approx 1.5L last week on 6/3 with fluid studies notable suspicious for adenocarinoma. He also endorses generalized abdominal pain, constipation, mild confusion recently. Denies fever, chills, nausea, vomiting. He reports not currently on diuretics or lactulose. Reports compliance with all prescribed medications. Denies current smoking, alcohol, illicit drug use.    In the ED, initial vital signs /83, , Temp 97.9F, SpO2 96% on room air. Labs include CBC with stable H/H 14.3/42.2 and WBC elevated 25.00. CMP with Na 131, K 5.1, Cl 94, and CO2 22 and BUN/Cr 83/2.0 (baseline Cr 1.0). CXR with no acute process. EKG sinus tachycardia. Troponin < 0.006. Initiated on IVF in the ER. [facility] consulted for evaluation for admission for abdominal distension and volume overload.      Overview/Hospital Course:  Patient admitted to [facility] for evaluation and management of abdominal distension and volume overload. IR consulted and removed approx 3.8L of ascites on 6/10 with fluid studies showing no signs of SBP. Ammonia elevated. Initiated on lactulose, rifaximin, spironolactone, and furosemide. 6/12 with increasing abdominal pain and distention including XR abd with concern for ileus. Initiated on NGT and NPO. Heme/Onc consulted and after  discussion with family, decision made to pursue hospice. Palliative medicine consulted. IR recontacted and plan for possible drain placement potentially 6/13.    Interval History: Abdominal pain continues. XR with concern for ileus. NGT placed. Family discussions include pursing comfort care in hospice. Remains hemodynamically and clinically stable at time of the exam.     Review of Systems   Constitutional:  Negative for chills and fever.   Respiratory:  Negative for cough and shortness of breath.    Cardiovascular:  Negative for chest pain, palpitations and leg swelling.   Gastrointestinal:  Positive for abdominal distention, abdominal pain and constipation. Negative for blood in stool.   Genitourinary:  Negative for dysuria and frequency.   Neurological:  Negative for dizziness and numbness.   Psychiatric/Behavioral:  Negative for agitation and confusion.      Objective:     Vital Signs (Most Recent):  Temp: 97.9 °F (36.6 °C) (06/12/25 0758)  Pulse: 96 (06/12/25 0758)  Resp: 20 (06/12/25 0610)  BP: (!) 142/91 (06/12/25 0758)  SpO2: (!) 92 % (06/12/25 0758) Vital Signs (24h Range):  Temp:  [97.6 °F (36.4 °C)-98 °F (36.7 °C)] 97.9 °F (36.6 °C)  Pulse:  [] 96  Resp:  [16-20] 20  SpO2:  [92 %-97 %] 92 %  BP: (135-149)/(81-93) 142/91     Weight: 86.8 kg (191 lb 5.8 oz)  Body mass index is 29.1 kg/m².    Intake/Output Summary (Last 24 hours) at 6/12/2025 1100  Last data filed at 6/12/2025 0931  Gross per 24 hour   Intake --   Output 450 ml   Net -450 ml         Physical Exam  Vitals and nursing note reviewed.   Constitutional:       General: He is not in acute distress.     Appearance: Normal appearance. He is obese. He is ill-appearing. He is not toxic-appearing or diaphoretic.   HENT:      Head: Normocephalic and atraumatic.      Right Ear: External ear normal.      Left Ear: External ear normal.      Nose: Nose normal.      Mouth/Throat:      Pharynx: Oropharynx is clear.   Eyes:      General: Scleral icterus  present.      Extraocular Movements: Extraocular movements intact.   Cardiovascular:      Rate and Rhythm: Normal rate and regular rhythm.      Pulses: Normal pulses.      Heart sounds: Normal heart sounds.   Pulmonary:      Effort: Pulmonary effort is normal. No respiratory distress.   Abdominal:      General: There is distension.      Tenderness: There is abdominal tenderness. There is no guarding or rebound.   Musculoskeletal:         General: Normal range of motion.      Cervical back: Normal range of motion.      Right lower leg: Edema present.      Left lower leg: Edema present.      Comments: Trace pitting edema bilateral LE   Skin:     General: Skin is warm.   Neurological:      Mental Status: He is alert.   Psychiatric:         Mood and Affect: Mood normal.         Behavior: Behavior normal.         Thought Content: Thought content normal.               Significant Labs: All pertinent labs within the past 24 hours have been reviewed.  CBC:   Recent Labs   Lab 06/11/25  0608 06/12/25  0322   WBC 25.41* 27.57*   HGB 13.8* 14.5   HCT 41.2 42.7    431     CMP:   Recent Labs   Lab 06/11/25  0608 06/12/25  0322   * 131*   K 5.0 4.7   CL 93* 94*   CO2 23 22*   * 127*   BUN 89* 98*   CREATININE 2.0* 2.0*   CALCIUM 11.3* 11.3*   PROT 7.3 7.4   ALBUMIN 2.4* 2.5*   BILITOT 5.0* 5.0*   ALKPHOS 657* 593*   * 184*   * 106*   ANIONGAP 13 15       Significant Imaging: I have reviewed all pertinent imaging results/findings within the past 24 hours.      Assessment & Plan  Other ascites  Plan as in abdominal distention.  Lumbago  Plan:  - Continue home medications (gabapentin).  Mixed hyperlipidemia  Plan:  - Continue home medications (atorvastatin).    Anxiety  Plan:  - Continue home medications (lexapro).  Restless legs  Plan:  - Continue home medication (ropinirole).  GERD without esophagitis  Plan:  - Continue home medications (omeprazole 40mg).  KELLY (obstructive sleep apnea)  Plan:  -  CPAP QHS  Primary insomnia  Plan:  - Continue home medication (trazodone).  Type 2 diabetes mellitus without complication, without long-term current use of insulin  Last Hgb A1c 7.5 (approx 2 weeks ago).  Home medications: glimepiride 2mg QD, metformin ER 1000mg BID    Plan:  - POCT BG.  - Hold any oral anti-glycemic medications while inpatient.  - LA + SSI PRN while inpatient.  - BG Goal 140-180.  - Will discuss outpatient regimen prior to discharge.     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  - Paracentesis with 3.75L removed 6/10. Studies with no indication of SBP.  - Started on IV ceftriaxone for SBP ppx (low likelihood)  - Plan for drain placement 6/13.  - Blood cultures obtained.  - Treat other etiologies such as constipation.  - Continue to monitor symptoms.  - Initiated on spironolactone, furosemide.  Leg swelling  Swelling in bilateral lower extremities worsening over the last month.  Albumin 2.1    Plan:  - Likely 2/2 low albumin in setting of adenocarcinoma.  - Echo 6/10: EF 58%. Grade 1 diastolic dysfunction.  - IV diuresis.  - Monitor Strict I's/O's.  Other constipation  Plan:  - Bowel regimen.  - Monitor symptoms.  Goals of care, counseling/discussion  Patient interested in establishing with Palliative medicine to further discuss GOC, living will, etc.  Full Code at this time.    Plan:  - Palliative Medicine consulted, appreciate recommendations.  - Heme/Onc consulted, appreciate recommendations. Able to provide biopsy results and treatment guidance.  - Plan to pursue hospice.  Transaminitis  AST//110 on admission.    Plan:  - Likely 2/2 adenocarcinoma.  - Avoid hepatotoxic meds.  - Continue to monitor for symptoms.  - Acute Hep Panel, Ethanol, Ammonia, Salicylate, Acetaminophen levels pending for work-up.  - Remaining plan as in abdominal  distension.    Ileus  XR abd 6/12: Dilated gas-filled loops of small bowel measuring up to 40 mm. Air does appear to be present within the distal colon rectum. Differential considerations include ileus versus bowel obstruction.     Plan:  - NPO  - NG Tube placement and set to low intermittent suction.  - Monitor symptoms.  Chronic heart failure with preserved ejection fraction  - Echo 6/10: EF 58%. Grade 1 diastolic dysfunction.    Plan:  - Continue with diuresis.  - Given pursing hospice, hold off on GDMT at this time.  VTE Risk Mitigation (From admission, onward)           Ordered     enoxaparin injection 40 mg  Daily         06/11/25 0834     IP VTE HIGH RISK PATIENT  Once         06/11/25 0834     Place sequential compression device  Until discontinued         06/10/25 1100                    Discharge Planning   SALENA: 6/13/2025     Code Status: DNR   Medical Readiness for Discharge Date:   Discharge Plan A: Home with family      ________________________  Michael Thomas MD  [facility] PGY-3

## 2025-06-12 NOTE — ASSESSMENT & PLAN NOTE
- Echo 6/10: EF 58%. Grade 1 diastolic dysfunction.    Plan:  - Continue with diuresis.  - Given pursing hospice, hold off on GDMT at this time.

## 2025-06-12 NOTE — NURSING
"Pt's spouse at bedside, crying. Pt spouse reports, that pt states that he, "is tired, and in pain and just wants to go." MD notified.   "

## 2025-06-12 NOTE — ASSESSMENT & PLAN NOTE
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  - Paracentesis with 3.75L removed 6/10. Studies with no indication of SBP.  - Started on IV ceftriaxone for SBP ppx (low likelihood)  - Plan for drain placement 6/13.  - Blood cultures obtained.  - Treat other etiologies such as constipation.  - Continue to monitor symptoms.  - Initiated on spironolactone, furosemide.

## 2025-06-13 NOTE — PLAN OF CARE
"This pt will d/c tomorrow (Sat 6/14)  to [facility] - will need stretcher with O2 - doctor to place .host[facility]hospice orders.  The  is "Faust[facility]"  - ([phone] - she is reaching out to pt's wife Robyn ;  c#  [phone] to complete paperwork.    --------------------------------------------------  CM notified by Palliative Care MD - pt is appropriate for [facility]   CM called Grabiel  [phone]  -- Rep for [facility].    Currently the facility would have 3 pts ahead of Mr. Sawant for admission (possible availability over the w/e).  Referral sent per Epic but she will not yet reach out to pt's wife as CM must check other facilities for [facility] Hosp[facility] availability.      CM called Elinor  -- Rep for [facility] -   She is checking availability and will call CM back .   Referral sent per Epic.    -- Elinor called back - [facility] one bed available.  Auth has to be obtained from pt's insurance - only then can bed be assigned to pt.  Elinor will reach out to pt's wife Robyn.    CM to update wife.         06/13/25 1203   Post-Acute Status   Post-Acute Authorization Hospice   Hospice Status Referrals Sent   Discharge Plan   Discharge Plan A Inpatient Hospice         "

## 2025-06-13 NOTE — ASSESSMENT & PLAN NOTE
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  - Paracentesis with 3.75L removed 6/10. Studies with no indication of SBP.  - Discontinued ceftriaxone for SBP ppx (low likelihood)  - Heme/Onc consulted, appreciate recommendations.  - Palliative Medicine consulted, appreciate recommendations.  - Plan after family discussion is to pursue hospice and comfort care.  - Plan for drain placement 6/13.  - Blood cultures obtained.  - Treat other etiologies such as constipation.  - Continue to monitor symptoms.  - Initiated on spironolactone, furosemide.  - Comfort Care: morphine PRN

## 2025-06-13 NOTE — SUBJECTIVE & OBJECTIVE
Interval History: No adverse events overnight. Patient asymptomatic. Remains clinically stable at time of the exam.      Review of Systems   Constitutional:  Negative for chills and fever.   Respiratory:  Negative for cough and shortness of breath.    Cardiovascular:  Negative for chest pain, palpitations and leg swelling.   Gastrointestinal:  Positive for abdominal distention, abdominal pain and constipation. Negative for blood in stool.   Genitourinary:  Negative for dysuria and frequency.   Neurological:  Negative for dizziness and numbness.   Psychiatric/Behavioral:  Negative for agitation and confusion.      Objective:     Vital Signs (Most Recent):  Temp: 97.9 °F (36.6 °C) (06/12/25 0758)  Pulse: 96 (06/12/25 0758)  Resp: 20 (06/13/25 0022)  BP: (!) 142/91 (06/12/25 0758)  SpO2: (!) 92 % (06/12/25 0758) Vital Signs (24h Range):  Resp:  [16-20] 20     Weight: 86.8 kg (191 lb 5.8 oz)  Body mass index is 29.1 kg/m².    Intake/Output Summary (Last 24 hours) at 6/13/2025 0808  Last data filed at 6/12/2025 0931  Gross per 24 hour   Intake --   Output 300 ml   Net -300 ml         Physical Exam  Vitals and nursing note reviewed.   Constitutional:       General: He is not in acute distress.     Appearance: Normal appearance. He is obese. He is ill-appearing. He is not toxic-appearing or diaphoretic.   HENT:      Head: Normocephalic and atraumatic.      Right Ear: External ear normal.      Left Ear: External ear normal.      Nose: Nose normal.      Mouth/Throat:      Pharynx: Oropharynx is clear.   Eyes:      Extraocular Movements: Extraocular movements intact.   Cardiovascular:      Rate and Rhythm: Normal rate and regular rhythm.      Pulses: Normal pulses.      Heart sounds: Normal heart sounds.   Pulmonary:      Effort: Pulmonary effort is normal. No respiratory distress.   Abdominal:      General: There is distension.      Tenderness: There is abdominal tenderness. There is no guarding or rebound.    Musculoskeletal:         General: Normal range of motion.      Cervical back: Normal range of motion.   Skin:     General: Skin is warm.   Neurological:      Mental Status: He is alert.   Psychiatric:         Mood and Affect: Mood normal.         Behavior: Behavior normal.         Thought Content: Thought content normal.               Significant Labs: All pertinent labs within the past 24 hours have been reviewed.    Significant Imaging: I have reviewed all pertinent imaging results/findings within the past 24 hours.

## 2025-06-13 NOTE — ASSESSMENT & PLAN NOTE
XR abd 6/12: Dilated gas-filled loops of small bowel measuring up to 40 mm. Air does appear to be present within the distal colon rectum. Differential considerations include ileus versus bowel obstruction.     Plan:  - Patient and family would like to forgo NG Tube placement.

## 2025-06-13 NOTE — PLAN OF CARE
Problem: Adult Inpatient Plan of Care  Goal: Plan of Care Review  Outcome: Progressing  Goal: Optimal Comfort and Wellbeing  Outcome: Progressing     Problem: Coping Ineffective  Goal: Effective Coping  Outcome: Progressing     Problem: Adult Inpatient Plan of Care  Goal: Readiness for Transition of Care  Outcome: Unable to Meet     Problem: Wound  Goal: Optimal Wound Healing  Outcome: Unable to Meet     Problem: Fall Injury Risk  Goal: Absence of Fall and Fall-Related Injury  Outcome: Unable to Meet

## 2025-06-13 NOTE — CARE UPDATE
[facility]  Department of Hospital Medicine  [address] [place] [address]  [phone] after hours  [phone] fax    HOSPICE  ORDERS    06/13/2025    Admit to Hospice:  Inpatient Hospice    Diagnoses:   Active Hospital Problems    Diagnosis  POA    *Abdominal distension [R14.0]  Yes    Ileus [K56.7]  Yes    Chronic heart failure with preserved ejection fraction [I50.32]  Yes    Adenocarcinoma of liver [C22.9]  Yes    Pancreatic mass [K86.89]  Yes    Hepatic encephalopathy [K76.82]  Yes    Other ascites [R18.8]  Yes    Leg swelling [M79.89]  Yes    Other constipation [K59.09]  Yes    Other specified counseling [Z71.89]  Not Applicable    Transaminitis [R74.01]  Yes    Type 2 diabetes mellitus without complication, without long-term current use of insulin [E11.9]  Yes    Primary insomnia [F51.01]  Yes    KELLY (obstructive sleep apnea) [G47.33]  Yes    Anxiety [F41.9]  Yes    GERD without esophagitis [K21.9]  Yes    Lumbago [M54.50]  Yes    Mixed hyperlipidemia [E78.2]  Yes    Restless legs [G25.81]  Yes      Resolved Hospital Problems   No resolved problems to display.       Hospice Qualifying Diagnoses:        Patient has a life expectancy < 6 months due to:  Primary Hospice Diagnosis: Adenocarcinoma  Comorbid Conditions Contributing to Decline: recurrent ascites requiring PleurX and frequent paracentesis, CHF, SBO, T2DM, and hepatic encephalopathy     Vital Signs: Routine per Hospice Protocol.    Code Status: DNR    Allergies: Review of patient's allergies indicates:  No Known Allergies    Diet: NPO, pleasure feeds as tolerated    Activities: As tolerated    Goals of Care Treatment Preferences:  Code Status: DNR    Health care agent: Robyn klein  Health care agent number: [phone]          What is most important right now is to focus on avoiding the hospital, remaining as independent as possible, symptom/pain control, quality of life, even if it means sacrificing a  little time.  Accordingly, we have decided that the best plan to meet the patient's goals includes enrolling in hospice care.      Nursing: Per Hospice Routine.      Routine Skin for Bedridden Patients: Apply moisture barrier cream to all skin folds and   wet areas in perineal area daily and after baths and all bowel movements.    Other Miscellaneous Care:     PleurX  Drain the fluid daily or every other day depending on the amount that builds up. Connect the PleurX catheter to a vacuum bottle and drain the fluid until there is no more drainage or until you experience pain or coughing.      If you see wetness or if it is peeling, you must change the dressing. Otherwise, it only has to be changed once a week. Change the dressing just after you have drained your pleural space          Medication List        STOP taking these medications      atorvastatin 40 MG tablet  Commonly known as: LIPITOR     blood sugar diagnostic Strp     blood-glucose meter kit     diclofenac sodium 1 % Gel  Commonly known as: VOLTAREN ARTHRITIS PAIN     docusate sodium 100 MG capsule  Commonly known as: COLACE     ergocalciferol 50,000 unit Cap  Commonly known as: ERGOCALCIFEROL     EScitalopram oxalate 20 MG tablet  Commonly known as: LEXAPRO     fluticasone propionate 50 mcg/actuation nasal spray  Commonly known as: FLONASE     gabapentin 300 MG capsule  Commonly known as: NEURONTIN     glimepiride 2 MG tablet  Commonly known as: AMARYL     HYDROcodone-acetaminophen 5-325 mg per tablet  Commonly known as: NORCO     hyoscyamine 0.125 mg Subl     ipratropium 21 mcg (0.03 %) nasal spray  Commonly known as: ATROVENT     lancets Misc     loratadine 10 mg tablet  Commonly known as: CLARITIN     metFORMIN 500 MG ER 24hr tablet  Commonly known as: GLUCOPHAGE-XR     multivitamin per tablet  Commonly known as: THERAGRAN     omeprazole 40 MG capsule  Commonly known as: PRILOSEC     rOPINIRole 4 MG tablet  Commonly known as: REQUIP     traZODone 150  MG tablet  Commonly known as: DESYREL                Future Orders:  Hospice Medical Director may dictate new orders for comfortable care measures & sign death certificate.        _________________________________  Hemalatha Bonner MD  06/13/2025

## 2025-06-13 NOTE — PROCEDURES
Radiology Post-Procedure Note    Pre Op Diagnosis: Ascites  Post Op Diagnosis: Same    Procedure: Tunneled peritoneal drainage cathter placement    Procedure performed by: Alberto Fish MD    Written Informed Consent Obtained: Yes  Specimen Removed: YES 1525 mL of serous ascites  Estimated Blood Loss: Minimal    Findings:   Small volume ascites, no complications.     Patient tolerated procedure well.    Alberto Fish MD  Interventional Radiology  Department of Radiology

## 2025-06-13 NOTE — PROGRESS NOTES
[facility]  Progress Note    Patient Name: Dylan Sawant  MRN: [id number]  Patient Class: IP- Inpatient   Admission Date: 6/10/2025  Length of Stay: 2 days  Attending Physician: Serafin Baldwin III, MD  Primary Care Provider: Roland Swenson DO        Subjective     Principal Problem:Abdominal distension        HPI:  Patient is a 63 yo M w/ PMHx of T2DM, GERD, Anxiety, KELLY (CPAP nightly), Restless Leg Syndrome, recently diagnosed with likely liver adenocarcinoma who presents to [facility] with complaints of increasing swelling in his bilateral lower extremities and his abdomen for about 1 month now. He reports recent paracentesis pulling approx 1.5L last week on 6/3 with fluid studies notable suspicious for adenocarinoma. He also endorses generalized abdominal pain, constipation, mild confusion recently. Denies fever, chills, nausea, vomiting. He reports not currently on diuretics or lactulose. Reports compliance with all prescribed medications. Denies current smoking, alcohol, illicit drug use.    In the ED, initial vital signs /83, , Temp 97.9F, SpO2 96% on room air. Labs include CBC with stable H/H 14.3/42.2 and WBC elevated 25.00. CMP with Na 131, K 5.1, Cl 94, and CO2 22 and BUN/Cr 83/2.0 (baseline Cr 1.0). CXR with no acute process. EKG sinus tachycardia. Troponin < 0.006. Initiated on IVF in the ER. [facility] consulted for evaluation for admission for abdominal distension and volume overload.      Overview/Hospital Course:  Patient admitted to [facility] for evaluation and management of abdominal distension and volume overload. IR consulted and removed approx 3.8L of ascites on 6/10 with fluid studies showing no signs of SBP. Ammonia elevated. Initiated on lactulose, rifaximin, spironolactone, and furosemide. 6/12 with increasing abdominal pain and distention including XR abd with concern for ileus. Initiated on NGT and NPO. Heme/Onc consulted and after  discussion with family, decision made to pursue hospice. Palliative medicine consulted. IR recontacted and plan for possible drain placement 6/13.    Interval History: No adverse events overnight. Patient asymptomatic. Remains clinically stable at time of the exam.      Review of Systems   Constitutional:  Negative for chills and fever.   Respiratory:  Negative for cough and shortness of breath.    Cardiovascular:  Negative for chest pain, palpitations and leg swelling.   Gastrointestinal:  Positive for abdominal distention, abdominal pain and constipation. Negative for blood in stool.   Genitourinary:  Negative for dysuria and frequency.   Neurological:  Negative for dizziness and numbness.   Psychiatric/Behavioral:  Negative for agitation and confusion.      Objective:     Vital Signs (Most Recent):  Temp: 97.9 °F (36.6 °C) (06/12/25 0758)  Pulse: 96 (06/12/25 0758)  Resp: 20 (06/13/25 0022)  BP: (!) 142/91 (06/12/25 0758)  SpO2: (!) 92 % (06/12/25 0758) Vital Signs (24h Range):  Resp:  [16-20] 20     Weight: 86.8 kg (191 lb 5.8 oz)  Body mass index is 29.1 kg/m².    Intake/Output Summary (Last 24 hours) at 6/13/2025 0808  Last data filed at 6/12/2025 0931  Gross per 24 hour   Intake --   Output 300 ml   Net -300 ml         Physical Exam  Vitals and nursing note reviewed.   Constitutional:       General: He is not in acute distress.     Appearance: Normal appearance. He is obese. He is ill-appearing. He is not toxic-appearing or diaphoretic.   HENT:      Head: Normocephalic and atraumatic.      Right Ear: External ear normal.      Left Ear: External ear normal.      Nose: Nose normal.      Mouth/Throat:      Pharynx: Oropharynx is clear.   Eyes:      Extraocular Movements: Extraocular movements intact.   Cardiovascular:      Rate and Rhythm: Normal rate and regular rhythm.      Pulses: Normal pulses.      Heart sounds: Normal heart sounds.   Pulmonary:      Effort: Pulmonary effort is normal. No respiratory  distress.   Abdominal:      General: There is distension.      Tenderness: There is abdominal tenderness. There is no guarding or rebound.   Musculoskeletal:         General: Normal range of motion.      Cervical back: Normal range of motion.   Skin:     General: Skin is warm.   Neurological:      Mental Status: He is alert.   Psychiatric:         Mood and Affect: Mood normal.         Behavior: Behavior normal.         Thought Content: Thought content normal.               Significant Labs: All pertinent labs within the past 24 hours have been reviewed.    Significant Imaging: I have reviewed all pertinent imaging results/findings within the past 24 hours.      Assessment & Plan  Other ascites  Plan as in abdominal distention.  Lumbago  Plan:  - Continue home medications (gabapentin).  Mixed hyperlipidemia  Plan:  - Continue home medications (atorvastatin).    Anxiety  Plan:  - Continue home medications (lexapro).  Restless legs  Plan:  - Continue home medication (ropinirole).  GERD without esophagitis  Plan:  - Continue home medications (omeprazole 40mg).  KELLY (obstructive sleep apnea)  Plan:  - CPAP QHS  Primary insomnia  Plan:  - Continue home medication (trazodone).  Type 2 diabetes mellitus without complication, without long-term current use of insulin  Last Hgb A1c 7.5 (approx 2 weeks ago).  Home medications: glimepiride 2mg QD, metformin ER 1000mg BID    Plan:  - POCT BG.  - Hold any oral anti-glycemic medications while inpatient.  - LA + SSI PRN while inpatient.  - BG Goal 140-180.  - Will discuss outpatient regimen prior to discharge.  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  - Paracentesis with 3.75L removed 6/10. Studies with no indication of SBP.  - Discontinued ceftriaxone for SBP ppx (low likelihood)  - Heme/Onc consulted, appreciate  recommendations.  - Palliative Medicine consulted, appreciate recommendations.  - Plan after family discussion is to pursue hospice and comfort care.  - Plan for drain placement 6/13.  - Blood cultures obtained.  - Treat other etiologies such as constipation.  - Continue to monitor symptoms.  - Initiated on spironolactone, furosemide.  - Comfort Care: morphine PRN  Leg swelling  Swelling in bilateral lower extremities worsening over the last month.  Albumin 2.1    Plan:  - Likely 2/2 low albumin in setting of adenocarcinoma.  - Echo 6/10: EF 58%. Grade 1 diastolic dysfunction.  - IV diuresis.  - Monitor Strict I's/O's.  Other constipation  Plan:  - Bowel regimen.  - Monitor symptoms.  Other specified counseling  Patient interested in establishing with Palliative medicine to further discuss GOC, living will, etc.  Full Code at this time.    Plan:  - Palliative Medicine consulted, appreciate recommendations.  - Heme/Onc consulted, appreciate recommendations. Able to provide biopsy results and treatment guidance.  - Plan to pursue hospice.  Transaminitis  AST//110 on admission.    Plan:  - Likely 2/2 adenocarcinoma.  - Avoid hepatotoxic meds.  - Continue to monitor for symptoms.  - Acute Hep Panel, Ethanol, Ammonia, Salicylate, Acetaminophen levels pending for work-up.  - Remaining plan as in abdominal distension.    Ileus  XR abd 6/12: Dilated gas-filled loops of small bowel measuring up to 40 mm. Air does appear to be present within the distal colon rectum. Differential considerations include ileus versus bowel obstruction.     Plan:  - Patient and family would like to forgo NG Tube placement.  Chronic heart failure with preserved ejection fraction  - Echo 6/10: EF 58%. Grade 1 diastolic dysfunction.    Plan:  - Continue with diuresis.  - Given pursing hospice, hold off on GDMT at this time.  Adenocarcinoma of liver  Plan as in abdominal distension.    Pancreatic mass  Plan as in abdominal distension.  Hepatic  encephalopathy  Plan as in abdominal distension.  VTE Risk Mitigation (From admission, onward)           Ordered     enoxaparin injection 40 mg  Daily         06/11/25 0834                    Discharge Planning   SALENA: 6/13/2025     Code Status: DNR   Medical Readiness for Discharge Date:   Discharge Plan A: Home with family          ________________________  Michael Thomas MD  [employer] [facility] PGY-3

## 2025-06-13 NOTE — PROGRESS NOTES
Palliative Care Daily Progress Note     S: Medical record reviewed, followed up with family regarding patient's condition. Pt is in rapid terminal decline, no speech or PO med tolerance.     Pt went for uneventful PleurX placement today with 1.5L serous ascites drained, returned to room VSS but comatose, eyes open at rest with blown pupils and grimaces to abdominal palpation otherwise unresponsive to all stimuli.    Advised family that pt is actively dying with prognosis under one week and active pain that can only be controlled via IV medications in the inpatient hospice setting.    Nonessential PO meds discontinued and referral initiated to [facility] for full [facility] LOC; needs cannot be met at home; respite care not indicated.    B: Code Status: DNR   Advanced Directives:     Family/Support: Sister and sister-in-law at bedside   Physician's Plan of Care Goal is IV comfort focused medications while awaiting transfer to [facility]; stable for discharge from our POV  Labs: noncontributory   Diagnostics: noncontributory    Physical Exam  Constitutional:       General: He is sleeping. He is not in acute distress.     Appearance: Normal appearance. He is well-developed. He is ill-appearing and toxic-appearing. He is not diaphoretic.   HENT:      Head: Normocephalic and atraumatic.      Mouth/Throat:      Mouth: Mucous membranes are dry.      Comments: Intermittent tracheal rattle  Eyes:      Pupils: Pupils are equal, round, and reactive to light.      Right eye: Pupil is sluggish.      Left eye: Pupil is sluggish.      Comments: Pupils dilated to 5mm, weakly responsive   Cardiovascular:      Rate and Rhythm: Normal rate and regular rhythm.      Pulses: Normal pulses.      Heart sounds: No murmur heard.  Pulmonary:      Effort: Pulmonary effort is normal.      Breath sounds: Normal breath sounds. No wheezing or rales.   Abdominal:      General: Abdomen is flat. There is no distension.      Palpations: Abdomen is soft.       Tenderness: There is abdominal tenderness.   Musculoskeletal:         General: Normal range of motion.      Right lower leg: Edema present.      Left lower leg: Edema present.   Skin:     General: Skin is warm and dry.      Capillary Refill: Capillary refill takes less than 2 seconds.      Coloration: Skin is pale and sallow.   Neurological:      Mental Status: He is lethargic.      GCS: GCS eye subscore is 4. GCS verbal subscore is 1. GCS motor subscore is 1.      Motor: Weakness, atrophy and abnormal muscle tone (hypotonic globally) present.   Psychiatric:         Attention and Perception: He is inattentive.         Speech: He is noncommunicative.       A: Patient's current condition actively dying.   Patient Symptom Assessment Flowsheet has been completed.   Family is in agreement with transition to IP hospice.  Discharge Planning: [facility]st choice, [facility] as fallback; pending bed availability     R: Support offered at this time.   Palliative Care Team will continue to follow patient.     Denny Musa MD  Hospice and Palliative Medicine  Palliative Care Pager: [phone]    Total time spent: 90 minutes  > 50% of 38 minute visit spent in chart review, face to face discussion of symptoms assessment, coordination of care with other specialties, and hospital discharge planning.    52 min ACP time spent: goals of care, emotional support, formulating and communicating prognosis and exploring burden/benefit of various approaches of treatment and various levels of hospice care.    Advance Care Planning     Date: 06/13/2025    [facility]  I engaged the family in a voluntary conversation about advance care planning and we specifically addressed what the goals of care would be moving forward, in light of the patient's change in clinical status, specifically terminal hepatic coma.  We did specifically address the patient's likely prognosis, which is grave.  We explored the patient's values and preferences for future  care.  The family endorses that what is most important right now is to focus on avoiding the hospital, remaining as independent as possible, symptom/pain control, and quality of life, even if it means sacrificing a little time    Accordingly, we have decided that the best plan to meet the patient's goals includes enrolling in hospice care    A total of 52 min was spent on advance care planning, goals of care discussion, emotional support, formulating and communicating prognosis and exploring burden/benefit of various approaches of treatment. This discussion occurred on a fully voluntary basis with the verbal consent of the patient and/or family.     Hospice  I did explain the role for hospice care at this stage of the patient's illness, including its ability to help the patient live with the best quality of life possible.  We will be making a hospice referral.

## 2025-06-13 NOTE — PLAN OF CARE
Pt. AAOx4. Bed in the lowest position. Call light/personal item within reach. Side rails up x2 . Pt. Was taken to IR for a placement of pluerax drain. 1500 L were drained. Pt. Received prn ativan for agitation symptoms. Wife and sister at bedside. Pt. Safety maintain during shift

## 2025-06-13 NOTE — NURSING
Pt safety maintained. Comfort measures observed. Pt NPO. PRN IV medication administered per request. Waffle mattress in place. HOB elevated. Spouse and family member at bedside.

## 2025-06-13 NOTE — SEDATION DOCUMENTATION
Procedure complete, pt tolerated well; vss. Patient alert and oriented x4 unlabored on Room Air. Patient denies pain and is resting comfortably. Surgical site dressed with gauze and tagederm. Dressing is clean, dry, and intact. Patient transported to recovery.

## 2025-06-14 NOTE — ASSESSMENT & PLAN NOTE
AST//110 on admission.    Plan:  - Likely 2/2 adenocarcinoma.  - Avoid hepatotoxic meds.  - Continue to monitor for symptoms.  - Acute Hep Panel, Ethanol, Ammonia, Salicylate, Acetaminophen levels pending for work-up.  - Remaining plan as in abdominal distension.    Comfort Measures, scheduled to transfer to hospice today

## 2025-06-14 NOTE — PLAN OF CARE
Spouse signed admit paperwork and is agreeable with discharge to [facility]patient at [facility]. Floor nurse given report information to call. Pt will be transported with hospital ambulance scheduled for 2:00 pm.      06/14/25 1226   Final Note   Assessment Type Final Discharge Note   Anticipated Discharge Disposition [facility])   What phone number can be called within the next 1-3 days to see how you are doing after discharge? [phone]   Hospital Resources/Appts/Education Provided Appointments scheduled and added to AVS   Post-Acute Status   Post-Acute Authorization Hospice   Hospice Status Set-up Complete/Auth obtained   Discharge Delays None known at this time

## 2025-06-14 NOTE — ASSESSMENT & PLAN NOTE
Plan:  - Continue home medications (omeprazole 40mg).    Comfort Measures, scheduled to transfer to hospice today

## 2025-06-14 NOTE — ASSESSMENT & PLAN NOTE
Plan:  - Continue home medication (trazodone).    Comfort Measures, scheduled to transfer to hospice today

## 2025-06-14 NOTE — SUBJECTIVE & OBJECTIVE
Interval History: Patient required Ativan, Morphine for pain agitation overnight, this AM. Tachy to 116. Does not answer questions, breathing comfortably after pain meds this AM. On comfort measures, has bed at [facility], scheduled to transfer today.    Review of Systems  Does not participate  Objective:     Vital Signs (Most Recent):  Temp: 99.1 °F (37.3 °C) (06/14/25 0732)  Pulse: (!) 116 (06/14/25 0732)  Resp: 16 (06/14/25 0732)  BP: 96/62 (06/14/25 0732)  SpO2: (!) 93 % (06/14/25 0732) Vital Signs (24h Range):  Temp:  [97.2 °F (36.2 °C)-99.1 °F (37.3 °C)] 99.1 °F (37.3 °C)  Pulse:  [103-116] 116  Resp:  [15-23] 16  SpO2:  [93 %-96 %] 93 %  BP: ()/(62-90) 96/62     Weight: 86.8 kg (191 lb 5.8 oz)  Body mass index is 29.1 kg/m².    Intake/Output Summary (Last 24 hours) at 6/14/2025 0956  Last data filed at 6/13/2025 1812  Gross per 24 hour   Intake --   Output 1825 ml   Net -1825 ml         Physical Exam  Constitutional:       Appearance: Normal appearance.   HENT:      Head: Normocephalic and atraumatic.   Cardiovascular:      Rate and Rhythm: Normal rate and regular rhythm.      Pulses: Normal pulses.      Heart sounds: Normal heart sounds. No murmur heard.  Pulmonary:      Effort: Pulmonary effort is normal. No respiratory distress.      Breath sounds: Normal breath sounds. No stridor. No wheezing, rhonchi or rales.   Abdominal:      General: There is distension.      Tenderness: There is no abdominal tenderness.   Musculoskeletal:      Right lower leg: No edema.      Left lower leg: No edema.   Neurological:      Mental Status: He is alert.      Comments: Somnolent               Significant Labs: All pertinent labs within the past 24 hours have been reviewed.    Significant Imaging: I have reviewed all pertinent imaging results/findings within the past 24 hours.

## 2025-06-14 NOTE — ASSESSMENT & PLAN NOTE
Swelling in bilateral lower extremities worsening over the last month.  Albumin 2.1    Plan:  - Likely 2/2 low albumin in setting of adenocarcinoma.  - Echo 6/10: EF 58%. Grade 1 diastolic dysfunction.  - IV diuresis.  - Monitor Strict I's/O's.  Comfort Measures, scheduled to transfer to hospice today

## 2025-06-14 NOTE — ASSESSMENT & PLAN NOTE
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  - Paracentesis with 3.75L removed 6/10. Studies with no indication of SBP.  - Discontinued ceftriaxone for SBP ppx (low likelihood)  - Heme/Onc consulted, appreciate recommendations.  - Palliative Medicine consulted, appreciate recommendations.  - Plan after family discussion is to pursue hospice and comfort care.  - Plan for drain placement 6/13.  - Blood cultures obtained.  - Treat other etiologies such as constipation.  - Continue to monitor symptoms.  - Initiated on spironolactone, furosemide.  - Comfort Care: morphine PRN Comfort Measures, scheduled to transfer to hospice today

## 2025-06-14 NOTE — ASSESSMENT & PLAN NOTE
Plan:  - Continue home medications (lexapro).      Comfort Measures, scheduled to transfer to hospice today

## 2025-06-14 NOTE — NURSING
Family member notified this nurse of pt. Passing away. Dr. Bonner came to bedside and confirmed death time 1348.

## 2025-06-14 NOTE — ASSESSMENT & PLAN NOTE
Plan:  - Continue home medications (lexapro).      Comfort Measures, scheduled to transfer to hospice today   No significant past surgical history

## 2025-06-14 NOTE — PROGRESS NOTES
[facility] Medicine  Progress Note    Patient Name: Dylan aSwant  MRN: [id number]  Patient Class: IP- Inpatient   Admission Date: 6/10/2025  Length of Stay: 3 days  Attending Physician: Serafin Baldwin III, MD  Primary Care Provider: Roland Swenson DO        Subjective     Principal Problem:Abdominal distension        HPI:  Patient is a 65 yo M w/ PMHx of T2DM, GERD, Anxiety, KELLY (CPAP nightly), Restless Leg Syndrome, recently diagnosed with likely liver adenocarcinoma who presents to [facility] with complaints of increasing swelling in his bilateral lower extremities and his abdomen for about 1 month now. He reports recent paracentesis pulling approx 1.5L last week on 6/3 with fluid studies notable suspicious for adenocarinoma. He also endorses generalized abdominal pain, constipation, mild confusion recently. Denies fever, chills, nausea, vomiting. He reports not currently on diuretics or lactulose. Reports compliance with all prescribed medications. Denies current smoking, alcohol, illicit drug use.    In the ED, initial vital signs /83, , Temp 97.9F, SpO2 96% on room air. Labs include CBC with stable H/H 14.3/42.2 and WBC elevated 25.00. CMP with Na 131, K 5.1, Cl 94, and CO2 22 and BUN/Cr 83/2.0 (baseline Cr 1.0). CXR with no acute process. EKG sinus tachycardia. Troponin < 0.006. Initiated on IVF in the ER. [facility] consulted for evaluation for admission for abdominal distension and volume overload.      Overview/Hospital Course:  Patient admitted to [facility] for evaluation and management of abdominal distension and volume overload. IR consulted and removed approx 3.8L of ascites on 6/10 with fluid studies showing no signs of SBP. Ammonia elevated. Initiated on lactulose, rifaximin, spironolactone, and furosemide. 6/12 with increasing abdominal pain and distention including XR abd with concern for ileus. Initiated on NGT and NPO. Heme/Onc consulted and after  discussion with family, decision made to pursue hospice. Palliative medicine consulted. IR recontacted, drain placed 6/13 with removal of approx 1.5L. Pending placement at hospice facility.    Interval History: Patient required Ativan, Morphine for pain agitation overnight, this AM. Tachy to 116. Does not answer questions, breathing comfortably after pain meds this AM. On comfort measures, has bed at [facility], scheduled to transfer today.    Review of Systems  Does not participate  Objective:     Vital Signs (Most Recent):  Temp: 99.1 °F (37.3 °C) (06/14/25 0732)  Pulse: (!) 116 (06/14/25 0732)  Resp: 16 (06/14/25 0732)  BP: 96/62 (06/14/25 0732)  SpO2: (!) 93 % (06/14/25 0732) Vital Signs (24h Range):  Temp:  [97.2 °F (36.2 °C)-99.1 °F (37.3 °C)] 99.1 °F (37.3 °C)  Pulse:  [103-116] 116  Resp:  [15-23] 16  SpO2:  [93 %-96 %] 93 %  BP: ()/(62-90) 96/62     Weight: 86.8 kg (191 lb 5.8 oz)  Body mass index is 29.1 kg/m².    Intake/Output Summary (Last 24 hours) at 6/14/2025 0956  Last data filed at 6/13/2025 1812  Gross per 24 hour   Intake --   Output 1825 ml   Net -1825 ml         Physical Exam  Constitutional:       Appearance: Normal appearance.   HENT:      Head: Normocephalic and atraumatic.   Cardiovascular:      Rate and Rhythm: Normal rate and regular rhythm.      Pulses: Normal pulses.      Heart sounds: Normal heart sounds. No murmur heard.  Pulmonary:      Effort: Pulmonary effort is normal. No respiratory distress.      Breath sounds: Normal breath sounds. No stridor. No wheezing, rhonchi or rales.   Abdominal:      General: There is distension.      Tenderness: There is no abdominal tenderness.   Musculoskeletal:      Right lower leg: No edema.      Left lower leg: No edema.   Neurological:      Mental Status: He is alert.      Comments: Somnolent               Significant Labs: All pertinent labs within the past 24 hours have been reviewed.    Significant Imaging: I have reviewed all  pertinent imaging results/findings within the past 24 hours.      Assessment & Plan  Other ascites  Plan as in abdominal distention.  Lumbago  Plan:  - Continue home medications (gabapentin).  Mixed hyperlipidemia  Plan:  - Continue home medications (atorvastatin).    Anxiety  Plan:  - Continue home medications (lexapro).      Comfort Measures, scheduled to transfer to hospice today  Restless legs  Plan:  - Continue home medication (ropinirole).  GERD without esophagitis  Plan:  - Continue home medications (omeprazole 40mg).    Comfort Measures, scheduled to transfer to hospice today  KELLY (obstructive sleep apnea)  Plan:  - CPAP QHS  Primary insomnia  Plan:  - Continue home medication (trazodone).    Comfort Measures, scheduled to transfer to hospice today  Type 2 diabetes mellitus without complication, without long-term current use of insulin  Last Hgb A1c 7.5 (approx 2 weeks ago).  Home medications: glimepiride 2mg QD, metformin ER 1000mg BID    Plan:  - POCT BG.  - Hold any oral anti-glycemic medications while inpatient.  - LA + SSI PRN while inpatient.  - BG Goal 140-180.  - Will discuss outpatient regimen prior to discharge.  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  - Paracentesis with 3.75L removed 6/10. Studies with no indication of SBP.  - Discontinued ceftriaxone for SBP ppx (low likelihood)  - Heme/Onc consulted, appreciate recommendations.  - Palliative Medicine consulted, appreciate recommendations.  - Plan after family discussion is to pursue hospice and comfort care.  - Plan for drain placement 6/13.  - Blood cultures obtained.  - Treat other etiologies such as constipation.  - Continue to monitor symptoms.  - Initiated on spironolactone, furosemide.  - Comfort Care: morphine PRN Comfort Measures, scheduled to transfer to hospice today  Leg  swelling  Swelling in bilateral lower extremities worsening over the last month.  Albumin 2.1    Plan:  - Likely 2/2 low albumin in setting of adenocarcinoma.  - Echo 6/10: EF 58%. Grade 1 diastolic dysfunction.  - IV diuresis.  - Monitor Strict I's/O's.  Comfort Measures, scheduled to transfer to hospice today  Other constipation  Plan:  - Bowel regimen.  - Monitor symptoms.  Other specified counseling  Patient interested in establishing with Palliative medicine to further discuss GOC, living will, etc.  Full Code at this time.    Plan:  - Palliative Medicine consulted, appreciate recommendations.  - Heme/Onc consulted, appreciate recommendations. Able to provide biopsy results and treatment guidance.  - Plan to pursue hospice.  Transaminitis  AST//110 on admission.    Plan:  - Likely 2/2 adenocarcinoma.  - Avoid hepatotoxic meds.  - Continue to monitor for symptoms.  - Acute Hep Panel, Ethanol, Ammonia, Salicylate, Acetaminophen levels pending for work-up.  - Remaining plan as in abdominal distension.    Comfort Measures, scheduled to transfer to hospice today  Ileus  XR abd 6/12: Dilated gas-filled loops of small bowel measuring up to 40 mm. Air does appear to be present within the distal colon rectum. Differential considerations include ileus versus bowel obstruction.     Plan:  - Patient and family would like to forgo NG Tube placement.  Chronic heart failure with preserved ejection fraction  - Echo 6/10: EF 58%. Grade 1 diastolic dysfunction.    Plan:  - Continue with diuresis.  - Given pursing hospice, hold off on GDMT at this time.  Adenocarcinoma of liver  Plan as in abdominal distension.    Pancreatic mass  Plan as in abdominal distension.    Comfort Measures, scheduled to transfer to hospice today  Hepatic encephalopathy  Plan as in abdominal distension.  VTE Risk Mitigation (From admission, onward)      None            Discharge Planning   SALENA: 6/13/2025     Code Status: DNR   Medical Readiness  for Discharge Date:   Discharge Plan A: Inpatient Hospice                        Bert Vaughn MD  Department of Hospital Medicine   [facility]

## 2025-06-14 NOTE — PLAN OF CARE
Problem: Adult Inpatient Plan of Care  Goal: Optimal Comfort and Wellbeing  Outcome: Progressing     Problem: Wound  Goal: Optimal Pain Control and Function  Outcome: Progressing     Problem: Fall Injury Risk  Goal: Absence of Fall and Fall-Related Injury  Outcome: Progressing     Problem: Palliative Care  Goal: Enhanced Quality of Life  Outcome: Progressing

## 2025-06-14 NOTE — DISCHARGE INSTRUCTIONS
Our goal at [facility] is to always give you outstanding care and exceptional service. You may receive a survey from [employer] by mail, text or e-mail in the next 24-48 hours asking about the care you received with us. The survey should only take 5-10 minutes to complete and is very important to us.     Your feedback provides us with a way to recognize our staff who work tirelessly to provide the best care! Also, your responses help us learn how to improve when your experience was below our aspiration of excellence. We are always looking for ways to improve your stay. We WILL use your feedback to continue making improvements to help us provide the highest quality care. We keep your personal information and feedback confidential. We appreciate your time completing this survey and can't wait to hear from you!!!    We look forward to your continued care with us! Thanks so much for choosing [facility] for your healthcare needs!

## 2025-06-14 NOTE — DISCHARGE SUMMARY
[facility] Medicine  Discharge Summary      Patient Name: Dylan Sawant  MRN: [id number]  THU: [id number]  Patient Class: IP- Inpatient  Admission Date: 6/10/2025  Hospital Length of Stay: 3 days  Discharge Date and Time: 06/14/2025 1:55 PM  Attending Physician: Serafin Baldwin III, MD   Discharging Provider: Hemalatha Bonner MD  Primary Care Provider: Roland Swenson DO    Primary Care Team: Networked reference to record PCT     HPI:   Patient is a 65 yo M w/ PMHx of T2DM, GERD, Anxiety, KELLY (CPAP nightly), Restless Leg Syndrome, recently diagnosed with likely liver adenocarcinoma who presents to [facility] with complaints of increasing swelling in his bilateral lower extremities and his abdomen for about 1 month now. He reports recent paracentesis pulling approx 1.5L last week on 6/3 with fluid studies notable suspicious for adenocarinoma. He also endorses generalized abdominal pain, constipation, mild confusion recently. Denies fever, chills, nausea, vomiting. He reports not currently on diuretics or lactulose. Reports compliance with all prescribed medications. Denies current smoking, alcohol, illicit drug use.    In the ED, initial vital signs /83, , Temp 97.9F, SpO2 96% on room air. Labs include CBC with stable H/H 14.3/42.2 and WBC elevated 25.00. CMP with Na 131, K 5.1, Cl 94, and CO2 22 and BUN/Cr 83/2.0 (baseline Cr 1.0). CXR with no acute process. EKG sinus tachycardia. Troponin < 0.006. Initiated on IVF in the ER. [facility] consulted for evaluation for admission for abdominal distension and volume overload.      * No surgery found *      Hospital Course:   [facility] admitted to [facility] for evaluation and management of abdominal distension and volume overload. Of note, recent diagnosis of adenocarcinoma based on pathology results. IR consulted and removed approx 3.8L of ascites on 6/10 with fluid studies showing no signs of SBP. Ammonia elevated. Initiated on  lactulose, rifaximin, spironolactone, and furosemide.  with increasing abdominal pain and distention including XR abd with concern for ileus. Initiated on NGT and NPO. Heme/Onc consulted and after discussion with family, decision made to pursue hospice. Palliative medicine consulted to assist in goals of care and medication management. IR recontacted, drain placed  with removal of approx 1.5L. Patient's mentation continued to decline, no longer was responding or alert. Transitioned to comfort care with Ativan and Morphine. Patient  on  prior to transport to inpatient hospice. Family at bedside and condolences expressed.    Presented to bedside and noted patient to have the following exam findings:  No spontaneous respirations  No palpable carotid pulse  No heart beat or breath sounds on ausculation  Pupils fixed and dilated bilaterally  Asystole in 3 leads     Time of death: 1:48 PM  Patient's family was at bedside and informed of his death       Goals of Care Treatment Preferences:  Code Status: DNR    Health care agent: Robyn ernie  Health care agent number: [phone]          What is most important right now is to focus on avoiding the hospital, remaining as independent as possible, symptom/pain control, quality of life, even if it means sacrificing a little time.  Accordingly, we have decided that the best plan to meet the patient's goals includes enrolling in hospice care.      SDOH Screening:  The patient was screened for utility difficulties, food insecurity, transport difficulties, housing insecurity, and interpersonal safety and there were no concerns identified this admission.     Consults:   Consults (From admission, onward)          Status Ordering Provider     Inpatient consult to Hematology/Oncology  Once        Provider:  (Not yet assigned)    Completed SIDDHARTHA SALEH     Inpatient consult to Palliative Care  Once        Provider:  (Not yet assigned)    Completed MARGOT ALVARADO      Inpatient consult to Interventional Radiology  Once        Provider:  (Not yet assigned)    Completed MARGOT ALVARADO            Assessment & Plan  Other ascites  Plan as in abdominal distention.  Lumbago  Plan:  - Continue home medications (gabapentin).  Mixed hyperlipidemia  Plan:  - Continue home medications (atorvastatin).    Anxiety  Plan:  - Continue home medications (lexapro).      Comfort Measures, scheduled to transfer to hospice today  Restless legs  Plan:  - Continue home medication (ropinirole).  GERD without esophagitis  Plan:  - Continue home medications (omeprazole 40mg).    Comfort Measures, scheduled to transfer to hospice today  KELLY (obstructive sleep apnea)  Plan:  - CPAP QHS  Primary insomnia  Plan:  - Continue home medication (trazodone).    Comfort Measures, scheduled to transfer to hospice today  Type 2 diabetes mellitus without complication, without long-term current use of insulin  Last Hgb A1c 7.5 (approx 2 weeks ago).  Home medications: glimepiride 2mg QD, metformin ER 1000mg BID    Plan:  - POCT BG.  - Hold any oral anti-glycemic medications while inpatient.  - LA + SSI PRN while inpatient.  - BG Goal 140-180.  - Will discuss outpatient regimen prior to discharge.  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  - Paracentesis with 3.75L removed 6/10. Studies with no indication of SBP.  - Discontinued ceftriaxone for SBP ppx (low likelihood)  - Heme/Onc consulted, appreciate recommendations.  - Palliative Medicine consulted, appreciate recommendations.  - Plan after family discussion is to pursue hospice and comfort care.  - Plan for drain placement 6/13.  - Blood cultures obtained.  - Treat other etiologies such as constipation.  - Continue to monitor symptoms.  - Initiated on spironolactone, furosemide.  - Comfort Care: morphine PRN Comfort  Measures, scheduled to transfer to hospice today  Leg swelling  Swelling in bilateral lower extremities worsening over the last month.  Albumin 2.1    Plan:  - Likely 2/2 low albumin in setting of adenocarcinoma.  - Echo 6/10: EF 58%. Grade 1 diastolic dysfunction.  - IV diuresis.  - Monitor Strict I's/O's.  Comfort Measures, scheduled to transfer to hospice today  Other constipation  Plan:  - Bowel regimen.  - Monitor symptoms.  Other specified counseling  Patient interested in establishing with Palliative medicine to further discuss GOC, living will, etc.  Full Code at this time.    Plan:  - Palliative Medicine consulted, appreciate recommendations.  - Heme/Onc consulted, appreciate recommendations. Able to provide biopsy results and treatment guidance.  - Plan to pursue hospice.  Transaminitis  AST//110 on admission.    Plan:  - Likely 2/2 adenocarcinoma.  - Avoid hepatotoxic meds.  - Continue to monitor for symptoms.  - Acute Hep Panel, Ethanol, Ammonia, Salicylate, Acetaminophen levels pending for work-up.  - Remaining plan as in abdominal distension.    Comfort Measures, scheduled to transfer to hospice today  Ileus  XR abd 6/12: Dilated gas-filled loops of small bowel measuring up to 40 mm. Air does appear to be present within the distal colon rectum. Differential considerations include ileus versus bowel obstruction.     Plan:  - Patient and family would like to forgo NG Tube placement.  Chronic heart failure with preserved ejection fraction  - Echo 6/10: EF 58%. Grade 1 diastolic dysfunction.    Plan:  - Continue with diuresis.  - Given pursing hospice, hold off on GDMT at this time.  Adenocarcinoma of liver  Plan as in abdominal distension.    Pancreatic mass  Plan as in abdominal distension.    Comfort Measures, scheduled to transfer to hospice today  Hepatic encephalopathy  Plan as in abdominal distension.  Final Active Diagnoses:    Diagnosis Date Noted POA    PRINCIPAL PROBLEM:  Abdominal  distension [R14.0] 06/10/2025 Yes    Ileus [K56.7] 2025 Yes    Chronic heart failure with preserved ejection fraction [I50.32] 2025 Yes    Adenocarcinoma of liver [C22.9] 2025 Yes    Pancreatic mass [K86.89] 2025 Yes    Hepatic encephalopathy [K76.82] 2025 Yes    Other ascites [R18.8] 06/10/2025 Yes    Leg swelling [M79.89] 06/10/2025 Yes    Other constipation [K59.09] 06/10/2025 Yes    Other specified counseling [Z71.89] 06/10/2025 Not Applicable    Transaminitis [R74.01] 06/10/2025 Yes    Type 2 diabetes mellitus without complication, without long-term current use of insulin [E11.9] 06/15/2023 Yes    Primary insomnia [F51.01] 2022 Yes    KELLY (obstructive sleep apnea) [G47.33] 2017 Yes    Anxiety [F41.9] 2013 Yes    GERD without esophagitis [K21.9] 2013 Yes    Lumbago [M54.50] 2013 Yes    Mixed hyperlipidemia [E78.2] 2013 Yes    Restless legs [G25.81] 2013 Yes      Problems Resolved During this Admission:       Discharged Condition:     Disposition:     Follow Up:   Follow-up Information       [facility]    Specialties: Home Health Services, Hospice and Palliative Medicine  Why: Hospice  Contact information:  [address] [place] [address] [phone]                           Patient Instructions:      ACCEPT - Ambulatory referral/consult to Cardiology   Standing Status: Future   Referral Priority: Routine Referral Type: Consultation   Referral Reason: Specialty Services Required   Requested Specialty: Cardiology   Number of Visits Requested: 1       Significant Diagnostic Studies: N/A    Pending Diagnostic Studies:       Procedure Component Value Units Date/Time    IR Peritoneal Tunneled Cath without port [[id number]] Resulted: 25 0904    Order Status: Sent Lab Status: In process Updated: 25 1020           Medications:  Reconciled Home Medications:      Medication List        STOP taking  these medications      atorvastatin 40 MG tablet  Commonly known as: LIPITOR     blood sugar diagnostic Strp     blood-glucose meter kit     diclofenac sodium 1 % Gel  Commonly known as: VOLTAREN ARTHRITIS PAIN     docusate sodium 100 MG capsule  Commonly known as: COLACE     ergocalciferol 50,000 unit Cap  Commonly known as: ERGOCALCIFEROL     EScitalopram oxalate 20 MG tablet  Commonly known as: LEXAPRO     fluticasone propionate 50 mcg/actuation nasal spray  Commonly known as: FLONASE     gabapentin 300 MG capsule  Commonly known as: NEURONTIN     glimepiride 2 MG tablet  Commonly known as: AMARYL     HYDROcodone-acetaminophen 5-325 mg per tablet  Commonly known as: NORCO     hyoscyamine 0.125 mg Subl     ipratropium 21 mcg (0.03 %) nasal spray  Commonly known as: ATROVENT     lancets Misc     loratadine 10 mg tablet  Commonly known as: CLARITIN     metFORMIN 500 MG ER 24hr tablet  Commonly known as: GLUCOPHAGE-XR     multivitamin per tablet  Commonly known as: THERAGRAN     omeprazole 40 MG capsule  Commonly known as: PRILOSEC     rOPINIRole 4 MG tablet  Commonly known as: REQUIP     traZODone 150 MG tablet  Commonly known as: DESYREL              Indwelling Lines/Drains at time of discharge:   Lines/Drains/Airways       Drain  Duration                  Closed/Suction Drain 06/13/25 1006 Right RUQ Other (Comment) 15.5 Fr. 1 day                    Time spent on the discharge of patient: 35 minutes    Hemalatha Bonner MD  [facility], PGY-3  06/14/2025

## 2025-06-16 LAB
BACTERIA BLD CULT: NORMAL
BACTERIA BLD CULT: NORMAL

## 2025-06-18 LAB — BACTERIA SPEC ANAEROBE CULT: NORMAL

## 2025-06-26 NOTE — LETTER
May 27, 2025    Dylan Sawant  [address] [place] [address]     [facility] Inter[facility]  [address]  [address]  [facility] [address]  Phone: [phone]  Fax: [phone] Dear Mr. Sawant:  PRE-PROCEDURE INSTRUCTIONS    Your procedure with Interventional Radiology is scheduled for 6/3. Please arrive by 845AM.    You must check-in and receive a wristband before going to your procedure. Your check-in location is [facility].    DO NOT take metformin or glimepiride morning of your procedure.    **Do not eat or drink anything between midnight and the time of your procedure. This includes gum, mints, and candy lemon drops.    **Do not smoke or drink alcoholic beverages 24 hours prior to your procedure.    **If you wear contact lenses, dentures, hearing aids, or glasses, bring a container to put them in during the procedure and give them to a family member for safekeeping.    **If you have been diagnosed with sleep apnea please bring your CPAP machine.    **If your doctor has scheduled you for an overnight stay, bring a small overnight bag with any personal items that you may need.    **Make arrangements in advance for transportation home by a responsible adult. It is not safe to drive a vehicle during the 24 hours following the procedure.    **All [facility] facilities and properties are tobacco free. Smoking is NOT allowed.    PLEASE NOTE: The procedure schedule has many variables which affect the time of your procedure. Family members should be available if your surgery time changes.    If you have any questions about these instructions call Interventional Radiology at [phone] Monday - Friday between 8:00am and 4:00pm or [phone] (ask for interventional radiology resident) for after hours.   Mayela Jose PA-C      No elbow fracture. There is swelling and arthritis. Use th Ace bandage. Avoid doing anything that would aggravate the elbow arm. Use the sling as needed.  If the range of motion is not improving then I would want him to see orthopedics soon.

## 2025-07-02 PROBLEM — N17.9 AKI (ACUTE KIDNEY INJURY): Status: ACTIVE | Noted: 2025-07-02

## 2025-07-02 PROBLEM — C80.1: Status: ACTIVE | Noted: 2025-07-02

## 2025-07-02 PROBLEM — C78.7: Status: ACTIVE | Noted: 2025-07-02

## (undated) DEVICE — SPONGE PATTY SURGICAL .5X3IN

## (undated) DEVICE — HOOD T7 W/ PEEL AWAY LENS

## (undated) DEVICE — KIT PT CARE HANA PROFX SSXT

## (undated) DEVICE — DRAPE THREE-QTR REINF 53X77IN

## (undated) DEVICE — GOWN SMARTGOWN 3XL XLONG

## (undated) DEVICE — DRAPE IOBAN 2 STERI

## (undated) DEVICE — BRUSH SCRUB HIBICLENS 4%

## (undated) DEVICE — PACK DRAPE UNIVERSAL CONVERTOR

## (undated) DEVICE — SUT PROLENE 6-0 P-1 18

## (undated) DEVICE — ELECTRODE NEEDLE 2.8IN

## (undated) DEVICE — GAUZE SPONGE 4X4 12PLY

## (undated) DEVICE — TRAY ENT 4/CS

## (undated) DEVICE — SHEET EENT SPLIT

## (undated) DEVICE — DRAPE C-ARM ELAS CLIP 42X120IN

## (undated) DEVICE — BLADE SAGITTAL 18 X 1.27 X 90M

## (undated) DEVICE — GLOVE BIOGEL PI MICRO SZ 7

## (undated) DEVICE — SUT MONOCRYL 3-0 PS-2 UND

## (undated) DEVICE — COVER LIGHT HANDLE 80/CA

## (undated) DEVICE — COVER CAMERA OPERATING ROOM

## (undated) DEVICE — DRESSING TRANS 4X4 TEGADERM

## (undated) DEVICE — DRESSING AQUACEL AG RBBN 2X45

## (undated) DEVICE — NDL 18GA X1 1/2 REG BEVEL

## (undated) DEVICE — GLOVE BIOGEL SKINSENSE PI 8.0

## (undated) DEVICE — KIT TOTAL HIP HPOFH OMC

## (undated) DEVICE — ELECTRODE REM PLYHSV RETURN 9

## (undated) DEVICE — BNDG COFLEX FOAM LF2 ST 4X5YD

## (undated) DEVICE — SKINMARKER & RULER REGULAR X-F

## (undated) DEVICE — SUT 1 36IN COATED VICRYL UN

## (undated) DEVICE — SUT ETHILON 4-0 PS2 18 BLK

## (undated) DEVICE — Device

## (undated) DEVICE — TOWEL OR XRAY WHITE 17X26IN

## (undated) DEVICE — SUT MONOCYRL 4-0 PS2 UND

## (undated) DEVICE — KIT IRR SUCTION HND PIECE

## (undated) DEVICE — SUT 2/0 36IN COATED VICRYL

## (undated) DEVICE — ALCOHOL 70% ANTISEPTIC ISO 4OZ

## (undated) DEVICE — SOL NACL IRR 1000ML BTL

## (undated) DEVICE — UNDERGLOVES BIOGEL PI SIZE 8.5

## (undated) DEVICE — SPONGE DERMACEA 4X4IN 12PLY

## (undated) DEVICE — CLOTH READYBATH STANDARD WT

## (undated) DEVICE — SYS CLSR DERMABOND PRINEO 22CM

## (undated) DEVICE — SEE MEDLINE ITEM 152622

## (undated) DEVICE — ALCOHOL 70% ISOP RUBBING 4OZ

## (undated) DEVICE — SEE MEDLINE ITEM 157117

## (undated) DEVICE — SEE MEDLINE ITEM 157194

## (undated) DEVICE — PENCIL ROCKER SWITCH 10FT CORD

## (undated) DEVICE — NDL HYPO 27G X 1 1/2

## (undated) DEVICE — SOL BETADINE 5%

## (undated) DEVICE — UNDERGLOVES BIOGEL PI SIZE 7.5

## (undated) DEVICE — BLADE SURGICAL 15C

## (undated) DEVICE — SEE MEDLINE ITEM 157128

## (undated) DEVICE — DRESSING TELFA N ADH 3X8

## (undated) DEVICE — SEE MEDLINE ITEM 152487

## (undated) DEVICE — SEE MEDLINE ITEM 146372

## (undated) DEVICE — TAPE SILK 3IN

## (undated) DEVICE — GOWN ECLIPSE REINF LV4 XLNG XL